# Patient Record
Sex: FEMALE | Race: WHITE | NOT HISPANIC OR LATINO | Employment: OTHER | URBAN - METROPOLITAN AREA
[De-identification: names, ages, dates, MRNs, and addresses within clinical notes are randomized per-mention and may not be internally consistent; named-entity substitution may affect disease eponyms.]

---

## 2017-01-01 ENCOUNTER — OUTSIDE FACILITY SERVICE (OUTPATIENT)
Dept: FAMILY MEDICINE CLINIC | Facility: CLINIC | Age: 82
End: 2017-01-01

## 2017-01-01 ENCOUNTER — HOSPITAL ENCOUNTER (INPATIENT)
Facility: HOSPITAL | Age: 82
LOS: 10 days | Discharge: NURSING, REHAB OR LONG-TERM CARE | End: 2018-01-08
Attending: EMERGENCY MEDICINE | Admitting: INTERNAL MEDICINE

## 2017-01-01 ENCOUNTER — APPOINTMENT (OUTPATIENT)
Dept: GENERAL RADIOLOGY | Facility: HOSPITAL | Age: 82
End: 2017-01-01

## 2017-01-01 DIAGNOSIS — R79.89 ELEVATED LFTS: ICD-10-CM

## 2017-01-01 DIAGNOSIS — E87.6 HYPOKALEMIA: ICD-10-CM

## 2017-01-01 DIAGNOSIS — R77.8 ELEVATED TROPONIN: ICD-10-CM

## 2017-01-01 DIAGNOSIS — I10 HYPERTENSION, UNSPECIFIED TYPE: ICD-10-CM

## 2017-01-01 DIAGNOSIS — N28.9 RENAL INSUFFICIENCY: ICD-10-CM

## 2017-01-01 DIAGNOSIS — J20.9 BRONCHITIS WITH BRONCHOSPASM: Primary | ICD-10-CM

## 2017-01-01 LAB
ALBUMIN SERPL-MCNC: 3.3 G/DL (ref 3.5–5.2)
ALBUMIN/GLOB SERPL: 0.8 G/DL
ALP SERPL-CCNC: 263 U/L (ref 39–117)
ALT SERPL W P-5'-P-CCNC: 47 U/L (ref 1–33)
ANION GAP SERPL CALCULATED.3IONS-SCNC: 12.5 MMOL/L
ANION GAP SERPL CALCULATED.3IONS-SCNC: 12.7 MMOL/L
ANION GAP SERPL CALCULATED.3IONS-SCNC: 15.3 MMOL/L
ANION GAP SERPL CALCULATED.3IONS-SCNC: 16.2 MMOL/L
APTT PPP: 44.7 SECONDS (ref 22.7–35.4)
AST SERPL-CCNC: 39 U/L (ref 1–32)
B PERT DNA SPEC QL NAA+PROBE: NOT DETECTED
BASOPHILS # BLD AUTO: 0.02 10*3/MM3 (ref 0–0.2)
BASOPHILS NFR BLD AUTO: 0.3 % (ref 0–1.5)
BILIRUB SERPL-MCNC: 0.2 MG/DL (ref 0.1–1.2)
BUN BLD-MCNC: 22 MG/DL (ref 8–23)
BUN BLD-MCNC: 23 MG/DL (ref 8–23)
BUN BLD-MCNC: 29 MG/DL (ref 8–23)
BUN BLD-MCNC: 31 MG/DL (ref 8–23)
BUN/CREAT SERPL: 14.6 (ref 7–25)
BUN/CREAT SERPL: 17.8 (ref 7–25)
BUN/CREAT SERPL: 18.8 (ref 7–25)
BUN/CREAT SERPL: 22.1 (ref 7–25)
C PNEUM DNA NPH QL NAA+NON-PROBE: NOT DETECTED
CALCIUM SPEC-SCNC: 8.6 MG/DL (ref 8.2–9.6)
CALCIUM SPEC-SCNC: 8.6 MG/DL (ref 8.2–9.6)
CALCIUM SPEC-SCNC: 8.7 MG/DL (ref 8.2–9.6)
CALCIUM SPEC-SCNC: 9 MG/DL (ref 8.2–9.6)
CHLORIDE SERPL-SCNC: 101 MMOL/L (ref 98–107)
CHLORIDE SERPL-SCNC: 102 MMOL/L (ref 98–107)
CHLORIDE SERPL-SCNC: 106 MMOL/L (ref 98–107)
CHLORIDE SERPL-SCNC: 107 MMOL/L (ref 98–107)
CO2 SERPL-SCNC: 22.3 MMOL/L (ref 22–29)
CO2 SERPL-SCNC: 23.5 MMOL/L (ref 22–29)
CO2 SERPL-SCNC: 23.7 MMOL/L (ref 22–29)
CO2 SERPL-SCNC: 23.8 MMOL/L (ref 22–29)
CREAT BLD-MCNC: 1.29 MG/DL (ref 0.57–1)
CREAT BLD-MCNC: 1.4 MG/DL (ref 0.57–1)
CREAT BLD-MCNC: 1.51 MG/DL (ref 0.57–1)
CREAT BLD-MCNC: 1.54 MG/DL (ref 0.57–1)
D-LACTATE SERPL-SCNC: 1.1 MMOL/L (ref 0.5–2)
DEPRECATED RDW RBC AUTO: 46.2 FL (ref 37–54)
DEPRECATED RDW RBC AUTO: 47.1 FL (ref 37–54)
DEPRECATED RDW RBC AUTO: 48 FL (ref 37–54)
EOSINOPHIL # BLD AUTO: 0.16 10*3/MM3 (ref 0–0.7)
EOSINOPHIL NFR BLD AUTO: 2.7 % (ref 0.3–6.2)
ERYTHROCYTE [DISTWIDTH] IN BLOOD BY AUTOMATED COUNT: 13.5 % (ref 11.7–13)
ERYTHROCYTE [DISTWIDTH] IN BLOOD BY AUTOMATED COUNT: 13.6 % (ref 11.7–13)
ERYTHROCYTE [DISTWIDTH] IN BLOOD BY AUTOMATED COUNT: 13.8 % (ref 11.7–13)
FLUAV AG NPH QL: NEGATIVE
FLUAV H1 2009 PAND RNA NPH QL NAA+PROBE: DETECTED
FLUAV H1 HA GENE NPH QL NAA+PROBE: NOT DETECTED
FLUAV H3 RNA NPH QL NAA+PROBE: NOT DETECTED
FLUAV SUBTYP SPEC NAA+PROBE: NOT DETECTED
FLUBV AG NPH QL IA: NEGATIVE
FLUBV RNA ISLT QL NAA+PROBE: NOT DETECTED
GFR SERPL CREATININE-BSD FRML MDRD: 32 ML/MIN/1.73
GFR SERPL CREATININE-BSD FRML MDRD: 32 ML/MIN/1.73
GFR SERPL CREATININE-BSD FRML MDRD: 35 ML/MIN/1.73
GFR SERPL CREATININE-BSD FRML MDRD: 39 ML/MIN/1.73
GLOBULIN UR ELPH-MCNC: 4.2 GM/DL
GLUCOSE BLD-MCNC: 161 MG/DL (ref 65–99)
GLUCOSE BLD-MCNC: 292 MG/DL (ref 65–99)
GLUCOSE BLD-MCNC: 299 MG/DL (ref 65–99)
GLUCOSE BLD-MCNC: 354 MG/DL (ref 65–99)
GLUCOSE BLDC GLUCOMTR-MCNC: 230 MG/DL (ref 70–130)
GLUCOSE BLDC GLUCOMTR-MCNC: 243 MG/DL (ref 70–130)
GLUCOSE BLDC GLUCOMTR-MCNC: 254 MG/DL (ref 70–130)
GLUCOSE BLDC GLUCOMTR-MCNC: 273 MG/DL (ref 70–130)
GLUCOSE BLDC GLUCOMTR-MCNC: 287 MG/DL (ref 70–130)
GLUCOSE BLDC GLUCOMTR-MCNC: 300 MG/DL (ref 70–130)
GLUCOSE BLDC GLUCOMTR-MCNC: 311 MG/DL (ref 70–130)
GLUCOSE BLDC GLUCOMTR-MCNC: 314 MG/DL (ref 70–130)
GLUCOSE BLDC GLUCOMTR-MCNC: 314 MG/DL (ref 70–130)
GLUCOSE BLDC GLUCOMTR-MCNC: 326 MG/DL (ref 70–130)
GLUCOSE BLDC GLUCOMTR-MCNC: 371 MG/DL (ref 70–130)
GLUCOSE BLDC GLUCOMTR-MCNC: 371 MG/DL (ref 70–130)
GLUCOSE BLDC GLUCOMTR-MCNC: 376 MG/DL (ref 70–130)
HADV DNA SPEC NAA+PROBE: NOT DETECTED
HBA1C MFR BLD: 9 % (ref 4.8–5.6)
HCOV 229E RNA SPEC QL NAA+PROBE: NOT DETECTED
HCOV HKU1 RNA SPEC QL NAA+PROBE: NOT DETECTED
HCOV NL63 RNA SPEC QL NAA+PROBE: NOT DETECTED
HCOV OC43 RNA SPEC QL NAA+PROBE: NOT DETECTED
HCT VFR BLD AUTO: 36.9 % (ref 35.6–45.5)
HCT VFR BLD AUTO: 38 % (ref 35.6–45.5)
HCT VFR BLD AUTO: 43.1 % (ref 35.6–45.5)
HGB BLD-MCNC: 11.2 G/DL (ref 11.9–15.5)
HGB BLD-MCNC: 11.5 G/DL (ref 11.9–15.5)
HGB BLD-MCNC: 13.4 G/DL (ref 11.9–15.5)
HMPV RNA NPH QL NAA+NON-PROBE: NOT DETECTED
HPIV1 RNA SPEC QL NAA+PROBE: NOT DETECTED
HPIV2 RNA SPEC QL NAA+PROBE: NOT DETECTED
HPIV3 RNA NPH QL NAA+PROBE: NOT DETECTED
HPIV4 P GENE NPH QL NAA+PROBE: NOT DETECTED
IMM GRANULOCYTES # BLD: 0 10*3/MM3 (ref 0–0.03)
IMM GRANULOCYTES NFR BLD: 0 % (ref 0–0.5)
INR PPP: 0.98 (ref 0.9–1.1)
LYMPHOCYTES # BLD AUTO: 2.05 10*3/MM3 (ref 0.9–4.8)
LYMPHOCYTES NFR BLD AUTO: 34.9 % (ref 19.6–45.3)
M PNEUMO IGG SER IA-ACNC: NOT DETECTED
MCH RBC QN AUTO: 29 PG (ref 26.9–32)
MCH RBC QN AUTO: 29.1 PG (ref 26.9–32)
MCH RBC QN AUTO: 29.3 PG (ref 26.9–32)
MCHC RBC AUTO-ENTMCNC: 30.3 G/DL (ref 32.4–36.3)
MCHC RBC AUTO-ENTMCNC: 30.4 G/DL (ref 32.4–36.3)
MCHC RBC AUTO-ENTMCNC: 31.1 G/DL (ref 32.4–36.3)
MCV RBC AUTO: 94.3 FL (ref 80.5–98.2)
MCV RBC AUTO: 95.7 FL (ref 80.5–98.2)
MCV RBC AUTO: 95.8 FL (ref 80.5–98.2)
MONOCYTES # BLD AUTO: 0.79 10*3/MM3 (ref 0.2–1.2)
MONOCYTES NFR BLD AUTO: 13.5 % (ref 5–12)
NEUTROPHILS # BLD AUTO: 2.85 10*3/MM3 (ref 1.9–8.1)
NEUTROPHILS NFR BLD AUTO: 48.6 % (ref 42.7–76)
NT-PROBNP SERPL-MCNC: 380.6 PG/ML (ref 0–1800)
PLATELET # BLD AUTO: 170 10*3/MM3 (ref 140–500)
PLATELET # BLD AUTO: 181 10*3/MM3 (ref 140–500)
PLATELET # BLD AUTO: 183 10*3/MM3 (ref 140–500)
PMV BLD AUTO: 11.9 FL (ref 6–12)
PMV BLD AUTO: 12.1 FL (ref 6–12)
PMV BLD AUTO: 12.1 FL (ref 6–12)
POTASSIUM BLD-SCNC: 3.3 MMOL/L (ref 3.5–5.2)
POTASSIUM BLD-SCNC: 3.7 MMOL/L (ref 3.5–5.2)
POTASSIUM BLD-SCNC: 4.2 MMOL/L (ref 3.5–5.2)
POTASSIUM BLD-SCNC: 4.7 MMOL/L (ref 3.5–5.2)
PROT SERPL-MCNC: 7.5 G/DL (ref 6–8.5)
PROTHROMBIN TIME: 12.6 SECONDS (ref 11.7–14.2)
RBC # BLD AUTO: 3.85 10*6/MM3 (ref 3.9–5.2)
RBC # BLD AUTO: 3.97 10*6/MM3 (ref 3.9–5.2)
RBC # BLD AUTO: 4.57 10*6/MM3 (ref 3.9–5.2)
RHINOVIRUS RNA SPEC NAA+PROBE: NOT DETECTED
RSV RNA NPH QL NAA+NON-PROBE: NOT DETECTED
SODIUM BLD-SCNC: 140 MMOL/L (ref 136–145)
SODIUM BLD-SCNC: 142 MMOL/L (ref 136–145)
TROPONIN T SERPL-MCNC: 0.07 NG/ML (ref 0–0.03)
TROPONIN T SERPL-MCNC: 0.1 NG/ML (ref 0–0.03)
VANCOMYCIN SERPL-MCNC: 15.6 MCG/ML (ref 5–40)
VANCOMYCIN SERPL-MCNC: 23.5 MCG/ML (ref 5–40)
WBC NRBC COR # BLD: 5.87 10*3/MM3 (ref 4.5–10.7)
WBC NRBC COR # BLD: 6.59 10*3/MM3 (ref 4.5–10.7)
WBC NRBC COR # BLD: 8.19 10*3/MM3 (ref 4.5–10.7)

## 2017-01-01 PROCEDURE — 25010000002 VANCOMYCIN 10 G RECONSTITUTED SOLUTION: Performed by: INTERNAL MEDICINE

## 2017-01-01 PROCEDURE — 99308 SBSQ NF CARE LOW MDM 20: CPT | Performed by: NURSE PRACTITIONER

## 2017-01-01 PROCEDURE — 63710000001 INSULIN ASPART PER 5 UNITS: Performed by: INTERNAL MEDICINE

## 2017-01-01 PROCEDURE — 36415 COLL VENOUS BLD VENIPUNCTURE: CPT

## 2017-01-01 PROCEDURE — 94640 AIRWAY INHALATION TREATMENT: CPT

## 2017-01-01 PROCEDURE — 94799 UNLISTED PULMONARY SVC/PX: CPT

## 2017-01-01 PROCEDURE — 85730 THROMBOPLASTIN TIME PARTIAL: CPT | Performed by: EMERGENCY MEDICINE

## 2017-01-01 PROCEDURE — 25010000002 ENOXAPARIN PER 10 MG: Performed by: INTERNAL MEDICINE

## 2017-01-01 PROCEDURE — G8980 MOBILITY D/C STATUS: HCPCS

## 2017-01-01 PROCEDURE — 85610 PROTHROMBIN TIME: CPT | Performed by: EMERGENCY MEDICINE

## 2017-01-01 PROCEDURE — 25010000002 METHYLPREDNISOLONE PER 125 MG: Performed by: INTERNAL MEDICINE

## 2017-01-01 PROCEDURE — 85025 COMPLETE CBC W/AUTO DIFF WBC: CPT | Performed by: EMERGENCY MEDICINE

## 2017-01-01 PROCEDURE — 85027 COMPLETE CBC AUTOMATED: CPT | Performed by: INTERNAL MEDICINE

## 2017-01-01 PROCEDURE — 25010000002 METHYLPREDNISOLONE PER 40 MG: Performed by: INTERNAL MEDICINE

## 2017-01-01 PROCEDURE — 82962 GLUCOSE BLOOD TEST: CPT

## 2017-01-01 PROCEDURE — 80202 ASSAY OF VANCOMYCIN: CPT | Performed by: INTERNAL MEDICINE

## 2017-01-01 PROCEDURE — 84484 ASSAY OF TROPONIN QUANT: CPT | Performed by: EMERGENCY MEDICINE

## 2017-01-01 PROCEDURE — 93005 ELECTROCARDIOGRAM TRACING: CPT | Performed by: INTERNAL MEDICINE

## 2017-01-01 PROCEDURE — 25010000002 VANCOMYCIN 10 G RECONSTITUTED SOLUTION: Performed by: EMERGENCY MEDICINE

## 2017-01-01 PROCEDURE — 84484 ASSAY OF TROPONIN QUANT: CPT | Performed by: INTERNAL MEDICINE

## 2017-01-01 PROCEDURE — 93010 ELECTROCARDIOGRAM REPORT: CPT | Performed by: INTERNAL MEDICINE

## 2017-01-01 PROCEDURE — 87581 M.PNEUMON DNA AMP PROBE: CPT | Performed by: INTERNAL MEDICINE

## 2017-01-01 PROCEDURE — G8978 MOBILITY CURRENT STATUS: HCPCS

## 2017-01-01 PROCEDURE — 93005 ELECTROCARDIOGRAM TRACING: CPT | Performed by: EMERGENCY MEDICINE

## 2017-01-01 PROCEDURE — 87486 CHLMYD PNEUM DNA AMP PROBE: CPT | Performed by: INTERNAL MEDICINE

## 2017-01-01 PROCEDURE — 71020 HC CHEST PA AND LATERAL: CPT

## 2017-01-01 PROCEDURE — 87804 INFLUENZA ASSAY W/OPTIC: CPT | Performed by: EMERGENCY MEDICINE

## 2017-01-01 PROCEDURE — 83036 HEMOGLOBIN GLYCOSYLATED A1C: CPT | Performed by: INTERNAL MEDICINE

## 2017-01-01 PROCEDURE — 87081 CULTURE SCREEN ONLY: CPT | Performed by: INTERNAL MEDICINE

## 2017-01-01 PROCEDURE — 99308 SBSQ NF CARE LOW MDM 20: CPT | Performed by: FAMILY MEDICINE

## 2017-01-01 PROCEDURE — 63710000001 INSULIN DETEMER PER 5 UNITS: Performed by: INTERNAL MEDICINE

## 2017-01-01 PROCEDURE — 25010000002 PIPERACILLIN SOD-TAZOBACTAM PER 1 G: Performed by: EMERGENCY MEDICINE

## 2017-01-01 PROCEDURE — 36415 COLL VENOUS BLD VENIPUNCTURE: CPT | Performed by: EMERGENCY MEDICINE

## 2017-01-01 PROCEDURE — 80053 COMPREHEN METABOLIC PANEL: CPT | Performed by: EMERGENCY MEDICINE

## 2017-01-01 PROCEDURE — 87798 DETECT AGENT NOS DNA AMP: CPT | Performed by: INTERNAL MEDICINE

## 2017-01-01 PROCEDURE — 25010000002 PIPERACILLIN SOD-TAZOBACTAM PER 1 G: Performed by: INTERNAL MEDICINE

## 2017-01-01 PROCEDURE — G0439 PPPS, SUBSEQ VISIT: HCPCS | Performed by: NURSE PRACTITIONER

## 2017-01-01 PROCEDURE — 25010000002 FUROSEMIDE PER 20 MG: Performed by: EMERGENCY MEDICINE

## 2017-01-01 PROCEDURE — G8979 MOBILITY GOAL STATUS: HCPCS

## 2017-01-01 PROCEDURE — 99307 SBSQ NF CARE SF MDM 10: CPT | Performed by: FAMILY MEDICINE

## 2017-01-01 PROCEDURE — 97162 PT EVAL MOD COMPLEX 30 MIN: CPT

## 2017-01-01 PROCEDURE — 83880 ASSAY OF NATRIURETIC PEPTIDE: CPT | Performed by: EMERGENCY MEDICINE

## 2017-01-01 PROCEDURE — 80048 BASIC METABOLIC PNL TOTAL CA: CPT | Performed by: INTERNAL MEDICINE

## 2017-01-01 PROCEDURE — 99285 EMERGENCY DEPT VISIT HI MDM: CPT

## 2017-01-01 PROCEDURE — 25010000002 METHYLPREDNISOLONE PER 125 MG: Performed by: EMERGENCY MEDICINE

## 2017-01-01 PROCEDURE — 83605 ASSAY OF LACTIC ACID: CPT | Performed by: EMERGENCY MEDICINE

## 2017-01-01 PROCEDURE — 87633 RESP VIRUS 12-25 TARGETS: CPT | Performed by: INTERNAL MEDICINE

## 2017-01-01 PROCEDURE — 63710000001 PREDNISONE PER 5 MG: Performed by: INTERNAL MEDICINE

## 2017-01-01 RX ORDER — IPRATROPIUM BROMIDE AND ALBUTEROL SULFATE 2.5; .5 MG/3ML; MG/3ML
3 SOLUTION RESPIRATORY (INHALATION) EVERY 4 HOURS PRN
COMMUNITY
End: 2018-01-01 | Stop reason: SDUPTHER

## 2017-01-01 RX ORDER — IPRATROPIUM BROMIDE AND ALBUTEROL SULFATE 2.5; .5 MG/3ML; MG/3ML
3 SOLUTION RESPIRATORY (INHALATION) EVERY 4 HOURS PRN
Status: DISCONTINUED | OUTPATIENT
Start: 2017-01-01 | End: 2018-01-01 | Stop reason: HOSPADM

## 2017-01-01 RX ORDER — POLYETHYLENE GLYCOL 3350 17 G/17G
17 POWDER, FOR SOLUTION ORAL DAILY PRN
Status: DISCONTINUED | OUTPATIENT
Start: 2017-01-01 | End: 2018-01-01 | Stop reason: HOSPADM

## 2017-01-01 RX ORDER — PREDNISONE 20 MG/1
20 TABLET ORAL 2 TIMES DAILY WITH MEALS
Status: DISCONTINUED | OUTPATIENT
Start: 2017-01-01 | End: 2017-01-01

## 2017-01-01 RX ORDER — METHYLPREDNISOLONE SODIUM SUCCINATE 40 MG/ML
40 INJECTION, POWDER, LYOPHILIZED, FOR SOLUTION INTRAMUSCULAR; INTRAVENOUS EVERY 12 HOURS
Status: DISCONTINUED | OUTPATIENT
Start: 2017-01-01 | End: 2017-01-01

## 2017-01-01 RX ORDER — FUROSEMIDE 10 MG/ML
40 INJECTION INTRAMUSCULAR; INTRAVENOUS ONCE
Status: COMPLETED | OUTPATIENT
Start: 2017-01-01 | End: 2017-01-01

## 2017-01-01 RX ORDER — ATORVASTATIN CALCIUM 10 MG/1
10 TABLET, FILM COATED ORAL NIGHTLY
Status: DISCONTINUED | OUTPATIENT
Start: 2017-01-01 | End: 2018-01-01 | Stop reason: HOSPADM

## 2017-01-01 RX ORDER — METOCLOPRAMIDE 5 MG/1
5 TABLET ORAL
Status: DISCONTINUED | OUTPATIENT
Start: 2017-01-01 | End: 2018-01-01 | Stop reason: HOSPADM

## 2017-01-01 RX ORDER — ROPINIROLE 0.25 MG/1
0.25 TABLET, FILM COATED ORAL NIGHTLY
COMMUNITY

## 2017-01-01 RX ORDER — IPRATROPIUM BROMIDE AND ALBUTEROL SULFATE 2.5; .5 MG/3ML; MG/3ML
3 SOLUTION RESPIRATORY (INHALATION)
Status: DISCONTINUED | OUTPATIENT
Start: 2017-01-01 | End: 2018-01-01

## 2017-01-01 RX ORDER — POLYETHYLENE GLYCOL 3350 17 G/17G
17 POWDER, FOR SOLUTION ORAL DAILY PRN
COMMUNITY

## 2017-01-01 RX ORDER — METOPROLOL SUCCINATE 50 MG/1
50 TABLET, EXTENDED RELEASE ORAL DAILY
Status: DISCONTINUED | OUTPATIENT
Start: 2017-01-01 | End: 2018-01-01 | Stop reason: HOSPADM

## 2017-01-01 RX ORDER — AMLODIPINE BESYLATE 5 MG/1
5 TABLET ORAL DAILY
Status: DISCONTINUED | OUTPATIENT
Start: 2017-01-01 | End: 2018-01-01

## 2017-01-01 RX ORDER — ASPIRIN 325 MG
325 TABLET ORAL ONCE
Status: COMPLETED | OUTPATIENT
Start: 2017-01-01 | End: 2017-01-01

## 2017-01-01 RX ORDER — DEXTROSE MONOHYDRATE 25 G/50ML
25 INJECTION, SOLUTION INTRAVENOUS
Status: DISCONTINUED | OUTPATIENT
Start: 2017-01-01 | End: 2018-01-01 | Stop reason: HOSPADM

## 2017-01-01 RX ORDER — PROMETHAZINE HYDROCHLORIDE 12.5 MG/1
12.5 TABLET ORAL EVERY 6 HOURS PRN
Status: DISCONTINUED | OUTPATIENT
Start: 2017-01-01 | End: 2018-01-01 | Stop reason: HOSPADM

## 2017-01-01 RX ORDER — ALBUTEROL SULFATE 2.5 MG/3ML
2.5 SOLUTION RESPIRATORY (INHALATION)
Status: COMPLETED | OUTPATIENT
Start: 2017-01-01 | End: 2017-01-01

## 2017-01-01 RX ORDER — IRON ASPGLY,PS/C/B12/FA/CA/SUC 150-25-1
1 CAPSULE ORAL
Status: DISCONTINUED | OUTPATIENT
Start: 2017-01-01 | End: 2018-01-01 | Stop reason: HOSPADM

## 2017-01-01 RX ORDER — SODIUM CHLORIDE 9 MG/ML
100 INJECTION, SOLUTION INTRAVENOUS CONTINUOUS
Status: DISCONTINUED | OUTPATIENT
Start: 2017-01-01 | End: 2017-01-01

## 2017-01-01 RX ORDER — ISOSORBIDE MONONITRATE 30 MG/1
30 TABLET, EXTENDED RELEASE ORAL DAILY
Status: DISCONTINUED | OUTPATIENT
Start: 2017-01-01 | End: 2018-01-01 | Stop reason: HOSPADM

## 2017-01-01 RX ORDER — METHYLPREDNISOLONE SODIUM SUCCINATE 125 MG/2ML
60 INJECTION, POWDER, LYOPHILIZED, FOR SOLUTION INTRAMUSCULAR; INTRAVENOUS EVERY 8 HOURS
Status: DISCONTINUED | OUTPATIENT
Start: 2017-01-01 | End: 2017-01-01

## 2017-01-01 RX ORDER — NITROGLYCERIN 0.4 MG/1
0.4 TABLET SUBLINGUAL
Status: DISCONTINUED | OUTPATIENT
Start: 2017-01-01 | End: 2018-01-01 | Stop reason: HOSPADM

## 2017-01-01 RX ORDER — LEVOTHYROXINE SODIUM 0.1 MG/1
100 TABLET ORAL EVERY MORNING
Status: DISCONTINUED | OUTPATIENT
Start: 2017-01-01 | End: 2018-01-01 | Stop reason: HOSPADM

## 2017-01-01 RX ORDER — OSELTAMIVIR PHOSPHATE 75 MG/1
75 CAPSULE ORAL EVERY 12 HOURS SCHEDULED
Status: DISCONTINUED | OUTPATIENT
Start: 2017-01-01 | End: 2017-01-01

## 2017-01-01 RX ORDER — ECHINACEA PURPUREA EXTRACT 125 MG
2 TABLET ORAL AS NEEDED
Status: DISCONTINUED | OUTPATIENT
Start: 2017-01-01 | End: 2018-01-01 | Stop reason: HOSPADM

## 2017-01-01 RX ORDER — METOPROLOL SUCCINATE 50 MG/1
50 TABLET, EXTENDED RELEASE ORAL DAILY
COMMUNITY

## 2017-01-01 RX ORDER — PANTOPRAZOLE SODIUM 40 MG/1
40 TABLET, DELAYED RELEASE ORAL EVERY MORNING
Status: DISCONTINUED | OUTPATIENT
Start: 2017-01-01 | End: 2018-01-01 | Stop reason: HOSPADM

## 2017-01-01 RX ORDER — ASPIRIN 81 MG/1
81 TABLET, CHEWABLE ORAL DAILY
Status: DISCONTINUED | OUTPATIENT
Start: 2017-01-01 | End: 2018-01-01 | Stop reason: HOSPADM

## 2017-01-01 RX ORDER — OSELTAMIVIR PHOSPHATE 30 MG/1
30 CAPSULE ORAL
Status: DISCONTINUED | OUTPATIENT
Start: 2017-01-01 | End: 2018-01-01 | Stop reason: DRUGHIGH

## 2017-01-01 RX ORDER — TROLAMINE SALICYLATE 10 G/100G
1 CREAM TOPICAL 2 TIMES DAILY
COMMUNITY
End: 2018-01-01

## 2017-01-01 RX ORDER — METHYLPREDNISOLONE SODIUM SUCCINATE 125 MG/2ML
125 INJECTION, POWDER, LYOPHILIZED, FOR SOLUTION INTRAMUSCULAR; INTRAVENOUS ONCE
Status: COMPLETED | OUTPATIENT
Start: 2017-01-01 | End: 2017-01-01

## 2017-01-01 RX ORDER — ROPINIROLE 0.25 MG/1
0.25 TABLET, FILM COATED ORAL NIGHTLY
Status: DISCONTINUED | OUTPATIENT
Start: 2017-01-01 | End: 2018-01-01 | Stop reason: HOSPADM

## 2017-01-01 RX ORDER — IPRATROPIUM BROMIDE AND ALBUTEROL SULFATE 2.5; .5 MG/3ML; MG/3ML
3 SOLUTION RESPIRATORY (INHALATION) ONCE
Status: COMPLETED | OUTPATIENT
Start: 2017-01-01 | End: 2017-01-01

## 2017-01-01 RX ORDER — HYDROCODONE BITARTRATE AND ACETAMINOPHEN 5; 325 MG/1; MG/1
1 TABLET ORAL EVERY 4 HOURS PRN
Status: DISCONTINUED | OUTPATIENT
Start: 2017-01-01 | End: 2018-01-01 | Stop reason: HOSPADM

## 2017-01-01 RX ORDER — NICOTINE POLACRILEX 4 MG
15 LOZENGE BUCCAL
Status: DISCONTINUED | OUTPATIENT
Start: 2017-01-01 | End: 2018-01-01 | Stop reason: HOSPADM

## 2017-01-01 RX ORDER — CLOPIDOGREL BISULFATE 75 MG/1
75 TABLET ORAL DAILY
Status: DISCONTINUED | OUTPATIENT
Start: 2017-01-01 | End: 2018-01-01 | Stop reason: HOSPADM

## 2017-01-01 RX ORDER — ASPIRIN 300 MG/1
300 SUPPOSITORY RECTAL ONCE
Status: DISCONTINUED | OUTPATIENT
Start: 2017-01-01 | End: 2017-01-01

## 2017-01-01 RX ORDER — POTASSIUM CHLORIDE 750 MG/1
40 CAPSULE, EXTENDED RELEASE ORAL ONCE
Status: COMPLETED | OUTPATIENT
Start: 2017-01-01 | End: 2017-01-01

## 2017-01-01 RX ORDER — ACETAMINOPHEN 325 MG/1
650 TABLET ORAL EVERY 6 HOURS PRN
Status: DISCONTINUED | OUTPATIENT
Start: 2017-01-01 | End: 2018-01-01 | Stop reason: HOSPADM

## 2017-01-01 RX ORDER — PREDNISONE 10 MG/1
10 TABLET ORAL 2 TIMES DAILY WITH MEALS
Status: DISCONTINUED | OUTPATIENT
Start: 2017-01-01 | End: 2018-01-01

## 2017-01-01 RX ADMIN — VANCOMYCIN HYDROCHLORIDE 1250 MG: 10 INJECTION, POWDER, LYOPHILIZED, FOR SOLUTION INTRAVENOUS at 01:32

## 2017-01-01 RX ADMIN — INSULIN ASPART 6 UNITS: 100 INJECTION, SOLUTION INTRAVENOUS; SUBCUTANEOUS at 17:35

## 2017-01-01 RX ADMIN — OSELTAMIVIR PHOSPHATE 75 MG: 75 CAPSULE ORAL at 01:31

## 2017-01-01 RX ADMIN — SODIUM CHLORIDE 100 ML/HR: 9 INJECTION, SOLUTION INTRAVENOUS at 05:24

## 2017-01-01 RX ADMIN — LEVOTHYROXINE SODIUM 100 MCG: 100 TABLET ORAL at 07:37

## 2017-01-01 RX ADMIN — INSULIN ASPART 4 UNITS: 100 INJECTION, SOLUTION INTRAVENOUS; SUBCUTANEOUS at 08:36

## 2017-01-01 RX ADMIN — METOCLOPRAMIDE 5 MG: 5 TABLET ORAL at 17:35

## 2017-01-01 RX ADMIN — CLOPIDOGREL 75 MG: 75 TABLET, FILM COATED ORAL at 08:29

## 2017-01-01 RX ADMIN — TAZOBACTAM SODIUM AND PIPERACILLIN SODIUM 3.38 G: 375; 3 INJECTION, SOLUTION INTRAVENOUS at 06:06

## 2017-01-01 RX ADMIN — OSELTAMIVIR PHOSPHATE 75 MG: 75 CAPSULE ORAL at 09:13

## 2017-01-01 RX ADMIN — LINAGLIPTIN 5 MG: 5 TABLET, FILM COATED ORAL at 09:12

## 2017-01-01 RX ADMIN — INSULIN ASPART 5 UNITS: 100 INJECTION, SOLUTION INTRAVENOUS; SUBCUTANEOUS at 11:42

## 2017-01-01 RX ADMIN — METOCLOPRAMIDE 5 MG: 5 TABLET ORAL at 11:36

## 2017-01-01 RX ADMIN — ATORVASTATIN CALCIUM 10 MG: 10 TABLET, FILM COATED ORAL at 20:31

## 2017-01-01 RX ADMIN — IPRATROPIUM BROMIDE AND ALBUTEROL SULFATE 3 ML: .5; 3 SOLUTION RESPIRATORY (INHALATION) at 23:26

## 2017-01-01 RX ADMIN — INSULIN ASPART 5 UNITS: 100 INJECTION, SOLUTION INTRAVENOUS; SUBCUTANEOUS at 11:35

## 2017-01-01 RX ADMIN — TAZOBACTAM SODIUM AND PIPERACILLIN SODIUM 3.38 G: 375; 3 INJECTION, SOLUTION INTRAVENOUS at 00:35

## 2017-01-01 RX ADMIN — AMLODIPINE BESYLATE 5 MG: 5 TABLET ORAL at 08:29

## 2017-01-01 RX ADMIN — ROPINIROLE 0.25 MG: 0.25 TABLET, FILM COATED ORAL at 20:31

## 2017-01-01 RX ADMIN — ATORVASTATIN CALCIUM 10 MG: 10 TABLET, FILM COATED ORAL at 20:12

## 2017-01-01 RX ADMIN — DOCUSATE SODIUM 250 MG: 50 CAPSULE, LIQUID FILLED ORAL at 08:35

## 2017-01-01 RX ADMIN — DOCUSATE SODIUM 250 MG: 50 CAPSULE, LIQUID FILLED ORAL at 09:13

## 2017-01-01 RX ADMIN — METOCLOPRAMIDE 5 MG: 5 TABLET ORAL at 20:12

## 2017-01-01 RX ADMIN — INSULIN ASPART 3 UNITS: 100 INJECTION, SOLUTION INTRAVENOUS; SUBCUTANEOUS at 20:30

## 2017-01-01 RX ADMIN — METOPROLOL SUCCINATE 50 MG: 50 TABLET, FILM COATED, EXTENDED RELEASE ORAL at 08:29

## 2017-01-01 RX ADMIN — ASPIRIN 81 MG: 81 TABLET, CHEWABLE ORAL at 09:13

## 2017-01-01 RX ADMIN — METOCLOPRAMIDE 5 MG: 5 TABLET ORAL at 07:37

## 2017-01-01 RX ADMIN — ASPIRIN 81 MG: 81 TABLET, CHEWABLE ORAL at 08:29

## 2017-01-01 RX ADMIN — ATORVASTATIN CALCIUM 10 MG: 10 TABLET, FILM COATED ORAL at 22:00

## 2017-01-01 RX ADMIN — IPRATROPIUM BROMIDE AND ALBUTEROL SULFATE 3 ML: .5; 3 SOLUTION RESPIRATORY (INHALATION) at 07:24

## 2017-01-01 RX ADMIN — INSULIN DETEMIR 25 UNITS: 100 INJECTION, SOLUTION SUBCUTANEOUS at 21:18

## 2017-01-01 RX ADMIN — ALBUTEROL SULFATE 2.5 MG: 2.5 SOLUTION RESPIRATORY (INHALATION) at 00:14

## 2017-01-01 RX ADMIN — Medication 1 CAPSULE: at 09:14

## 2017-01-01 RX ADMIN — OSELTAMIVIR PHOSPHATE 30 MG: 30 CAPSULE ORAL at 08:29

## 2017-01-01 RX ADMIN — Medication 1 CAPSULE: at 08:35

## 2017-01-01 RX ADMIN — LEVOTHYROXINE SODIUM 100 MCG: 100 TABLET ORAL at 06:58

## 2017-01-01 RX ADMIN — IPRATROPIUM BROMIDE AND ALBUTEROL SULFATE 3 ML: .5; 3 SOLUTION RESPIRATORY (INHALATION) at 15:17

## 2017-01-01 RX ADMIN — CLOPIDOGREL 75 MG: 75 TABLET, FILM COATED ORAL at 10:18

## 2017-01-01 RX ADMIN — PANTOPRAZOLE SODIUM 40 MG: 40 TABLET, DELAYED RELEASE ORAL at 07:37

## 2017-01-01 RX ADMIN — DOCUSATE SODIUM 250 MG: 50 CAPSULE, LIQUID FILLED ORAL at 10:23

## 2017-01-01 RX ADMIN — INSULIN ASPART 5 UNITS: 100 INJECTION, SOLUTION INTRAVENOUS; SUBCUTANEOUS at 22:01

## 2017-01-01 RX ADMIN — PANTOPRAZOLE SODIUM 40 MG: 40 TABLET, DELAYED RELEASE ORAL at 11:35

## 2017-01-01 RX ADMIN — IPRATROPIUM BROMIDE AND ALBUTEROL SULFATE 3 ML: .5; 3 SOLUTION RESPIRATORY (INHALATION) at 21:18

## 2017-01-01 RX ADMIN — LINAGLIPTIN 5 MG: 5 TABLET, FILM COATED ORAL at 08:29

## 2017-01-01 RX ADMIN — METOCLOPRAMIDE 5 MG: 5 TABLET ORAL at 20:31

## 2017-01-01 RX ADMIN — PREDNISONE 10 MG: 10 TABLET ORAL at 17:27

## 2017-01-01 RX ADMIN — PANTOPRAZOLE SODIUM 40 MG: 40 TABLET, DELAYED RELEASE ORAL at 06:58

## 2017-01-01 RX ADMIN — ENOXAPARIN SODIUM 30 MG: 30 INJECTION SUBCUTANEOUS at 11:35

## 2017-01-01 RX ADMIN — IPRATROPIUM BROMIDE AND ALBUTEROL SULFATE 3 ML: .5; 3 SOLUTION RESPIRATORY (INHALATION) at 16:31

## 2017-01-01 RX ADMIN — METHYLPREDNISOLONE SODIUM SUCCINATE 40 MG: 125 INJECTION, POWDER, FOR SOLUTION INTRAMUSCULAR; INTRAVENOUS at 20:15

## 2017-01-01 RX ADMIN — METOCLOPRAMIDE 5 MG: 5 TABLET ORAL at 22:00

## 2017-01-01 RX ADMIN — IPRATROPIUM BROMIDE AND ALBUTEROL SULFATE 3 ML: .5; 3 SOLUTION RESPIRATORY (INHALATION) at 15:14

## 2017-01-01 RX ADMIN — METOCLOPRAMIDE 5 MG: 5 TABLET ORAL at 17:46

## 2017-01-01 RX ADMIN — INSULIN ASPART 5 UNITS: 100 INJECTION, SOLUTION INTRAVENOUS; SUBCUTANEOUS at 09:13

## 2017-01-01 RX ADMIN — CLOPIDOGREL 75 MG: 75 TABLET, FILM COATED ORAL at 09:13

## 2017-01-01 RX ADMIN — ACETAMINOPHEN 325 MG: 325 TABLET ORAL at 20:47

## 2017-01-01 RX ADMIN — LEVOTHYROXINE SODIUM 100 MCG: 100 TABLET ORAL at 10:19

## 2017-01-01 RX ADMIN — VANCOMYCIN HYDROCHLORIDE 1500 MG: 10 INJECTION, POWDER, LYOPHILIZED, FOR SOLUTION INTRAVENOUS at 03:28

## 2017-01-01 RX ADMIN — INSULIN DETEMIR 15 UNITS: 100 INJECTION, SOLUTION SUBCUTANEOUS at 01:32

## 2017-01-01 RX ADMIN — AMLODIPINE BESYLATE 5 MG: 5 TABLET ORAL at 10:20

## 2017-01-01 RX ADMIN — SODIUM CHLORIDE 100 ML/HR: 9 INJECTION, SOLUTION INTRAVENOUS at 23:47

## 2017-01-01 RX ADMIN — METOPROLOL SUCCINATE 50 MG: 50 TABLET, FILM COATED, EXTENDED RELEASE ORAL at 09:12

## 2017-01-01 RX ADMIN — ENOXAPARIN SODIUM 30 MG: 30 INJECTION SUBCUTANEOUS at 09:13

## 2017-01-01 RX ADMIN — INSULIN ASPART 4 UNITS: 100 INJECTION, SOLUTION INTRAVENOUS; SUBCUTANEOUS at 17:26

## 2017-01-01 RX ADMIN — METHYLPREDNISOLONE SODIUM SUCCINATE 60 MG: 125 INJECTION, POWDER, FOR SOLUTION INTRAMUSCULAR; INTRAVENOUS at 00:34

## 2017-01-01 RX ADMIN — ALBUTEROL SULFATE 2.5 MG: 2.5 SOLUTION RESPIRATORY (INHALATION) at 23:31

## 2017-01-01 RX ADMIN — IPRATROPIUM BROMIDE AND ALBUTEROL SULFATE 3 ML: .5; 3 SOLUTION RESPIRATORY (INHALATION) at 09:07

## 2017-01-01 RX ADMIN — AMLODIPINE BESYLATE 5 MG: 5 TABLET ORAL at 09:13

## 2017-01-01 RX ADMIN — METOCLOPRAMIDE 5 MG: 5 TABLET ORAL at 06:58

## 2017-01-01 RX ADMIN — NITROGLYCERIN 1 INCH: 20 OINTMENT TOPICAL at 00:35

## 2017-01-01 RX ADMIN — Medication 1 CAPSULE: at 11:36

## 2017-01-01 RX ADMIN — INSULIN ASPART 6 UNITS: 100 INJECTION, SOLUTION INTRAVENOUS; SUBCUTANEOUS at 21:17

## 2017-01-01 RX ADMIN — METOCLOPRAMIDE 5 MG: 5 TABLET ORAL at 17:27

## 2017-01-01 RX ADMIN — INSULIN ASPART 6 UNITS: 100 INJECTION, SOLUTION INTRAVENOUS; SUBCUTANEOUS at 12:47

## 2017-01-01 RX ADMIN — IPRATROPIUM BROMIDE AND ALBUTEROL SULFATE 3 ML: .5; 3 SOLUTION RESPIRATORY (INHALATION) at 12:49

## 2017-01-01 RX ADMIN — ENOXAPARIN SODIUM 30 MG: 30 INJECTION SUBCUTANEOUS at 08:29

## 2017-01-01 RX ADMIN — IPRATROPIUM BROMIDE AND ALBUTEROL SULFATE 3 ML: .5; 3 SOLUTION RESPIRATORY (INHALATION) at 22:25

## 2017-01-01 RX ADMIN — METOCLOPRAMIDE 5 MG: 5 TABLET ORAL at 11:42

## 2017-01-01 RX ADMIN — METOCLOPRAMIDE 5 MG: 5 TABLET ORAL at 12:50

## 2017-01-01 RX ADMIN — IPRATROPIUM BROMIDE AND ALBUTEROL SULFATE 3 ML: .5; 3 SOLUTION RESPIRATORY (INHALATION) at 07:05

## 2017-01-01 RX ADMIN — METHYLPREDNISOLONE SODIUM SUCCINATE 40 MG: 125 INJECTION, POWDER, FOR SOLUTION INTRAMUSCULAR; INTRAVENOUS at 08:29

## 2017-01-01 RX ADMIN — INSULIN DETEMIR 25 UNITS: 100 INJECTION, SOLUTION SUBCUTANEOUS at 20:30

## 2017-01-01 RX ADMIN — IPRATROPIUM BROMIDE AND ALBUTEROL SULFATE 3 ML: .5; 3 SOLUTION RESPIRATORY (INHALATION) at 11:49

## 2017-01-01 RX ADMIN — LINAGLIPTIN 5 MG: 5 TABLET, FILM COATED ORAL at 10:19

## 2017-01-01 RX ADMIN — POTASSIUM CHLORIDE 40 MEQ: 750 CAPSULE, EXTENDED RELEASE ORAL at 11:35

## 2017-01-01 RX ADMIN — METHYLPREDNISOLONE SODIUM SUCCINATE 125 MG: 125 INJECTION, POWDER, FOR SOLUTION INTRAMUSCULAR; INTRAVENOUS at 23:47

## 2017-01-01 RX ADMIN — ISOSORBIDE MONONITRATE 30 MG: 30 TABLET ORAL at 10:19

## 2017-01-01 RX ADMIN — ISOSORBIDE MONONITRATE 30 MG: 30 TABLET ORAL at 09:13

## 2017-01-01 RX ADMIN — METHYLPREDNISOLONE SODIUM SUCCINATE 60 MG: 125 INJECTION, POWDER, FOR SOLUTION INTRAMUSCULAR; INTRAVENOUS at 09:14

## 2017-01-01 RX ADMIN — ASPIRIN 81 MG: 81 TABLET, CHEWABLE ORAL at 10:20

## 2017-01-01 RX ADMIN — IPRATROPIUM BROMIDE AND ALBUTEROL SULFATE 3 ML: .5; 3 SOLUTION RESPIRATORY (INHALATION) at 11:38

## 2017-01-01 RX ADMIN — INSULIN ASPART 5 UNITS: 100 INJECTION, SOLUTION INTRAVENOUS; SUBCUTANEOUS at 17:46

## 2017-01-01 RX ADMIN — ROPINIROLE 0.25 MG: 0.25 TABLET, FILM COATED ORAL at 20:12

## 2017-01-01 RX ADMIN — METOPROLOL SUCCINATE 50 MG: 50 TABLET, FILM COATED, EXTENDED RELEASE ORAL at 10:19

## 2017-01-01 RX ADMIN — ISOSORBIDE MONONITRATE 30 MG: 30 TABLET ORAL at 08:29

## 2017-01-01 RX ADMIN — ASPIRIN 325 MG: 325 TABLET ORAL at 00:48

## 2017-01-01 RX ADMIN — IPRATROPIUM BROMIDE AND ALBUTEROL SULFATE 3 ML: .5; 3 SOLUTION RESPIRATORY (INHALATION) at 22:03

## 2017-01-01 RX ADMIN — FUROSEMIDE 40 MG: 10 INJECTION, SOLUTION INTRAMUSCULAR; INTRAVENOUS at 00:40

## 2017-01-01 RX ADMIN — ROPINIROLE 0.25 MG: 0.25 TABLET, FILM COATED ORAL at 22:00

## 2017-01-01 RX ADMIN — TAZOBACTAM SODIUM AND PIPERACILLIN SODIUM 4.5 G: 500; 4 INJECTION, SOLUTION INTRAVENOUS at 02:52

## 2017-01-03 ENCOUNTER — OUTSIDE FACILITY SERVICE (OUTPATIENT)
Dept: FAMILY MEDICINE CLINIC | Facility: CLINIC | Age: 82
End: 2017-01-03

## 2017-01-03 PROCEDURE — 99308 SBSQ NF CARE LOW MDM 20: CPT | Performed by: NURSE PRACTITIONER

## 2017-01-13 ENCOUNTER — APPOINTMENT (OUTPATIENT)
Dept: WOUND CARE | Facility: HOSPITAL | Age: 82
End: 2017-01-13
Attending: SURGERY

## 2017-01-16 ENCOUNTER — APPOINTMENT (OUTPATIENT)
Dept: GENERAL RADIOLOGY | Facility: HOSPITAL | Age: 82
End: 2017-01-16

## 2017-01-16 ENCOUNTER — HOSPITAL ENCOUNTER (INPATIENT)
Facility: HOSPITAL | Age: 82
LOS: 4 days | Discharge: NURSING, REHAB OR LONG-TERM CARE | End: 2017-01-20
Attending: EMERGENCY MEDICINE | Admitting: INTERNAL MEDICINE

## 2017-01-16 ENCOUNTER — APPOINTMENT (OUTPATIENT)
Dept: CT IMAGING | Facility: HOSPITAL | Age: 82
End: 2017-01-16
Attending: INTERNAL MEDICINE

## 2017-01-16 DIAGNOSIS — J18.9 PNEUMONIA OF LEFT LOWER LOBE DUE TO INFECTIOUS ORGANISM: Primary | ICD-10-CM

## 2017-01-16 PROBLEM — K80.50 COMMON BILE DUCT STONE: Status: ACTIVE | Noted: 2017-01-16

## 2017-01-16 PROBLEM — J18.0 BILATERAL BRONCHOPNEUMONIA: Status: ACTIVE | Noted: 2017-01-16

## 2017-01-16 PROBLEM — J98.8: Status: ACTIVE | Noted: 2017-01-16

## 2017-01-16 PROBLEM — B97.19: Status: ACTIVE | Noted: 2017-01-16

## 2017-01-16 LAB
ALBUMIN SERPL-MCNC: 3.6 G/DL (ref 3.5–5.2)
ALBUMIN/GLOB SERPL: 0.8 G/DL
ALP SERPL-CCNC: 125 U/L (ref 39–117)
ALT SERPL W P-5'-P-CCNC: 12 U/L (ref 1–33)
ANION GAP SERPL CALCULATED.3IONS-SCNC: 13.8 MMOL/L
ANION GAP SERPL CALCULATED.3IONS-SCNC: 15.7 MMOL/L
AST SERPL-CCNC: 10 U/L (ref 1–32)
B PERT DNA SPEC QL NAA+PROBE: NOT DETECTED
BASOPHILS # BLD AUTO: 0.03 10*3/MM3 (ref 0–0.2)
BASOPHILS # BLD AUTO: 0.04 10*3/MM3 (ref 0–0.2)
BASOPHILS NFR BLD AUTO: 0.2 % (ref 0–1.5)
BASOPHILS NFR BLD AUTO: 0.2 % (ref 0–1.5)
BILIRUB SERPL-MCNC: 0.3 MG/DL (ref 0.1–1.2)
BUN BLD-MCNC: 14 MG/DL (ref 8–23)
BUN BLD-MCNC: 14 MG/DL (ref 8–23)
BUN/CREAT SERPL: 12.6 (ref 7–25)
BUN/CREAT SERPL: 14 (ref 7–25)
C PNEUM DNA NPH QL NAA+NON-PROBE: NOT DETECTED
CALCIUM SPEC-SCNC: 9 MG/DL (ref 8.6–10.5)
CALCIUM SPEC-SCNC: 9.5 MG/DL (ref 8.6–10.5)
CHLORIDE SERPL-SCNC: 97 MMOL/L (ref 98–107)
CHLORIDE SERPL-SCNC: 99 MMOL/L (ref 98–107)
CO2 SERPL-SCNC: 25.3 MMOL/L (ref 22–29)
CO2 SERPL-SCNC: 26.2 MMOL/L (ref 22–29)
CREAT BLD-MCNC: 1 MG/DL (ref 0.57–1)
CREAT BLD-MCNC: 1.11 MG/DL (ref 0.57–1)
D-LACTATE SERPL-SCNC: 1.1 MMOL/L (ref 0.5–2)
D-LACTATE SERPL-SCNC: 1.5 MMOL/L (ref 0.5–2)
DEPRECATED RDW RBC AUTO: 51.2 FL (ref 37–54)
DEPRECATED RDW RBC AUTO: 51.4 FL (ref 37–54)
EOSINOPHIL # BLD AUTO: 0.11 10*3/MM3 (ref 0–0.7)
EOSINOPHIL # BLD AUTO: 0.15 10*3/MM3 (ref 0–0.7)
EOSINOPHIL NFR BLD AUTO: 0.7 % (ref 0.3–6.2)
EOSINOPHIL NFR BLD AUTO: 0.8 % (ref 0.3–6.2)
ERYTHROCYTE [DISTWIDTH] IN BLOOD BY AUTOMATED COUNT: 15.2 % (ref 11.7–13)
ERYTHROCYTE [DISTWIDTH] IN BLOOD BY AUTOMATED COUNT: 15.2 % (ref 11.7–13)
FLUAV H1 2009 PAND RNA NPH QL NAA+PROBE: NOT DETECTED
FLUAV H1 HA GENE NPH QL NAA+PROBE: NOT DETECTED
FLUAV H3 RNA NPH QL NAA+PROBE: NOT DETECTED
FLUAV SUBTYP SPEC NAA+PROBE: NOT DETECTED
FLUBV RNA ISLT QL NAA+PROBE: NOT DETECTED
GFR SERPL CREATININE-BSD FRML MDRD: 46 ML/MIN/1.73
GFR SERPL CREATININE-BSD FRML MDRD: 52 ML/MIN/1.73
GLOBULIN UR ELPH-MCNC: 4.3 GM/DL
GLUCOSE BLD-MCNC: 171 MG/DL (ref 65–99)
GLUCOSE BLD-MCNC: 241 MG/DL (ref 65–99)
GLUCOSE BLDC GLUCOMTR-MCNC: 206 MG/DL (ref 70–130)
GLUCOSE BLDC GLUCOMTR-MCNC: 219 MG/DL (ref 70–130)
HADV DNA SPEC NAA+PROBE: NOT DETECTED
HCOV 229E RNA SPEC QL NAA+PROBE: NOT DETECTED
HCOV HKU1 RNA SPEC QL NAA+PROBE: NOT DETECTED
HCOV NL63 RNA SPEC QL NAA+PROBE: NOT DETECTED
HCOV OC43 RNA SPEC QL NAA+PROBE: NOT DETECTED
HCT VFR BLD AUTO: 37.1 % (ref 35.6–45.5)
HCT VFR BLD AUTO: 40.9 % (ref 35.6–45.5)
HGB BLD-MCNC: 11.6 G/DL (ref 11.9–15.5)
HGB BLD-MCNC: 12.3 G/DL (ref 11.9–15.5)
HMPV RNA NPH QL NAA+NON-PROBE: NOT DETECTED
HOLD SPECIMEN: NORMAL
HOLD SPECIMEN: NORMAL
HPIV1 RNA SPEC QL NAA+PROBE: NOT DETECTED
HPIV2 RNA SPEC QL NAA+PROBE: NOT DETECTED
HPIV3 RNA NPH QL NAA+PROBE: NOT DETECTED
HPIV4 P GENE NPH QL NAA+PROBE: NOT DETECTED
IMM GRANULOCYTES # BLD: 0.12 10*3/MM3 (ref 0–0.03)
IMM GRANULOCYTES # BLD: 0.13 10*3/MM3 (ref 0–0.03)
IMM GRANULOCYTES NFR BLD: 0.7 % (ref 0–0.5)
IMM GRANULOCYTES NFR BLD: 0.8 % (ref 0–0.5)
LYMPHOCYTES # BLD AUTO: 2.46 10*3/MM3 (ref 0.9–4.8)
LYMPHOCYTES # BLD AUTO: 3 10*3/MM3 (ref 0.9–4.8)
LYMPHOCYTES NFR BLD AUTO: 14.6 % (ref 19.6–45.3)
LYMPHOCYTES NFR BLD AUTO: 16.9 % (ref 19.6–45.3)
M PNEUMO IGG SER IA-ACNC: NOT DETECTED
MCH RBC QN AUTO: 27.6 PG (ref 26.9–32)
MCH RBC QN AUTO: 28.9 PG (ref 26.9–32)
MCHC RBC AUTO-ENTMCNC: 30.1 G/DL (ref 32.4–36.3)
MCHC RBC AUTO-ENTMCNC: 31.3 G/DL (ref 32.4–36.3)
MCV RBC AUTO: 91.7 FL (ref 80.5–98.2)
MCV RBC AUTO: 92.5 FL (ref 80.5–98.2)
MONOCYTES # BLD AUTO: 1.72 10*3/MM3 (ref 0.2–1.2)
MONOCYTES # BLD AUTO: 1.9 10*3/MM3 (ref 0.2–1.2)
MONOCYTES NFR BLD AUTO: 10.2 % (ref 5–12)
MONOCYTES NFR BLD AUTO: 10.7 % (ref 5–12)
NEUTROPHILS # BLD AUTO: 12.34 10*3/MM3 (ref 1.9–8.1)
NEUTROPHILS # BLD AUTO: 12.52 10*3/MM3 (ref 1.9–8.1)
NEUTROPHILS NFR BLD AUTO: 70.7 % (ref 42.7–76)
NEUTROPHILS NFR BLD AUTO: 73.5 % (ref 42.7–76)
NRBC BLD MANUAL-RTO: 0 /100 WBC (ref 0–0)
NT-PROBNP SERPL-MCNC: 953.2 PG/ML (ref 0–1800)
PLATELET # BLD AUTO: 373 10*3/MM3 (ref 140–500)
PLATELET # BLD AUTO: 380 10*3/MM3 (ref 140–500)
PMV BLD AUTO: 10.7 FL (ref 6–12)
PMV BLD AUTO: 10.9 FL (ref 6–12)
POTASSIUM BLD-SCNC: 3.6 MMOL/L (ref 3.5–5.2)
POTASSIUM BLD-SCNC: 3.7 MMOL/L (ref 3.5–5.2)
PROCALCITONIN SERPL-MCNC: 0.1 NG/ML (ref 0.1–0.25)
PROT SERPL-MCNC: 7.9 G/DL (ref 6–8.5)
RBC # BLD AUTO: 4.01 10*6/MM3 (ref 3.9–5.2)
RBC # BLD AUTO: 4.46 10*6/MM3 (ref 3.9–5.2)
RHINOVIRUS RNA SPEC NAA+PROBE: DETECTED
RSV RNA NPH QL NAA+NON-PROBE: NOT DETECTED
SODIUM BLD-SCNC: 138 MMOL/L (ref 136–145)
SODIUM BLD-SCNC: 139 MMOL/L (ref 136–145)
TROPONIN T SERPL-MCNC: <0.01 NG/ML (ref 0–0.03)
WBC NRBC COR # BLD: 16.8 10*3/MM3 (ref 4.5–10.7)
WBC NRBC COR # BLD: 17.72 10*3/MM3 (ref 4.5–10.7)
WHOLE BLOOD HOLD SPECIMEN: NORMAL
WHOLE BLOOD HOLD SPECIMEN: NORMAL

## 2017-01-16 PROCEDURE — 99222 1ST HOSP IP/OBS MODERATE 55: CPT | Performed by: INTERNAL MEDICINE

## 2017-01-16 PROCEDURE — 84145 PROCALCITONIN (PCT): CPT | Performed by: INTERNAL MEDICINE

## 2017-01-16 PROCEDURE — 83880 ASSAY OF NATRIURETIC PEPTIDE: CPT | Performed by: EMERGENCY MEDICINE

## 2017-01-16 PROCEDURE — 82962 GLUCOSE BLOOD TEST: CPT

## 2017-01-16 PROCEDURE — 25010000002 PIPERACILLIN SOD-TAZOBACTAM PER 1 G: Performed by: FAMILY MEDICINE

## 2017-01-16 PROCEDURE — 63710000001 INSULIN DETEMER PER 5 UNITS: Performed by: INTERNAL MEDICINE

## 2017-01-16 PROCEDURE — 74176 CT ABD & PELVIS W/O CONTRAST: CPT

## 2017-01-16 PROCEDURE — 85025 COMPLETE CBC W/AUTO DIFF WBC: CPT | Performed by: INTERNAL MEDICINE

## 2017-01-16 PROCEDURE — 87486 CHLMYD PNEUM DNA AMP PROBE: CPT | Performed by: INTERNAL MEDICINE

## 2017-01-16 PROCEDURE — 83605 ASSAY OF LACTIC ACID: CPT | Performed by: INTERNAL MEDICINE

## 2017-01-16 PROCEDURE — 63710000001 INSULIN ASPART PER 5 UNITS: Performed by: INTERNAL MEDICINE

## 2017-01-16 PROCEDURE — 80053 COMPREHEN METABOLIC PANEL: CPT | Performed by: EMERGENCY MEDICINE

## 2017-01-16 PROCEDURE — 25010000002 PIPERACILLIN SOD-TAZOBACTAM PER 1 G: Performed by: EMERGENCY MEDICINE

## 2017-01-16 PROCEDURE — 93010 ELECTROCARDIOGRAM REPORT: CPT | Performed by: INTERNAL MEDICINE

## 2017-01-16 PROCEDURE — 87633 RESP VIRUS 12-25 TARGETS: CPT | Performed by: INTERNAL MEDICINE

## 2017-01-16 PROCEDURE — 93005 ELECTROCARDIOGRAM TRACING: CPT | Performed by: EMERGENCY MEDICINE

## 2017-01-16 PROCEDURE — 87040 BLOOD CULTURE FOR BACTERIA: CPT | Performed by: EMERGENCY MEDICINE

## 2017-01-16 PROCEDURE — 87581 M.PNEUMON DNA AMP PROBE: CPT | Performed by: INTERNAL MEDICINE

## 2017-01-16 PROCEDURE — 84484 ASSAY OF TROPONIN QUANT: CPT | Performed by: EMERGENCY MEDICINE

## 2017-01-16 PROCEDURE — 71020 HC CHEST PA AND LATERAL: CPT

## 2017-01-16 PROCEDURE — 99223 1ST HOSP IP/OBS HIGH 75: CPT | Performed by: INTERNAL MEDICINE

## 2017-01-16 PROCEDURE — 85025 COMPLETE CBC W/AUTO DIFF WBC: CPT | Performed by: EMERGENCY MEDICINE

## 2017-01-16 PROCEDURE — 87798 DETECT AGENT NOS DNA AMP: CPT | Performed by: INTERNAL MEDICINE

## 2017-01-16 PROCEDURE — 71250 CT THORAX DX C-: CPT

## 2017-01-16 PROCEDURE — 83605 ASSAY OF LACTIC ACID: CPT | Performed by: EMERGENCY MEDICINE

## 2017-01-16 PROCEDURE — 25010000002 ENOXAPARIN PER 10 MG: Performed by: INTERNAL MEDICINE

## 2017-01-16 PROCEDURE — 99285 EMERGENCY DEPT VISIT HI MDM: CPT

## 2017-01-16 RX ORDER — TRAMADOL HYDROCHLORIDE 50 MG/1
50 TABLET ORAL EVERY 6 HOURS PRN
Status: DISCONTINUED | OUTPATIENT
Start: 2017-01-16 | End: 2017-01-16 | Stop reason: SDUPTHER

## 2017-01-16 RX ORDER — AMLODIPINE BESYLATE 5 MG/1
5 TABLET ORAL DAILY
Status: DISCONTINUED | OUTPATIENT
Start: 2017-01-16 | End: 2017-01-21 | Stop reason: HOSPADM

## 2017-01-16 RX ORDER — HYDROCODONE BITARTRATE AND ACETAMINOPHEN 5; 325 MG/1; MG/1
1 TABLET ORAL EVERY 4 HOURS PRN
Status: DISCONTINUED | OUTPATIENT
Start: 2017-01-16 | End: 2017-01-21 | Stop reason: HOSPADM

## 2017-01-16 RX ORDER — DEXTROSE MONOHYDRATE 25 G/50ML
25 INJECTION, SOLUTION INTRAVENOUS
Status: DISCONTINUED | OUTPATIENT
Start: 2017-01-16 | End: 2017-01-21 | Stop reason: HOSPADM

## 2017-01-16 RX ORDER — MORPHINE SULFATE 2 MG/ML
1 INJECTION, SOLUTION INTRAMUSCULAR; INTRAVENOUS EVERY 4 HOURS PRN
Status: DISCONTINUED | OUTPATIENT
Start: 2017-01-16 | End: 2017-01-21 | Stop reason: HOSPADM

## 2017-01-16 RX ORDER — LEVOTHYROXINE SODIUM 0.1 MG/1
100 TABLET ORAL DAILY
Status: DISCONTINUED | OUTPATIENT
Start: 2017-01-16 | End: 2017-01-21 | Stop reason: HOSPADM

## 2017-01-16 RX ORDER — INSULIN GLARGINE 100 [IU]/ML
16 INJECTION, SOLUTION SUBCUTANEOUS DAILY
Status: ON HOLD | COMMUNITY
End: 2017-01-20

## 2017-01-16 RX ORDER — TRAMADOL HYDROCHLORIDE 50 MG/1
50 TABLET ORAL EVERY 6 HOURS PRN
Status: DISCONTINUED | OUTPATIENT
Start: 2017-01-16 | End: 2017-01-21 | Stop reason: HOSPADM

## 2017-01-16 RX ORDER — ATORVASTATIN CALCIUM 10 MG/1
10 TABLET, FILM COATED ORAL NIGHTLY
Status: DISCONTINUED | OUTPATIENT
Start: 2017-01-16 | End: 2017-01-21 | Stop reason: HOSPADM

## 2017-01-16 RX ORDER — NITROGLYCERIN 0.4 MG/1
0.4 TABLET SUBLINGUAL
Status: DISCONTINUED | OUTPATIENT
Start: 2017-01-16 | End: 2017-01-21 | Stop reason: HOSPADM

## 2017-01-16 RX ORDER — SUCRALFATE 1 G/1
1 TABLET ORAL
COMMUNITY
End: 2018-01-01 | Stop reason: HOSPADM

## 2017-01-16 RX ORDER — GLIMEPIRIDE 4 MG/1
4 TABLET ORAL
Status: DISCONTINUED | OUTPATIENT
Start: 2017-01-16 | End: 2017-01-16

## 2017-01-16 RX ORDER — ASPIRIN 81 MG/1
81 TABLET, CHEWABLE ORAL DAILY
Status: DISCONTINUED | OUTPATIENT
Start: 2017-01-16 | End: 2017-01-21 | Stop reason: HOSPADM

## 2017-01-16 RX ORDER — ACETAMINOPHEN 325 MG/1
650 TABLET ORAL EVERY 6 HOURS PRN
Status: DISCONTINUED | OUTPATIENT
Start: 2017-01-16 | End: 2017-01-21 | Stop reason: HOSPADM

## 2017-01-16 RX ORDER — ACETAMINOPHEN 325 MG/1
650 TABLET ORAL EVERY 4 HOURS PRN
Status: DISCONTINUED | OUTPATIENT
Start: 2017-01-16 | End: 2017-01-21 | Stop reason: HOSPADM

## 2017-01-16 RX ORDER — CALCIUM CARBONATE 200(500)MG
1 TABLET,CHEWABLE ORAL DAILY
Status: DISCONTINUED | OUTPATIENT
Start: 2017-01-16 | End: 2017-01-21 | Stop reason: HOSPADM

## 2017-01-16 RX ORDER — CLOPIDOGREL BISULFATE 75 MG/1
75 TABLET ORAL DAILY
Status: DISCONTINUED | OUTPATIENT
Start: 2017-01-16 | End: 2017-01-21 | Stop reason: HOSPADM

## 2017-01-16 RX ORDER — PROMETHAZINE HYDROCHLORIDE 25 MG/1
12.5 TABLET ORAL EVERY 6 HOURS PRN
Status: DISCONTINUED | OUTPATIENT
Start: 2017-01-16 | End: 2017-01-21 | Stop reason: HOSPADM

## 2017-01-16 RX ORDER — SODIUM HYPOCHLORITE 1.25 MG/ML
10 SOLUTION TOPICAL EVERY 12 HOURS
Status: DISCONTINUED | OUTPATIENT
Start: 2017-01-16 | End: 2017-01-16

## 2017-01-16 RX ORDER — SUCRALFATE 1 G/1
1 TABLET ORAL
Status: DISCONTINUED | OUTPATIENT
Start: 2017-01-16 | End: 2017-01-21 | Stop reason: HOSPADM

## 2017-01-16 RX ORDER — IRON ASPGLY,PS/C/B12/FA/CA/SUC 150-25-1
1 CAPSULE ORAL
Status: DISCONTINUED | OUTPATIENT
Start: 2017-01-17 | End: 2017-01-21 | Stop reason: HOSPADM

## 2017-01-16 RX ORDER — ISOSORBIDE MONONITRATE 30 MG/1
30 TABLET, EXTENDED RELEASE ORAL DAILY
Status: DISCONTINUED | OUTPATIENT
Start: 2017-01-16 | End: 2017-01-21 | Stop reason: HOSPADM

## 2017-01-16 RX ORDER — NALOXONE HCL 0.4 MG/ML
0.4 VIAL (ML) INJECTION
Status: DISCONTINUED | OUTPATIENT
Start: 2017-01-16 | End: 2017-01-21 | Stop reason: HOSPADM

## 2017-01-16 RX ORDER — SODIUM CHLORIDE 0.9 % (FLUSH) 0.9 %
10 SYRINGE (ML) INJECTION AS NEEDED
Status: DISCONTINUED | OUTPATIENT
Start: 2017-01-16 | End: 2017-01-21 | Stop reason: HOSPADM

## 2017-01-16 RX ORDER — METOCLOPRAMIDE 10 MG/1
10 TABLET ORAL
Status: DISCONTINUED | OUTPATIENT
Start: 2017-01-16 | End: 2017-01-21 | Stop reason: HOSPADM

## 2017-01-16 RX ORDER — NICOTINE POLACRILEX 4 MG
15 LOZENGE BUCCAL
Status: DISCONTINUED | OUTPATIENT
Start: 2017-01-16 | End: 2017-01-21 | Stop reason: HOSPADM

## 2017-01-16 RX ORDER — DOCUSATE SODIUM 100 MG/1
100 CAPSULE, LIQUID FILLED ORAL 2 TIMES DAILY
Status: DISCONTINUED | OUTPATIENT
Start: 2017-01-16 | End: 2017-01-21 | Stop reason: HOSPADM

## 2017-01-16 RX ORDER — GLIMEPIRIDE 4 MG/1
4 TABLET ORAL 2 TIMES DAILY WITH MEALS
Status: DISCONTINUED | OUTPATIENT
Start: 2017-01-16 | End: 2017-01-21 | Stop reason: HOSPADM

## 2017-01-16 RX ORDER — PANTOPRAZOLE SODIUM 40 MG/1
40 TABLET, DELAYED RELEASE ORAL DAILY
Status: DISCONTINUED | OUTPATIENT
Start: 2017-01-16 | End: 2017-01-21 | Stop reason: HOSPADM

## 2017-01-16 RX ORDER — ALBUTEROL SULFATE 2.5 MG/3ML
2.5 SOLUTION RESPIRATORY (INHALATION) EVERY 4 HOURS PRN
Status: DISCONTINUED | OUTPATIENT
Start: 2017-01-16 | End: 2017-01-21 | Stop reason: HOSPADM

## 2017-01-16 RX ORDER — DEXTROSE, SODIUM CHLORIDE, AND POTASSIUM CHLORIDE 5; .45; .15 G/100ML; G/100ML; G/100ML
75 INJECTION INTRAVENOUS CONTINUOUS
Status: DISCONTINUED | OUTPATIENT
Start: 2017-01-16 | End: 2017-01-21 | Stop reason: HOSPADM

## 2017-01-16 RX ORDER — SODIUM CHLORIDE 0.9 % (FLUSH) 0.9 %
1-10 SYRINGE (ML) INJECTION AS NEEDED
Status: DISCONTINUED | OUTPATIENT
Start: 2017-01-16 | End: 2017-01-21 | Stop reason: HOSPADM

## 2017-01-16 RX ORDER — ATENOLOL 25 MG/1
25 TABLET ORAL DAILY
Status: DISCONTINUED | OUTPATIENT
Start: 2017-01-16 | End: 2017-01-21 | Stop reason: HOSPADM

## 2017-01-16 RX ORDER — CALCIUM CARBONATE 200(500)MG
1 TABLET,CHEWABLE ORAL DAILY
COMMUNITY

## 2017-01-16 RX ADMIN — TRAMADOL HYDROCHLORIDE 50 MG: 50 TABLET, COATED ORAL at 06:28

## 2017-01-16 RX ADMIN — HYDROCODONE BITARTRATE AND ACETAMINOPHEN 1 TABLET: 5; 325 TABLET ORAL at 15:50

## 2017-01-16 RX ADMIN — CLOPIDOGREL 75 MG: 75 TABLET, FILM COATED ORAL at 13:12

## 2017-01-16 RX ADMIN — ASPIRIN 81 MG: 81 TABLET, CHEWABLE ORAL at 13:13

## 2017-01-16 RX ADMIN — INSULIN DETEMIR 16 UNITS: 100 INJECTION, SOLUTION SUBCUTANEOUS at 21:54

## 2017-01-16 RX ADMIN — ISOSORBIDE MONONITRATE 30 MG: 30 TABLET, EXTENDED RELEASE ORAL at 13:13

## 2017-01-16 RX ADMIN — ENOXAPARIN SODIUM 40 MG: 40 INJECTION SUBCUTANEOUS at 10:54

## 2017-01-16 RX ADMIN — ATORVASTATIN CALCIUM 10 MG: 10 TABLET, FILM COATED ORAL at 21:02

## 2017-01-16 RX ADMIN — HYDROCODONE BITARTRATE AND ACETAMINOPHEN 1 TABLET: 5; 325 TABLET ORAL at 11:07

## 2017-01-16 RX ADMIN — AMLODIPINE BESYLATE 5 MG: 5 TABLET ORAL at 13:12

## 2017-01-16 RX ADMIN — HYDROCODONE BITARTRATE AND ACETAMINOPHEN 1 TABLET: 5; 325 TABLET ORAL at 22:37

## 2017-01-16 RX ADMIN — METOCLOPRAMIDE 10 MG: 10 TABLET ORAL at 21:02

## 2017-01-16 RX ADMIN — POTASSIUM CHLORIDE, DEXTROSE MONOHYDRATE AND SODIUM CHLORIDE 75 ML/HR: 150; 5; 450 INJECTION, SOLUTION INTRAVENOUS at 11:08

## 2017-01-16 RX ADMIN — TAZOBACTAM SODIUM AND PIPERACILLIN SODIUM 4.5 G: 500; 4 INJECTION, SOLUTION INTRAVENOUS at 04:22

## 2017-01-16 RX ADMIN — INSULIN ASPART 3 UNITS: 100 INJECTION, SOLUTION INTRAVENOUS; SUBCUTANEOUS at 21:36

## 2017-01-16 RX ADMIN — PANTOPRAZOLE SODIUM 40 MG: 40 TABLET, DELAYED RELEASE ORAL at 10:54

## 2017-01-16 RX ADMIN — GLIMEPIRIDE 4 MG: 4 TABLET ORAL at 21:02

## 2017-01-16 RX ADMIN — DOCUSATE SODIUM 100 MG: 100 CAPSULE, LIQUID FILLED ORAL at 10:54

## 2017-01-16 RX ADMIN — ATENOLOL 25 MG: 25 TABLET ORAL at 13:13

## 2017-01-16 RX ADMIN — SUCRALFATE 1 G: 1 TABLET ORAL at 13:13

## 2017-01-16 RX ADMIN — METOCLOPRAMIDE 10 MG: 10 TABLET ORAL at 18:24

## 2017-01-16 RX ADMIN — SUCRALFATE 1 G: 1 TABLET ORAL at 18:24

## 2017-01-16 RX ADMIN — TAZOBACTAM SODIUM AND PIPERACILLIN SODIUM 3.38 G: 375; 3 INJECTION, SOLUTION INTRAVENOUS at 20:49

## 2017-01-16 RX ADMIN — POTASSIUM CHLORIDE, DEXTROSE MONOHYDRATE AND SODIUM CHLORIDE 75 ML/HR: 150; 5; 450 INJECTION, SOLUTION INTRAVENOUS at 22:38

## 2017-01-16 RX ADMIN — DOCUSATE SODIUM 100 MG: 100 CAPSULE, LIQUID FILLED ORAL at 18:25

## 2017-01-16 RX ADMIN — Medication 1 TABLET: at 13:12

## 2017-01-16 RX ADMIN — GLIMEPIRIDE 4 MG: 4 TABLET ORAL at 13:13

## 2017-01-16 RX ADMIN — LEVOTHYROXINE SODIUM 100 MCG: 100 TABLET ORAL at 13:13

## 2017-01-16 RX ADMIN — SILVER SULFADIAZINE: 10 CREAM TOPICAL at 19:15

## 2017-01-16 RX ADMIN — TAZOBACTAM SODIUM AND PIPERACILLIN SODIUM 3.38 G: 375; 3 INJECTION, SOLUTION INTRAVENOUS at 13:12

## 2017-01-16 RX ADMIN — METOCLOPRAMIDE 10 MG: 10 TABLET ORAL at 13:13

## 2017-01-16 NOTE — PROGRESS NOTES
"Pharmacy Consult - [facility]    Cynthia Arreola has been consulted for pharmacy to dose enoxaparin for DVT prophylaxis per Dr. Canales's request.       Relevant clinical data and objective history reviewed:  89 y.o. female 63\" (160 cm) 165 lb 6.4 oz (75 kg)    Past Medical History   Diagnosis Date   • Anemia    • Coronary artery disease    • Diabetes mellitus    • Disease of thyroid gland    • Dysphagia    • Hypertension    • Ischemic cardiomyopathy    • Malignant neoplasm of colon    • PVD (peripheral vascular disease)    • Renal disorder      has No Known Allergies.    Lab Results   Component Value Date    WBC 17.72 (H) 01/16/2017    HGB 12.3 01/16/2017    HCT 40.9 01/16/2017    MCV 91.7 01/16/2017     01/16/2017     Lab Results   Component Value Date    GLUCOSE 171 (H) 01/16/2017    CALCIUM 9.5 01/16/2017     01/16/2017    K 3.6 01/16/2017    CO2 26.2 01/16/2017    CL 99 01/16/2017    BUN 14 01/16/2017    CREATININE 1.00 01/16/2017    EGFRIFNONA 52 (L) 01/16/2017    BCR 14.0 01/16/2017    ANIONGAP 13.8 01/16/2017       Estimated Creatinine Clearance: 37 mL/min (by C-G formula based on Cr of 1).    Active Inpatient Anticoagulation Orders: Aspirin 81mg daily and clopidogrel 75mg daily    Assessment/Plan  Will start patient on enoxaparin 40mg subQ q24hrs. Pharmacy will continue to follow.     Tod Michelle [facility]      "

## 2017-01-16 NOTE — CONSULTS
ENDOCRINOLOGY CONSULTATION    CONSULTING PHYSICIAN: Sukumar Richard MD    REFERRING PHYSICIAN: Dayron Canales MD    REASON FOR CONSULTATION: Treatment of diabetes mellitus.    HISTORY OF PRESENT ILLNESS: Seen last 12/27/2016 for diabetes control. She came to the hospital on 01/16/2017 because of acute onset of nausea, vomiting, and shortness of breath which woke her up from sleep.     The patient has known diabetes mellitus since 2008. She was recently started on insulin in 2016 at 16 units once a day when discharged from the hospital in 12/2016. In 12/2016, was 7.89%. She has renal insufficiency with a serum creatinine of 1.31. Her last eye examination was in 2016. She denies any blurry vision. She denies any numbness, tingling, or burning in her hands or feet.    She has a chronic wound on the right heel and the dorsum of the right foot. Ultrasound done in 12/2016 showed bilateral aortoiliac disease and bilateral moderate digital ischemia.    She has hyperlipidemia and has been on Simvastatin 20 mg once a day. Note that the patient has been taking amlodipine 5 mg once a day for hypertension.     The patient has hypothyroidism and has been on levothyroxine 100 mcg per day. She denies any previous history of goiter. She denies any history of head or neck radiation therapy. TSH done in 12/2016 was normal at 0.915.     PAST MEDICAL HISTORY:   1. She had previous colon cancer with resection and has a left lower quadrant colostomy. The surgery was done about 25 years ago.   2. She has history of ischemic cardiomyopathy.   3. She has iron deficiency anemia.  4. No previous history of myocardial infarction or stroke.    DIAGNOSTIC DATA: CT scan of the abdomen done during this admission showed a 1 cm stone in the common bile duct and possible intracalyceal stones in the left kidney.    ALLERGIES: No known drug allergies.    SOCIAL HISTORY: The patient is . She has 1 son. She lives in a nursing home. She denies any  alcohol, tobacco, or drug abuse.    FAMILY HISTORY: Her daughter has diabetes mellitus, hypertension, and hyperlipidemia.     REVIEW OF SYSTEMS: She denies any significant weight change. She is no longer nauseated or vomiting. She denies any melena, hematochezia, or hematuria. She denies any dysuria. She denies any cough or sputum production. She denies any chest pain or palpitations. She denies vaginal discharge. She denies any pain in her foot. She denies increased heartburn. She denies any loss of consciousness.     PHYSICAL EXAMINATION:   VITAL SIGNS: Blood pressure is 177/61, respiratory rate 18, heart rate 64, temperature 98.6° Fahrenheit.  GENERAL: The patient is awake, alert, oriented. Not dyspneic, not tachypneic, not orthopneic. Not in acute distress.  HEENT: Pink conjunctivae. Sclerae are anicteric. There is no xanthelasma. Fully extraocular muscle movement. No facial asymmetry. Speech is fluent. Tongue is midline.   NECK: No carotid bruits. Neck veins are not visible at 45°. The thyroid is not enlarged.  LUNGS: Equal chest expansion. No rales, no wheezes.  HEART: Regular heart rate and rhythm. No gallop.  ABDOMEN: Soft, nontender. There is a colostomy bag in the left lower quadrant with brown formed stool.  EXTREMITIES: Warm. No cyanosis, no clubbing. There is a right heel ulcer with black eschar measuring about 2.5 cm. There is a shallow ulcer on the dorsum of the right foot with about a 2.5 cm shallow ulcer.   NEUROLOGICAL: Light touch sensation is grossly intact. Motor is grade 5/5. There are no xanthomas.     IMPRESSION:   1. Type 2 diabetes mellitus, poorly controlled.  2. Hypothyroidism, adequately replaced.  3. Hyperlipidemia.  4. Hypertension.   5. Diabetic foot ulcers.  6. Peripheral artery disease.  7. Chronic renal insufficiency.    RECOMMENDATIONS: Restart glimepiride 4 mg b.i.d. Continue Levemir 16 units every evening. Will switch the patient from Zocor to Lipitor 10 mg once a day which does  not interact with amlodipine and does not require dose adjustment with renal insufficiency. Continue levothyroxine 100 mcg per day. I will defer decision on blood pressure control to Dr. Canales. Will start on iron supplements. Check iron levels in the morning.    Thank you for the referral. I will follow the patient with you during her hospital stay.

## 2017-01-16 NOTE — PROGRESS NOTES
Continued Stay Note  [facility]     Patient Name: Cynthia Arreola  MRN: [id number]  Today's Date: 1/16/2017    Admit Date: 1/16/2017          Discharge Plan       01/16/17 1157    Case Management/Social Work Plan    Additional Comments IMM 1/16. Spoke with patient at bedside.  I introduced myself and explained CCP role.  Verified face sheet and confirmed that pt obtains his medications from facility where she resides..  Pt lives at [facility] and has no bed hold, in a Medicaid bed.  Spoke to Laura to verify bed status.  Pt can return.  She uses a wheel chair to move around the facility.    CCP will follow.              Discharge Codes     None            Ana Copeland RN

## 2017-01-16 NOTE — IP AVS SNAPSHOT
AFTER VISIT SUMMARY             Cynthia Arreola           About your hospitalization     You were admitted on:  January 16, 2017 You last received care in the:  [address]       Procedures & Surgeries         Medications    If you or your caregiver advised us that you are currently taking a medication and that medication is marked below as “Resume”, this simply indicates that we have reviewed those medications to make sure our new therapy recommendations do not interfere.  If you have concerns about medications other than those new ones which we are prescribing today, please consult the physician who prescribed them (or your primary physician).  Our review of your home medications is not meant to indicate that we are directing their use.             Your Medications      START taking these medications     atorvastatin 10 MG tablet   Take 1 tablet by mouth Every Night.   Last time this was given:  1/20/2017  8:33 PM   Commonly known as:  LIPITOR           dextrose 40 % gel   Take 15 g by mouth Every 15 (Fifteen) Minutes As Needed for low blood sugar.   Commonly known as:  GLUTOSE           Ferrex 150 forte plus  MG capsule capsule   Take 1 capsule by mouth Daily With Breakfast.   Last time this was given:  1/20/2017 10:19 AM           HYDROcodone-acetaminophen 5-325 MG per tablet   Take 1 tablet by mouth Every 4 (Four) Hours As Needed for moderate pain (4-6) or severe pain (7-10) for up to 6 days.   Last time this was given:  1/20/2017  8:33 PM   Commonly known as:  NORCO           linagliptin 5 MG tablet tablet   Take 1 tablet by mouth Daily.   Last time this was given:  1/20/2017 10:17 AM   Commonly known as:  TRADJENTA           nitroglycerin 0.4 MG SL tablet   Place 1 tablet under the tongue Every 5 (Five) Minutes As Needed for chest pain (Hold systolic BP < 100 mm/Hg.). Max 3 doses / 15 min   Commonly known as:  NITROSTAT             CHANGE how you take these medications     glimepiride 4 MG tablet   Take 1 tablet by mouth 2 (Two) Times a Day With Meals.   Last time this was given:  1/20/2017 10:18 AM   Commonly known as:  AMARYL   What changed:  You were already taking a medication with the same name, and this prescription was added. Make sure you understand how and when to take each.           glimepiride 4 MG tablet   Take 4 mg by mouth Every Morning Before Breakfast.   Last time this was given:  1/20/2017 10:18 AM   Commonly known as:  AMARYL   What changed:  Another medication with the same name was added. Make sure you understand how and when to take each.           insulin glargine 100 UNIT/ML injection   Inject 14 Units under the skin Daily.   Commonly known as:  LANTUS   What changed:  how much to take             CONTINUE taking these medications     acetaminophen 325 MG tablet   Take 650 mg by mouth Every 6 (Six) Hours As Needed for mild pain (1-3).   Commonly known as:  TYLENOL           albuterol (2.5 MG/3ML) 0.083% nebulizer solution   Take 2.5 mg by nebulization Every 4 (Four) Hours As Needed for wheezing.   Last time this was given:  1/20/2017  2:18 AM   Commonly known as:  PROVENTIL           amLODIPine 5 MG tablet   Take 5 mg by mouth Daily.   Last time this was given:  1/20/2017 10:18 AM   Commonly known as:  NORVASC           aspirin 81 MG chewable tablet   Chew 81 mg Daily.   Last time this was given:  1/20/2017 10:17 AM           atenolol 25 MG tablet   Take 25 mg by mouth Daily.   Last time this was given:  1/20/2017 10:18 AM   Commonly known as:  TENORMIN           calcium carbonate 500 MG chewable tablet   Chew 1 tablet Daily.   Last time this was given:  1/20/2017 10:18 AM   Commonly known as:  TUMS           clopidogrel 75 MG tablet   Take 75 mg by mouth Daily.   Last time this was given:  1/20/2017 10:18 AM   Commonly known as:  PLAVIX            MG capsule   Take 100 mg by mouth 2 (Two) Times a Day.   Last time this was given:  1/20/2017  8:35 PM            glucagon (human recombinant) 1 MG injection   Inject 1 mg under the skin 1 (One) Time As Needed (hypoglycemia) for up to 1 dose.   Commonly known as:  GLUCAGEN DIAGNOSTIC           insulin aspart 100 UNIT/ML injection   Inject 0-7 Units under the skin 4 (Four) Times a Day Before Meals & at Bedtime.   Last time this was given:  1/20/2017  1:14 PM   Commonly known as:  novoLOG           isosorbide mononitrate 30 MG 24 hr tablet   Take 30 mg by mouth Daily.   Last time this was given:  1/20/2017 10:17 AM   Commonly known as:  IMDUR           levothyroxine 100 MCG tablet   Take 100 mcg by mouth Daily.   Last time this was given:  1/20/2017 10:17 AM   Commonly known as:  SYNTHROID, LEVOTHROID           metoclopramide 10 MG tablet   Take 10 mg by mouth 4 (Four) Times a Day Before Meals & at Bedtime.   Last time this was given:  1/20/2017  8:33 PM   Commonly known as:  REGLAN           pantoprazole 40 MG EC tablet   Take 40 mg by mouth Daily.   Last time this was given:  1/20/2017  8:46 AM   Commonly known as:  PROTONIX           promethazine 12.5 MG tablet   Take 1 tablet by mouth Every 6 (Six) Hours As Needed for nausea or vomiting.   Commonly known as:  PHENERGAN           silver sulfadiazine 1 % cream   Apply  topically Every 12 (Twelve) Hours.   Last time this was given:  1/20/2017 10:28 AM   Commonly known as:  SILVADENE, SSD           sucralfate 1 G tablet   Take 1 g by mouth 3 (Three) Times a Day With Meals.   Last time this was given:  1/20/2017  8:35 PM   Commonly known as:  CARAFATE           traMADol 50 MG tablet   Take 1 tablet by mouth Every 6 (Six) Hours As Needed for moderate pain (4-6).   Last time this was given:  1/16/2017  6:28 AM   Commonly known as:  ULTRAM             STOP taking these medications     ARGLAES powder           insulin detemir 100 UNIT/ML injection   Commonly known as:  LEVEMIR           simvastatin 20 MG tablet   Commonly known as:  ZOCOR           sodium hypochlorite 0.125 %  solution topical solution 0.125%   Commonly known as:  DAKIN'S 1/4 STRENGTH                Where to Get Your Medications      You can get these medications from any pharmacy     Bring a paper prescription for each of these medications     HYDROcodone-acetaminophen 5-325 MG per tablet    traMADol 50 MG tablet         Information about where to get these medications is not yet available     ! Ask your nurse or doctor about these medications     atorvastatin 10 MG tablet    dextrose 40 % gel     MG capsule    Ferrex 150 forte plus  MG capsule capsule    glimepiride 4 MG tablet    insulin aspart 100 UNIT/ML injection    insulin glargine 100 UNIT/ML injection    linagliptin 5 MG tablet tablet    nitroglycerin 0.4 MG SL tablet                  Your Medications      Your Medication List           Morning Noon Evening Bedtime As Needed    acetaminophen 325 MG tablet   Take 650 mg by mouth Every 6 (Six) Hours As Needed for mild pain (1-3).   Commonly known as:  TYLENOL                                albuterol (2.5 MG/3ML) 0.083% nebulizer solution   Take 2.5 mg by nebulization Every 4 (Four) Hours As Needed for wheezing.   Commonly known as:  PROVENTIL                                amLODIPine 5 MG tablet   Take 5 mg by mouth Daily.   Commonly known as:  NORVASC                                aspirin 81 MG chewable tablet   Chew 81 mg Daily.                                atenolol 25 MG tablet   Take 25 mg by mouth Daily.   Commonly known as:  TENORMIN                                atorvastatin 10 MG tablet   Take 1 tablet by mouth Every Night.   Commonly known as:  LIPITOR                                calcium carbonate 500 MG chewable tablet   Chew 1 tablet Daily.   Commonly known as:  TUMS                                clopidogrel 75 MG tablet   Take 75 mg by mouth Daily.   Commonly known as:  PLAVIX                                dextrose 40 % gel   Take 15 g by mouth Every 15 (Fifteen) Minutes As Needed  for low blood sugar.   Commonly known as:  GLUTOSE                                 MG capsule   Take 100 mg by mouth 2 (Two) Times a Day.                                Ferrex 150 forte plus  MG capsule capsule   Take 1 capsule by mouth Daily With Breakfast.                                * glimepiride 4 MG tablet   Take 1 tablet by mouth 2 (Two) Times a Day With Meals.   Commonly known as:  AMARYL                                * glimepiride 4 MG tablet   Take 4 mg by mouth Every Morning Before Breakfast.   Commonly known as:  AMARYL                                glucagon (human recombinant) 1 MG injection   Inject 1 mg under the skin 1 (One) Time As Needed (hypoglycemia) for up to 1 dose.   Commonly known as:  GLUCAGEN DIAGNOSTIC                                HYDROcodone-acetaminophen 5-325 MG per tablet   Take 1 tablet by mouth Every 4 (Four) Hours As Needed for moderate pain (4-6) or severe pain (7-10) for up to 6 days.   Commonly known as:  NORCO                                insulin aspart 100 UNIT/ML injection   Inject 0-7 Units under the skin 4 (Four) Times a Day Before Meals & at Bedtime.   Commonly known as:  novoLOG                                insulin glargine 100 UNIT/ML injection   Inject 14 Units under the skin Daily.   Commonly known as:  LANTUS                                isosorbide mononitrate 30 MG 24 hr tablet   Take 30 mg by mouth Daily.   Commonly known as:  IMDUR                                levothyroxine 100 MCG tablet   Take 100 mcg by mouth Daily.   Commonly known as:  SYNTHROID, LEVOTHROID                                linagliptin 5 MG tablet tablet   Take 1 tablet by mouth Daily.   Commonly known as:  TRADJENTA                                metoclopramide 10 MG tablet   Take 10 mg by mouth 4 (Four) Times a Day Before Meals & at Bedtime.   Commonly known as:  REGLAN                                nitroglycerin 0.4 MG SL tablet   Place 1 tablet under the tongue  Every 5 (Five) Minutes As Needed for chest pain (Hold systolic BP < 100 mm/Hg.). Max 3 doses / 15 min   Commonly known as:  NITROSTAT                                pantoprazole 40 MG EC tablet   Take 40 mg by mouth Daily.   Commonly known as:  PROTONIX                                promethazine 12.5 MG tablet   Take 1 tablet by mouth Every 6 (Six) Hours As Needed for nausea or vomiting.   Commonly known as:  PHENERGAN                                silver sulfadiazine 1 % cream   Apply  topically Every 12 (Twelve) Hours.   Commonly known as:  SILVADENE, SSD         Clean right foot wounds with normal saline, then apply silvadene cream and cover with 4x4/wrap kerlex bid                       sucralfate 1 G tablet   Take 1 g by mouth 3 (Three) Times a Day With Meals.   Commonly known as:  CARAFATE                                traMADol 50 MG tablet   Take 1 tablet by mouth Every 6 (Six) Hours As Needed for moderate pain (4-6).   Commonly known as:  ULTRAM                                * Notice:  This list has 2 medication(s) that are the same as other medications prescribed for you. Read the directions carefully, and ask your doctor or other care provider to review them with you.             Instructions for After Discharge         Follow-ups for After Discharge        Follow-up Information     Follow up with [facility] .    Specialty:  Skilled Nursing Facility    Contact information:    [address] [facility] [address] [phone]        Follow up with Dayron Canales MD .    Specialty:  Internal Medicine    Contact information:    [address] [address] [phone]        Referrals and Follow-ups to Schedule     Follow-Up    As directed    1.  With [facility] or with wound team at facility (if available)  2.  With Dr. Yuliana EASTMAN in his office for ERCP to treat RUQ stone and Colonoscopy to eval GI bleeding             Scheduled Appointments      Mar 15, 2017  1:00 PM EDT   New Patient with Amandeep Cooper Jr., MD   [facility] (--)    [address] [facility]  [facility] [address] [phone]           Bring all previous medical records and films, along with current medications and insurance information.              MyChart Signup     Our records indicate that you have declined [facility] signup. If you would like to sign up for [employer], please email Lyon [email or web address] or call [phone] to obtain an activation code.         Summary of Your Hospitalization        Reason for Hospitalization     Your primary diagnosis was:  Bilateral Bronchopneumonia    Your diagnoses also included:  Healthcare-Associated Pneumonia, Diabetes With Complication, Chronic Kidney Disease, Stage Ii (Mild), Ischemic Cardiomyopathy, Peripheral Arterial Disease, Mrsa Cellulitis Of Right Foot, High Cholesterol Or Triglycerides, Cardiovascular Disease, Anemia Of Chronic Disease, High Blood Pressure, Immobility Syndrome, Acquired Underactive Thyroid, Pressure Sore On Sacrum, Sss (Sick Sinus Syndrome), Status Post Placement Of Cardiac Pacemaker, Inflammation Of The Esophagus, History Of Small Bowel Obstruction, Recurrent Uti, Respiratory Infection Due To Enterovirus 68, Ischemic Ulcer Of Right Heel With Fat Layer Exposed, Common Bile Duct Stone, Acute Bronchitis Due To Rhinovirus      Care Providers     Provider Service Role Specialty    Dayron Canales MD Internal Medicine Attending Provider Internal Medicine    Dayron Canales MD Internal Medicine Consulting Physician  Internal Medicine    Kennedy CASTAÑEDA MD -- Consulting Physician  Endocrinology    Jasper Bridges MD -- Consulting Physician  Gastroenterology      Your Allergies  Date Reviewed: 1/17/2017    No active allergies      Pending Labs     Order Current Status    Blood Culture Preliminary result      Patient Belongings Returned     Document  Return of Belongings Flowsheet     Were the patient bedside belongings sent home?   Yes   Belongings Retrieved from Security & Sent Home   N/A    Belongings Sent to Safe   --   Medications Retrieved from Pharmacy & Sent Home   N/A               SYMPTOMS OF A STROKE    Call 911 or have someone take you to the Emergency Department if you have any of the following:    · Sudden numbness or weakness of your face, arm or leg especially on one side of the body  · Sudden confusion, diffiiculty speaking or trouble understanding   · Changes in your vision or loss of sight in one eye  · Sudden severe headache with no known cause  · sudden dizziness, trouble walking, loss of balance or coordination    It is important to seek emergency care right away if you have further stroke symptoms. If you get emergency help quickly, the powerful clot-dissolving medicines can reduce the disabilities caused by a stroke.     For more information:    American Stroke Association  7-065-3-STROKE  www.strokeassociation.org           IF YOU SMOKE OR USE TOBACCO PLEASE READ THE FOLLOWING:    Why is smoking bad for me?  Smoking increases the risk of heart disease, lung disease, vascular disease, stroke, and cancer.     If you smoke, STOP!    If you would like more information on quitting smoking, please visit the [employer] website: [email or web address]   This link will provide additional resources including the QUIT line and the Beat the Pack support groups.     For more information:    American Cancer Society  (460) 209-3780    American Heart Association  1-694.510.5216               YOU ARE THE MOST IMPORTANT FACTOR IN YOUR RECOVERY.     Follow all instructions carefully.     I have reviewed my discharge instructions with my nurse, including the following information, if applicable:     Information about my illness and diagnosis   Follow up appointments (including lab draws)   Wound Care   Equipment  Needs   Medications (new and continuing) along with side effects   Preventative information such as vaccines and smoking cessations   Diet   Pain   I know when to contact my Doctor's office or seek emergency care      I want my nurse to describe the side effects of my medications: YES NO   If the answer is no, I understand the side effects of my medications: YES NO   My nurse described the side effects of my medications in a way that I could understand: YES NO   I have taken my personal belongings and my own medications with me at discharge: YES NO            I have received this information and my questions have been answered. I have discussed any concerns I see with this plan with the nurse or physician. I understand these instructions.    Signature of Patient or Responsible Person: _____________________________________    Date: _________________  Time: __________________    Signature of Healthcare Provider: _______________________________________  Date: _________________  Time: __________________

## 2017-01-16 NOTE — NURSING NOTE
CWOCN consult- patient has an unstageable pressure injury on right heel and another ulceration on the right dorsal foot which has moist yellow slough and light redness around periwound. There is no odor to either wound. The right heel has loosening eschar around the edges. Last admission, Vascular team saw her and started silvadene- recommend to continue their plan.   Will send a colostomy kit to the floor.

## 2017-01-16 NOTE — PROGRESS NOTES
"Pharmacy Consult - Zosyn Dosing     Cynthia Arreola has a consult for pharmacy to dose Zosyn for PNA.  Pharmacy dosing Zosyn per Dr. Martinez's request.       Relevant clinical data and objective history reviewed:  89 y.o. female 63\" (160 cm) 165 lb 6.4 oz (75 kg)    Past Medical History   Diagnosis Date   • Anemia    • Coronary artery disease    • Diabetes mellitus    • Disease of thyroid gland    • Dysphagia    • Hypertension    • Ischemic cardiomyopathy    • Malignant neoplasm of colon    • PVD (peripheral vascular disease)    • Renal disorder      CREATININE   Date Value Ref Range Status   01/16/2017 1.00 0.57 - 1.00 mg/dL Final   12/29/2016 1.16 (H) 0.57 - 1.00 mg/dL Final   12/28/2016 1.45 (H) 0.57 - 1.00 mg/dL Final     BUN   Date Value Ref Range Status   01/16/2017 14 8 - 23 mg/dL Final     Estimated Creatinine Clearance: 37 mL/min (by C-G formula based on Cr of 1).    Lab Results   Component Value Date    WBC 17.72 (H) 01/16/2017     Temp Readings from Last 3 Encounters:   01/16/17 97.8 °F (36.6 °C)   12/29/16 97.6 °F (36.4 °C) (Oral)          Assessment/Plan  Will start Zosyn 3.75 g IV q8h.. Pharmacy will continue to follow daily while on Zosyn and adjust as needed.     Joy Boykin, [facility]    "

## 2017-01-16 NOTE — PLAN OF CARE
Problem: Patient Care Overview (Adult)  Goal: Plan of Care Review  Outcome: Ongoing (interventions implemented as appropriate)    01/16/17 0858   Coping/Psychosocial Response Interventions   Plan Of Care Reviewed With patient   Patient Care Overview   Progress no change       Goal: Adult Individualization and Mutuality  Outcome: Ongoing (interventions implemented as appropriate)  Goal: Discharge Needs Assessment  Outcome: Ongoing (interventions implemented as appropriate)

## 2017-01-16 NOTE — CONSULTS
Referring Provider: Param Martinez MD  Reason for Consultation: SOB    Subjective   History of present illness:    This is a very nice 89-year-old female who was admitted on 01/16/2017 with dyspnea.  Infectious disease service was asked to provide evaluation and opinion regarding possible pneumonia.    Per review of the past medical record, it looks like she was previously admitted from 09/27/2016 through 10/12/2016 to [facility] with increasing weakness, as well as a pressure sore on sacral area.  During that admission, she was also treated for urinary tract infection, pretibial cellulitis with antibiotics and with wound care of the ulcers and cellulitis.  Her hospitalization was complicated by small bowel obstruction, but she was ultimately and will be discharged to a nursing facility.  She was readmitted to [facility] on 12/27/2016 with acute hypoxic respiratory failure.  She had a RVP that was positive for rhinovirus at that time and was diagnosed with viral bronchitis.  She was treated supportively and was discharged.      Per the patient, she was in her usual state of health until 01/15/2017 when she awoke with the acute onset of severe dyspnea.  She had had normal day, but then developed dyspnea.  This lasted about 3 hours and so she came to the [facility] emergency department.  She was started on antibiotics and admitted.  She has felt quite a bit better since that time.  Feels like her breathing is nearly back to baseline.  She denies any associated fevers or chills or night sweats.  She denies chest pain or productive cough.  She has been around several people who have been ill with respiratory illnesses at her hospital.    PAST MEDICAL HISTORY:  1.  Hypertension.  2.  Diabetes mellitus type 2.  3.  Sick sinus syndrome.  4.  Restless leg syndrome.  5.  Colorectal cancer.  6.  Chronic kidney disease.  7.  Ischemic cardiomyopathy.  8.  Sacral ulcer.  9.   Hypothyroidism.  10.  Anemia.  11.  Cholecystectomy.  12.  Colon resection with colostomy placement.  13.  Pacemaker placement.  14.  Peripheral vascular disease.  15.  Bilateral calcaneal ulcers.    FAMILY HISTORY:  No family history of infectious disease.    SOCIAL HISTORY:  She lives in a nursing facility in [place], [place].  No alcohol, tobacco or illicit drug use.      Review of Systems  Pertinent items are noted in HPI, all other systems reviewed and negative and very mild abdominal pain    Objective     Physical Exam:   Vital Signs   Temp:  [97.8 °F (36.6 °C)-98.6 °F (37 °C)] 98.6 °F (37 °C)  Heart Rate:  [] 60  Resp:  [18-26] 18  BP: (150-198)/() 177/61    GENERAL: Awake and alert, in no acute distress.   HEENT: Oropharynx is clear. Hearing is grossly normal.   EYES: PERRL. No conjunctival injection. No lid lag.   LYMPHATICS: No lymphadenopathy of the neck or axillary regions.   HEART: Regular rate and rhythm. No peripheral edema.   LUNGS: Clear to auscultation anteriorly with some wheezing; normal respiratory effort.   GI: Soft, not really ttp in RUQ, nondistended. No appreciable organomegaly.   SKIN: Warm and dry without cutaneous eruptions   PSYCHIATRIC: Appropriate mood, affect, insight, and judgment.     Results Review:   I reviewed the patient's new clinical results.  WBC 16.80 (p74, L 15, M 10, E1)  H/H 11.6/37      CMP nl but   Cr 1.11    Estimated Creatinine Clearance: 33.3 mL/min (by C-G formula based on Cr of 1.11).      Microbiology:  RVP=rhinovirus    Radiology:  CT chest, personally reviewed, mild bonchopneumonia  CT a/p: 1.2 cm CBD with 1 cm stone in CBD    Assessment/Plan   1. Sepsis  2. Rhinovirus  3. PNA  4. Choledocholithiasis    Suspect WBC up from stone in CBD.  Will cont on zosyn.  Probably still residual rhinovirus from last month rather than new infection to explain lung findings.  Repeat LFTs and PCT in AM.    Thank you for this consult.  We will continue  to follow along and tailor antibiotics as the patient's clinical course evolves.    Rico Ramirez MD  01/16/17  4:23 PM

## 2017-01-17 LAB
ALBUMIN SERPL-MCNC: 2.8 G/DL (ref 3.5–5.2)
ALP SERPL-CCNC: 115 U/L (ref 39–117)
ALT SERPL W P-5'-P-CCNC: 11 U/L (ref 1–33)
ANION GAP SERPL CALCULATED.3IONS-SCNC: 14.7 MMOL/L
AST SERPL-CCNC: 12 U/L (ref 1–32)
BASOPHILS # BLD AUTO: 0.06 10*3/MM3 (ref 0–0.2)
BASOPHILS NFR BLD AUTO: 0.4 % (ref 0–1.5)
BILIRUB CONJ SERPL-MCNC: 0.3 MG/DL (ref 0–0.3)
BILIRUB INDIRECT SERPL-MCNC: 0.1 MG/DL
BILIRUB SERPL-MCNC: 0.4 MG/DL (ref 0.1–1.2)
BUN BLD-MCNC: 13 MG/DL (ref 8–23)
BUN/CREAT SERPL: 13.8 (ref 7–25)
CALCIUM SPEC-SCNC: 9.1 MG/DL (ref 8.6–10.5)
CHLORIDE SERPL-SCNC: 99 MMOL/L (ref 98–107)
CO2 SERPL-SCNC: 25.3 MMOL/L (ref 22–29)
CREAT BLD-MCNC: 0.94 MG/DL (ref 0.57–1)
DEPRECATED RDW RBC AUTO: 51.6 FL (ref 37–54)
EOSINOPHIL # BLD AUTO: 0.15 10*3/MM3 (ref 0–0.7)
EOSINOPHIL NFR BLD AUTO: 1.1 % (ref 0.3–6.2)
ERYTHROCYTE [DISTWIDTH] IN BLOOD BY AUTOMATED COUNT: 15.2 % (ref 11.7–13)
GASTROCULT GAST QL: POSITIVE
GFR SERPL CREATININE-BSD FRML MDRD: 56 ML/MIN/1.73
GLUCOSE BLD-MCNC: 85 MG/DL (ref 65–99)
GLUCOSE BLDC GLUCOMTR-MCNC: 196 MG/DL (ref 70–130)
GLUCOSE BLDC GLUCOMTR-MCNC: 209 MG/DL (ref 70–130)
GLUCOSE BLDC GLUCOMTR-MCNC: 246 MG/DL (ref 70–130)
HCT VFR BLD AUTO: 36.9 % (ref 35.6–45.5)
HGB BLD-MCNC: 11.2 G/DL (ref 11.9–15.5)
IMM GRANULOCYTES # BLD: 0.06 10*3/MM3 (ref 0–0.03)
IMM GRANULOCYTES NFR BLD: 0.4 % (ref 0–0.5)
IRON 24H UR-MRATE: 16 MCG/DL (ref 37–145)
IRON SATN MFR SERPL: 8 % (ref 20–50)
LYMPHOCYTES # BLD AUTO: 1.76 10*3/MM3 (ref 0.9–4.8)
LYMPHOCYTES NFR BLD AUTO: 13 % (ref 19.6–45.3)
MCH RBC QN AUTO: 28 PG (ref 26.9–32)
MCHC RBC AUTO-ENTMCNC: 30.4 G/DL (ref 32.4–36.3)
MCV RBC AUTO: 92.3 FL (ref 80.5–98.2)
MONOCYTES # BLD AUTO: 1.73 10*3/MM3 (ref 0.2–1.2)
MONOCYTES NFR BLD AUTO: 12.8 % (ref 5–12)
NEUTROPHILS # BLD AUTO: 9.76 10*3/MM3 (ref 1.9–8.1)
NEUTROPHILS NFR BLD AUTO: 72.3 % (ref 42.7–76)
NRBC BLD MANUAL-RTO: 0 /100 WBC (ref 0–0)
PLATELET # BLD AUTO: 326 10*3/MM3 (ref 140–500)
PMV BLD AUTO: 11 FL (ref 6–12)
POTASSIUM BLD-SCNC: 3.4 MMOL/L (ref 3.5–5.2)
PROCALCITONIN SERPL-MCNC: 0.11 NG/ML (ref 0.1–0.25)
PROT SERPL-MCNC: 7.1 G/DL (ref 6–8.5)
RBC # BLD AUTO: 4 10*6/MM3 (ref 3.9–5.2)
SODIUM BLD-SCNC: 139 MMOL/L (ref 136–145)
TIBC SERPL-MCNC: 191 MCG/DL (ref 298–536)
TRANSFERRIN SERPL-MCNC: 128 MG/DL (ref 200–360)
WBC NRBC COR # BLD: 13.52 10*3/MM3 (ref 4.5–10.7)

## 2017-01-17 PROCEDURE — 25010000002 PIPERACILLIN SOD-TAZOBACTAM PER 1 G: Performed by: FAMILY MEDICINE

## 2017-01-17 PROCEDURE — 82270 OCCULT BLOOD FECES: CPT | Performed by: INTERNAL MEDICINE

## 2017-01-17 PROCEDURE — 82962 GLUCOSE BLOOD TEST: CPT

## 2017-01-17 PROCEDURE — 85025 COMPLETE CBC W/AUTO DIFF WBC: CPT | Performed by: INTERNAL MEDICINE

## 2017-01-17 PROCEDURE — 80048 BASIC METABOLIC PNL TOTAL CA: CPT | Performed by: INTERNAL MEDICINE

## 2017-01-17 PROCEDURE — 84466 ASSAY OF TRANSFERRIN: CPT | Performed by: INTERNAL MEDICINE

## 2017-01-17 PROCEDURE — 80076 HEPATIC FUNCTION PANEL: CPT | Performed by: INTERNAL MEDICINE

## 2017-01-17 PROCEDURE — 99232 SBSQ HOSP IP/OBS MODERATE 35: CPT | Performed by: INTERNAL MEDICINE

## 2017-01-17 PROCEDURE — 84145 PROCALCITONIN (PCT): CPT | Performed by: INTERNAL MEDICINE

## 2017-01-17 PROCEDURE — 63710000001 INSULIN DETEMER PER 5 UNITS: Performed by: INTERNAL MEDICINE

## 2017-01-17 PROCEDURE — 99221 1ST HOSP IP/OBS SF/LOW 40: CPT | Performed by: SURGERY

## 2017-01-17 PROCEDURE — 83540 ASSAY OF IRON: CPT | Performed by: INTERNAL MEDICINE

## 2017-01-17 PROCEDURE — 63710000001 INSULIN ASPART PER 5 UNITS: Performed by: INTERNAL MEDICINE

## 2017-01-17 PROCEDURE — 99222 1ST HOSP IP/OBS MODERATE 55: CPT | Performed by: INTERNAL MEDICINE

## 2017-01-17 PROCEDURE — 25010000002 ENOXAPARIN PER 10 MG: Performed by: INTERNAL MEDICINE

## 2017-01-17 RX ADMIN — GLIMEPIRIDE 4 MG: 4 TABLET ORAL at 18:08

## 2017-01-17 RX ADMIN — INSULIN ASPART 3 UNITS: 100 INJECTION, SOLUTION INTRAVENOUS; SUBCUTANEOUS at 12:54

## 2017-01-17 RX ADMIN — HYDROCODONE BITARTRATE AND ACETAMINOPHEN 1 TABLET: 5; 325 TABLET ORAL at 22:04

## 2017-01-17 RX ADMIN — INSULIN DETEMIR 16 UNITS: 100 INJECTION, SOLUTION SUBCUTANEOUS at 21:28

## 2017-01-17 RX ADMIN — DOCUSATE SODIUM 100 MG: 100 CAPSULE, LIQUID FILLED ORAL at 18:08

## 2017-01-17 RX ADMIN — ATORVASTATIN CALCIUM 10 MG: 10 TABLET, FILM COATED ORAL at 21:26

## 2017-01-17 RX ADMIN — SUCRALFATE 1 G: 1 TABLET ORAL at 10:21

## 2017-01-17 RX ADMIN — METOCLOPRAMIDE 10 MG: 10 TABLET ORAL at 21:26

## 2017-01-17 RX ADMIN — INSULIN ASPART 3 UNITS: 100 INJECTION, SOLUTION INTRAVENOUS; SUBCUTANEOUS at 18:08

## 2017-01-17 RX ADMIN — POTASSIUM CHLORIDE, DEXTROSE MONOHYDRATE AND SODIUM CHLORIDE 75 ML/HR: 150; 5; 450 INJECTION, SOLUTION INTRAVENOUS at 22:32

## 2017-01-17 RX ADMIN — DOCUSATE SODIUM 100 MG: 100 CAPSULE, LIQUID FILLED ORAL at 10:24

## 2017-01-17 RX ADMIN — TAZOBACTAM SODIUM AND PIPERACILLIN SODIUM 3.38 G: 375; 3 INJECTION, SOLUTION INTRAVENOUS at 04:36

## 2017-01-17 RX ADMIN — GLIMEPIRIDE 4 MG: 4 TABLET ORAL at 10:21

## 2017-01-17 RX ADMIN — METOCLOPRAMIDE 10 MG: 10 TABLET ORAL at 10:20

## 2017-01-17 RX ADMIN — ASPIRIN 81 MG: 81 TABLET, CHEWABLE ORAL at 10:21

## 2017-01-17 RX ADMIN — AMLODIPINE BESYLATE 5 MG: 5 TABLET ORAL at 10:21

## 2017-01-17 RX ADMIN — HYDROCODONE BITARTRATE AND ACETAMINOPHEN 1 TABLET: 5; 325 TABLET ORAL at 10:22

## 2017-01-17 RX ADMIN — LEVOTHYROXINE SODIUM 100 MCG: 100 TABLET ORAL at 10:21

## 2017-01-17 RX ADMIN — TAZOBACTAM SODIUM AND PIPERACILLIN SODIUM 3.38 G: 375; 3 INJECTION, SOLUTION INTRAVENOUS at 20:30

## 2017-01-17 RX ADMIN — TAZOBACTAM SODIUM AND PIPERACILLIN SODIUM 3.38 G: 375; 3 INJECTION, SOLUTION INTRAVENOUS at 12:48

## 2017-01-17 RX ADMIN — SILVER SULFADIAZINE: 10 CREAM TOPICAL at 20:35

## 2017-01-17 RX ADMIN — ATENOLOL 25 MG: 25 TABLET ORAL at 10:21

## 2017-01-17 RX ADMIN — INSULIN ASPART 2 UNITS: 100 INJECTION, SOLUTION INTRAVENOUS; SUBCUTANEOUS at 21:25

## 2017-01-17 RX ADMIN — ISOSORBIDE MONONITRATE 30 MG: 30 TABLET, EXTENDED RELEASE ORAL at 10:21

## 2017-01-17 RX ADMIN — SILVER SULFADIAZINE: 10 CREAM TOPICAL at 10:24

## 2017-01-17 RX ADMIN — ENOXAPARIN SODIUM 40 MG: 40 INJECTION SUBCUTANEOUS at 10:23

## 2017-01-17 RX ADMIN — HYDROCODONE BITARTRATE AND ACETAMINOPHEN 1 TABLET: 5; 325 TABLET ORAL at 18:08

## 2017-01-17 RX ADMIN — METOCLOPRAMIDE 10 MG: 10 TABLET ORAL at 18:08

## 2017-01-17 RX ADMIN — Medication 1 TABLET: at 10:20

## 2017-01-17 RX ADMIN — PANTOPRAZOLE SODIUM 40 MG: 40 TABLET, DELAYED RELEASE ORAL at 10:22

## 2017-01-17 RX ADMIN — Medication 1 CAPSULE: at 10:24

## 2017-01-17 RX ADMIN — CLOPIDOGREL 75 MG: 75 TABLET, FILM COATED ORAL at 10:21

## 2017-01-17 RX ADMIN — POTASSIUM CHLORIDE, DEXTROSE MONOHYDRATE AND SODIUM CHLORIDE 75 ML/HR: 150; 5; 450 INJECTION, SOLUTION INTRAVENOUS at 10:25

## 2017-01-17 RX ADMIN — SUCRALFATE 1 G: 1 TABLET ORAL at 18:08

## 2017-01-17 NOTE — PROGRESS NOTES
"  ENDOCRINE    Subjective    AND PLAN  Cynthia Arreola is a 89 y.o. female.     No shortness of breath.  No nausea or vomiting.  Fasting glucose 85.  Random glucose 219.      Hemoglobin 11.2.  Iron low at 1 16.  Patient has iron deficiency anemia.  Iron supplement has been started.      Patient has been switch from Zocor to Lipitor 10 mg once a day to reduce potential interaction with amlodipine.  No myalgia.      Patient has slow healing wound on right foot and right heel.  She has bilateral aortoiliac disease.      Objective   Visit Vitals   • /63   • Pulse 60   • Temp 97.9 °F (36.6 °C) (Oral)   • Resp 14   • Ht 63\" (160 cm)   • Wt 165 lb 6.4 oz (75 kg)   • SpO2 96%   • Breastfeeding No   • BMI 29.3 kg/m2     Physical Exam    Awake, alert, not dyspneic, not in acute distress.  No rales or wheezes.  Regular heart rate and rhythm.  No gallop  Neck veins are not visible a 45°.  No carotid bruits  Abdomen soft, nontender.  No hepatojugular reflux.  No calf tenderness.  No cyanosis.    Lab Results (last 24 hours)     Procedure Component Value Units Date/Time    Lactic Acid, Plasma [[id number]]  (Normal) Collected:  01/16/17 0930    Specimen:  Blood Updated:  01/16/17 0948     Lactate 1.5 mmol/L     CBC & Differential [[id number]] Collected:  01/16/17 0930    Specimen:  Blood Updated:  01/16/17 0949    Narrative:       The following orders were created for panel order CBC & Differential.  Procedure                               Abnormality         Status                     ---------                               -----------         ------                     CBC Auto Differential[[id number]]         Abnormal            Final result                 Please view results for these tests on the individual orders.    CBC Auto Differential [[id number]]  (Abnormal) Collected:  01/16/17 0930    Specimen:  Blood Updated:  01/16/17 0949     WBC 16.80 (H) 10*3/mm3      RBC 4.01 10*6/mm3      Hemoglobin 11.6 (L) g/dL      Hematocrit 37.1 " "%      MCV 92.5 fL      MCH 28.9 pg      MCHC 31.3 (L) g/dL      RDW 15.2 (H) %      RDW-SD 51.4 fl      MPV 10.7 fL      Platelets 373 10*3/mm3      Neutrophil % 73.5 %      Lymphocyte % 14.6 (L) %      Monocyte % 10.2 %      Eosinophil % 0.7 %      Basophil % 0.2 %      Immature Grans % 0.8 (H) %      Neutrophils, Absolute 12.34 (H) 10*3/mm3      Lymphocytes, Absolute 2.46 10*3/mm3      Monocytes, Absolute 1.72 (H) 10*3/mm3      Eosinophils, Absolute 0.11 10*3/mm3      Basophils, Absolute 0.04 10*3/mm3      Immature Grans, Absolute 0.13 (H) 10*3/mm3     Basic Metabolic Panel [[id number]]  (Abnormal) Collected:  01/16/17 0930    Specimen:  Blood Updated:  01/16/17 1003     Glucose 241 (H) mg/dL      BUN 14 mg/dL      Creatinine 1.11 (H) mg/dL      Sodium 138 mmol/L      Potassium 3.7 mmol/L      Chloride 97 (L) mmol/L      CO2 25.3 mmol/L      Calcium 9.0 mg/dL      eGFR Non African Amer 46 (L) mL/min/1.73      BUN/Creatinine Ratio 12.6      Anion Gap 15.7 mmol/L     Narrative:       The MDRD GFR formula is only valid for adults with stable renal function between ages 18 and 70.    Procalcitonin [[id number]]  (Normal) Collected:  01/16/17 0930    Specimen:  Blood Updated:  01/16/17 1012     Procalcitonin 0.10 ng/mL     Narrative:       As a Marker for Sepsis (Non-Neonates):   1. <0.5 ng/mL represents a low risk of severe sepsis and/or septic shock.  1. >2 ng/mL represents a high risk of severe sepsis and/or septic shock.    As a Marker for Lower Respiratory Tract Infections that require antibiotic therapy:  PCT on Admission     Antibiotic Therapy             6-12 Hrs later  > 0.5                Strongly Recommended            >0.25 - <0.5         Recommended  0.1 - 0.25           Discouraged                   Remeasure/reassess PCT  <0.1                 Strongly Discouraged          Remeasure/reassess PCT      As 28 day mortality risk marker: \"Change in Procalcitonin Result\" (> 80 % or <=80 %) if Day 0 (or Day 1) and " Day 4 values are available. Refer to [email or web address]-pct-[email or web address]/   Change in PCT <=80 %   A decrease of PCT levels below or equal to 80 % defines a positive change in PCT test result representing a higher risk for 28-day all-cause mortality of patients diagnosed with severe sepsis or septic shock.  Change in PCT > 80 %   A decrease of PCT levels of more than 80 % defines a negative change in PCT result representing a lower risk for 28-day all-cause mortality of patients diagnosed with severe sepsis or septic shock.                Respiratory Panel, PCR [[id number]]  (Abnormal) Collected:  01/16/17 1025    Specimen:  Swab from Nasopharynx Updated:  01/16/17 1356     ADENOVIRUS, PCR Not Detected      Coronavirus 229E Not Detected      Coronavirus HKU1 Not Detected      Coronavirus NL63 Not Detected      Coronavirus OC43 Not Detected      Human Metapneumovirus Not Detected      Human Rhinovirus/Enterovirus Detected (A)      Influenza B PCR Not Detected      Parainfluenza Virus 1 Not Detected      Parainfluenza Virus 2 Not Detected      Parainfluenza Virus 3 Not Detected      Parainfluenza Virus 4 Not Detected      Bordetella pertussis pcr Not Detected      Influenza 2009 H1N1 by PCR Not Detected      Chlamydophila pneumoniae PCR Not Detected      Mycoplasma pneumo by PCR Not Detected      Influenza A PCR Not Detected      Influenza A H3 Not Detected      Influenza A H1 Not Detected      RSV, PCR Not Detected     POC Glucose Fingerstick [[id number]]  (Abnormal) Collected:  01/16/17 1636    Specimen:  Blood Updated:  01/16/17 1637     Glucose 206 (H) mg/dL     Narrative:       Meter: [id number] : [id number] Hayde Tabares    POC Glucose Fingerstick [[id number]]  (Abnormal) Collected:  01/16/17 2056    Specimen:  Blood Updated:  01/16/17 2058     Glucose 219 (H) mg/dL     Narrative:       Meter: [id number] : [id number] Felton Samano    CBC & Differential [[id number]] Collected:  01/17/17 0623     Specimen:  Blood Updated:  01/17/17 0717    Narrative:       The following orders were created for panel order CBC & Differential.  Procedure                               Abnormality         Status                     ---------                               -----------         ------                     CBC Auto Differential[[id number]]         Abnormal            Final result                 Please view results for these tests on the individual orders.    CBC Auto Differential [[id number]]  (Abnormal) Collected:  01/17/17 0623    Specimen:  Blood Updated:  01/17/17 0717     WBC 13.52 (H) 10*3/mm3      RBC 4.00 10*6/mm3      Hemoglobin 11.2 (L) g/dL      Hematocrit 36.9 %      MCV 92.3 fL      MCH 28.0 pg      MCHC 30.4 (L) g/dL      RDW 15.2 (H) %      RDW-SD 51.6 fl      MPV 11.0 fL      Platelets 326 10*3/mm3      Neutrophil % 72.3 %      Lymphocyte % 13.0 (L) %      Monocyte % 12.8 (H) %      Eosinophil % 1.1 %      Basophil % 0.4 %      Immature Grans % 0.4 %      Neutrophils, Absolute 9.76 (H) 10*3/mm3      Lymphocytes, Absolute 1.76 10*3/mm3      Monocytes, Absolute 1.73 (H) 10*3/mm3      Eosinophils, Absolute 0.15 10*3/mm3      Basophils, Absolute 0.06 10*3/mm3      Immature Grans, Absolute 0.06 (H) 10*3/mm3      nRBC 0.0 /100 WBC     Procalcitonin [[id number]]  (Normal) Collected:  01/17/17 0623    Specimen:  Blood Updated:  01/17/17 0724     Procalcitonin 0.11 ng/mL     Narrative:       As a Marker for Sepsis (Non-Neonates):   1. <0.5 ng/mL represents a low risk of severe sepsis and/or septic shock.  1. >2 ng/mL represents a high risk of severe sepsis and/or septic shock.    As a Marker for Lower Respiratory Tract Infections that require antibiotic therapy:  PCT on Admission     Antibiotic Therapy             6-12 Hrs later  > 0.5                Strongly Recommended            >0.25 - <0.5         Recommended  0.1 - 0.25           Discouraged                   Remeasure/reassess PCT  <0.1                  "Strongly Discouraged          Remeasure/reassess PCT      As 28 day mortality risk marker: \"Change in Procalcitonin Result\" (> 80 % or <=80 %) if Day 0 (or Day 1) and Day 4 values are available. Refer to [email or web address]-pct-[email or web address]/   Change in PCT <=80 %   A decrease of PCT levels below or equal to 80 % defines a positive change in PCT test result representing a higher risk for 28-day all-cause mortality of patients diagnosed with severe sepsis or septic shock.  Change in PCT > 80 %   A decrease of PCT levels of more than 80 % defines a negative change in PCT result representing a lower risk for 28-day all-cause mortality of patients diagnosed with severe sepsis or septic shock.                Hepatic Function Panel [[id number]]  (Abnormal) Collected:  01/17/17 0623    Specimen:  Blood Updated:  01/17/17 0739     Total Protein 7.1 g/dL      Albumin 2.80 (L) g/dL      ALT (SGPT) 11 U/L      AST (SGOT) 12 U/L      Alkaline Phosphatase 115 U/L      Total Bilirubin 0.4 mg/dL      Bilirubin, Direct 0.3 mg/dL      Bilirubin, Indirect 0.1 mg/dL     Iron Profile [[id number]]  (Abnormal) Collected:  01/17/17 0623    Specimen:  Blood Updated:  01/17/17 0739     Iron 16 (L) mcg/dL      Iron Saturation 8 (L) %      Transferrin 128 (L) mg/dL      TIBC 191 mcg/dL     Basic Metabolic Panel [[id number]]  (Abnormal) Collected:  01/17/17 0623    Specimen:  Blood Updated:  01/17/17 0742     Glucose 85 mg/dL      BUN 13 mg/dL      Creatinine 0.94 mg/dL      Sodium 139 mmol/L      Potassium 3.4 (L) mmol/L      Chloride 99 mmol/L      CO2 25.3 mmol/L      Calcium 9.1 mg/dL      eGFR Non African Amer 56 (L) mL/min/1.73      BUN/Creatinine Ratio 13.8      Anion Gap 14.7 mmol/L     Narrative:       The MDRD GFR formula is only valid for adults with stable renal function between ages 18 and 70.    Blood Culture [[id number]]  (Normal) Collected:  01/16/17 0426    Specimen:  Blood from Arm, Left Updated:  01/17/17 0801     Blood Culture No " growth at 24 hours             Principal Problem:    Bilateral bronchopneumonia  Active Problems:    Healthcare-associated viral pneumonia    Diabetic foot ulcer associated with type 2 diabetes mellitus    Chronic kidney disease, stage II (mild)    H/O small bowel obstruction    Anemia of chronic disease    Recurrent UTI    Ischemic cardiomyopathy    Peripheral arterial disease    Hypertension, essential    Immobility syndrome    Acquired hypothyroidism    MRSA cellulitis of right foot    Sacral decubitus ulcer    SSS (sick sinus syndrome)    S/P placement of cardiac pacemaker    Gastroesophageal reflux disease with esophagitis    Hyperlipidemia    Ischemic ulcer of right heel with fat layer exposed    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    Respiratory infection due to enterovirus/ Human Rhinovirus    Common bile duct stone    Plan-  Continue Levemir 16 units every evening and glimepiride 4 mg twice a day.    Continue NovoLog as needed.    Will watch on present regimen for now.  Check stool for occult blood.    Will defer further evaluation to Dr. Canales.    Check lipid profile in 4-6 weeks.  Continue local wound care and follow-up at wound care center.

## 2017-01-17 NOTE — H&P
"                       [facility]  FAMILY AND INTERNAL MEDICINE  NEYDA PINTO, and JACE      INTERNAL MEDICINE DAILY PROGRESS NOTE  Dayron Canales M.D.  2017            Patient Identification:  Name: Cynthia Arreola  Age: 89 y.o.  Sex: female  :  1927  MRN: [id number]         Primary Care Physician: Dayron Canales MD  LENGTH OF STAY 0 DAYS    Consults     Date and Time Order Name Status Description    2017 1453 Inpatient Consult to General Surgery      2017 0917 Inpatient Consult to Endocrinology Completed     2017 0917 Inpatient Consult to Infectious Diseases Completed     2017 0339 Family Medicine Consult Completed     2016 1424 Inpatient Consult to Vascular Surgery Completed     2016 142 Inpatient Consult to Infectious Diseases Completed     2016 1424 Inpatient Consult to Endocrinology Completed     2016 142 Inpatient Consult to Cardiology Completed     2016 0821 Family Medicine Consult Completed           Chief Complaint:  Shortness of air and chest pain      Subjective     HPI: Patient is a 89 y.o.female who presented with shortness of breath and chest pain that woke patient from sleep. Event occurred at [facility] where she has been a patient for several weeks.  Patient demanded transfer out to ER for further work up and evaluation. She felt symptoms were similar to those she had with \"her pneumonia\" between 16 and 16 here at [facility].  She also complains of nausea and vomiting with certain foods. Patient has multiple pressure ulcers that predate her transfer to [place].  These were addressed during her last stay here at [facility] and actually seem to be improving slightly.    Patient was evaluated in the ED by Dr. Ramon Yao.  He reported SOB x 3 hours duration at the time of presentation. ROS was positive for SOB, chest pain, nausea and vomiting.  Vitals showed  and /120.  She was " afebrile. PE showed decreased breath sounds, rhonchi bilaterally, no pedal edema. Significant labs included:  CBC with WBC 17.72, glucose 171, hemoglobin 12.3, respiratory viral panel positive for Human Rhinovirus/Enterovirus.  Chest xray showed a possible LLL infiltrate.  EKG was unremarkable with HR 67.  Patient was started on Zosyn in the ER and admitted by Dr. Martinez to me.    1/16/17.  I saw the patient for the first time during this hospital stay in her bed on [address].  Patient was resting comfortably in bed and in no respiratory distress.  Patient admits to be somewhat unhappy at her current nursing home.  Wounds on legs are reported as better by patient.  She currently is without nausea, vomiting or other significant issues. Patient to be seen today by Endocrine, ID, and Wound Care.        Review of Systems:    A comprehensive 14 point review of systems was negative except for:  Constitution:  positive for fatigue and malaise  Respiratory: positive for  pleuritic pain and shortness of air  Cardiovascular: positive for  lower extremity edema  Integument: positive for  rash and ulcer  Musculoskeletal: positive for  back pain and muscle weakness  Neurological: positive for  dizziness and weakness    Past Medical History   Diagnosis Date   • Anemia    • Coronary artery disease    • Diabetes mellitus    • Disease of thyroid gland    • Dysphagia    • Hypertension    • Ischemic cardiomyopathy    • Malignant neoplasm of colon    • PVD (peripheral vascular disease)    • Renal disorder      Past Surgical History   Procedure Laterality Date   • Colon resection with colostomy       25 years ago   • Cholecystectomy     • Colon surgery       No Known Allergies  Family History   Problem Relation Age of Onset   • Diabetes Daughter    • Hyperlipidemia Daughter    • Hypertension Daughter    • Cancer Son      Social History     Social History   • Marital status:      Spouse name: N/A   • Number of children: N/A   •  Years of education: N/A     Social History Main Topics   • Smoking status: Former Smoker     Packs/day: 0.50     Types: Cigarettes     Start date: 6/16/1941     Quit date: 5/16/2007   • Smokeless tobacco: Never Used   • Alcohol use No   • Drug use: No   • Sexual activity: Defer     Other Topics Concern   • None     Social History Narrative   • None       PMH, FH, SH and ROS completed with Admission History and Physical and updated in EPIC system.  This data is unchanged at this time.       Objective     Scheduled Meds:  amLODIPine 5 mg Oral Daily   aspirin 81 mg Oral Daily   atenolol 25 mg Oral Daily   atorvastatin 10 mg Oral Nightly   calcium carbonate 1 tablet Oral Daily   clopidogrel 75 mg Oral Daily   docusate sodium 100 mg Oral BID   enoxaparin 40 mg Subcutaneous Q24H   [START ON 1/17/2017] Ferrex 150 forte plus 1 capsule Oral Daily With Breakfast   glimepiride 4 mg Oral BID With Meals   insulin aspart 0-7 Units Subcutaneous 4x Daily AC & at Bedtime   insulin detemir 16 Units Subcutaneous Nightly   isosorbide mononitrate 30 mg Oral Daily   levothyroxine 100 mcg Oral Daily   metoclopramide 10 mg Oral 4x Daily AC & at Bedtime   pantoprazole 40 mg Oral Daily   piperacillin-tazobactam 3.375 g Intravenous Q8H   silver sulfadiazine  Topical Q12H   sucralfate 1 g Oral TID With Meals     Continuous Infusions:  dextrose 5 % and sodium chloride 0.45 % with KCl 20 mEq/L 75 mL/hr Last Rate: 75 mL/hr (01/16/17 2238)   Pharmacy to Dose enoxaparin (LOVENOX)     Pharmacy to Dose Zosyn         Vital signs in last 24 hours:  Temp:  [97.8 °F (36.6 °C)-98.6 °F (37 °C)] 98 °F (36.7 °C)  Heart Rate:  [] 61  Resp:  [14-26] 14  BP: (150-198)/() 166/76    Intake/Output:    Intake/Output Summary (Last 24 hours) at 01/16/17 2351  Last data filed at 01/16/17 2238   Gross per 24 hour   Intake   2030 ml   Output      0 ml   Net   2030 ml       Exam:  Visit Vitals   • /76   • Pulse 61   • Temp 98 °F (36.7 °C) (Oral)   •  "Resp 14   • Ht 63\" (160 cm)   • Wt 165 lb 6.4 oz (75 kg)   • SpO2 94%   • Breastfeeding No   • BMI 29.3 kg/m2                  Constitutional:    Alert, cooperative, no distress, AAOx3, resting comfortably, lying in bed                          Head:    Normocephalic, without obvious abnormality, atraumatic                           Eyes:    PERRLA, conjunctiva/corneas clear, no icterus, no conjunctival                                         pallor, EOM's intact, both eyes          ENT and Mouth:   Lips, tongue, gums normal; oral mucosa pink and moist                           Neck:   Supple, symmetrical, trachea midline, no JVD                 Respiratory:   Clear to auscultation bilaterally, respirations unlabored           Cardiovascular:    Regular rate and rhythm, S1 and S2 normal, no murmur,       no  Rub or gallop.  Pulses normal.            Gastrointestinal:   BS present x 4 Soft, non-tender, bowel sounds active, no                                                  masses,  no hepatosplenomegaly                              :    No hernia.  Normal exam for sex.                Musculoskeletal:   Extremities normal, atraumatic, no cyanosis or edema.  No                                               arthropathy.  No deformity.  Gait normal                            Skin:  Skin is warm and dry,  no rashes, swelling or palpable lesions                  Neurologic:   CNII-XII intact, motor strength grossly intact, sensation                                                      grossly intact to light touch, no focal reflexl deficits noted                   Psychiatric:   Alert, oriented x 3, no delusions, psychosis,depression,anxiety       Heme/Lymph/Imun:   No bruises, petechiae.  Lymph nodes normal in size/configuration       Data Review:  Lab Results   Component Value Date    CALCIUM 9.0 01/16/2017     Results from last 7 days  Lab Units 01/16/17  0930 01/16/17  0248   AST (SGOT) U/L  --  10   SODIUM mmol/L " 138 139   POTASSIUM mmol/L 3.7 3.6   CHLORIDE mmol/L 97* 99   TOTAL CO2 mmol/L 25.3 26.2   BUN mg/dL 14 14   CREATININE mg/dL 1.11* 1.00   GLUCOSE mg/dL 241* 171*   CALCIUM mg/dL 9.0 9.5   WBC 10*3/mm3 16.80* 17.72*   HEMOGLOBIN g/dL 11.6* 12.3   PLATELETS 10*3/mm3 373 380   ALT (SGPT) U/L  --  12     Lab Results   Component Value Date    TROPONINT <0.010 01/16/2017     Estimated Creatinine Clearance: 33.3 mL/min (by C-G formula based on Cr of 1.11).  WEIGHTS:     Wt Readings from Last 1 Encounters:   01/16/17 0602 165 lb 6.4 oz (75 kg)   01/16/17 0222 170 lb (77.1 kg)       PROBLEM LIST    Principal Problem:    Bilateral bronchopneumonia  Active Problems:    Healthcare-associated viral pneumonia    Diabetic foot ulcer associated with type 2 diabetes mellitus    Chronic kidney disease, stage II (mild)    Ischemic cardiomyopathy    Peripheral arterial disease    Hyperlipidemia    Ischemic ulcer of right heel with fat layer exposed    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    Respiratory infection due to enterovirus/ Human Rhinovirus    Common bile duct stone    Anemia of chronic disease    Hypertension, essential    Immobility syndrome    Acquired hypothyroidism    MRSA cellulitis of right foot    Sacral decubitus ulcer    SSS (sick sinus syndrome)    S/P placement of cardiac pacemaker    Gastroesophageal reflux disease with esophagitis    H/O small bowel obstruction    Recurrent UTI      ATTENDING PHYSICIAN ASESSMENT AND PLAN:    1.  Healthcare-associated Bilateral broncho-pneumonia with respiratory distress on admission.Patient admitted on IV Zosyn.  ID consulted and agrees with choice  Will continue same antibiotic and monitor.  2.  Choledocholithiasis with large stone in bile duct.  May be cause for increased WBC.  Surgery consulted for their input and evaluation.  patient does indeed complain of pain in this area.  3.  Pressure ulcers of decubitus, right foot and right heal with poorly  controlled type 2 DM  Wound care evaluating.  Silvadene continued as topical treatment of choice.  Outpatient follow up in wound care clinic is planned.  4.  Uncontrolled Diabetes Type 2.  Endocrine evaluating and adjusting insulin  We will continue to following blood sugars regularly  5. Hyperlipidemia. Now being controlled with Lipitor as opposed to Zocor. Endocrinology feels this is a good move to protect against potential interaction between Zocor and amlodipine. Levels on lipid profile looked good.  5. Severe peripheral vascular occlusive arterial disease especially prominent in aorta iliac and femoral areas. Revascularization is one possibility. Vascular seems a little reluctant however at this point to engage in this very aggressive mode of treatment. Continued careful outpatient follow-up in wound care clinic seems most appropriate with a follow-up in the vascular clinic if revascularization is felt to be necessary at some point in the future.  6. History of MRSA infection in cellulitis of right foot. No clear-cut evidence at this point of a bacterial in this area. We will continue aggressive topical treatment of the wounds and efforts to heal the wounds. Wound care has evaluated and adjusted the patient's wedding care plans.  7. Ischemic heel ulcer. This also can be treated outpatient level with regular debridement. Basilar has rheumatic recommended treatment of this with painting using Betadine as done in the past to continue with the drying process.  8. Ischemic cardiomyopathy. Workup so far unremarkable. Echocardiogram showing ejection fraction greater than 60%. Stress test also completed and was unremarkable. Pacemaker check has been done and showed no malfunction of the pacemaker. Cardiac workup therefore so far inconclusive.  9. Hypertension. Medications have been regulated and blood pressure seems under much better control at this point clonidine has been added on a when necessary basis and Norvasc  on a regular prescribed basis. We will continue to follow this on an outpatient basis.  10. Recurrent urinary tract infections. There was no evidence of a urinary tract infection in this patient at this time in the hospital. All antibiotics have been discontinued at this point.  11. Anemia of chronic disease. Iron therapy recommended no need for transfusion at this point. Patient does need continued follow-up with CBCs on an outpatient basis.        Dayron Canales MD  1/16/2017  9:30AM

## 2017-01-17 NOTE — NURSING NOTE
Ostomy kit taken to room; current pouch on ok and staff getting pt up in chair with lift at present time.  Low air loss mattress ordered.  Assessed right foot wounds per staff RN;s request.  Right heel with loose black eschar; no s/s of infection; right dorsal foot wound pink with small area of yellow slough; no s/s of infection.  Recommend continuing silvadene and drsgs changed as ordered

## 2017-01-17 NOTE — CONSULTS
CC: nausea and vomiting      Patient is a 89 y.o. female referred by Dayron Canales MD for evaluation of nausea and vomiting  Patient was brought to the emergency room on 1/16/2017 with complaints of nausea, vomiting and shortness of breath.  Patient reports she had experienced this during her December admission for pneumonia.patient denies jaundice, radha colored stools or dark urine.  Patient reports that she has had her gallbladder out. Patient has a colostomy for colon cancer over 25 years ago and denies any melena or bright red blood in the output.  Patient reports she has a decrease in appetite secondary to the nausea and vomiting.  But does not think that food caused it.  Patient does not know what makes it better or worse.  This event occurred suddenly in the middle of the night.    Past Medical History   Diagnosis Date   • Anemia    • Coronary artery disease    • Diabetes mellitus    • Disease of thyroid gland    • Dysphagia    • Hypertension    • Ischemic cardiomyopathy    • Malignant neoplasm of colon    • PVD (peripheral vascular disease)    • Renal disorder      Past Surgical History   Procedure Laterality Date   • Colon resection with colostomy       25 years ago   • Cholecystectomy     • Colon surgery       Family History   Problem Relation Age of Onset   • Diabetes Daughter    • Hyperlipidemia Daughter    • Hypertension Daughter    • Cancer Son      Social History   Substance Use Topics   • Smoking status: Former Smoker     Packs/day: 0.50     Types: Cigarettes     Start date: 6/16/1941     Quit date: 5/16/2007   • Smokeless tobacco: Never Used   • Alcohol use No         Current Facility-Administered Medications:   •  acetaminophen (TYLENOL) tablet 650 mg, 650 mg, Oral, Q6H PRN, Dayron Canales MD  •  acetaminophen (TYLENOL) tablet 650 mg, 650 mg, Oral, Q4H PRN, Dayron Canales MD  •  albuterol (PROVENTIL) nebulizer solution 2.5 mg, 2.5 mg, Nebulization, Q4H PRN, Dayron Canales MD  •   amLODIPine (NORVASC) tablet 5 mg, 5 mg, Oral, Daily, Dayron Canales MD, 5 mg at 01/17/17 1021  •  aspirin chewable tablet 81 mg, 81 mg, Oral, Daily, Dayron Canales MD, 81 mg at 01/17/17 1021  •  atenolol (TENORMIN) tablet 25 mg, 25 mg, Oral, Daily, Dayron Canales MD, 25 mg at 01/17/17 1021  •  atorvastatin (LIPITOR) tablet 10 mg, 10 mg, Oral, Nightly, Sukumar Richard MD, 10 mg at 01/16/17 2102  •  calcium carbonate (TUMS) chewable tablet 500 mg (200 mg elemental), 1 tablet, Oral, Daily, Dayron Canales MD, 1 tablet at 01/17/17 1020  •  clopidogrel (PLAVIX) tablet 75 mg, 75 mg, Oral, Daily, Dayron Canales MD, 75 mg at 01/17/17 1021  •  dextrose (D50W) solution 25 g, 25 g, Intravenous, Q15 Min PRN, Sukumar Richard MD  •  dextrose (GLUTOSE) oral gel 15 g, 15 g, Oral, Q15 Min PRN, Sukumar Richard MD  •  dextrose 5 % and sodium chloride 0.45 % with KCl 20 mEq/L infusion, 75 mL/hr, Intravenous, Continuous, Dayron Canales MD, Last Rate: 75 mL/hr at 01/17/17 1025, 75 mL/hr at 01/17/17 1025  •  docusate sodium (COLACE) capsule 100 mg, 100 mg, Oral, BID, Dayron Canales MD, 100 mg at 01/17/17 1024  •  enoxaparin (LOVENOX) syringe 40 mg, 40 mg, Subcutaneous, Q24H, Dayron Canales MD, 40 mg at 01/17/17 1023  •  Ferrex 150 forte plus capsule 1 capsule, 1 capsule, Oral, Daily With Breakfast, Sukumar Richard MD, 1 capsule at 01/17/17 1024  •  glimepiride (AMARYL) tablet 4 mg, 4 mg, Oral, BID With Meals, Sukumar Richard MD, 4 mg at 01/17/17 1021  •  glucagon (human recombinant) (GLUCAGEN DIAGNOSTIC) injection 1 mg, 1 mg, Subcutaneous, Once PRN, Dayron Canales MD  •  glucagon (human recombinant) (GLUCAGEN DIAGNOSTIC) injection 1 mg, 1 mg, Subcutaneous, Q15 Min PRN, Sukumar Richard MD  •  HYDROcodone-acetaminophen (NORCO) 5-325 MG per tablet 1 tablet, 1 tablet, Oral, Q4H PRN, Dayron Canales MD, 1 tablet at 01/17/17 1022  •  insulin aspart (novoLOG) injection 0-7 Units, 0-7 Units, Subcutaneous, 4x Daily AC  & at Bedtime, Sukumar Richard MD, 3 Units at 01/17/17 1254  •  insulin detemir (LEVEMIR) injection 16 Units, 16 Units, Subcutaneous, Nightly, Dayron Canales MD, 16 Units at 01/16/17 2154  •  isosorbide mononitrate (IMDUR) 24 hr tablet 30 mg, 30 mg, Oral, Daily, Dayron Canales MD, 30 mg at 01/17/17 1021  •  levothyroxine (SYNTHROID, LEVOTHROID) tablet 100 mcg, 100 mcg, Oral, Daily, Dayron Canales MD, 100 mcg at 01/17/17 1021  •  metoclopramide (REGLAN) tablet 10 mg, 10 mg, Oral, 4x Daily AC & at Bedtime, Dayron Canales MD, 10 mg at 01/17/17 1020  •  morphine injection 1 mg, 1 mg, Intravenous, Q4H PRN **AND** naloxone (NARCAN) injection 0.4 mg, 0.4 mg, Intravenous, Q5 Min PRN, Dayron Canales MD  •  nitroglycerin (NITROSTAT) SL tablet 0.4 mg, 0.4 mg, Sublingual, Q5 Min PRN, Dayron Canales MD  •  pantoprazole (PROTONIX) EC tablet 40 mg, 40 mg, Oral, Daily, Dayron Canales MD, 40 mg at 01/17/17 1022  •  Pharmacy to Dose enoxaparin (LOVENOX), , Does not apply, Continuous PRN, Dayron Canales MD  •  Pharmacy to Dose Zosyn, , Does not apply, Continuous PRN, Param Martinez MD  •  piperacillin-tazobactam (ZOSYN) 3.375 g in dextrose 50 mL IVPB (premix), 3.375 g, Intravenous, Q8H, Param Martinez MD, Last Rate: 12.5 mL/hr at 01/17/17 1248, 3.375 g at 01/17/17 1248  •  promethazine (PHENERGAN) tablet 12.5 mg, 12.5 mg, Oral, Q6H PRN, Dayron Canales MD  •  silver sulfadiazine (SILVADENE, SSD) 1 % cream, , Topical, Q12H, Dayron Canales MD  •  sodium chloride 0.9 % flush 1-10 mL, 1-10 mL, Intravenous, PRN, Dayron Canales MD  •  sodium chloride 0.9 % flush 10 mL, 10 mL, Intravenous, PRN, Ramon Yao MD  •  sucralfate (CARAFATE) tablet 1 g, 1 g, Oral, TID With Meals, Dayron Canales MD, 1 g at 01/17/17 1021  •  traMADol (ULTRAM) tablet 50 mg, 50 mg, Oral, Q6H PRN, Dayron Canales MD, 50 mg at 01/16/17 1054    Review of Systems   General: Patient reports fair health  Eyes: No eye problems  Ears, nose,  mouth and throat: No rhinitis, no hearing problems, no chronic cough  Cardiovascular/heart: Denies palpitations, syncope or chest pain  Respiratory/lung: positive shortness of breath, no hemoptysis  Genital/urinary: No frequency, hematuria or dysuria  Hematological/lymphatic: positive anemia   Musculoskeletal: muscle pain, back pain, muscle weakness  Skin: pressure sores  Neurological: No seizures or other neurological problems  Psychiatric: None  Endocrine: diabetic, hypothyroidism  Gastro-intestinal: see history of present illness      Vitals:    01/1934 01/17/17 0025 01/17/17 0827 01/17/17 1300   BP: 166/76 156/63 164/73 151/56   BP Location:   Left arm Left arm   Patient Position:   Lying Lying   Pulse: 61 60 66 63   Resp: 14 14 18 18   Temp: 98 °F (36.7 °C) 97.9 °F (36.6 °C) 98.8 °F (37.1 °C) 98.6 °F (37 °C)   TempSrc: Oral Oral Oral Oral   SpO2: 94% 96%  98%   Weight:       Height:           Physical Exam  General: Alert and oriented x3 in no acute distress  HEENT: Normal cephalic, atraumatic, PERRLA, EOMI, sclera anicteric, moist mucous membranes, neck is supple, + JVD, no carotid bruits, no thyromegaly no adenopathy  Chest: Decreased breath sounds on the left base  CVA: RRR, normal S1-S2, no murmurs, no gallops or rubs  Abdomen: Positive BS, soft, slightly distended, nontender, no rebound, no guarding, no hernias, no organomegaly and no masses, functioning LLQ colostomt  Extremities:  no clubbing, no cyanosis, positivie edema  Neurovascular: Grossly intact    Patient does not use tobacco products currently and I have counseled the patient not to start using tobacco products in the future.    Ct Scan reviewed and revealed a CBD stone that was nonobstructive  Labs and x-rays all have been reviewed     Assessment:  ?? Symptomatic CBD stone with N & V    Plan:  This is a pleasant 89-year-old female patient who has had nausea and vomiting with decrease in appetite.  Patient was incidentally found to have a  common bile duct stone on the CAT scan that was nonobstructing and a normal liver function test.  However, with the nausea and vomiting this could be related to the common bile duct stone and may want to consider an ERCP with a sphincterotomy. The patient has had a previous cholecystectomy several years ago. I will place a consult for Dr. Bridges.    Lilian Gibbs MD  General, Minimally Invasive and Endoscopic Surgery  [facility]    [address], [address]  [place], [place], [address]  P: [phone]  F: [phone]    Cc:  Dayron Canales MD

## 2017-01-17 NOTE — PLAN OF CARE
Problem: Patient Care Overview (Adult)  Goal: Plan of Care Review  Outcome: Ongoing (interventions implemented as appropriate)    01/17/17 0634   Coping/Psychosocial Response Interventions   Plan Of Care Reviewed With patient   Patient Care Overview   Progress no change   Outcome Evaluation   Outcome Summary/Follow up Plan VSS. O2 SAT STABLE ON 2L/M N/C. PAIN PILL FOR NECK PAIN. SAFETY MAINTAINED.         Problem: Pneumonia (Adult)  Goal: Signs and Symptoms of Listed Potential Problems Will be Absent or Manageable (Pneumonia)  Outcome: Ongoing (interventions implemented as appropriate)    Problem: Fall Risk (Adult)  Goal: Identify Related Risk Factors and Signs and Symptoms  Outcome: Outcome(s) achieved Date Met:  01/17/17  Goal: Absence of Falls  Outcome: Ongoing (interventions implemented as appropriate)

## 2017-01-17 NOTE — PROGRESS NOTES
Continued Stay Note  [facility]     Patient Name: Cynthia Arreola  MRN: [id number]  Today's Date: 1/17/2017    Admit Date: 1/16/2017          Discharge Plan       01/17/17 1506    Case Management/Social Work Plan    Plan [facility] or [facility]    Additional Comments Spoke with Dr. giles and pt son and pt son wants pt moved to another nursing facility.  [facility]              Discharge Codes     None            Ana Copeland RN

## 2017-01-17 NOTE — PLAN OF CARE
Problem: Patient Care Overview (Adult)  Goal: Plan of Care Review  Outcome: Ongoing (interventions implemented as appropriate)    01/16/17 2054   Coping/Psychosocial Response Interventions   Plan Of Care Reviewed With patient   Patient Care Overview   Progress no change   Outcome Evaluation   Outcome Summary/Follow up Plan Continue antibiotics. Dressing changed as ordered. BP increased monitor BP. Maintain isolation precautions for Droplet and Contact.        Goal: Adult Individualization and Mutuality  Outcome: Ongoing (interventions implemented as appropriate)  Goal: Discharge Needs Assessment  Outcome: Ongoing (interventions implemented as appropriate)    Problem: Pneumonia (Adult)  Goal: Signs and Symptoms of Listed Potential Problems Will be Absent or Manageable (Pneumonia)  Outcome: Ongoing (interventions implemented as appropriate)

## 2017-01-17 NOTE — PROGRESS NOTES
INFECTIOUS DISEASES PROGRESS NOTE    CC: f/u dyspnea    S:   Feels good  No f/c/ns, abdominal pain, sob    O:  Physical Exam:  Temp:  [97.9 °F (36.6 °C)-98.8 °F (37.1 °C)] 98.8 °F (37.1 °C)  Heart Rate:  [60-66] 66  Resp:  [14-18] 18  BP: (156-177)/(61-76) 164/73  Physical Exam   Constitutional: She appears well-developed. No distress.   Cardiovascular: Regular rhythm.    Pulmonary/Chest: Effort normal and breath sounds normal.   Abdominal: Soft. She exhibits distension. There is no tenderness.   Neurological: She is alert.   Skin: Skin is warm.   Psychiatric: She has a normal mood and affect. Her behavior is normal.        Diagnostics:     WBC 13.5 (P72,  L 13, M 13)  H/H11.2/37    Cr 0.94  ALT 11  AST 12  TB 0.4    PCT 0.11    BCx ngtd    Estimated Creatinine Clearance: 39.3 mL/min (by C-G formula based on Cr of 0.94).    Assessment/Plan   1. Sepsis  2. Rhinovirus  3. PNA  4. Choledocholithiasis    LFTs nl, AF, so I don't think she has cholangitis or any biliary infection.  I'll keep her on zosyn pending surgical eval for her CBD stone but if nothing need to be done then I think we can discharge her off antibiotics.  Has viral pna.    Rico Ramirez MD  9:03 AM  01/17/17

## 2017-01-18 LAB
ANION GAP SERPL CALCULATED.3IONS-SCNC: 16.3 MMOL/L
BASOPHILS # BLD AUTO: 0.03 10*3/MM3 (ref 0–0.2)
BASOPHILS NFR BLD AUTO: 0.2 % (ref 0–1.5)
BUN BLD-MCNC: 12 MG/DL (ref 8–23)
BUN/CREAT SERPL: 11.2 (ref 7–25)
CALCIUM SPEC-SCNC: 9.1 MG/DL (ref 8.6–10.5)
CHLORIDE SERPL-SCNC: 98 MMOL/L (ref 98–107)
CO2 SERPL-SCNC: 23.7 MMOL/L (ref 22–29)
CREAT BLD-MCNC: 1.07 MG/DL (ref 0.57–1)
DEPRECATED RDW RBC AUTO: 49.8 FL (ref 37–54)
EOSINOPHIL # BLD AUTO: 0.05 10*3/MM3 (ref 0–0.7)
EOSINOPHIL NFR BLD AUTO: 0.4 % (ref 0.3–6.2)
ERYTHROCYTE [DISTWIDTH] IN BLOOD BY AUTOMATED COUNT: 14.9 % (ref 11.7–13)
GFR SERPL CREATININE-BSD FRML MDRD: 48 ML/MIN/1.73
GLUCOSE BLD-MCNC: 54 MG/DL (ref 65–99)
GLUCOSE BLDC GLUCOMTR-MCNC: 199 MG/DL (ref 70–130)
GLUCOSE BLDC GLUCOMTR-MCNC: 203 MG/DL (ref 70–130)
GLUCOSE BLDC GLUCOMTR-MCNC: 281 MG/DL (ref 70–130)
GLUCOSE BLDC GLUCOMTR-MCNC: 75 MG/DL (ref 70–130)
HCT VFR BLD AUTO: 36.6 % (ref 35.6–45.5)
HGB BLD-MCNC: 11.2 G/DL (ref 11.9–15.5)
IMM GRANULOCYTES # BLD: 0.08 10*3/MM3 (ref 0–0.03)
IMM GRANULOCYTES NFR BLD: 0.6 % (ref 0–0.5)
LYMPHOCYTES # BLD AUTO: 1.99 10*3/MM3 (ref 0.9–4.8)
LYMPHOCYTES NFR BLD AUTO: 15.3 % (ref 19.6–45.3)
MCH RBC QN AUTO: 27.8 PG (ref 26.9–32)
MCHC RBC AUTO-ENTMCNC: 30.6 G/DL (ref 32.4–36.3)
MCV RBC AUTO: 90.8 FL (ref 80.5–98.2)
MONOCYTES # BLD AUTO: 1.55 10*3/MM3 (ref 0.2–1.2)
MONOCYTES NFR BLD AUTO: 11.9 % (ref 5–12)
NEUTROPHILS # BLD AUTO: 9.32 10*3/MM3 (ref 1.9–8.1)
NEUTROPHILS NFR BLD AUTO: 71.6 % (ref 42.7–76)
NRBC BLD MANUAL-RTO: 0 /100 WBC (ref 0–0)
PLATELET # BLD AUTO: 266 10*3/MM3 (ref 140–500)
PMV BLD AUTO: 11 FL (ref 6–12)
POTASSIUM BLD-SCNC: 3.9 MMOL/L (ref 3.5–5.2)
RBC # BLD AUTO: 4.03 10*6/MM3 (ref 3.9–5.2)
SODIUM BLD-SCNC: 138 MMOL/L (ref 136–145)
WBC NRBC COR # BLD: 13.02 10*3/MM3 (ref 4.5–10.7)

## 2017-01-18 PROCEDURE — 25010000002 ENOXAPARIN PER 10 MG: Performed by: INTERNAL MEDICINE

## 2017-01-18 PROCEDURE — 63710000001 INSULIN ASPART PER 5 UNITS: Performed by: INTERNAL MEDICINE

## 2017-01-18 PROCEDURE — 80048 BASIC METABOLIC PNL TOTAL CA: CPT | Performed by: INTERNAL MEDICINE

## 2017-01-18 PROCEDURE — 25010000002 PIPERACILLIN SOD-TAZOBACTAM PER 1 G: Performed by: FAMILY MEDICINE

## 2017-01-18 PROCEDURE — 85025 COMPLETE CBC W/AUTO DIFF WBC: CPT | Performed by: INTERNAL MEDICINE

## 2017-01-18 PROCEDURE — 99232 SBSQ HOSP IP/OBS MODERATE 35: CPT | Performed by: INTERNAL MEDICINE

## 2017-01-18 PROCEDURE — 99231 SBSQ HOSP IP/OBS SF/LOW 25: CPT | Performed by: SURGERY

## 2017-01-18 PROCEDURE — 82962 GLUCOSE BLOOD TEST: CPT

## 2017-01-18 RX ADMIN — DOCUSATE SODIUM 100 MG: 100 CAPSULE, LIQUID FILLED ORAL at 09:46

## 2017-01-18 RX ADMIN — ENOXAPARIN SODIUM 40 MG: 40 INJECTION SUBCUTANEOUS at 09:44

## 2017-01-18 RX ADMIN — GLIMEPIRIDE 4 MG: 4 TABLET ORAL at 09:45

## 2017-01-18 RX ADMIN — SUCRALFATE 1 G: 1 TABLET ORAL at 11:45

## 2017-01-18 RX ADMIN — HYDROCODONE BITARTRATE AND ACETAMINOPHEN 1 TABLET: 5; 325 TABLET ORAL at 15:14

## 2017-01-18 RX ADMIN — SUCRALFATE 1 G: 1 TABLET ORAL at 09:45

## 2017-01-18 RX ADMIN — SILVER SULFADIAZINE 1 APPLICATION: 10 CREAM TOPICAL at 23:00

## 2017-01-18 RX ADMIN — METOCLOPRAMIDE 10 MG: 10 TABLET ORAL at 17:41

## 2017-01-18 RX ADMIN — ISOSORBIDE MONONITRATE 30 MG: 30 TABLET, EXTENDED RELEASE ORAL at 09:44

## 2017-01-18 RX ADMIN — POTASSIUM CHLORIDE, DEXTROSE MONOHYDRATE AND SODIUM CHLORIDE 75 ML/HR: 150; 5; 450 INJECTION, SOLUTION INTRAVENOUS at 15:14

## 2017-01-18 RX ADMIN — GLIMEPIRIDE 4 MG: 4 TABLET ORAL at 17:41

## 2017-01-18 RX ADMIN — METOCLOPRAMIDE 10 MG: 10 TABLET ORAL at 11:45

## 2017-01-18 RX ADMIN — HYDROCODONE BITARTRATE AND ACETAMINOPHEN 1 TABLET: 5; 325 TABLET ORAL at 22:58

## 2017-01-18 RX ADMIN — HYDROCODONE BITARTRATE AND ACETAMINOPHEN 1 TABLET: 5; 325 TABLET ORAL at 09:46

## 2017-01-18 RX ADMIN — Medication 1 TABLET: at 09:44

## 2017-01-18 RX ADMIN — METOCLOPRAMIDE 10 MG: 10 TABLET ORAL at 09:46

## 2017-01-18 RX ADMIN — SILVER SULFADIAZINE: 10 CREAM TOPICAL at 09:44

## 2017-01-18 RX ADMIN — CLOPIDOGREL 75 MG: 75 TABLET, FILM COATED ORAL at 09:44

## 2017-01-18 RX ADMIN — ATORVASTATIN CALCIUM 10 MG: 10 TABLET, FILM COATED ORAL at 22:57

## 2017-01-18 RX ADMIN — SUCRALFATE 1 G: 1 TABLET ORAL at 17:41

## 2017-01-18 RX ADMIN — TAZOBACTAM SODIUM AND PIPERACILLIN SODIUM 3.38 G: 375; 3 INJECTION, SOLUTION INTRAVENOUS at 04:33

## 2017-01-18 RX ADMIN — INSULIN ASPART 3 UNITS: 100 INJECTION, SOLUTION INTRAVENOUS; SUBCUTANEOUS at 22:55

## 2017-01-18 RX ADMIN — INSULIN ASPART 4 UNITS: 100 INJECTION, SOLUTION INTRAVENOUS; SUBCUTANEOUS at 17:40

## 2017-01-18 RX ADMIN — ASPIRIN 81 MG: 81 TABLET, CHEWABLE ORAL at 09:45

## 2017-01-18 RX ADMIN — LEVOTHYROXINE SODIUM 100 MCG: 100 TABLET ORAL at 09:44

## 2017-01-18 RX ADMIN — METOCLOPRAMIDE 10 MG: 10 TABLET ORAL at 22:58

## 2017-01-18 RX ADMIN — DOCUSATE SODIUM 100 MG: 100 CAPSULE, LIQUID FILLED ORAL at 17:41

## 2017-01-18 RX ADMIN — AMLODIPINE BESYLATE 5 MG: 5 TABLET ORAL at 09:45

## 2017-01-18 RX ADMIN — Medication 1 CAPSULE: at 09:47

## 2017-01-18 RX ADMIN — PANTOPRAZOLE SODIUM 40 MG: 40 TABLET, DELAYED RELEASE ORAL at 09:47

## 2017-01-18 RX ADMIN — INSULIN ASPART 2 UNITS: 100 INJECTION, SOLUTION INTRAVENOUS; SUBCUTANEOUS at 11:44

## 2017-01-18 RX ADMIN — ATENOLOL 25 MG: 25 TABLET ORAL at 09:44

## 2017-01-18 NOTE — PROGRESS NOTES
Daily Progress Note    Chief Complaint:   Chief Complaint   Patient presents with   • Shortness of Breath     sent from [facility], rn concerned may be anxiety related    Nausea and vomiting      LOS: 2 days     Subjective   No N&V today tolerated diet    Objective     Vital signs in last 24 hours:  Temp:  [98 °F (36.7 °C)-99.4 °F (37.4 °C)] 98 °F (36.7 °C)  Heart Rate:  [60-67] 65  Resp:  [18] 18  BP: (152-179)/(50-65) 178/65    Results from last 7 days  Lab Units 01/18/17  0438 01/17/17  0623   SODIUM mmol/L 138 139   POTASSIUM mmol/L 3.9 3.4*   CHLORIDE mmol/L 98 99   TOTAL CO2 mmol/L 23.7 25.3   BUN mg/dL 12 13   CREATININE mg/dL 1.07* 0.94   CALCIUM mg/dL 9.1 9.1   BILIRUBIN mg/dL  --  0.4   ALK PHOS U/L  --  115   ALT (SGPT) U/L  --  11   AST (SGOT) U/L  --  12   GLUCOSE mg/dL 54* 85         Results from last 7 days  Lab Units 01/18/17  0615   WBC 10*3/mm3 13.02*   HEMOGLOBIN g/dL 11.2*   HEMATOCRIT % 36.6   PLATELETS 10*3/mm3 266         Intake/Output last 3 shifts:  I/O last 3 completed shifts:  In: 3480 [P.O.:780; I.V.:2450; IV Piggyback:250]  Out: -     Intake/Output this shift:  I/O this shift:  In: 600 [P.O.:600]  Out: -     Physical Exam:  Chest: decreased bilateral breath sounds  CV:irreg  Abd: Positive BS, soft, ND, NT, no rebound, no guarding, func colostomy    Assessment/Plan   CBD stone current okay agree with Dr. Bridges and discussed this with the pt.    Lilian Gibbs MD  General, Minimally Invasive and Endoscopic Surgery  [facility]    [address], [address]  [place], [place], [address]  P: [phone]  F: [phone]    Cc:  Dayron Canales MD

## 2017-01-18 NOTE — PLAN OF CARE
Problem: Patient Care Overview (Adult)  Goal: Plan of Care Review  Outcome: Ongoing (interventions implemented as appropriate)    01/17/17 2016   Coping/Psychosocial Response Interventions   Plan Of Care Reviewed With patient   Patient Care Overview   Progress improving   Outcome Evaluation   Outcome Summary/Follow up Plan Used lift to get patient in chair today. Stayed in chair til shift change. Low Air Loss Mattress placed on bed. Lortab for back pain given PRN. Monitor Lung sounds and wounds on right foot.        Goal: Adult Individualization and Mutuality  Outcome: Ongoing (interventions implemented as appropriate)  Goal: Discharge Needs Assessment  Outcome: Ongoing (interventions implemented as appropriate)    Problem: Pneumonia (Adult)  Goal: Signs and Symptoms of Listed Potential Problems Will be Absent or Manageable (Pneumonia)  Outcome: Ongoing (interventions implemented as appropriate)    Problem: Fall Risk (Adult)  Goal: Absence of Falls  Outcome: Ongoing (interventions implemented as appropriate)

## 2017-01-18 NOTE — PLAN OF CARE
Problem: Patient Care Overview (Adult)  Goal: Plan of Care Review  Outcome: Ongoing (interventions implemented as appropriate)    01/18/17 0707   Patient Care Overview   Progress no change   Outcome Evaluation   Outcome Summary/Follow up Plan LOW GRADE TEMP OVERNIGHT. O2 SAT STABLE ON 2L/M N/C. SAFETY MAINTAINED.         Problem: Pneumonia (Adult)  Goal: Signs and Symptoms of Listed Potential Problems Will be Absent or Manageable (Pneumonia)  Outcome: Ongoing (interventions implemented as appropriate)    Problem: Fall Risk (Adult)  Goal: Absence of Falls  Outcome: Ongoing (interventions implemented as appropriate)

## 2017-01-18 NOTE — PROGRESS NOTES
"  ENDOCRINE    Subjective   AND PLAN  Cynthia Arreola is a 89 y.o. female.     Stool occult blood positive.  No nausea, vomiting or abdominal pain.  Patient has iron deficiency anemia of unknown cause.  Will defer further evaluation to Yuliana Burnham and Sai.    Appetite good.  Fasting glucose 75.  Random glucose .      Tolerating switch from Zocor to Lipitor.      Denies claudication pain.  Right foot ulcers about the same.  Wound care nurse notes noted    Objective   Visit Vitals   • /63 (BP Location: Left arm, Patient Position: Lying)   • Pulse 65   • Temp 98.9 °F (37.2 °C) (Oral)   • Resp 18   • Ht 63\" (160 cm)   • Wt 161 lb 11.2 oz (73.3 kg)   • SpO2 97%   • Breastfeeding No   • BMI 28.64 kg/m2     Physical Exam    Awake, alert, not dyspneic, not in distress.  Pink conjunctiva.  Anicteric sclerae.  No rales or wheezes.    Regular heart rate and rhythm.  No gallop.  Abdomen soft, nontender.  No hepatojugular reflux.  Brown stools on colostomy bag.  Right heel ulcer with black eschar without surrounding erythema or tenderness.  Ulcer on dorsum of the right foot without erythema or tenderness.  No cyanosis.      Lab Results (last 24 hours)     Procedure Component Value Units Date/Time    POC Glucose Fingerstick [[id number]]  (Abnormal) Collected:  01/17/17 1245    Specimen:  Blood Updated:  01/17/17 1247     Glucose 246 (H) mg/dL     Narrative:       Meter: [id number] : [id number] Hayde Tabares    POC Glucose Fingerstick [[id number]]  (Abnormal) Collected:  01/17/17 1641    Specimen:  Blood Updated:  01/17/17 1656     Glucose 209 (H) mg/dL     Narrative:       Meter: [id number] : [id number] Hayde Tabares    Occult Blood X 1, Stool [[id number]]  (Abnormal) Collected:  01/17/17 1853    Specimen:  Stool Updated:  01/17/17 1950     Fecal Occult Blood Positive (A)     POC Glucose Fingerstick [[id number]]  (Abnormal) Collected:  01/17/17 2039    Specimen:  Blood Updated:  01/17/17 " 2042     Glucose 196 (H) mg/dL     Narrative:       Meter: [id number] : [id number] Javed Bateman    Basic Metabolic Panel [[id number]]  (Abnormal) Collected:  01/18/17 0438    Specimen:  Blood Updated:  01/18/17 0527     Glucose 54 (L) mg/dL      BUN 12 mg/dL      Creatinine 1.07 (H) mg/dL      Sodium 138 mmol/L      Potassium 3.9 mmol/L      Chloride 98 mmol/L      CO2 23.7 mmol/L      Calcium 9.1 mg/dL      eGFR Non African Amer 48 (L) mL/min/1.73      BUN/Creatinine Ratio 11.2      Anion Gap 16.3 mmol/L     Narrative:       The MDRD GFR formula is only valid for adults with stable renal function between ages 18 and 70.    POC Glucose Fingerstick [[id number]]  (Normal) Collected:  01/18/17 0628    Specimen:  Blood Updated:  01/18/17 0628     Glucose 75 mg/dL     Narrative:       Meter: [id number] : [id number] Javed Bateman    CBC Auto Differential [[id number]]  (Abnormal) Collected:  01/18/17 0615    Specimen:  Blood Updated:  01/18/17 0732     WBC 13.02 (H) 10*3/mm3      RBC 4.03 10*6/mm3      Hemoglobin 11.2 (L) g/dL      Hematocrit 36.6 %      MCV 90.8 fL      MCH 27.8 pg      MCHC 30.6 (L) g/dL      RDW 14.9 (H) %      RDW-SD 49.8 fl      MPV 11.0 fL      Platelets 266 10*3/mm3      Neutrophil % 71.6 %      Lymphocyte % 15.3 (L) %      Monocyte % 11.9 %      Eosinophil % 0.4 %      Basophil % 0.2 %      Immature Grans % 0.6 (H) %      Neutrophils, Absolute 9.32 (H) 10*3/mm3      Lymphocytes, Absolute 1.99 10*3/mm3      Monocytes, Absolute 1.55 (H) 10*3/mm3      Eosinophils, Absolute 0.05 10*3/mm3      Basophils, Absolute 0.03 10*3/mm3      Immature Grans, Absolute 0.08 (H) 10*3/mm3      nRBC 0.0 /100 WBC     CBC & Differential [[id number]] Collected:  01/18/17 0438    Specimen:  Blood Updated:  01/18/17 0733    Narrative:       The following orders were created for panel order CBC & Differential.  Procedure                               Abnormality         Status                     ---------                                -----------         ------                     Scan Slide[[id number]]                                                                   CBC Auto Differential[[id number]]         Abnormal            Final result                 Please view results for these tests on the individual orders.    Blood Culture [[id number]]  (Normal) Collected:  01/16/17 0426    Specimen:  Blood from Arm, Left Updated:  01/18/17 0801     Blood Culture No growth at 2 days             Principal Problem:    Bilateral bronchopneumonia  Active Problems:    Healthcare-associated viral pneumonia    Diabetic foot ulcer associated with type 2 diabetes mellitus    Chronic kidney disease, stage II (mild)    H/O small bowel obstruction    Anemia of chronic disease    Recurrent UTI    Ischemic cardiomyopathy    Peripheral arterial disease    Hypertension, essential    Immobility syndrome    Acquired hypothyroidism    MRSA cellulitis of right foot    Sacral decubitus ulcer    SSS (sick sinus syndrome)    S/P placement of cardiac pacemaker    Gastroesophageal reflux disease with esophagitis    Hyperlipidemia    Ischemic ulcer of right heel with fat layer exposed    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    Respiratory infection due to enterovirus/ Human Rhinovirus    Common bile duct stone    Plan-  GI and surgical consult noted.    Continue iron supplements.    Will defer further evaluation to Yuliana Burnham and Sai.  Will decrease Levemir to 14 units every evening.    Continue glimepiride 4 mg twice a day and NovoLog as needed.  Recheck lipid profile in 4-6 weeks.

## 2017-01-18 NOTE — PROGRESS NOTES
INFECTIOUS DISEASES PROGRESS NOTE    CC: f/u sepsis    S:   Still with intermittent abdominal pain and TTP  Breathing back to baseline  No f/c/ns    O:  Physical Exam:  Temp:  [98.6 °F (37 °C)-99.4 °F (37.4 °C)] 98.9 °F (37.2 °C)  Heart Rate:  [60-67] 65  Resp:  [18] 18  BP: (151-167)/(50-63) 167/63  Physical Exam   Constitutional: She appears well-developed. No distress.   Abdominal: Soft. She exhibits no distension. There is tenderness in the right upper quadrant and epigastric area. There is guarding. There is no rebound.   Neurological: She is alert.   Skin: Skin is warm and dry.   Psychiatric: She has a normal mood and affect. Her behavior is normal.        Diagnostics:     WBC 13.0 (P72, L 15, M 12)  H/H 11/37    Cr 1.1    bcx ngtd      Assessment/Plan   1. Sepsis  2. Rhinovirus PNA  3. Choledocholithiasis    ERCP when over viral PNA per Dr. Bridges.  Let's see how she does off zosyn today.        Rico Ramirez MD  9:15 AM  01/18/17

## 2017-01-18 NOTE — CONSULTS
[facility]  Initial Inpatient Consult Note    Referring Provider: Dr. Lilian Gibbs    Reason for Consultation: Bile duct stone    Subjective     History of present illness:  Pt is an 89 YO f with h/o CAD, DM, hypopthyroid, HTN, PVD presenting with weakness and shortness of breath.  Recently admitted for acute pneumonia found with pulmonary infiltrates.  On questioning pt reported intermittent ruq pain with nausea and vomiting with certain foods.  Pt noted this with greasy foods in particular.  LFT's have been normal but CT shows a mid CBD stone with no ductal dilation.  Pt now with no GI complaints referred for further evaluation.  Pt on O2 nasal canula though reports breathing improved.    Past Medical History:  Past Medical History   Diagnosis Date   • Anemia    • Coronary artery disease    • Diabetes mellitus    • Disease of thyroid gland    • Dysphagia    • Hypertension    • Ischemic cardiomyopathy    • Malignant neoplasm of colon    • PVD (peripheral vascular disease)    • Renal disorder        Past Surgical History:  Past Surgical History   Procedure Laterality Date   • Colon resection with colostomy       25 years ago   • Cholecystectomy     • Colon surgery          Social History:   Social History   Substance Use Topics   • Smoking status: Former Smoker     Packs/day: 0.50     Types: Cigarettes     Start date: 6/16/1941     Quit date: 5/16/2007   • Smokeless tobacco: Never Used   • Alcohol use No        Family History:  Family History   Problem Relation Age of Onset   • Diabetes Daughter    • Hyperlipidemia Daughter    • Hypertension Daughter    • Cancer Son        Home Meds:  Prescriptions Prior to Admission   Medication Sig Dispense Refill Last Dose   • acetaminophen (TYLENOL) 325 MG tablet Take 650 mg by mouth Every 6 (Six) Hours As Needed for mild pain (1-3).   Unknown at Unknown time   • albuterol (PROVENTIL) (2.5 MG/3ML) 0.083% nebulizer solution Take 2.5 mg by nebulization  Every 4 (Four) Hours As Needed for wheezing.   Unknown at Unknown time   • amLODIPine (NORVASC) 5 MG tablet Take 5 mg by mouth Daily.   Unknown at Unknown time   • aspirin 81 MG chewable tablet Chew 81 mg Daily.   Unknown at Unknown time   • atenolol (TENORMIN) 25 MG tablet Take 25 mg by mouth Daily.   Unknown at Unknown time   • calcium carbonate (TUMS) 500 MG chewable tablet Chew 1 tablet Daily.   Unknown at Unknown time   • clopidogrel (PLAVIX) 75 MG tablet Take 75 mg by mouth Daily.   Unknown at Unknown time   • docusate sodium 100 MG capsule Take 100 mg by mouth 2 (Two) Times a Day.  0 Unknown at Unknown time   • glimepiride (AMARYL) 4 MG tablet Take 4 mg by mouth Every Morning Before Breakfast.   Unknown at Unknown time   • glucagon, human recombinant, (GLUCAGEN DIAGNOSTIC) 1 MG injection Inject 1 mg under the skin 1 (One) Time As Needed (hypoglycemia) for up to 1 dose.   Unknown at Unknown time   • insulin aspart (novoLOG) 100 UNIT/ML injection Inject 0-7 Units under the skin 4 (Four) Times a Day Before Meals & at Bedtime.  12 Unknown at Unknown time   • insulin detemir (LEVEMIR) 100 UNIT/ML injection Inject 16 Units under the skin Every Night.      • insulin glargine (LANTUS) 100 UNIT/ML injection Inject 16 Units under the skin Daily.   Unknown at Unknown time   • isosorbide mononitrate (IMDUR) 30 MG 24 hr tablet Take 30 mg by mouth Daily.   Unknown at Unknown time   • levothyroxine (SYNTHROID, LEVOTHROID) 100 MCG tablet Take 100 mcg by mouth Daily.   Unknown at Unknown time   • metoclopramide (REGLAN) 10 MG tablet Take 10 mg by mouth 4 (Four) Times a Day Before Meals & at Bedtime.   Unknown at Unknown time   • pantoprazole (PROTONIX) 40 MG EC tablet Take 40 mg by mouth Daily.   Unknown at Unknown time   • promethazine (PHENERGAN) 12.5 MG tablet Take 1 tablet by mouth Every 6 (Six) Hours As Needed for nausea or vomiting.  0    • silver sulfadiazine (SILVADENE, SSD) 1 % cream Apply  topically Every 12  (Twelve) Hours.  0 Unknown at Unknown time   • simvastatin (ZOCOR) 20 MG tablet Take 20 mg by mouth Every Night.   Unknown at Unknown time   • sodium hypochlorite , (DAKIN'S 1/4 STRENGTH) 0.125 % solution topical solution 0.125% Apply 10 mL topically Every 12 (Twelve) Hours.  0 Unknown at Unknown time   • sucralfate (CARAFATE) 1 G tablet Take 1 g by mouth 3 (Three) Times a Day With Meals.   Unknown at Unknown time   • traMADol (ULTRAM) 50 MG tablet Take 1 tablet by mouth Every 6 (Six) Hours As Needed for moderate pain (4-6). 120 tablet 0 Unknown at Unknown time   • Wound Dressings (ARGLAES) powder Apply  topically.   Unknown at Unknown time       Current Meds:     amLODIPine 5 mg Oral Daily   aspirin 81 mg Oral Daily   atenolol 25 mg Oral Daily   atorvastatin 10 mg Oral Nightly   calcium carbonate 1 tablet Oral Daily   clopidogrel 75 mg Oral Daily   docusate sodium 100 mg Oral BID   enoxaparin 40 mg Subcutaneous Q24H   Ferrex 150 forte plus 1 capsule Oral Daily With Breakfast   glimepiride 4 mg Oral BID With Meals   insulin aspart 0-7 Units Subcutaneous 4x Daily AC & at Bedtime   insulin detemir 16 Units Subcutaneous Nightly   isosorbide mononitrate 30 mg Oral Daily   levothyroxine 100 mcg Oral Daily   metoclopramide 10 mg Oral 4x Daily AC & at Bedtime   pantoprazole 40 mg Oral Daily   piperacillin-tazobactam 3.375 g Intravenous Q8H   silver sulfadiazine  Topical Q12H   sucralfate 1 g Oral TID With Meals       Allergies:  No Known Allergies    Review of Systems  Pertinent items are noted in HPI     Objective     Vital Signs  Temp:  [97.9 °F (36.6 °C)-98.8 °F (37.1 °C)] 98.7 °F (37.1 °C)  Heart Rate:  [60-67] 67  Resp:  [14-18] 18  BP: (151-164)/(50-73) 152/50    Physical Exam:  General Appearance:    Alert, cooperative, in no acute distress   Head:    Normocephalic, without obvious abnormality, atraumatic   Eyes:            Lids and lashes normal, conjunctivae and sclerae normal, no   icterus   Throat:   No oral  lesions, no thrush, oral mucosa moist   Neck:   No adenopathy, supple, trachea midline, no thyromegaly, no   carotid bruit, no JVD   Lungs:     Clear to auscultation,respirations regular, even and                   unlabored    Heart:    Regular rhythm and normal rate, normal S1 and S2, no            murmur, no gallop, no rub, no click   Chest Wall:    No abnormalities observed   Abdomen:     Normal bowel sounds, no masses, no organomegaly, soft        non-tender, non-distended, no guarding, no rebound                 tenderness   Rectal:     Deferred   Extremities:   no edema, no cyanosis, no redness   Skin:   No bleeding, bruising or rash   Lymph nodes:   No palpable adenopathy   Psychiatric:  Judgement and insight: normal   Orientation to person place and time: normal   Mood and affect: normal     Results Review:   I reviewed the patient's new clinical results.  I reviewed the patient's new imaging results and agree with the interpretation.      Results from last 7 days  Lab Units 01/17/17 0623 01/16/17  0930 01/16/17  0248   WBC 10*3/mm3 13.52* 16.80* 17.72*   HEMOGLOBIN g/dL 11.2* 11.6* 12.3   HEMATOCRIT % 36.9 37.1 40.9   PLATELETS 10*3/mm3 326 373 380         Results from last 7 days  Lab Units 01/17/17 0623 01/16/17  0930 01/16/17  0248   SODIUM mmol/L 139 138 139   POTASSIUM mmol/L 3.4* 3.7 3.6   CHLORIDE mmol/L 99 97* 99   TOTAL CO2 mmol/L 25.3 25.3 26.2   BUN mg/dL 13 14 14   CREATININE mg/dL 0.94 1.11* 1.00   CALCIUM mg/dL 9.1 9.0 9.5   BILIRUBIN mg/dL 0.4  --  0.3   ALK PHOS U/L 115  --  125*   ALT (SGPT) U/L 11  --  12   AST (SGOT) U/L 12  --  10   GLUCOSE mg/dL 85 241* 171*             No results found for: LIPASE    Radiology:  Imaging Results (last 72 hours)     Procedure Component Value Units Date/Time    XR Chest 2 View [[id number]] Collected:  01/16/17 0623     Updated:  01/16/17 0623    Narrative:       2-VIEW CHEST     HISTORY: Shortness of breath. Cough.     FINDINGS: There is mild  cardiomegaly with a pacemaker in place and this  is unchanged from 12/27/2016. There is mild vascular congestion that is  slightly more prominent on today's examination and consistent with  borderline congestive heart failure. There is no focal infiltrate.       CT Chest Without Contrast [[id number]] Collected:  01/16/17 1628     Updated:  01/16/17 1711    Narrative:       CT CHEST, ABDOMEN, AND PELVIS WITHOUT CONTRAST     HISTORY: Shortness of breath, congestion, right upper quadrant pain and  nausea.     TECHNIQUE: Spiral axial CT imaging of the chest, abdomen, and pelvis was  performed at 3 mm intervals throughout. No intravenous contrast was  administered for this imaging.     COMPARISON: A chest radiograph of the same day is reviewed. There is no  previous CT imaging.     FINDINGS:  CHEST: Heart and great vessels demonstrate an unremarkable noncontrasted  appearance. There is advanced calcified coronary disease. A left-sided  cardiac pacer is in place with lead tips terminating in the right atrium  and ventricle. Mediastinal and hilar structures are unremarkable. The  esophagus is normal in caliber and course. I suspect there is a small  hiatal hernia. No axillary or supraclavicular adenopathy is present. The  chest wall is unremarkable. Degenerative changes are noted at the spine.  Lung windows demonstrate small calcified granuloma. No suspicious  pulmonary nodule or mass is present. Some peribronchial thickening and  peribronchiolar inflammatory changes are present at both inferior lower  lobe, particularly extending into the medial aspect of the right  posterior sulcus. No further infiltrate is present. There is no  pneumothorax.     ABDOMEN/PELVIS: Liver is unremarkable. Gallbladder is surgically absent.  There is some minimal prominence of the central hepatic ducts. The  common bile duct measures up to 1.2 cm in diameter. There appears to be  a partially calcified 1 cm stone within the common bile duct at  the  level of the head of the pancreas. The common bile duct distal to this  level is completely decompressed. There is no dilatation of the  pancreatic duct or secondary signs for pancreatitis.     Spleen is normal in size and configuration. The adrenal glands and  kidneys are largely unremarkable. Bilateral renal cortical cysts are  noted. There is vascular calcification at both kidneys and probably  small intracalyceal stones along the posterior aspect mid to lower pole  left kidney.     Apparently, there has been distal colonic and rectal resection. A  diverting colostomy is present at the left lower quadrant. The remaining  colon otherwise is unremarkable. A normal appendix is visualized. Small  bowel is normal in caliber and course. Stomach appears grossly  unremarkable. Uterus remains in situ and is atrophic. No adnexal masses  are identified. No lymphadenopathy is present. There is no free air or  free fluid. Degenerative changes are present at the hips and spine.       Impression:       1. CT chest demonstrates peribronchial thickening and peribronchiolar  inflammatory changes along the inferior medial aspects of both lower  lobes, particularly in the right posterior sulcus. Findings are  consistent with mild bronchopneumonia.  2. There has been prior cholecystectomy. There is some mild prominence  of the central intrahepatic bile ducts as well as proximal common bile  duct measuring up to 1.2 cm. There is a partially calcified 1 cm stone  within the common bile left just above the level of the head of the  pancreas.  3. No further acute process is identified at the abdomen or pelvis.  There appears to have been a previous APR with diverting left lower  quadrant colostomy present.     NOTE: These findings were called to the nurse caring for the patient on  the floor at the time of the dictation.     This report was finalized on 1/16/2017 5:08 PM by Dr. Armand Kendall MD.       CT Abdomen Pelvis Without  Contrast [[id number]] Collected:  01/16/17 1628     Updated:  01/16/17 1711    Narrative:       CT CHEST, ABDOMEN, AND PELVIS WITHOUT CONTRAST     HISTORY: Shortness of breath, congestion, right upper quadrant pain and  nausea.     TECHNIQUE: Spiral axial CT imaging of the chest, abdomen, and pelvis was  performed at 3 mm intervals throughout. No intravenous contrast was  administered for this imaging.     COMPARISON: A chest radiograph of the same day is reviewed. There is no  previous CT imaging.     FINDINGS:  CHEST: Heart and great vessels demonstrate an unremarkable noncontrasted  appearance. There is advanced calcified coronary disease. A left-sided  cardiac pacer is in place with lead tips terminating in the right atrium  and ventricle. Mediastinal and hilar structures are unremarkable. The  esophagus is normal in caliber and course. I suspect there is a small  hiatal hernia. No axillary or supraclavicular adenopathy is present. The  chest wall is unremarkable. Degenerative changes are noted at the spine.  Lung windows demonstrate small calcified granuloma. No suspicious  pulmonary nodule or mass is present. Some peribronchial thickening and  peribronchiolar inflammatory changes are present at both inferior lower  lobe, particularly extending into the medial aspect of the right  posterior sulcus. No further infiltrate is present. There is no  pneumothorax.     ABDOMEN/PELVIS: Liver is unremarkable. Gallbladder is surgically absent.  There is some minimal prominence of the central hepatic ducts. The  common bile duct measures up to 1.2 cm in diameter. There appears to be  a partially calcified 1 cm stone within the common bile duct at the  level of the head of the pancreas. The common bile duct distal to this  level is completely decompressed. There is no dilatation of the  pancreatic duct or secondary signs for pancreatitis.     Spleen is normal in size and configuration. The adrenal glands and  kidneys are  largely unremarkable. Bilateral renal cortical cysts are  noted. There is vascular calcification at both kidneys and probably  small intracalyceal stones along the posterior aspect mid to lower pole  left kidney.     Apparently, there has been distal colonic and rectal resection. A  diverting colostomy is present at the left lower quadrant. The remaining  colon otherwise is unremarkable. A normal appendix is visualized. Small  bowel is normal in caliber and course. Stomach appears grossly  unremarkable. Uterus remains in situ and is atrophic. No adnexal masses  are identified. No lymphadenopathy is present. There is no free air or  free fluid. Degenerative changes are present at the hips and spine.       Impression:       1. CT chest demonstrates peribronchial thickening and peribronchiolar  inflammatory changes along the inferior medial aspects of both lower  lobes, particularly in the right posterior sulcus. Findings are  consistent with mild bronchopneumonia.  2. There has been prior cholecystectomy. There is some mild prominence  of the central intrahepatic bile ducts as well as proximal common bile  duct measuring up to 1.2 cm. There is a partially calcified 1 cm stone  within the common bile left just above the level of the head of the  pancreas.  3. No further acute process is identified at the abdomen or pelvis.  There appears to have been a previous APR with diverting left lower  quadrant colostomy present.     NOTE: These findings were called to the nurse caring for the patient on  the floor at the time of the dictation.     This report was finalized on 1/16/2017 5:08 PM by Dr. Armand Kendall MD.             Assessment/Plan     Principal Problem:    Bilateral bronchopneumonia  Active Problems:    Healthcare-associated viral pneumonia    Diabetic foot ulcer associated with type 2 diabetes mellitus    Chronic kidney disease, stage II (mild)    H/O small bowel obstruction    Anemia of chronic disease     Recurrent UTI    Ischemic cardiomyopathy    Peripheral arterial disease    Hypertension, essential    Immobility syndrome    Acquired hypothyroidism    MRSA cellulitis of right foot    Sacral decubitus ulcer    SSS (sick sinus syndrome)    S/P placement of cardiac pacemaker    Gastroesophageal reflux disease with esophagitis    Hyperlipidemia    Ischemic ulcer of right heel with fat layer exposed    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    Respiratory infection due to enterovirus/ Human Rhinovirus    Common bile duct stone      Pt with intermittent diet related symptoms from CBD stone.  In setting of normal LFT's and bile ducts, elevated WBC not from cholangitis, likely from bronchpneumonia identified on CT chest.  Discussed need to remove CBD stone but not until pulmonary status is optimized as increased risk with MAC anesthesia in that setting.  Keep low fat diet and pt to consider ERCP when pulmnary status improved.    I discussed the patients findings and my recommendations with patient    Jasper Bridges MD  [facility]  01/17/17

## 2017-01-18 NOTE — PROGRESS NOTES
Continued Stay Note  [facility]     Patient Name: Cynthia Arreola  MRN: [id number]  Today's Date: 1/18/2017    Admit Date: 1/16/2017          Discharge Plan       01/18/17 1124    Case Management/Social Work Plan    Additional Comments Spoke to Dr Canales  Referral to [facility].  Spoke to Velma Kaur and she states she can accept pt based on bed availability.  She states all beds are private.  CCP will follow up tomorrow on bed status at [facility].                Discharge Codes     None            Ana Copeland RN

## 2017-01-18 NOTE — PROGRESS NOTES
"                       [facility]  FAMILY AND INTERNAL MEDICINE  NEYDA PINTO, and JACE      INTERNAL MEDICINE DAILY PROGRESS NOTE  Dayron Canales M.D.  17            Patient Identification:  Name: Cynthia Arreola  Age: 89 y.o.  Sex: female  :  1927  MRN: [id number]         Primary Care Physician: Dayron Canales MD  LENGTH OF STAY 1 DAYS    Consults     Date and Time Order Name Status Description    2017 0822 Inpatient Consult to Gastroenterology Completed     2017 1453 Inpatient Consult to General Surgery      2017 0917 Inpatient Consult to Endocrinology Completed     2017 0917 Inpatient Consult to Infectious Diseases Completed     2017 0339 Family Medicine Consult Completed     2016 1424 Inpatient Consult to Vascular Surgery Completed     2016 1424 Inpatient Consult to Infectious Diseases Completed     2016 1424 Inpatient Consult to Endocrinology Completed     2016 1424 Inpatient Consult to Cardiology Completed     2016 0821 Family Medicine Consult Completed                Chief Complaint: Shortness of air and chest pain        Subjective        HPI: Patient is a 89 y.o.female who presented with shortness of breath and chest pain that woke patient from sleep. Event occurred at [facility] where she has been a patient for several weeks. Patient demanded transfer out to ER for further work up and evaluation. She felt symptoms were similar to those she had with \"her pneumonia\" between 16 and 16 here at [facility]. She also complains of nausea and vomiting with certain foods. Patient has multiple pressure ulcers that predate her transfer to [place]. These were addressed during her last stay here at [facility] and actually seem to be improving slightly.     Patient was evaluated in the ED by Dr. Ramon Yao. He reported SOB x 3 hours duration at the time of presentation. ROS was positive for SOB, chest " pain, nausea and vomiting. Vitals showed  and /120. She was afebrile. PE showed decreased breath sounds, rhonchi bilaterally, no pedal edema. Significant labs included: CBC with WBC 17.72, glucose 171, hemoglobin 12.3, respiratory viral panel positive for Human Rhinovirus/Enterovirus. Chest xray showed a possible LLL infiltrate. EKG was unremarkable with HR 67. Patient was started on Zosyn in the ER and admitted by Dr. Martinez to me.     1/16/17. I saw the patient for the first time during this hospital stay in her bed on [address]. Patient was resting comfortably in bed and in no respiratory distress. Patient admits to be somewhat unhappy at her current nursing home. Wounds on legs are reported as better by patient. She currently is without nausea, vomiting or other significant issues. Patient to be seen today by Endocrine, ID, and Wound Care.    1/17/17 Patient resting comfortably in bed with son at here side.  She is eating about 50% of her lunch picking at it bite by bite.  I had a lengthy discussion with son about nursing home options and I then discussed his concerns with CCP who will help him look into options. Endocrine continues to optimize diabetic control.  Blood sugars looking better on controlled hospital diet.Surgery and GI have seen patient.  She needs ERCP when better to remove bile duct stone and needs colonoscopy to evaluate anemia.  These will be scheduled as outpatient.  Patient will have antibiotics Discontinued at time of discharge.  Anticipate DC in 1-2 days    Review of Systems:    A comprehensive 14 point review of systems was negative    Past Medical History   Diagnosis Date   • Anemia    • Coronary artery disease    • Diabetes mellitus    • Disease of thyroid gland    • Dysphagia    • Hypertension    • Ischemic cardiomyopathy    • Malignant neoplasm of colon    • PVD (peripheral vascular disease)    • Renal disorder      Past Surgical History   Procedure Laterality Date   •  Colon resection with colostomy       25 years ago   • Cholecystectomy     • Colon surgery       No Known Allergies  Family History   Problem Relation Age of Onset   • Diabetes Daughter    • Hyperlipidemia Daughter    • Hypertension Daughter    • Cancer Son      Social History     Social History   • Marital status:      Spouse name: N/A   • Number of children: N/A   • Years of education: N/A     Social History Main Topics   • Smoking status: Former Smoker     Packs/day: 0.50     Types: Cigarettes     Start date: 6/16/1941     Quit date: 5/16/2007   • Smokeless tobacco: Never Used   • Alcohol use No   • Drug use: No   • Sexual activity: Defer     Other Topics Concern   • None     Social History Narrative       PMH, FH, SH and ROS completed with Admission History and Physical and updated in EPIC system.  This data is unchanged at this time.       Objective     Scheduled Meds:  amLODIPine 5 mg Oral Daily   aspirin 81 mg Oral Daily   atenolol 25 mg Oral Daily   atorvastatin 10 mg Oral Nightly   calcium carbonate 1 tablet Oral Daily   clopidogrel 75 mg Oral Daily   docusate sodium 100 mg Oral BID   enoxaparin 40 mg Subcutaneous Q24H   Ferrex 150 forte plus 1 capsule Oral Daily With Breakfast   glimepiride 4 mg Oral BID With Meals   insulin aspart 0-7 Units Subcutaneous 4x Daily AC & at Bedtime   insulin detemir 16 Units Subcutaneous Nightly   isosorbide mononitrate 30 mg Oral Daily   levothyroxine 100 mcg Oral Daily   metoclopramide 10 mg Oral 4x Daily AC & at Bedtime   pantoprazole 40 mg Oral Daily   piperacillin-tazobactam 3.375 g Intravenous Q8H   silver sulfadiazine  Topical Q12H   sucralfate 1 g Oral TID With Meals     Continuous Infusions:  dextrose 5 % and sodium chloride 0.45 % with KCl 20 mEq/L 75 mL/hr Last Rate: 75 mL/hr (01/17/17 2232)   Pharmacy to Dose enoxaparin (LOVENOX)     Pharmacy to Dose Zosyn         Vital signs in last 24 hours:  Temp:  [98.6 °F (37 °C)-99.4 °F (37.4 °C)] 99.4 °F (37.4  "°C)  Heart Rate:  [60-67] 60  Resp:  [18] 18  BP: (151-167)/(50-73) 167/63    Intake/Output:    Intake/Output Summary (Last 24 hours) at 01/18/17 0238  Last data filed at 01/17/17 2232   Gross per 24 hour   Intake   2430 ml   Output      0 ml   Net   2430 ml       Exam:  Visit Vitals   • /63 (BP Location: Left arm, Patient Position: Lying)   • Pulse 60   • Temp 99.4 °F (37.4 °C) (Oral)   • Resp 18   • Ht 63\" (160 cm)   • Wt 165 lb 6.4 oz (75 kg)   • SpO2 97%   • Breastfeeding No   • BMI 29.3 kg/m2                  Constitutional:    Alert, cooperative, no distress, AAOx3, resting comfortably                          Head:    Normocephalic, without obvious abnormality, atraumatic                           Eyes:    PERRLA, conjunctiva/corneas clear, no icterus, no conjunctival                                         pallor, EOM's intact, both eyes          ENT and Mouth:   Lips, tongue, gums normal; oral mucosa pink and moist                           Neck:   Supple, symmetrical, trachea midline, no JVD                 Respiratory:   Clear to auscultation bilaterally, respirations unlabored           Cardiovascular:    Regular rate and rhythm, S1 and S2 normal, no murmur,       no  Rub or gallop.  Pulses normal.            Gastrointestinal:   BS present x 4 Soft, non-tender, bowel sounds active, no                                                  masses,  no hepatosplenomegaly                              :    No hernia.  Normal exam for sex.                Musculoskeletal:   Extremities normal, atraumatic, no cyanosis or edema.  No                                               arthropathy.  No deformity.  Gait normal                            Skin:  Skin is warm and dry,  no rashes, swelling or palpable lesions                  Neurologic:   CNII-XII intact, motor strength grossly intact, sensation                                                      grossly intact to light touch, no focal reflexl deficits " noted                   Psychiatric:   Alert, oriented x 3, no delusions, psychosis,depression,anxiety       Heme/Lymph/Imun:   No bruises, petechiae.  Lymph nodes normal in size/configuration       Data Review:  Lab Results   Component Value Date    CALCIUM 9.1 01/17/2017     Results from last 7 days  Lab Units 01/17/17  0623 01/16/17  0930 01/16/17  0248   AST (SGOT) U/L 12  --  10   SODIUM mmol/L 139 138 139   POTASSIUM mmol/L 3.4* 3.7 3.6   CHLORIDE mmol/L 99 97* 99   TOTAL CO2 mmol/L 25.3 25.3 26.2   BUN mg/dL 13 14 14   CREATININE mg/dL 0.94 1.11* 1.00   GLUCOSE mg/dL 85 241* 171*   CALCIUM mg/dL 9.1 9.0 9.5   WBC 10*3/mm3 13.52* 16.80* 17.72*   HEMOGLOBIN g/dL 11.2* 11.6* 12.3   PLATELETS 10*3/mm3 326 373 380   ALT (SGPT) U/L 11  --  12     Lab Results   Component Value Date    TROPONINT <0.010 01/16/2017     Estimated Creatinine Clearance: 39.3 mL/min (by C-G formula based on Cr of 0.94).  WEIGHTS:     Wt Readings from Last 1 Encounters:   01/16/17 0602 165 lb 6.4 oz (75 kg)   01/16/17 0222 170 lb (77.1 kg)         Assessment:  Principal Problem:    Bilateral bronchopneumonia  Active Problems:    Healthcare-associated viral pneumonia    Diabetic foot ulcer associated with type 2 diabetes mellitus    Chronic kidney disease, stage II (mild)    Ischemic cardiomyopathy    Peripheral arterial disease    Hyperlipidemia    Ischemic ulcer of right heel with fat layer exposed    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    Respiratory infection due to enterovirus/ Human Rhinovirus    Common bile duct stone    Anemia of chronic disease    Hypertension, essential    Immobility syndrome    Acquired hypothyroidism    MRSA cellulitis of right foot    Sacral decubitus ulcer    SSS (sick sinus syndrome)    S/P placement of cardiac pacemaker    Gastroesophageal reflux disease with esophagitis    H/O small bowel obstruction    Recurrent UTI    ATTENDING PHYSICIAN ASESSMENT AND PLAN:     1.  Healthcare-associated Bilateral broncho-pneumonia with respiratory distress on admission now felt to be viral in etiology.Patient admitted on IV Zosyn. ID consulted and agrees with choice Planning to Dc antibiotic at time od hospital DC and continue aggressive pulmonary toilet to clear the  2. Choledocholithiasis with large stone in bile duct. May be cause for increased WBC. Surgery and GI have evaluated.  Will need outpatient ERCP and Colonoscopy when pneumonia has resolved in 2-3 weeks  3. Pressure ulcers of decubitus, right foot and right heal with poorly controlled type 2 DM  Wound care evaluating. Silvadene continued as topical treatment of choice. Outpatient follow up in wound care clinic is planned.Pressure reducing mattress added  4. Uncontrolled Diabetes Type 2. Endocrine evaluating and adjusting insulin We will continue to following blood sugars regularly.  Levwels much improved  5. Hyperlipidemia. Now being controlled with Lipitor as opposed to Zocor. Endocrinology feels this is a good move to protect against potential interaction between Zocor and amlodipine. Levels on lipid profile looked good.  5. Severe peripheral vascular occlusive arterial disease especially prominent in aorta iliac and femoral areas. Revascularization is one possibility. Vascular seems a little reluctant however at this point to engage in this very aggressive mode of treatment. Continued careful outpatient follow-up in wound care clinic seems most appropriate with a follow-up in the vascular clinic if revascularization is felt to be necessary at some point in the future.  6. History of MRSA infection in cellulitis of right foot. No clear-cut evidence at this point of a bacterial in this area. We will continue aggressive topical treatment of the wounds and efforts to heal the wounds. Wound care has evaluated and adjusted the patient's wedding care plans.  7. Ischemic heel ulcer. This also can be treated outpatient level with regular  debridement. Vascular has recommended treatment of this with painting using Betadine as done in the past to continue with the drying process.  8. Ischemic cardiomyopathy. Workup so far unremarkable. Echocardiogram showing ejection fraction greater than 60%. Stress test also completed and was unremarkable. Pacemaker check has been done and showed no malfunction of the pacemaker. Cardiac workup therefore so far inconclusive.  9. Hypertension. Medications have been regulated and blood pressure seems under much better control at this point clonidine has been added on a when necessary basis and Norvasc on a regular prescribed basis. We will continue to follow this on an outpatient basis.  10. Recurrent urinary tract infections. There was no evidence of a urinary tract infection in this patient at this time in the hospital. All antibiotics have been discontinued at this point.  11. Anemia of chronic disease. Iron therapy recommended no need for transfusion at this point. Patient does need continued follow-up with CBCs on an outpatient basis.      2      Plan for disposition:Where: SNF and When:  1-2 days when blood sugar stabilizesdays      Dayron Canales MD  1/17/17  10:30 AM

## 2017-01-19 LAB
ANION GAP SERPL CALCULATED.3IONS-SCNC: 13.2 MMOL/L
BASOPHILS # BLD AUTO: 0.03 10*3/MM3 (ref 0–0.2)
BASOPHILS NFR BLD AUTO: 0.3 % (ref 0–1.5)
BUN BLD-MCNC: 10 MG/DL (ref 8–23)
BUN/CREAT SERPL: 10.3 (ref 7–25)
CALCIUM SPEC-SCNC: 8.9 MG/DL (ref 8.6–10.5)
CHLORIDE SERPL-SCNC: 101 MMOL/L (ref 98–107)
CO2 SERPL-SCNC: 23.8 MMOL/L (ref 22–29)
CREAT BLD-MCNC: 0.97 MG/DL (ref 0.57–1)
DEPRECATED RDW RBC AUTO: 52.3 FL (ref 37–54)
EOSINOPHIL # BLD AUTO: 0.15 10*3/MM3 (ref 0–0.7)
EOSINOPHIL NFR BLD AUTO: 1.3 % (ref 0.3–6.2)
ERYTHROCYTE [DISTWIDTH] IN BLOOD BY AUTOMATED COUNT: 15.2 % (ref 11.7–13)
GFR SERPL CREATININE-BSD FRML MDRD: 54 ML/MIN/1.73
GLUCOSE BLD-MCNC: 109 MG/DL (ref 65–99)
GLUCOSE BLDC GLUCOMTR-MCNC: 100 MG/DL (ref 70–130)
GLUCOSE BLDC GLUCOMTR-MCNC: 109 MG/DL (ref 70–130)
GLUCOSE BLDC GLUCOMTR-MCNC: 187 MG/DL (ref 70–130)
GLUCOSE BLDC GLUCOMTR-MCNC: 250 MG/DL (ref 70–130)
GLUCOSE BLDC GLUCOMTR-MCNC: 99 MG/DL (ref 70–130)
HCT VFR BLD AUTO: 33.7 % (ref 35.6–45.5)
HGB BLD-MCNC: 9.9 G/DL (ref 11.9–15.5)
IMM GRANULOCYTES # BLD: 0.09 10*3/MM3 (ref 0–0.03)
IMM GRANULOCYTES NFR BLD: 0.8 % (ref 0–0.5)
LYMPHOCYTES # BLD AUTO: 1.85 10*3/MM3 (ref 0.9–4.8)
LYMPHOCYTES NFR BLD AUTO: 16.1 % (ref 19.6–45.3)
MCH RBC QN AUTO: 27.5 PG (ref 26.9–32)
MCHC RBC AUTO-ENTMCNC: 29.4 G/DL (ref 32.4–36.3)
MCV RBC AUTO: 93.6 FL (ref 80.5–98.2)
MONOCYTES # BLD AUTO: 1.21 10*3/MM3 (ref 0.2–1.2)
MONOCYTES NFR BLD AUTO: 10.6 % (ref 5–12)
NEUTROPHILS # BLD AUTO: 8.13 10*3/MM3 (ref 1.9–8.1)
NEUTROPHILS NFR BLD AUTO: 70.9 % (ref 42.7–76)
NRBC BLD MANUAL-RTO: 0 /100 WBC (ref 0–0)
PLATELET # BLD AUTO: 312 10*3/MM3 (ref 140–500)
PMV BLD AUTO: 11 FL (ref 6–12)
POTASSIUM BLD-SCNC: 3.6 MMOL/L (ref 3.5–5.2)
RBC # BLD AUTO: 3.6 10*6/MM3 (ref 3.9–5.2)
SODIUM BLD-SCNC: 138 MMOL/L (ref 136–145)
WBC NRBC COR # BLD: 11.46 10*3/MM3 (ref 4.5–10.7)

## 2017-01-19 PROCEDURE — 85025 COMPLETE CBC W/AUTO DIFF WBC: CPT | Performed by: INTERNAL MEDICINE

## 2017-01-19 PROCEDURE — 99232 SBSQ HOSP IP/OBS MODERATE 35: CPT | Performed by: INTERNAL MEDICINE

## 2017-01-19 PROCEDURE — 25010000002 ENOXAPARIN PER 10 MG: Performed by: INTERNAL MEDICINE

## 2017-01-19 PROCEDURE — 82962 GLUCOSE BLOOD TEST: CPT

## 2017-01-19 PROCEDURE — 63710000001 INSULIN ASPART PER 5 UNITS: Performed by: INTERNAL MEDICINE

## 2017-01-19 PROCEDURE — 80048 BASIC METABOLIC PNL TOTAL CA: CPT | Performed by: INTERNAL MEDICINE

## 2017-01-19 RX ADMIN — ATENOLOL 25 MG: 25 TABLET ORAL at 09:28

## 2017-01-19 RX ADMIN — PANTOPRAZOLE SODIUM 40 MG: 40 TABLET, DELAYED RELEASE ORAL at 09:27

## 2017-01-19 RX ADMIN — INSULIN ASPART 2 UNITS: 100 INJECTION, SOLUTION INTRAVENOUS; SUBCUTANEOUS at 16:59

## 2017-01-19 RX ADMIN — POTASSIUM CHLORIDE, DEXTROSE MONOHYDRATE AND SODIUM CHLORIDE 75 ML/HR: 150; 5; 450 INJECTION, SOLUTION INTRAVENOUS at 05:20

## 2017-01-19 RX ADMIN — LINAGLIPTIN 5 MG: 5 TABLET, FILM COATED ORAL at 09:28

## 2017-01-19 RX ADMIN — HYDROCODONE BITARTRATE AND ACETAMINOPHEN 1 TABLET: 5; 325 TABLET ORAL at 09:27

## 2017-01-19 RX ADMIN — METOCLOPRAMIDE 10 MG: 10 TABLET ORAL at 11:31

## 2017-01-19 RX ADMIN — ISOSORBIDE MONONITRATE 30 MG: 30 TABLET, EXTENDED RELEASE ORAL at 09:28

## 2017-01-19 RX ADMIN — METOCLOPRAMIDE 10 MG: 10 TABLET ORAL at 07:35

## 2017-01-19 RX ADMIN — Medication 1 TABLET: at 09:27

## 2017-01-19 RX ADMIN — GLIMEPIRIDE 4 MG: 4 TABLET ORAL at 17:00

## 2017-01-19 RX ADMIN — HYDROCODONE BITARTRATE AND ACETAMINOPHEN 1 TABLET: 5; 325 TABLET ORAL at 03:59

## 2017-01-19 RX ADMIN — CLOPIDOGREL 75 MG: 75 TABLET, FILM COATED ORAL at 09:28

## 2017-01-19 RX ADMIN — SUCRALFATE 1 G: 1 TABLET ORAL at 17:00

## 2017-01-19 RX ADMIN — GLIMEPIRIDE 4 MG: 4 TABLET ORAL at 07:35

## 2017-01-19 RX ADMIN — SUCRALFATE 1 G: 1 TABLET ORAL at 11:31

## 2017-01-19 RX ADMIN — METOCLOPRAMIDE 10 MG: 10 TABLET ORAL at 17:00

## 2017-01-19 RX ADMIN — HYDROCODONE BITARTRATE AND ACETAMINOPHEN 1 TABLET: 5; 325 TABLET ORAL at 16:49

## 2017-01-19 RX ADMIN — METOCLOPRAMIDE 10 MG: 10 TABLET ORAL at 23:13

## 2017-01-19 RX ADMIN — DOCUSATE SODIUM 100 MG: 100 CAPSULE, LIQUID FILLED ORAL at 09:27

## 2017-01-19 RX ADMIN — SILVER SULFADIAZINE 1 APPLICATION: 10 CREAM TOPICAL at 23:13

## 2017-01-19 RX ADMIN — Medication 1 CAPSULE: at 07:35

## 2017-01-19 RX ADMIN — AMLODIPINE BESYLATE 5 MG: 5 TABLET ORAL at 09:27

## 2017-01-19 RX ADMIN — POTASSIUM CHLORIDE, DEXTROSE MONOHYDRATE AND SODIUM CHLORIDE 75 ML/HR: 150; 5; 450 INJECTION, SOLUTION INTRAVENOUS at 21:58

## 2017-01-19 RX ADMIN — ATORVASTATIN CALCIUM 10 MG: 10 TABLET, FILM COATED ORAL at 23:13

## 2017-01-19 RX ADMIN — SUCRALFATE 1 G: 1 TABLET ORAL at 07:35

## 2017-01-19 RX ADMIN — SILVER SULFADIAZINE: 10 CREAM TOPICAL at 09:28

## 2017-01-19 RX ADMIN — INSULIN ASPART 4 UNITS: 100 INJECTION, SOLUTION INTRAVENOUS; SUBCUTANEOUS at 11:31

## 2017-01-19 RX ADMIN — HYDROCODONE BITARTRATE AND ACETAMINOPHEN 1 TABLET: 5; 325 TABLET ORAL at 21:58

## 2017-01-19 RX ADMIN — ASPIRIN 81 MG: 81 TABLET, CHEWABLE ORAL at 09:27

## 2017-01-19 RX ADMIN — DOCUSATE SODIUM 100 MG: 100 CAPSULE, LIQUID FILLED ORAL at 17:00

## 2017-01-19 RX ADMIN — ENOXAPARIN SODIUM 40 MG: 40 INJECTION SUBCUTANEOUS at 09:28

## 2017-01-19 RX ADMIN — LEVOTHYROXINE SODIUM 100 MCG: 100 TABLET ORAL at 09:28

## 2017-01-19 NOTE — PLAN OF CARE
Problem: Patient Care Overview (Adult)  Goal: Plan of Care Review  Outcome: Ongoing (interventions implemented as appropriate)    01/18/17 2020   Coping/Psychosocial Response Interventions   Plan Of Care Reviewed With patient   Patient Care Overview   Progress no change   Outcome Evaluation   Outcome Summary/Follow up Plan Pt complained of hip and abd pain. Medicated. Atrial paced. Pink buttocks, turned frequently. Continue to monitor.        Goal: Discharge Needs Assessment  Outcome: Ongoing (interventions implemented as appropriate)    Problem: Pneumonia (Adult)  Goal: Signs and Symptoms of Listed Potential Problems Will be Absent or Manageable (Pneumonia)  Outcome: Ongoing (interventions implemented as appropriate)    Problem: Fall Risk (Adult)  Goal: Absence of Falls  Outcome: Ongoing (interventions implemented as appropriate)

## 2017-01-19 NOTE — PROGRESS NOTES
"  ENDOCRINE    Subjective   AND PLAN  Cynthia Arreola is a 89 y.o. female.     Mild right upper quadrant pain.  No nausea or vomiting.  Afebrile off antibiotics.  Fasting glucose 99.  Random glucose 199-281.  No hypoglycemia.      Objective   Visit Vitals   • /58 (BP Location: Right arm, Patient Position: Lying)   • Pulse 60   • Temp 98.6 °F (37 °C) (Oral)   • Resp 18   • Ht 63\" (160 cm)   • Wt 161 lb 11.2 oz (73.3 kg)   • SpO2 97%   • Breastfeeding No   • BMI 28.64 kg/m2     Physical Exam    Awake, alert, not dyspneic, not in distress.  No rales or wheezes.  Regular heart rate and rhythm.  No gallop  Abdomen soft, nontender  No cyanosis.  No clubbing    Lab Results (last 24 hours)     Procedure Component Value Units Date/Time    Blood Culture [[id number]]  (Normal) Collected:  01/16/17 0426    Specimen:  Blood from Arm, Left Updated:  01/18/17 0801     Blood Culture No growth at 2 days     POC Glucose Fingerstick [[id number]]  (Abnormal) Collected:  01/18/17 1114    Specimen:  Blood Updated:  01/18/17 1116     Glucose 199 (H) mg/dL     Narrative:       Meter: [id number] : [id number] [employer]    POC Glucose Fingerstick [[id number]]  (Abnormal) Collected:  01/18/17 1627    Specimen:  Blood Updated:  01/18/17 1628     Glucose 281 (H) mg/dL     Narrative:       Meter: [id number] : [id number] [employer] Jammin [employer]av[employer]    POC Glucose Fingerstick [[id number]]  (Abnormal) Collected:  01/18/17 2201    Specimen:  Blood Updated:  01/18/17 2202     Glucose 203 (H) mg/dL     Narrative:       Meter: [id number] : [id number] Javed Bateman    Basic Metabolic Panel [[id number]] Collected:  01/19/17 0606    Specimen:  Blood Updated:  01/19/17 0633    POC Glucose Fingerstick [[id number]]  (Normal) Collected:  01/19/17 0716    Specimen:  Blood Updated:  01/19/17 0718     Glucose 99 mg/dL     Narrative:       Meter: [id number] : [id number] Javed Bateman    CBC & Differential [[id number]] Collected:  01/19/17 0606    Specimen:  Blood " Updated:  01/19/17 0726    Narrative:       The following orders were created for panel order CBC & Differential.  Procedure                               Abnormality         Status                     ---------                               -----------         ------                     CBC Auto Differential[[id number]]         Abnormal            Final result                 Please view results for these tests on the individual orders.    CBC Auto Differential [[id number]]  (Abnormal) Collected:  01/19/17 0606    Specimen:  Blood Updated:  01/19/17 0726     WBC 11.46 (H) 10*3/mm3      RBC 3.60 (L) 10*6/mm3      Hemoglobin 9.9 (L) g/dL      Hematocrit 33.7 (L) %      MCV 93.6 fL      MCH 27.5 pg      MCHC 29.4 (L) g/dL      RDW 15.2 (H) %      RDW-SD 52.3 fl      MPV 11.0 fL      Platelets 312 10*3/mm3      Neutrophil % 70.9 %      Lymphocyte % 16.1 (L) %      Monocyte % 10.6 %      Eosinophil % 1.3 %      Basophil % 0.3 %      Immature Grans % 0.8 (H) %      Neutrophils, Absolute 8.13 (H) 10*3/mm3      Lymphocytes, Absolute 1.85 10*3/mm3      Monocytes, Absolute 1.21 (H) 10*3/mm3      Eosinophils, Absolute 0.15 10*3/mm3      Basophils, Absolute 0.03 10*3/mm3      Immature Grans, Absolute 0.09 (H) 10*3/mm3      nRBC 0.0 /100 WBC             Principal Problem:    Bilateral bronchopneumonia  Active Problems:    Healthcare-associated viral pneumonia    Diabetic foot ulcer associated with type 2 diabetes mellitus    Chronic kidney disease, stage II (mild)    H/O small bowel obstruction    Anemia of chronic disease    Recurrent UTI    Ischemic cardiomyopathy    Peripheral arterial disease    Hypertension, essential    Immobility syndrome    Acquired hypothyroidism    MRSA cellulitis of right foot    Sacral decubitus ulcer    SSS (sick sinus syndrome)    S/P placement of cardiac pacemaker    Gastroesophageal reflux disease with esophagitis    Hyperlipidemia    Ischemic ulcer of right heel with fat layer exposed    Peripheral  vascular occlusive disease aorta iliac and femoraL    Respiratory infection due to enterovirus/ Human Rhinovirus    Common bile duct stone    Plan-  Will add Tradjenta 5 mg once a day.    Continue Levemir 14 units every evening and glimepiride 4 mg twice a day.

## 2017-01-19 NOTE — PROGRESS NOTES
"                       [facility]  FAMILY AND INTERNAL MEDICINE  NEYDA PINTO, and JACE      INTERNAL MEDICINE DAILY PROGRESS NOTE  Dayron Canales M.D.  17            Patient Identification:  Name: Cynthia Arreola  Age: 89 y.o.  Sex: female  :  1927  MRN: [id number]         Primary Care Physician: Dayron Canales MD  LENGTH OF STAY 2 DAYS    Consults     Date and Time Order Name Status Description    2017 0822 Inpatient Consult to Gastroenterology Completed     2017 1453 Inpatient Consult to General Surgery      2017 0917 Inpatient Consult to Endocrinology Completed     2017 0917 Inpatient Consult to Infectious Diseases Completed     2017 0339 Family Medicine Consult Completed     2016 1424 Inpatient Consult to Vascular Surgery Completed     2016 1424 Inpatient Consult to Infectious Diseases Completed     2016 1424 Inpatient Consult to Endocrinology Completed     2016 1424 Inpatient Consult to Cardiology Completed     2016 0821 Family Medicine Consult Completed                Chief Complaint: Shortness of air and chest pain        Subjective        HPI: Patient is a 89 y.o.female who presented with shortness of breath and chest pain that woke patient from sleep. Event occurred at [facility] where she has been a patient for several weeks. Patient demanded transfer out to ER for further work up and evaluation. She felt symptoms were similar to those she had with \"her pneumonia\" between 16 and 16 here at [facility]. She also complains of nausea and vomiting with certain foods. Patient has multiple pressure ulcers that predate her transfer to [place]. These were addressed during her last stay here at [facility] and actually seem to be improving slightly.     Patient was evaluated in the ED by Dr. Ramon Yao. He reported SOB x 3 hours duration at the time of presentation. ROS was positive for SOB, chest " pain, nausea and vomiting. Vitals showed  and /120. She was afebrile. PE showed decreased breath sounds, rhonchi bilaterally, no pedal edema. Significant labs included: CBC with WBC 17.72, glucose 171, hemoglobin 12.3, respiratory viral panel positive for Human Rhinovirus/Enterovirus. Chest xray showed a possible LLL infiltrate. EKG was unremarkable with HR 67. Patient was started on Zosyn in the ER and admitted by Dr. Martinez to me.     1/16/17. I saw the patient for the first time during this hospital stay in her bed on [address]. Patient was resting comfortably in bed and in no respiratory distress. Patient admits to be somewhat unhappy at her current nursing home. Wounds on legs are reported as better by patient. She currently is without nausea, vomiting or other significant issues. Patient to be seen today by Endocrine, ID, and Wound Care.    1/17/17 Patient resting comfortably in bed with son at here side.  She is eating about 50% of her lunch picking at it bite by bite.  I had a lengthy discussion with son about nursing home options and I then discussed his concerns with CCP who will help him look into options. Endocrine continues to optimize diabetic control.  Blood sugars looking better on controlled hospital diet.Surgery and GI have seen patient.  She needs ERCP when better to remove bile duct stone and needs colonoscopy to evaluate anemia.  These will be scheduled as outpatient.  Patient will have antibiotics Discontinued at time of discharge.  Anticipate DC in 1-2 days    Review of Systems:    A comprehensive 14 point review of systems was negative    Past Medical History   Diagnosis Date   • Anemia    • Coronary artery disease    • Diabetes mellitus    • Disease of thyroid gland    • Dysphagia    • Hypertension    • Ischemic cardiomyopathy    • Malignant neoplasm of colon    • PVD (peripheral vascular disease)    • Renal disorder      Past Surgical History   Procedure Laterality Date   •  Colon resection with colostomy       25 years ago   • Cholecystectomy     • Colon surgery       No Known Allergies    Family History   Problem Relation Age of Onset   • Diabetes Daughter    • Hyperlipidemia Daughter    • Hypertension Daughter    • Cancer Son        Social History     Social History   • Marital status:      Spouse name: N/A   • Number of children: N/A   • Years of education: N/A     Social History Main Topics   • Smoking status: Former Smoker     Packs/day: 0.50     Types: Cigarettes     Start date: 6/16/1941     Quit date: 5/16/2007   • Smokeless tobacco: Never Used   • Alcohol use No   • Drug use: No   • Sexual activity: Defer     Other Topics Concern   • None     Social History Narrative       PMH, FH, SH and ROS completed with Admission History and Physical and updated in EPIC system.  This data is unchanged at this time.       Objective     Scheduled Meds:    amLODIPine 5 mg Oral Daily   aspirin 81 mg Oral Daily   atenolol 25 mg Oral Daily   atorvastatin 10 mg Oral Nightly   calcium carbonate 1 tablet Oral Daily   clopidogrel 75 mg Oral Daily   docusate sodium 100 mg Oral BID   enoxaparin 40 mg Subcutaneous Q24H   Ferrex 150 forte plus 1 capsule Oral Daily With Breakfast   glimepiride 4 mg Oral BID With Meals   insulin aspart 0-7 Units Subcutaneous 4x Daily AC & at Bedtime   insulin detemir 14 Units Subcutaneous Nightly   isosorbide mononitrate 30 mg Oral Daily   levothyroxine 100 mcg Oral Daily   metoclopramide 10 mg Oral 4x Daily AC & at Bedtime   pantoprazole 40 mg Oral Daily   silver sulfadiazine  Topical Q12H   sucralfate 1 g Oral TID With Meals     Continuous Infusions:    dextrose 5 % and sodium chloride 0.45 % with KCl 20 mEq/L 75 mL/hr Last Rate: 75 mL/hr (01/18/17 1514)   Pharmacy to Dose enoxaparin (LOVENOX)     Pharmacy to Dose Zosyn         Vital signs in last 24 hours:  Temp:  [98 °F (36.7 °C)-98.9 °F (37.2 °C)] 98.6 °F (37 °C)  Heart Rate:  [60-65] 60  Resp:  [16-18]  "18  BP: (175-184)/(52-65) 175/58    Intake/Output:    Intake/Output Summary (Last 24 hours) at 01/19/17 0121  Last data filed at 01/18/17 1514   Gross per 24 hour   Intake   1650 ml   Output      0 ml   Net   1650 ml       Exam:  Visit Vitals   • /58 (BP Location: Right arm, Patient Position: Lying)   • Pulse 60   • Temp 98.6 °F (37 °C) (Oral)   • Resp 18   • Ht 63\" (160 cm)   • Wt 161 lb 11.2 oz (73.3 kg)   • SpO2 97%   • Breastfeeding No   • BMI 28.64 kg/m2                  Constitutional:    Alert, cooperative, no distress, AAOx3, resting comfortably                          Head:    Normocephalic, without obvious abnormality, atraumatic                           Eyes:    PERRLA, conjunctiva/corneas clear, no icterus, no conjunctival                                         pallor, EOM's intact, both eyes          ENT and Mouth:   Lips, tongue, gums normal; oral mucosa pink and moist                           Neck:   Supple, symmetrical, trachea midline, no JVD                 Respiratory:   Clear to auscultation bilaterally, respirations unlabored           Cardiovascular:    Regular rate and rhythm, S1 and S2 normal, no murmur,       no  Rub or gallop.  Pulses normal.            Gastrointestinal:   BS present x 4 Soft, non-tender, bowel sounds active, no                                                  masses,  no hepatosplenomegaly                              :    No hernia.  Normal exam for sex.                Musculoskeletal:   Extremities normal, atraumatic, no cyanosis or edema.  No                                               arthropathy.  No deformity.  Gait normal                            Skin:  Skin is warm and dry,  no rashes, swelling or palpable lesions                  Neurologic:   CNII-XII intact, motor strength grossly intact, sensation                                                      grossly intact to light touch, no focal reflexl deficits noted                   Psychiatric:   " Alert, oriented x 3, no delusions, psychosis,depression,anxiety       Heme/Lymph/Imun:   No bruises, petechiae.  Lymph nodes normal in size/configuration       Data Review:  Lab Results   Component Value Date    CALCIUM 9.1 01/18/2017       Results from last 7 days  Lab Units 01/18/17  0615 01/18/17  0438 01/17/17  0623 01/16/17  0930 01/16/17  0248   AST (SGOT) U/L  --   --  12  --  10   SODIUM mmol/L  --  138 139 138 139   POTASSIUM mmol/L  --  3.9 3.4* 3.7 3.6   CHLORIDE mmol/L  --  98 99 97* 99   TOTAL CO2 mmol/L  --  23.7 25.3 25.3 26.2   BUN mg/dL  --  12 13 14 14   CREATININE mg/dL  --  1.07* 0.94 1.11* 1.00   GLUCOSE mg/dL  --  54* 85 241* 171*   CALCIUM mg/dL  --  9.1 9.1 9.0 9.5   WBC 10*3/mm3 13.02*  --  13.52* 16.80* 17.72*   HEMOGLOBIN g/dL 11.2*  --  11.2* 11.6* 12.3   PLATELETS 10*3/mm3 266  --  326 373 380   ALT (SGPT) U/L  --   --  11  --  12     Lab Results   Component Value Date    TROPONINT <0.010 01/16/2017     Estimated Creatinine Clearance: 34.2 mL/min (by C-G formula based on Cr of 1.07).  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 Healthcare-associated Bilateral broncho-pneumonia with respiratory distress on admission now felt to be viral in etiology.Patient admitted on IV Zosyn. ID consulted and agrees with choice Planning to Dc antibiotic at time od hospital DC and continue aggressive pulmonary toilet to clear the secretions.  Zosyn Dc's on 1/18/17.  2. Choledocholithiasis with large stone in bile duct. May be cause for increased WBC. Surgery and GI have evaluated.  Will need outpatient ERCP and Colonoscopy when pneumonia has resolved in 2-3 weeks  3. Pressure ulcers of decubitus, right foot and right heal with poorly controlled type 2 DM  Wound care evaluating. Silvadene continued as topical treatment of choice. Outpatient follow up in wound care clinic is planned.Pressure reducing mattress added  4. Uncontrolled Diabetes Type 2. Endocrine evaluating and adjusting insulin We will continue to following blood sugars regularly.  Levwels much improved.  Now stabilizing.  5. Hyperlipidemia. Now being controlled with Lipitor as opposed to Zocor. Endocrinology feels this is a good move to protect against potential interaction between Zocor and amlodipine. Levels on lipid profile looked good.  5. Severe peripheral vascular occlusive arterial disease especially prominent in aorta iliac and femoral areas. Revascularization is one possibility. Vascular seems a little reluctant however at this point to engage in this very aggressive mode of treatment. Continued careful outpatient follow-up in wound care clinic seems most appropriate with a follow-up in the vascular clinic if revascularization is felt to be necessary at some point in the future.  6. History of MRSA infection in cellulitis of right foot. No clear-cut evidence at this point of a bacterial in this area. We will continue aggressive topical  treatment of the wounds and efforts to heal the wounds. Wound care has evaluated and adjusted the patient's wedding care plans.  7. Ischemic heel ulcer. This also can be treated outpatient level with regular debridement. Vascular has recommended treatment of this with painting using Betadine as done in the past to continue with the drying process.  8. Ischemic cardiomyopathy. Workup so far unremarkable. Echocardiogram showing ejection fraction greater than 60%. Stress test also completed and was unremarkable. Pacemaker check has been done and showed no malfunction of the pacemaker. Cardiac workup therefore so far inconclusive.  9. Hypertension. Medications have been regulated and blood pressure seems under much better control at this point clonidine has been added on a when necessary basis and Norvasc on a regular prescribed basis. We will continue to follow this on an outpatient basis.  10. Recurrent urinary tract infections. There was no evidence of a urinary tract infection in this patient at this time in the hospital. All antibiotics have been discontinued at this point.  11. Anemia of chronic disease. Iron therapy recommended no need for transfusion at this point. Patient does need continued follow-up with CBCs on an outpatient basis.            Plan for disposition:Where: SNF and When:  1-2 days when blood sugar stabilizesdays      Dayron Canales MD  1/18/17  10:30 AM

## 2017-01-19 NOTE — PROGRESS NOTES
"INFECTIOUS DISEASES PROGRESS NOTE    CC: f/u sepsis    S:   Mild intermittent abd pain when she eats food that \"does not agree\" with her  No f/c/ns  Breathing at bseline    O:  Physical Exam:  Temp:  [98 °F (36.7 °C)-98.7 °F (37.1 °C)] 98.6 °F (37 °C)  Heart Rate:  [60-65] 60  Resp:  [16-18] 18  BP: (175-184)/(52-65) 175/58  Physical Exam   Constitutional: She appears well-developed. No distress.   Cardiovascular: Normal rate and regular rhythm.    Abdominal: Soft. She exhibits no distension. There is tenderness (RUQ, minimal). There is no rebound and no guarding.   Neurological: She is alert.   Skin: Skin is dry.        Diagnostics:    WBC 11.5 (p71, L 16, M 11)  H/H 9.9/34    Bcx ngtd      Assessment/Plan   1. Sepsis, better  2. Rhinovirus PNA, better  3. Choledocholithiasis, no s/s of cholangitis.  ERCP when stable    Stable off abx, WBC is decreasing.  I d/w her s/s of cholangitis and when to seek medical attn.  OK to d/c from ID perspective.  I will not plan to see daily but please call with questions.    Rico Ramirez MD  7:55 AM  01/19/17         "

## 2017-01-19 NOTE — PROGRESS NOTES
"                       [facility]  FAMILY AND INTERNAL MEDICINE  NEYDA PINTO, and JACE      INTERNAL MEDICINE DAILY PROGRESS NOTE  Dayron Canales M.D.  17            Patient Identification:  Name: Cynthia Arreola  Age: 89 y.o.  Sex: female  :  1927  MRN: [id number]         Primary Care Physician: Dayron Canales MD  LENGTH OF STAY 3 DAYS    Consults     Date and Time Order Name Status Description    2017 0822 Inpatient Consult to Gastroenterology Completed     2017 1453 Inpatient Consult to General Surgery      2017 0917 Inpatient Consult to Endocrinology Completed     2017 0917 Inpatient Consult to Infectious Diseases Completed     2017 0339 Family Medicine Consult Completed     2016 1424 Inpatient Consult to Vascular Surgery Completed     2016 1424 Inpatient Consult to Infectious Diseases Completed     2016 1424 Inpatient Consult to Endocrinology Completed     2016 1424 Inpatient Consult to Cardiology Completed     2016 0821 Family Medicine Consult Completed                Chief Complaint: Shortness of air and chest pain        Subjective        HPI: Patient is a 89 y.o.female who presented with shortness of breath and chest pain that woke patient from sleep. Event occurred at [facility] where she has been a patient for several weeks. Patient demanded transfer out to ER for further work up and evaluation. She felt symptoms were similar to those she had with \"her pneumonia\" between 16 and 16 here at [facility]. She also complains of nausea and vomiting with certain foods. Patient has multiple pressure ulcers that predate her transfer to [place]. These were addressed during her last stay here at [facility] and actually seem to be improving slightly.     Patient was evaluated in the ED by Dr. Ramon Yao. He reported SOB x 3 hours duration at the time of presentation. ROS was positive for SOB, chest " pain, nausea and vomiting. Vitals showed  and /120. She was afebrile. PE showed decreased breath sounds, rhonchi bilaterally, no pedal edema. Significant labs included: CBC with WBC 17.72, glucose 171, hemoglobin 12.3, respiratory viral panel positive for Human Rhinovirus/Enterovirus. Chest xray showed a possible LLL infiltrate. EKG was unremarkable with HR 67. Patient was started on Zosyn in the ER and admitted by Dr. Martinez to me.     1/16/17. I saw the patient for the first time during this hospital stay in her bed on [address]. Patient was resting comfortably in bed and in no respiratory distress. Patient admits to be somewhat unhappy at her current nursing home. Wounds on legs are reported as better by patient. She currently is without nausea, vomiting or other significant issues. Patient to be seen today by Endocrine, ID, and Wound Care.    1/17/17 Patient resting comfortably in bed with son at here side.  She is eating about 50% of her lunch picking at it bite by bite.  I had a lengthy discussion with son about nursing home options and I then discussed his concerns with CCP who will help him look into options. Endocrine continues to optimize diabetic control.  Blood sugars looking better on controlled hospital diet.Surgery and GI have seen patient.  She needs ERCP when better to remove bile duct stone and needs colonoscopy to evaluate anemia.  These will be scheduled as outpatient.  Patient will have antibiotics Discontinued at time of discharge.  Anticipate DC in 1-2 days    1/18/17.  Patient resting quietly in bed at the time of my visit.  I attempted to wake her but she was sleeping soundly.  Discussed the case with the patient's nurse.  Still working on blood sugar control with endocrinology.  Other specialists seem to have finished their workup.  Discussed the case with CCP.  Working on nursing home placement issues with the son.  Son would like to switch to a new nursing home that can manage  her wounds more effectively.    1/1917.  Patient more alert today during my hospital rounds.  She did eat a good breakfast and feels very good this morning.  Blood sugars have been under much improved control.  I did discuss the case with the patient's nurse and with CCP.  Continuing to work with son on nursing home placement.  [facility] in the [facility] system is [place] does have a bed available which would meet the patient's medical needs.  Son is planning to take a look at this today and make a decision on what that she can be discharged to tomorrow morning.  Hopefully blood sugars will remain under good control.  We are asking endocrinology to make final comments on outpatient treatment of her blood sugars.  We are planning a follow-up with in 2-3 weeks with Dr. Bridges to consider ERCP for treatment of the chronic biliary dilatation due to stone.    Review of Systems:    A comprehensive 14 point review of systems was negative    Past Medical History   Diagnosis Date   • Anemia    • Coronary artery disease    • Diabetes mellitus    • Disease of thyroid gland    • Dysphagia    • Hypertension    • Ischemic cardiomyopathy    • Malignant neoplasm of colon    • PVD (peripheral vascular disease)    • Renal disorder      Past Surgical History   Procedure Laterality Date   • Colon resection with colostomy       25 years ago   • Cholecystectomy     • Colon surgery       No Known Allergiesob  Family History   Problem Relation Age of Onset   • Diabetes Daughter    • Hyperlipidemia Daughter    • Hypertension Daughter    • Cancer Son    jeff Relation Age of Onset   • Diabetes Daughter    • Hyperlipidemia Daughter    • Hypertension Daughter    • Cancer Son         Social History   • Marital status:      Spouse name: N/A   • Number of children: N/A   • Years of education: N/A     Social History Main Topics   • Smoking status: Former Smoker     Packs/day: 0.50     Types: Cigarettes     Start date: 6/16/1941      "Quit date: 5/16/2007   • Smokeless tobacco: Never Used   • Alcohol use No   • Drug use: No   • Sexual activity: Defer     Other Topics Concern   • None     Social History Narrative       PMH, FH, SH and ROS completed with Admission History and Physical and updated in EPIC system.  This data is unchanged at this time.       Objective     Scheduled Meds:    amLODIPine 5 mg Oral Daily   aspirin 81 mg Oral Daily   atenolol 25 mg Oral Daily   atorvastatin 10 mg Oral Nightly   calcium carbonate 1 tablet Oral Daily   clopidogrel 75 mg Oral Daily   docusate sodium 100 mg Oral BID   enoxaparin 40 mg Subcutaneous Q24H   Ferrex 150 forte plus 1 capsule Oral Daily With Breakfast   glimepiride 4 mg Oral BID With Meals   insulin aspart 0-7 Units Subcutaneous 4x Daily AC & at Bedtime   insulin detemir 14 Units Subcutaneous Nightly   isosorbide mononitrate 30 mg Oral Daily   levothyroxine 100 mcg Oral Daily   linagliptin 5 mg Oral Daily   metoclopramide 10 mg Oral 4x Daily AC & at Bedtime   pantoprazole 40 mg Oral Daily   silver sulfadiazine  Topical Q12H   sucralfate 1 g Oral TID With Meals     Continuous Infusions:    dextrose 5 % and sodium chloride 0.45 % with KCl 20 mEq/L 75 mL/hr Last Rate: 75 mL/hr (01/19/17 0520)   Pharmacy to Dose enoxaparin (LOVENOX)         Vital signs in last 24 hours:  Temp:  [97.5 °F (36.4 °C)-98.7 °F (37.1 °C)] 97.5 °F (36.4 °C)  Heart Rate:  [60-66] 65  Resp:  [16-20] 20  BP: (175-196)/(58-72) 196/72    Intake/Output:    Intake/Output Summary (Last 24 hours) at 01/19/17 1104  Last data filed at 01/19/17 0520   Gross per 24 hour   Intake   2984 ml   Output      0 ml   Net   2984 ml       Exam:  Visit Vitals   • BP (!) 196/72   • Pulse 65   • Temp 97.5 °F (36.4 °C) (Oral)   • Resp 20   • Ht 63\" (160 cm)   • Wt 161 lb 11.2 oz (73.3 kg)   • SpO2 97%   • Breastfeeding No   • BMI 28.64 kg/m2                  Constitutional:    Alert, cooperative, no distress, AAOx3, resting comfortably                     "      Head:    Normocephalic, without obvious abnormality, atraumatic                           Eyes:    PERRLA, conjunctiva/corneas clear, no icterus, no conjunctival                                         pallor, EOM's intact, both eyes          ENT and Mouth:   Lips, tongue, gums normal; oral mucosa pink and moist                           Neck:   Supple, symmetrical, trachea midline, no JVD                 Respiratory:   Clear to auscultation bilaterally, respirations unlabored           Cardiovascular:    Regular rate and rhythm, S1 and S2 normal, no murmur,       no  Rub or gallop.  Pulses normal.            Gastrointestinal:   BS present x 4 Soft, non-tender, bowel sounds active, no                                                  masses,  no hepatosplenomegaly                              :    No hernia.  Normal exam for sex.                Musculoskeletal:   Extremities normal, atraumatic, no cyanosis or edema.  No                                               arthropathy.  No deformity.  Gait normal                            Skin:  Skin is warm and dry,  no rashes, swelling or palpable lesions                  Neurologic:   CNII-XII intact, motor strength grossly intact, sensation                                                      grossly intact to light touch, no focal reflexl deficits noted                   Psychiatric:   Alert, oriented x 3, no delusions, psychosis,depression,anxiety       Heme/Lymph/Imun:   No bruises, petechiae.  Lymph nodes normal in size/configuration       Data Review:  Lab Results   Component Value Date    CALCIUM 8.9 01/19/2017       Results from last 7 days  Lab Units 01/19/17  0606 01/18/17  0615 01/18/17  0438 01/17/17  0623  01/16/17  0248   AST (SGOT) U/L  --   --   --  12  --  10   SODIUM mmol/L 138  --  138 139  < > 139   POTASSIUM mmol/L 3.6  --  3.9 3.4*  < > 3.6   CHLORIDE mmol/L 101  --  98 99  < > 99   TOTAL CO2 mmol/L 23.8  --  23.7 25.3  < > 26.2   BUN mg/dL  10  --  12 13  < > 14   CREATININE mg/dL 0.97  --  1.07* 0.94  < > 1.00   GLUCOSE mg/dL 109*  --  54* 85  < > 171*   CALCIUM mg/dL 8.9  --  9.1 9.1  < > 9.5   WBC 10*3/mm3 11.46* 13.02*  --  13.52*  < > 17.72*   HEMOGLOBIN g/dL 9.9* 11.2*  --  11.2*  < > 12.3   PLATELETS 10*3/mm3 312 266  --  326  < > 380   ALT (SGPT) U/L  --   --   --  11  --  12   < > = values in this interval not displayed.  Lab Results   Component Value Date    TROPONINT <0.010 01/16/2017     Estimated Creatinine Clearance: 37.7 mL/min (by C-G formula based on Cr of 0.97).  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 Healthcare-associated Bilateral broncho-pneumonia with respiratory distress on admission now felt to be viral in etiology.Patient admitted on IV Zosyn. ID consulted and agrees with choice Planning to Dc antibiotic at time od hospital DC and continue aggressive pulmonary toilet to clear the secretions.  Zosyn Dc's on 1/18/17.  Seems  to be doing well with little to no suctioning.  Antibiotics have now been discontinued.  2. Choledocholithiasis with large stone in bile duct. May be cause for increased WBC. Surgery and GI have evaluated.  Will need outpatient ERCP and Colonoscopy when pneumonia has resolved in 2-3 weeks area and still has minimal right upper quadrant tenderness will continue to follow and treat as necessary.  3. Pressure ulcers of decubitus, right foot and right heal with poorly controlled type 2 DM  Wound care evaluating. Silvadene continued as topical treatment of choice. Outpatient follow up in wound care clinic is planned.Pressure reducing mattress added.  Care issues ongoing.  Patient may benefit from being at skilled nursing unit which does have wound care physician.  4. Uncontrolled Diabetes Type 2. Endocrine evaluating and adjusting insulin We will continue to following blood sugars regularly.  Levwels much improved.  Now stabilizing.  Working  on final plan for control of diabetes.  5. Hyperlipidemia. Now being controlled with Lipitor as opposed to Zocor. Endocrinology feels this is a good move to protect against potential interaction between Zocor and amlodipine. Levels on lipid profile looked good.  5. Severe peripheral vascular occlusive arterial disease especially prominent in aorta iliac and femoral areas. Revascularization is one possibility. Vascular seems a little reluctant however at this point to engage in this very aggressive mode of treatment. Continued careful outpatient follow-up in wound care clinic seems most appropriate with a follow-up in the vascular clinic if revascularization is felt to be necessary at some point in the future.  6. History of MRSA infection in cellulitis of right foot. No clear-cut evidence at this point of a bacterial in this area. We will continue aggressive topical treatment of the wounds and efforts to heal the wounds. Wound care has evaluated and adjusted the patient's wedding  care plans.  7. Ischemic heel ulcer. This also can be treated outpatient level with regular debridement. Vascular has recommended treatment of this with painting using Betadine as done in the past to continue with the drying process.  8. Ischemic cardiomyopathy. Workup so far unremarkable. Echocardiogram showing ejection fraction greater than 60%. Stress test also completed and was unremarkable. Pacemaker check has been done and showed no malfunction of the pacemaker. Cardiac workup therefore so far inconclusive.  9. Hypertension. Medications have been regulated and blood pressure seems under much better control at this point clonidine has been added on a when necessary basis and Norvasc on a regular prescribed basis. We will continue to follow this on an outpatient basis.  10. Recurrent urinary tract infections. There was no evidence of a urinary tract infection in this patient at this time in the hospital. All antibiotics have been discontinued at this point.  11. Anemia of chronic disease. Iron therapy recommended no need for transfusion at this point. Patient does need continued follow-up with CBCs on an outpatient basis.            Plan for disposition:Where: SNF and When:  1-2 days when blood sugar stabilizes.       Dayron Canales MD  1/19/17  11:04 AM

## 2017-01-19 NOTE — PLAN OF CARE
Problem: Patient Care Overview (Adult)  Goal: Plan of Care Review  Outcome: Ongoing (interventions implemented as appropriate)    01/19/17 0602   Coping/Psychosocial Response Interventions   Plan Of Care Reviewed With patient   Patient Care Overview   Progress improving   Outcome Evaluation   Outcome Summary/Follow up Plan CONTINUE TO MONITOR VS, LABS, PAIN, TURN Q 2, SKIN, DRESSING CHANGES BID&PRN         Problem: Pneumonia (Adult)  Goal: Signs and Symptoms of Listed Potential Problems Will be Absent or Manageable (Pneumonia)  Outcome: Ongoing (interventions implemented as appropriate)    01/19/17 0602   Pneumonia   Problems Assessed (Pneumonia) all   Problems Present (Pneumonia) respiratory compromise;progression of infection         Problem: Fall Risk (Adult)  Goal: Absence of Falls  Outcome: Ongoing (interventions implemented as appropriate)    01/19/17 0602   Fall Risk (Adult)   Absence of Falls making progress toward outcome

## 2017-01-19 NOTE — PROGRESS NOTES
Continued Stay Note  [facility]     Patient Name: Cynthia Arreola  MRN: [id number]  Today's Date: 1/19/2017    Admit Date: 1/16/2017          Discharge Plan       01/19/17 1453    Case Management/Social Work Plan    Plan [facility]    Patient/Family In Agreement With Plan yes    Additional Comments Several conversations with Evert over course of day.  Pt is accepted at [facility] per Leonila key from [facility].  Pt aware.  No other needs.  Laura notified that pt will not return to [place].              Discharge Codes     None            Ana Copeland RN

## 2017-01-20 VITALS
BODY MASS INDEX: 28.24 KG/M2 | RESPIRATION RATE: 22 BRPM | HEART RATE: 76 BPM | OXYGEN SATURATION: 95 % | TEMPERATURE: 99.6 F | WEIGHT: 159.4 LBS | HEIGHT: 63 IN | DIASTOLIC BLOOD PRESSURE: 58 MMHG | SYSTOLIC BLOOD PRESSURE: 169 MMHG

## 2017-01-20 LAB
ANION GAP SERPL CALCULATED.3IONS-SCNC: 11.2 MMOL/L
BASOPHILS # BLD AUTO: 0.03 10*3/MM3 (ref 0–0.2)
BASOPHILS NFR BLD AUTO: 0.3 % (ref 0–1.5)
BUN BLD-MCNC: 10 MG/DL (ref 8–23)
BUN/CREAT SERPL: 11.2 (ref 7–25)
CALCIUM SPEC-SCNC: 9.1 MG/DL (ref 8.6–10.5)
CHLORIDE SERPL-SCNC: 97 MMOL/L (ref 98–107)
CO2 SERPL-SCNC: 24.8 MMOL/L (ref 22–29)
CREAT BLD-MCNC: 0.89 MG/DL (ref 0.57–1)
DEPRECATED RDW RBC AUTO: 51 FL (ref 37–54)
EOSINOPHIL # BLD AUTO: 0.18 10*3/MM3 (ref 0–0.7)
EOSINOPHIL NFR BLD AUTO: 1.5 % (ref 0.3–6.2)
ERYTHROCYTE [DISTWIDTH] IN BLOOD BY AUTOMATED COUNT: 15.1 % (ref 11.7–13)
GFR SERPL CREATININE-BSD FRML MDRD: 60 ML/MIN/1.73
GLUCOSE BLD-MCNC: 238 MG/DL (ref 65–99)
GLUCOSE BLDC GLUCOMTR-MCNC: 159 MG/DL (ref 70–130)
GLUCOSE BLDC GLUCOMTR-MCNC: 166 MG/DL (ref 70–130)
GLUCOSE BLDC GLUCOMTR-MCNC: 176 MG/DL (ref 70–130)
GLUCOSE BLDC GLUCOMTR-MCNC: 199 MG/DL (ref 70–130)
GLUCOSE BLDC GLUCOMTR-MCNC: 233 MG/DL (ref 70–130)
HCT VFR BLD AUTO: 33.4 % (ref 35.6–45.5)
HGB BLD-MCNC: 10.2 G/DL (ref 11.9–15.5)
IMM GRANULOCYTES # BLD: 0.1 10*3/MM3 (ref 0–0.03)
IMM GRANULOCYTES NFR BLD: 0.8 % (ref 0–0.5)
LYMPHOCYTES # BLD AUTO: 1.6 10*3/MM3 (ref 0.9–4.8)
LYMPHOCYTES NFR BLD AUTO: 13.5 % (ref 19.6–45.3)
MCH RBC QN AUTO: 28 PG (ref 26.9–32)
MCHC RBC AUTO-ENTMCNC: 30.5 G/DL (ref 32.4–36.3)
MCV RBC AUTO: 91.8 FL (ref 80.5–98.2)
MONOCYTES # BLD AUTO: 1.02 10*3/MM3 (ref 0.2–1.2)
MONOCYTES NFR BLD AUTO: 8.6 % (ref 5–12)
NEUTROPHILS # BLD AUTO: 8.9 10*3/MM3 (ref 1.9–8.1)
NEUTROPHILS NFR BLD AUTO: 75.3 % (ref 42.7–76)
NRBC BLD MANUAL-RTO: 0 /100 WBC (ref 0–0)
PLATELET # BLD AUTO: 313 10*3/MM3 (ref 140–500)
PMV BLD AUTO: 11 FL (ref 6–12)
POTASSIUM BLD-SCNC: 3.9 MMOL/L (ref 3.5–5.2)
RBC # BLD AUTO: 3.64 10*6/MM3 (ref 3.9–5.2)
SODIUM BLD-SCNC: 133 MMOL/L (ref 136–145)
WBC NRBC COR # BLD: 11.83 10*3/MM3 (ref 4.5–10.7)

## 2017-01-20 PROCEDURE — 82962 GLUCOSE BLOOD TEST: CPT

## 2017-01-20 PROCEDURE — 99232 SBSQ HOSP IP/OBS MODERATE 35: CPT | Performed by: INTERNAL MEDICINE

## 2017-01-20 PROCEDURE — 85025 COMPLETE CBC W/AUTO DIFF WBC: CPT | Performed by: INTERNAL MEDICINE

## 2017-01-20 PROCEDURE — 94640 AIRWAY INHALATION TREATMENT: CPT

## 2017-01-20 PROCEDURE — 80048 BASIC METABOLIC PNL TOTAL CA: CPT | Performed by: INTERNAL MEDICINE

## 2017-01-20 PROCEDURE — 94799 UNLISTED PULMONARY SVC/PX: CPT

## 2017-01-20 PROCEDURE — 99239 HOSP IP/OBS DSCHRG MGMT >30: CPT | Performed by: INTERNAL MEDICINE

## 2017-01-20 PROCEDURE — 63710000001 INSULIN ASPART PER 5 UNITS: Performed by: INTERNAL MEDICINE

## 2017-01-20 PROCEDURE — 25010000002 ENOXAPARIN PER 10 MG: Performed by: INTERNAL MEDICINE

## 2017-01-20 RX ORDER — PSEUDOEPHEDRINE HCL 30 MG
100 TABLET ORAL 2 TIMES DAILY
Refills: 0
Start: 2017-01-20 | End: 2018-01-01 | Stop reason: DRUGHIGH

## 2017-01-20 RX ORDER — HYDROCODONE BITARTRATE AND ACETAMINOPHEN 5; 325 MG/1; MG/1
1 TABLET ORAL EVERY 4 HOURS PRN
Qty: 60 TABLET | Refills: 0 | Status: SHIPPED | OUTPATIENT
Start: 2017-01-20 | End: 2017-01-26

## 2017-01-20 RX ORDER — ATORVASTATIN CALCIUM 10 MG/1
10 TABLET, FILM COATED ORAL NIGHTLY
Start: 2017-01-20

## 2017-01-20 RX ORDER — NITROGLYCERIN 0.4 MG/1
0.4 TABLET SUBLINGUAL
Refills: 12
Start: 2017-01-20

## 2017-01-20 RX ORDER — IRON ASPGLY,PS/C/B12/FA/CA/SUC 150-25-1
1 CAPSULE ORAL
Qty: 30 EACH
Start: 2017-01-20

## 2017-01-20 RX ORDER — GLIMEPIRIDE 4 MG/1
4 TABLET ORAL 2 TIMES DAILY WITH MEALS
Start: 2017-01-20 | End: 2018-01-01 | Stop reason: HOSPADM

## 2017-01-20 RX ORDER — NICOTINE POLACRILEX 4 MG
15 LOZENGE BUCCAL
Qty: 37.5 EACH | Refills: 0
Start: 2017-01-20 | End: 2018-01-01 | Stop reason: HOSPADM

## 2017-01-20 RX ORDER — INSULIN GLARGINE 100 [IU]/ML
14 INJECTION, SOLUTION SUBCUTANEOUS DAILY
Refills: 12
Start: 2017-01-20

## 2017-01-20 RX ORDER — TRAMADOL HYDROCHLORIDE 50 MG/1
50 TABLET ORAL EVERY 6 HOURS PRN
Qty: 60 TABLET | Refills: 0 | Status: SHIPPED | OUTPATIENT
Start: 2017-01-20 | End: 2018-01-01 | Stop reason: HOSPADM

## 2017-01-20 RX ADMIN — LEVOTHYROXINE SODIUM 100 MCG: 100 TABLET ORAL at 10:17

## 2017-01-20 RX ADMIN — HYDROCODONE BITARTRATE AND ACETAMINOPHEN 1 TABLET: 5; 325 TABLET ORAL at 02:09

## 2017-01-20 RX ADMIN — DOCUSATE SODIUM 100 MG: 100 CAPSULE, LIQUID FILLED ORAL at 08:46

## 2017-01-20 RX ADMIN — HYDROCODONE BITARTRATE AND ACETAMINOPHEN 1 TABLET: 5; 325 TABLET ORAL at 08:46

## 2017-01-20 RX ADMIN — DOCUSATE SODIUM 100 MG: 100 CAPSULE, LIQUID FILLED ORAL at 20:35

## 2017-01-20 RX ADMIN — ISOSORBIDE MONONITRATE 30 MG: 30 TABLET, EXTENDED RELEASE ORAL at 10:17

## 2017-01-20 RX ADMIN — ATORVASTATIN CALCIUM 10 MG: 10 TABLET, FILM COATED ORAL at 20:33

## 2017-01-20 RX ADMIN — METOCLOPRAMIDE 10 MG: 10 TABLET ORAL at 10:17

## 2017-01-20 RX ADMIN — ASPIRIN 81 MG: 81 TABLET, CHEWABLE ORAL at 10:17

## 2017-01-20 RX ADMIN — INSULIN ASPART 3 UNITS: 100 INJECTION, SOLUTION INTRAVENOUS; SUBCUTANEOUS at 10:52

## 2017-01-20 RX ADMIN — LINAGLIPTIN 5 MG: 5 TABLET, FILM COATED ORAL at 10:17

## 2017-01-20 RX ADMIN — Medication 1 CAPSULE: at 10:19

## 2017-01-20 RX ADMIN — SUCRALFATE 1 G: 1 TABLET ORAL at 20:35

## 2017-01-20 RX ADMIN — CLOPIDOGREL 75 MG: 75 TABLET, FILM COATED ORAL at 10:18

## 2017-01-20 RX ADMIN — PANTOPRAZOLE SODIUM 40 MG: 40 TABLET, DELAYED RELEASE ORAL at 08:46

## 2017-01-20 RX ADMIN — AMLODIPINE BESYLATE 5 MG: 5 TABLET ORAL at 10:18

## 2017-01-20 RX ADMIN — SUCRALFATE 1 G: 1 TABLET ORAL at 13:15

## 2017-01-20 RX ADMIN — ENOXAPARIN SODIUM 40 MG: 40 INJECTION SUBCUTANEOUS at 10:20

## 2017-01-20 RX ADMIN — SUCRALFATE 1 G: 1 TABLET ORAL at 10:17

## 2017-01-20 RX ADMIN — INSULIN ASPART 2 UNITS: 100 INJECTION, SOLUTION INTRAVENOUS; SUBCUTANEOUS at 13:14

## 2017-01-20 RX ADMIN — ATENOLOL 25 MG: 25 TABLET ORAL at 10:18

## 2017-01-20 RX ADMIN — POTASSIUM CHLORIDE, DEXTROSE MONOHYDRATE AND SODIUM CHLORIDE 75 ML/HR: 150; 5; 450 INJECTION, SOLUTION INTRAVENOUS at 10:20

## 2017-01-20 RX ADMIN — GLIMEPIRIDE 4 MG: 4 TABLET ORAL at 10:18

## 2017-01-20 RX ADMIN — ALBUTEROL SULFATE 2.5 MG: 2.5 SOLUTION RESPIRATORY (INHALATION) at 02:18

## 2017-01-20 RX ADMIN — HYDROCODONE BITARTRATE AND ACETAMINOPHEN 1 TABLET: 5; 325 TABLET ORAL at 20:33

## 2017-01-20 RX ADMIN — METOCLOPRAMIDE 10 MG: 10 TABLET ORAL at 13:15

## 2017-01-20 RX ADMIN — SILVER SULFADIAZINE: 10 CREAM TOPICAL at 10:28

## 2017-01-20 RX ADMIN — METOCLOPRAMIDE 10 MG: 10 TABLET ORAL at 20:33

## 2017-01-20 RX ADMIN — Medication 1 TABLET: at 10:18

## 2017-01-20 NOTE — PROGRESS NOTES
Continued Stay Note  [facility]     Patient Name: Cynthia Arreola  MRN: [id number]  Today's Date: 1/20/2017    Admit Date: 1/16/2017          Discharge Plan       01/20/17 1027    Case Management/Social Work Plan    Plan [facility]    Additional Comments Information sent to [facility] for DC transport to [facility].  Pt aware of pending DC and placement at [facility]  No other needs noted.              Discharge Codes     None            Ana Copeland RN

## 2017-01-20 NOTE — PROGRESS NOTES
"  ENDOCRINE    Subjective   AND PLANS  Cynthia Arreola is a 89 y.o. female.     No nausea or vomiting.  Has intermittent right upper quadrant pain.  No diarrhea.    Fasting glucose 238.  Random glucose 100-250.      Patient has iron deficiency anemia with heme positive stools.      Tolerating switch from Zocor to Lipitor 10 mg once a day to prevent potential interaction of Zocor with amlodipine.      Afebrile off systemic antibiotics.  Right foot and right heel wounds healing slowly.  No claudication pain.      Objective   Visit Vitals   • BP (!) 193/66 (BP Location: Right arm, Patient Position: Lying)   • Pulse 62   • Temp 98 °F (36.7 °C) (Oral)   • Resp 18   • Ht 63\" (160 cm)   • Wt 159 lb 6.4 oz (72.3 kg)  Comment: on bed   • SpO2 95%   • Breastfeeding No   • BMI 28.24 kg/m2     Physical Exam    Awake, alert, not orthopneic not in acute distress.  No rales or wheezes.  Regular heart rate and rhythm.  No gallop.  Neck veins are not visible.  Thyroid is not enlarged.  Abdomen soft, nontender.  No cyanosis or clubbing.      Lab Results (last 24 hours)     Procedure Component Value Units Date/Time    POC Glucose Fingerstick [[id number]]  (Abnormal) Collected:  01/19/17 1106    Specimen:  Blood Updated:  01/19/17 1116     Glucose 250 (H) mg/dL     Narrative:       Meter: [id number] : [id number] WilkersonElasticDot    POC Glucose Fingerstick [[id number]]  (Abnormal) Collected:  01/19/17 1608    Specimen:  Blood Updated:  01/19/17 1619     Glucose 187 (H) mg/dL     Narrative:       Meter: [id number] : [id number] Wilkerson Osiris M    POC Glucose Fingerstick [[id number]]  (Normal) Collected:  01/19/17 2105    Specimen:  Blood Updated:  01/19/17 2106     Glucose 109 mg/dL     Narrative:       Meter: [id number] : [id number] Everett Larry    POC Glucose Fingerstick [[id number]]  (Normal) Collected:  01/19/17 2311    Specimen:  Blood Updated:  01/19/17 2312     Glucose 100 mg/dL     Narrative:       Meter: [id number] : [id number] " Everett Larry    POC Glucose Fingerstick [[id number]]  (Abnormal) Collected:  01/20/17 0147    Specimen:  Blood Updated:  01/20/17 0148     Glucose 199 (H) mg/dL     Narrative:       Meter: [id number] : [id number] Everett Larry    POC Glucose Fingerstick [[id number]]  (Abnormal) Collected:  01/20/17 0612    Specimen:  Blood Updated:  01/20/17 0613     Glucose 233 (H) mg/dL     Narrative:       Meter: [id number] : [id number] Everett Larry    CBC & Differential [[id number]] Collected:  01/20/17 0531    Specimen:  Blood Updated:  01/20/17 0636    Narrative:       The following orders were created for panel order CBC & Differential.  Procedure                               Abnormality         Status                     ---------                               -----------         ------                     CBC Auto Differential[[id number]]         Abnormal            Final result                 Please view results for these tests on the individual orders.    CBC Auto Differential [[id number]]  (Abnormal) Collected:  01/20/17 0531    Specimen:  Blood Updated:  01/20/17 0636     WBC 11.83 (H) 10*3/mm3      RBC 3.64 (L) 10*6/mm3      Hemoglobin 10.2 (L) g/dL      Hematocrit 33.4 (L) %      MCV 91.8 fL      MCH 28.0 pg      MCHC 30.5 (L) g/dL      RDW 15.1 (H) %      RDW-SD 51.0 fl      MPV 11.0 fL      Platelets 313 10*3/mm3      Neutrophil % 75.3 %      Lymphocyte % 13.5 (L) %      Monocyte % 8.6 %      Eosinophil % 1.5 %      Basophil % 0.3 %      Immature Grans % 0.8 (H) %      Neutrophils, Absolute 8.90 (H) 10*3/mm3      Lymphocytes, Absolute 1.60 10*3/mm3      Monocytes, Absolute 1.02 10*3/mm3      Eosinophils, Absolute 0.18 10*3/mm3      Basophils, Absolute 0.03 10*3/mm3      Immature Grans, Absolute 0.10 (H) 10*3/mm3      nRBC 0.0 /100 WBC     Basic Metabolic Panel [[id number]]  (Abnormal) Collected:  01/20/17 0531    Specimen:  Blood Updated:  01/20/17 0637     Glucose 238 (H) mg/dL      BUN 10 mg/dL      Creatinine  0.89 mg/dL      Sodium 133 (L) mmol/L      Potassium 3.9 mmol/L      Chloride 97 (L) mmol/L      CO2 24.8 mmol/L      Calcium 9.1 mg/dL      eGFR Non African Amer 60 (L) mL/min/1.73      BUN/Creatinine Ratio 11.2      Anion Gap 11.2 mmol/L     Narrative:       The MDRD GFR formula is only valid for adults with stable renal function between ages 18 and 70.    Blood Culture [[id number]]  (Normal) Collected:  01/16/17 0426    Specimen:  Blood from Arm, Left Updated:  01/20/17 0801     Blood Culture No growth at 4 days             Principal Problem:    Bilateral bronchopneumonia  Active Problems:    Healthcare-associated viral pneumonia    Diabetic foot ulcer associated with type 2 diabetes mellitus    Chronic kidney disease, stage II (mild)    H/O small bowel obstruction    Anemia of chronic disease    Recurrent UTI    Ischemic cardiomyopathy    Peripheral arterial disease    Hypertension, essential    Immobility syndrome    Acquired hypothyroidism    MRSA cellulitis of right foot    Sacral decubitus ulcer    SSS (sick sinus syndrome)    S/P placement of cardiac pacemaker    Gastroesophageal reflux disease with esophagitis    Hyperlipidemia    Ischemic ulcer of right heel with fat layer exposed    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    Acute bronchitis due to Rhinovirus (enterovirus)    Respiratory infection due to enterovirus/ Human Rhinovirus    Common bile duct stone      Plan-  GI plans noted.  Continue Levemir 40 units every evening, glimepiride 4 mg twice a day and Tradjenta 5 mg per day.    Continue NovoLog as needed.  Continue iron supplement.    Further evaluation deferred to Dr. Canales and surgeon.  Recheck lipid profile in 4-6 weeks.  Continue local wound care and follow-up at wound care center.

## 2017-01-20 NOTE — PROGRESS NOTES
[facility]    Physicians Statement of Medical Necessity for Ambulance Transportation    It is medically necessary for:    Patient Name: Cynthia Arreola    Insurance Information:  [id number] [employer]    To be transported by ambulance:    From (if nursing facility, specify level of care: skilled, retirement, etc): [facility]    To (specify level of care if nursing facility): [facility]    Date of Service: 1/20/2017      For dialysis patients state date dialysis began:     Diagnosis: Bilateral bronchopneumonia  Active Problems:  Healthcare-associated viral pneumonia  Diabetic foot ulcer associated with type 2 diabetes mellitus  Chronic kidney disease, stage II (mild)  Ischemic cardiomyopathy  Peripheral arterial disease  Hyperlipidemia  Ischemic ulcer of right heel with fat layer exposed  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  Respiratory infection due to enterovirus/ Human Rhinovirus  Common bile duct stone  Anemia of chronic disease  Hypertension, essential  Immobility syndrome  Acquired hypothyroidism  MRSA cellulitis of right foot  Sacral decubitus ulcer  SSS (sick sinus syndrome)  S/P placement of cardiac pacemaker  Gastroesophageal reflux disease with esophagitis  H/O small bowel obstruction  Recurrent UTI     ATTENDING PHYSICIAN ASESSMENT AND PLAN:    Past Medical/Surgical History:  Past Medical History   Diagnosis Date   • Anemia    • Coronary artery disease    • Diabetes mellitus    • Disease of thyroid gland    • Dysphagia    • Hypertension    • Ischemic cardiomyopathy    • Malignant neoplasm of colon    • PVD (peripheral vascular disease)    • Renal disorder       Past Surgical History   Procedure Laterality Date   • Colon resection with colostomy       25 years ago   • Cholecystectomy     • Colon surgery          Current Objective Medical Evidence(including physical exam finding to support reason for limitations):    Immobilization syndrome    Other:  falls risk    Physician Signature:           (RN,NP,PA,CAN, Discharge Planner) Date/Time: 1/20/2017 10:02 AM       Printed Name:    __Ana Copeland RN CCP  ________________________________    [facility]   Phone: [phone] Phone: [phone] Phone: [phone]   Fax: [phone] Fax: [phone] Fax: [phone]

## 2017-01-20 NOTE — DISCHARGE SUMMARY
[facility] DC SUMMARY  [facility]    Date of Discharge:  1/20/2017    Discharge Diagnosis:          Bilateral Viral bronchopneumonia  Healthcare associated pneumonia  Diabetic foot ulcers ×2 with black eschar of heel  Chronic kidney disease stage II  Ischemic cardiomyopathy  Peripheral arterial disease  Hyperlipidemia,  Ischemic ulcer right heel with fat layer exposed  Peripheral vascular occlusive disease of the aorta iliac and femoral arteries  Respiratory infection due to enterovirus/human rhinovirus  Common bile duct stone needing ERCP  Anemia of chronic disease with heme positive stools needing colonoscopy  Hypertension essential  Immobility syndrome up with lift  Acquired hypothyroidism  History of MRSA cellulitis of right foot  Sacral decubitus ulcer  Sick sinus syndrome status post placement of cardiac pacemaker  Gastroesophageal reflux disease with esophagitis  History of small bowel obstructive pattern in past  Recurrent upper respiratory tract infections due to rhinovirus  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    Problem List:  Principal Problem:    Bilateral viral bronchopneumonia  Active Problems:    Healthcare-associated viral pneumonia    Diabetic foot ulcer associated with type 2 diabetes mellitus    Chronic kidney disease, stage II (mild)    Ischemic cardiomyopathy    Peripheral arterial disease    Hyperlipidemia    Ischemic ulcer of right heel with fat layer exposed    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    Respiratory infection due to enterovirus/ Human Rhinovirus    Common bile duct stone    Anemia of chronic disease    Hypertension, essential    Immobility syndrome    Acquired hypothyroidism    MRSA cellulitis of right foot    Sacral decubitus ulcer    SSS (sick sinus syndrome)    S/P placement of cardiac pacemaker    Gastroesophageal reflux disease with esophagitis    H/O small bowel obstruction     "Recurrent UTI    Acute bronchitis due to Rhinovirus (enterovirus)      Procedures Performed       Procedures  EKG with nonspecific changes  Pro-calcitonin level 0.11 on admission  Viral respiratory panel positive for human rhinovirus/enterovirus  Chest x-ray with mild cardiomegaly and no infiltrates  CT of chest consistent with bronchopneumonia bilateral lower lobes  CT of abdomen showing right upper quadrant calcified biliary stone  WBC ranging from 17.7 at peak to 11.83 at discharge  Hemoglobin ranging from 12.3 at peak to 10.2 at discharge with minimum level of 9.9    Treatment Team at Hospital  Treatment Team:   Attending Provider: Dayron Canales MD  Consulting Physician: Dayron Canales MD  Consulting Physician: Kennedy CASTAÑEDA MD  Consulting Physician: Jasper Bridges MD  Admitting Provider: Param Martinez MD   Insulting physician: Infectious disease :PHILLIP Ramirez MD      Presenting Problem/History of Present Illness  Chief Complaint   Patient presents with   • Shortness of Breath     sent from [facility], rn concerned may be anxiety related              Chief Complaint: Shortness of air and chest pain          Subjective          HPI: Patient is a 89 y.o.female who presented with shortness of breath and chest pain that woke patient from sleep. Event occurred at [facility] where she has been a patient for several weeks. Patient demanded transfer out to ER for further work up and evaluation. She felt symptoms were similar to those she had with \"her pneumonia\" between 12/27/16 and 12/29/16 here at [facility] [place]. She also complains of nausea and vomiting with certain foods. Patient has multiple pressure ulcers that predate her transfer to [place]. These were addressed during her last stay here at [facility] [place] and actually seem to be improving slightly.      Patient was evaluated in the ED by Dr. Ramon Yao. He reported SOB x 3 hours duration at the time of presentation. ROS was positive for " SOB, chest pain, nausea and vomiting. Vitals showed  and /120. She was afebrile. PE showed decreased breath sounds, rhonchi bilaterally, no pedal edema. Significant labs included: CBC with WBC 17.72, glucose 171, hemoglobin 12.3, respiratory viral panel positive for Human Rhinovirus/Enterovirus. Chest xray showed a possible LLL infiltrate. EKG was unremarkable with HR 67. Patient was started on Zosyn in the ER and admitted by Dr. Martinez to me.      1/16/17. I saw the patient for the first time during this hospital stay in her bed on [address]. Patient was resting comfortably in bed and in no respiratory distress. Patient admits to be somewhat unhappy at her current nursing home. Wounds on legs are reported as better by patient. She currently is without nausea, vomiting or other significant issues. Patient to be seen today by Endocrine, ID, and Wound Care.     1/17/17 Patient resting comfortably in bed with son at here side. She is eating about 50% of her lunch picking at it bite by bite. I had a lengthy discussion with son about nursing home options and I then discussed his concerns with CCP who will help him look into options. Endocrine continues to optimize diabetic control. Blood sugars looking better on controlled hospital diet.Surgery and GI have seen patient. She needs ERCP when better to remove bile duct stone and needs colonoscopy to evaluate anemia. These will be scheduled as outpatient. Patient will have antibiotics Discontinued at time of discharge. Anticipate DC in 1-2 days     1/18/17. Patient resting quietly in bed at the time of my visit. I attempted to wake her but she was sleeping soundly. Discussed the case with the patient's nurse. Still working on blood sugar control with endocrinology. Other specialists seem to have finished their workup. Discussed the case with CCP. Working on nursing home placement issues with the son. Son would like to switch to a new nursing home that can manage  her wounds more effectively.     1/19/17. Patient more alert today during my hospital rounds. She did eat a good breakfast and feels very good this morning. Blood sugars have been under much improved control. I did discuss the case with the patient's nurse and with CCP. Continuing to work with son on nursing home placement. [facility] system is [facility] does have a bed available which would meet the patient's medical needs. Son is planning to take a look at this today and make a decision on what that she can be discharged to tomorrow morning. Hopefully blood sugars will remain under good control. We are asking endocrinology to make final comments on outpatient treatment of her blood sugars. We are planning a follow-up with in 2-3 weeks with Dr. Bridges to consider ERCP for treatment of the chronic biliary dilatation due to stone.    1/20/17.  Patient is now doing much better.  Blood sugars are finally come under better control with help from endocrine.  She still has rather unstable dietary patterns that cause her blood sugars to fluctuate wildly at times and will need to continue being on a sliding scale insulin to control this.  In addition she is on Lantus 14 units daily at bedtime, Glimipermide 4 mg twice a day and Trajenta 5 mg daily.  Patient does have 2 major GI issues that will need follow-up by Dr. Bridges on an outpatient basis.  She does have a right upper quadrant calcified stone in the hepatic/biliary ducts which will require an ERCP and she has persistent bleeding from the rectum with heme positive stools causing anemia which will necessitate a colonoscopy.  Patient is to be scheduled in 2 weeks for reevaluation by Dr. Bridges once she recovers from her pneumonia.  Son tells me today he is not sure if mother will be compliant with this plan but I have encouraged him to at least meet with Dr. Bridges and discuss the issues.  Patient is recovering from her bronchopneumonia which is due  to a viral etiology with human rhinovirus.  Antibiotics have been discontinued.  Patient does have pain medicine needs and Yonathan has been reviewed.  Given Norco for severe pain and tramadol for less severe pain.  Patient is stable at this point.  I have discussed the case with her son.  She will be discharged to [facility] nursing unit later this evening.      Vital Signs  Temp:  [97.7 °F (36.5 °C)-98.8 °F (37.1 °C)] 98 °F (36.7 °C)  Heart Rate:  [62-84] 62  Resp:  [16-20] 18  BP: (157-193)/(63-74) 173/74    Pertinent Test Results:      Results from last 7 days  Lab Units 01/20/17  0531 01/19/17  0606 01/18/17  0438   SODIUM mmol/L 133* 138 138   POTASSIUM mmol/L 3.9 3.6 3.9   CHLORIDE mmol/L 97* 101 98   TOTAL CO2 mmol/L 24.8 23.8 23.7   BUN mg/dL 10 10 12   CREATININE mg/dL 0.89 0.97 1.07*   GLUCOSE mg/dL 238* 109* 54*   CALCIUM mg/dL 9.1 8.9 9.1         Results from last 7 days  Lab Units 01/20/17  0531 01/19/17  0606 01/18/17  0615   WBC 10*3/mm3 11.83* 11.46* 13.02*   HEMOGLOBIN g/dL 10.2* 9.9* 11.2*   HEMATOCRIT % 33.4* 33.7* 36.6   PLATELETS 10*3/mm3 313 312 266       No results found for: LIPASE        Need to check labs again on Monday each week ×3 weeks to include CBC and CMP.        Condition on Discharge:  Stable    Discharge Disposition  Rehab Facility or Unit (DC - External)  [facility]    Transport Plan  Ambulance which is medically necessary by Medicare Standards    Hospital Treatments discontinued at time of Discharge  IV, Telemetry, Graves Catheter, Deep Lines and PICC LInes    Discharge Medications   Cynthia Arreola   Home Medication Instructions MEGAN:[id number]    Printed on:01/20/17 1422   Medication Information                      acetaminophen (TYLENOL) 325 MG tablet  Take 650 mg by mouth Every 6 (Six) Hours As Needed for mild pain (1-3).             albuterol (PROVENTIL) (2.5 MG/3ML) 0.083% nebulizer solution  Take 2.5 mg by nebulization Every 4 (Four) Hours As Needed for wheezing.              amLODIPine (NORVASC) 5 MG tablet  Take 5 mg by mouth Daily.             aspirin 81 MG chewable tablet  Chew 81 mg Daily.             atenolol (TENORMIN) 25 MG tablet  Take 25 mg by mouth Daily.             atorvastatin (LIPITOR) 10 MG tablet  Take 1 tablet by mouth Every Night.             calcium carbonate (TUMS) 500 MG chewable tablet  Chew 1 tablet Daily.             clopidogrel (PLAVIX) 75 MG tablet  Take 75 mg by mouth Daily.             dextrose (GLUTOSE) 40 % gel  Take 15 g by mouth Every 15 (Fifteen) Minutes As Needed for low blood sugar.             Docusate Sodium (DSS) 100 MG capsule  Take 100 mg by mouth 2 (Two) Times a Day.             Fe-Succ Ac-C-Thre Ac-B12-FA (FERREX 150 FORTE PLUS)  MG capsule capsule  Take 1 capsule by mouth Daily With Breakfast.             glimepiride (AMARYL) 4 MG tablet  Take 4 mg by mouth Every Morning Before Breakfast.             glimepiride (AMARYL) 4 MG tablet  Take 1 tablet by mouth 2 (Two) Times a Day With Meals.             glucagon, human recombinant, (GLUCAGEN DIAGNOSTIC) 1 MG injection  Inject 1 mg under the skin 1 (One) Time As Needed (hypoglycemia) for up to 1 dose.             HYDROcodone-acetaminophen (NORCO) 5-325 MG per tablet  Take 1 tablet by mouth Every 4 (Four) Hours As Needed for moderate pain (4-6) or severe pain (7-10) for up to 6 days.             insulin aspart (novoLOG) 100 UNIT/ML injection  Inject 0-7 Units under the skin 4 (Four) Times a Day Before Meals & at Bedtime.             insulin glargine (LANTUS) 100 UNIT/ML injection  Inject 14 Units under the skin Daily.             isosorbide mononitrate (IMDUR) 30 MG 24 hr tablet  Take 30 mg by mouth Daily.             levothyroxine (SYNTHROID, LEVOTHROID) 100 MCG tablet  Take 100 mcg by mouth Daily.             linagliptin (TRADJENTA) 5 MG tablet tablet  Take 1 tablet by mouth Daily.             metoclopramide (REGLAN) 10 MG tablet  Take 10 mg by mouth 4 (Four) Times a Day Before Meals &  at Bedtime.             nitroglycerin (NITROSTAT) 0.4 MG SL tablet  Place 1 tablet under the tongue Every 5 (Five) Minutes As Needed for chest pain (Hold systolic BP < 100 mm/Hg.). Max 3 doses / 15 min             pantoprazole (PROTONIX) 40 MG EC tablet  Take 40 mg by mouth Daily.             promethazine (PHENERGAN) 12.5 MG tablet  Take 1 tablet by mouth Every 6 (Six) Hours As Needed for nausea or vomiting.             silver sulfadiazine (SILVADENE, SSD) 1 % cream  Apply  topically Every 12 (Twelve) Hours.             sucralfate (CARAFATE) 1 G tablet  Take 1 g by mouth 3 (Three) Times a Day With Meals.             traMADol (ULTRAM) 50 MG tablet  Take 1 tablet by mouth Every 6 (Six) Hours As Needed for moderate pain (4-6).                 Home Medication List  Prior to Admission medications    Medication Sig Start Date End Date Taking? Authorizing Provider   acetaminophen (TYLENOL) 325 MG tablet Take 650 mg by mouth Every 6 (Six) Hours As Needed for mild pain (1-3).    Historical Provider, MD   albuterol (PROVENTIL) (2.5 MG/3ML) 0.083% nebulizer solution Take 2.5 mg by nebulization Every 4 (Four) Hours As Needed for wheezing.    Historical Provider, MD   amLODIPine (NORVASC) 5 MG tablet Take 5 mg by mouth Daily.    Historical Provider, MD   aspirin 81 MG chewable tablet Chew 81 mg Daily.    Historical Provider, MD   atenolol (TENORMIN) 25 MG tablet Take 25 mg by mouth Daily.    Historical Provider, MD   atorvastatin (LIPITOR) 10 MG tablet Take 1 tablet by mouth Every Night. 1/20/17   Dayron Canales MD   calcium carbonate (TUMS) 500 MG chewable tablet Chew 1 tablet Daily.    Historical Provider, MD   clopidogrel (PLAVIX) 75 MG tablet Take 75 mg by mouth Daily.    Historical Provider, MD   dextrose (GLUTOSE) 40 % gel Take 15 g by mouth Every 15 (Fifteen) Minutes As Needed for low blood sugar. 1/20/17   Dayron Canales MD   Docusate Sodium (DSS) 100 MG capsule Take 100 mg by mouth 2 (Two) Times a Day. 1/20/17    Dayron Canales MD   docusate sodium 100 MG capsule Take 100 mg by mouth 2 (Two) Times a Day. 12/29/16   Dayron Canales MD   Fe-Succ Ac-C-Thre Ac-B12-FA (FERREX 150 FORTE PLUS)  MG capsule capsule Take 1 capsule by mouth Daily With Breakfast. 1/20/17   Dayron Canales MD   glimepiride (AMARYL) 4 MG tablet Take 4 mg by mouth Every Morning Before Breakfast.    Historical Provider, MD   glimepiride (AMARYL) 4 MG tablet Take 1 tablet by mouth 2 (Two) Times a Day With Meals. 1/20/17   Dayron Canales MD   glucagon, human recombinant, (GLUCAGEN DIAGNOSTIC) 1 MG injection Inject 1 mg under the skin 1 (One) Time As Needed (hypoglycemia) for up to 1 dose. 12/29/16   Dayron Canales MD   HYDROcodone-acetaminophen (NORCO) 5-325 MG per tablet Take 1 tablet by mouth Every 4 (Four) Hours As Needed for moderate pain (4-6) or severe pain (7-10) for up to 6 days. 1/20/17 1/26/17  Dayron Canales MD   insulin aspart (novoLOG) 100 UNIT/ML injection Inject 0-7 Units under the skin 4 (Four) Times a Day Before Meals & at Bedtime. 12/29/16   Dayron Canales MD   insulin aspart (novoLOG) 100 UNIT/ML injection Inject 0-7 Units under the skin 4 (Four) Times a Day Before Meals & at Bedtime. 1/20/17   Dayron Canales MD   insulin detemir (LEVEMIR) 100 UNIT/ML injection Inject 16 Units under the skin Every Night. 12/29/16   Dayron Canales MD   insulin glargine (LANTUS) 100 UNIT/ML injection Inject 14 Units under the skin Daily. 1/20/17   Dayron Canales MD   isosorbide mononitrate (IMDUR) 30 MG 24 hr tablet Take 30 mg by mouth Daily.    Historical Provider, MD   levothyroxine (SYNTHROID, LEVOTHROID) 100 MCG tablet Take 100 mcg by mouth Daily.    Historical Provider, MD   linagliptin (TRADJENTA) 5 MG tablet tablet Take 1 tablet by mouth Daily. 1/20/17   Dayron Canales MD   metoclopramide (REGLAN) 10 MG tablet Take 10 mg by mouth 4 (Four) Times a Day Before Meals & at Bedtime.    Historical Provider, MD   nitroglycerin  (NITROSTAT) 0.4 MG SL tablet Place 1 tablet under the tongue Every 5 (Five) Minutes As Needed for chest pain (Hold systolic BP < 100 mm/Hg.). Max 3 doses / 15 min 1/20/17   Dayron Canales MD   pantoprazole (PROTONIX) 40 MG EC tablet Take 40 mg by mouth Daily.    Historical Provider, MD   promethazine (PHENERGAN) 12.5 MG tablet Take 1 tablet by mouth Every 6 (Six) Hours As Needed for nausea or vomiting. 12/29/16   Dayron Canales MD   silver sulfadiazine (SILVADENE, SSD) 1 % cream Apply  topically Every 12 (Twelve) Hours. 12/29/16   Dayron Canales MD   simvastatin (ZOCOR) 20 MG tablet Take 20 mg by mouth Every Night.    Historical Provider, MD   sodium hypochlorite , (DAKIN'S 1/4 STRENGTH) 0.125 % solution topical solution 0.125% Apply 10 mL topically Every 12 (Twelve) Hours. 12/29/16   Dayron Canales MD   sucralfate (CARAFATE) 1 G tablet Take 1 g by mouth 3 (Three) Times a Day With Meals.    Historical Provider, MD   traMADol (ULTRAM) 50 MG tablet Take 1 tablet by mouth Every 6 (Six) Hours As Needed for moderate pain (4-6). 1/20/17   Dayron Canales MD   Wound Dressings (ARGLAES) powder Apply  topically.    Historical Provider, MD   insulin glargine (LANTUS) 100 UNIT/ML injection Inject 16 Units under the skin Daily.  1/20/17  Historical Provider, MD   traMADol (ULTRAM) 50 MG tablet Take 1 tablet by mouth Every 6 (Six) Hours As Needed for moderate pain (4-6). 12/29/16 1/20/17  Dayron Canales MD       Discharge Diet   Diet Orders (active)     Start     Ordered    01/16/17 0619  Diet Regular; Consistent Carbohydrate  Diet Effective Now      01/16/17 0620        Son does request review of diet by dietitian and discussion with him.  Patient is very particular with her food preferences.  It is important however that her carbohydrates are limited.      Insulin dosing    Correction - Low Dose.  Less than 40 units/day total insulin dose or lean, elderly, renal patients    Blood glucose 150-199 mg/dL - 2  units  Blood glucose 200-249 mg/dL - 3 units  Blood glucose 250-299 mg/dL - 4 units  Blood glucose 300-349 mg/dL - 5 units  Blood glucose 350-400 mg/dL - 6 units  Blood glucose greater than 400 mg/dL - 7 units and call provider    Activity at Discharge up in chair or wheelchair with lift.      Follow-up Appointments  Future Appointments  Date Time Provider Department Center   3/15/2017 1:00 PM Amandeep Cooper Jr., MD [facility] None     Referrals and Follow-ups to Schedule     Follow-Up    As directed    1.  With [facility] or with wound team at facility (if available)  2.  With Dr. Yuliana EASTMAN in his office for ERCP to treat RUQ stone and Colonoscopy to eval GI bleeding               Wound care    1.  The 2 ulcers on the foot are cleansed with saline.  Silvadene is applied.  Areas are then wrapped with loose gauze.  Foot is elevated as much as possible.  2.  Minimal changes in the coccyx area treated by keeping pressure off the wound and rolling the patient every 2 hours.    Therapy Orders  Physical therapy, Occupational Therapy, and Speech Therapy to evaluate and treat.  Nutrition/Dieticien to follow weights and determine further nutritional needs.    Test Results Pending at Discharge   Order Current Status    Blood Culture Preliminary result          Dayron Canales MD    1/20/2017      Time: Discharge 60 min time spent speaking with son in family conference, comprehensive review of chart results, preparation of discharge summary, preparation of discharge orders on Epic, writing of narcotic prescriptions, review of cast per.

## 2017-01-20 NOTE — PLAN OF CARE
Problem: Patient Care Overview (Adult)  Goal: Plan of Care Review    01/20/17 0647   Coping/Psychosocial Response Interventions   Plan Of Care Reviewed With patient   Patient Care Overview   Progress no change   Outcome Evaluation   Outcome Summary/Follow up Plan CONTINUE TO MONITOR VS, LABS, PAIN, TURNQ2; DRESSING CHANGES PRN BID         Problem: Pneumonia (Adult)  Goal: Signs and Symptoms of Listed Potential Problems Will be Absent or Manageable (Pneumonia)  Outcome: Ongoing (interventions implemented as appropriate)    01/20/17 0647   Pneumonia   Problems Assessed (Pneumonia) all   Problems Present (Pneumonia) respiratory compromise;progression of infection         Problem: Fall Risk (Adult)  Goal: Absence of Falls  Outcome: Ongoing (interventions implemented as appropriate)    01/20/17 0647   Fall Risk (Adult)   Absence of Falls making progress toward outcome         Problem: Pressure Ulcer (Adult)  Goal: Signs and Symptoms of Listed Potential Problems Will be Absent or Manageable (Pressure Ulcer)  Outcome: Ongoing (interventions implemented as appropriate)    01/20/17 0647   Pressure Ulcer   Problems Assessed (Pressure Ulcer) all   Problems Present (Pressure Ulcer) pain;wound complications

## 2017-01-20 NOTE — PLAN OF CARE
Problem: Patient Care Overview (Adult)  Goal: Plan of Care Review  Outcome: Ongoing (interventions implemented as appropriate)    01/19/17 7574   Coping/Psychosocial Response Interventions   Plan Of Care Reviewed With patient   Patient Care Overview   Progress improving   Outcome Evaluation   Outcome Summary/Follow up Plan Pt c/o pain in left hip and legs. Changed dressing on foot. Turned frequently. Continue to monitoe.        Goal: Discharge Needs Assessment  Outcome: Ongoing (interventions implemented as appropriate)    Problem: Pneumonia (Adult)  Goal: Signs and Symptoms of Listed Potential Problems Will be Absent or Manageable (Pneumonia)  Outcome: Ongoing (interventions implemented as appropriate)    Problem: Fall Risk (Adult)  Goal: Absence of Falls  Outcome: Ongoing (interventions implemented as appropriate)    Problem: Pressure Ulcer (Adult)  Goal: Signs and Symptoms of Listed Potential Problems Will be Absent or Manageable (Pressure Ulcer)  Outcome: Ongoing (interventions implemented as appropriate)

## 2017-01-21 LAB — BACTERIA SPEC AEROBE CULT: NORMAL

## 2017-01-23 ENCOUNTER — OUTSIDE FACILITY SERVICE (OUTPATIENT)
Dept: FAMILY MEDICINE CLINIC | Facility: CLINIC | Age: 82
End: 2017-01-23

## 2017-01-23 PROCEDURE — 99309 SBSQ NF CARE MODERATE MDM 30: CPT | Performed by: NURSE PRACTITIONER

## 2017-01-26 ENCOUNTER — OUTSIDE FACILITY SERVICE (OUTPATIENT)
Dept: FAMILY MEDICINE CLINIC | Facility: CLINIC | Age: 82
End: 2017-01-26

## 2017-01-26 PROCEDURE — 99304 1ST NF CARE SF/LOW MDM 25: CPT | Performed by: FAMILY MEDICINE

## 2017-01-31 RX ORDER — HYDROCODONE BITARTRATE AND ACETAMINOPHEN 5; 325 MG/1; MG/1
1 TABLET ORAL EVERY 4 HOURS PRN
Qty: 180 TABLET | Refills: 0 | Status: ON HOLD | OUTPATIENT
Start: 2017-01-31 | End: 2018-01-01

## 2017-02-23 ENCOUNTER — OUTSIDE FACILITY SERVICE (OUTPATIENT)
Dept: FAMILY MEDICINE CLINIC | Facility: CLINIC | Age: 82
End: 2017-02-23

## 2017-02-23 PROCEDURE — 99308 SBSQ NF CARE LOW MDM 20: CPT | Performed by: NURSE PRACTITIONER

## 2017-12-29 PROBLEM — R79.89 ELEVATED LFTS: Status: ACTIVE | Noted: 2017-01-01

## 2017-12-29 PROBLEM — E11.9 DM (DIABETES MELLITUS) (HCC): Status: ACTIVE | Noted: 2017-01-01

## 2017-12-29 PROBLEM — N17.9 AKI (ACUTE KIDNEY INJURY) (HCC): Status: ACTIVE | Noted: 2017-01-01

## 2017-12-29 PROBLEM — J20.9 BRONCHITIS WITH BRONCHOSPASM: Status: ACTIVE | Noted: 2017-01-01

## 2017-12-29 PROBLEM — E87.6 HYPOKALEMIA: Status: ACTIVE | Noted: 2017-01-01

## 2017-12-30 PROBLEM — J44.1 COPD EXACERBATION (HCC): Status: ACTIVE | Noted: 2017-01-01

## 2017-12-30 PROBLEM — J10.1 H1N1 INFLUENZA: Status: ACTIVE | Noted: 2017-01-01

## 2018-01-01 ENCOUNTER — APPOINTMENT (OUTPATIENT)
Dept: CARDIOLOGY | Facility: HOSPITAL | Age: 83
End: 2018-01-01
Attending: INTERNAL MEDICINE

## 2018-01-01 ENCOUNTER — EPISODE CHANGES (OUTPATIENT)
Dept: CASE MANAGEMENT | Facility: OTHER | Age: 83
End: 2018-01-01

## 2018-01-01 ENCOUNTER — ANESTHESIA EVENT (OUTPATIENT)
Dept: GASTROENTEROLOGY | Facility: HOSPITAL | Age: 83
End: 2018-01-01

## 2018-01-01 ENCOUNTER — PATIENT OUTREACH (OUTPATIENT)
Dept: CASE MANAGEMENT | Facility: OTHER | Age: 83
End: 2018-01-01

## 2018-01-01 ENCOUNTER — OUTSIDE FACILITY SERVICE (OUTPATIENT)
Dept: INTERNAL MEDICINE | Facility: CLINIC | Age: 83
End: 2018-01-01

## 2018-01-01 ENCOUNTER — APPOINTMENT (OUTPATIENT)
Dept: GENERAL RADIOLOGY | Facility: HOSPITAL | Age: 83
End: 2018-01-01

## 2018-01-01 ENCOUNTER — HOSPITAL ENCOUNTER (INPATIENT)
Facility: HOSPITAL | Age: 83
LOS: 8 days | End: 2018-02-28
Attending: EMERGENCY MEDICINE | Admitting: INTERNAL MEDICINE

## 2018-01-01 ENCOUNTER — HOSPITAL ENCOUNTER (INPATIENT)
Facility: HOSPITAL | Age: 83
LOS: 1 days | End: 2018-03-01
Attending: INTERNAL MEDICINE | Admitting: INTERNAL MEDICINE

## 2018-01-01 ENCOUNTER — APPOINTMENT (OUTPATIENT)
Dept: CT IMAGING | Facility: HOSPITAL | Age: 83
End: 2018-01-01

## 2018-01-01 ENCOUNTER — HOSPITAL ENCOUNTER (INPATIENT)
Facility: HOSPITAL | Age: 83
LOS: 14 days | Discharge: NURSING, REHAB OR LONG-TERM CARE | End: 2018-02-09
Attending: EMERGENCY MEDICINE | Admitting: INTERNAL MEDICINE

## 2018-01-01 ENCOUNTER — APPOINTMENT (OUTPATIENT)
Dept: GENERAL RADIOLOGY | Facility: HOSPITAL | Age: 83
End: 2018-01-01
Attending: INTERNAL MEDICINE

## 2018-01-01 ENCOUNTER — ANESTHESIA (OUTPATIENT)
Dept: GASTROENTEROLOGY | Facility: HOSPITAL | Age: 83
End: 2018-01-01

## 2018-01-01 ENCOUNTER — APPOINTMENT (OUTPATIENT)
Dept: ULTRASOUND IMAGING | Facility: HOSPITAL | Age: 83
End: 2018-01-01

## 2018-01-01 VITALS
DIASTOLIC BLOOD PRESSURE: 81 MMHG | WEIGHT: 172.3 LBS | TEMPERATURE: 98.3 F | HEART RATE: 63 BPM | SYSTOLIC BLOOD PRESSURE: 148 MMHG | OXYGEN SATURATION: 95 % | RESPIRATION RATE: 18 BRPM | HEIGHT: 63 IN | BODY MASS INDEX: 30.53 KG/M2

## 2018-01-01 VITALS
HEART RATE: 101 BPM | DIASTOLIC BLOOD PRESSURE: 54 MMHG | RESPIRATION RATE: 20 BRPM | SYSTOLIC BLOOD PRESSURE: 101 MMHG | BODY MASS INDEX: 29.5 KG/M2 | OXYGEN SATURATION: 94 % | HEIGHT: 63 IN | TEMPERATURE: 99.7 F | WEIGHT: 166.5 LBS

## 2018-01-01 VITALS — DIASTOLIC BLOOD PRESSURE: 48 MMHG | OXYGEN SATURATION: 90 % | TEMPERATURE: 100.1 F | SYSTOLIC BLOOD PRESSURE: 82 MMHG

## 2018-01-01 VITALS
BODY MASS INDEX: 29.92 KG/M2 | RESPIRATION RATE: 20 BRPM | TEMPERATURE: 98.3 F | HEIGHT: 63 IN | DIASTOLIC BLOOD PRESSURE: 55 MMHG | HEART RATE: 65 BPM | SYSTOLIC BLOOD PRESSURE: 153 MMHG | OXYGEN SATURATION: 91 % | WEIGHT: 168.87 LBS

## 2018-01-01 DIAGNOSIS — J18.9 HAP (HOSPITAL-ACQUIRED PNEUMONIA): Primary | ICD-10-CM

## 2018-01-01 DIAGNOSIS — R13.11 ORAL PHASE DYSPHAGIA: ICD-10-CM

## 2018-01-01 DIAGNOSIS — D64.9 ANEMIA, UNSPECIFIED TYPE: ICD-10-CM

## 2018-01-01 DIAGNOSIS — K83.1 OBSTRUCTIVE JAUNDICE: Primary | ICD-10-CM

## 2018-01-01 DIAGNOSIS — D63.8 ANEMIA OF CHRONIC DISEASE: ICD-10-CM

## 2018-01-01 DIAGNOSIS — Z74.09 IMPAIRED FUNCTIONAL MOBILITY, BALANCE, AND ENDURANCE: ICD-10-CM

## 2018-01-01 DIAGNOSIS — I50.9 ACUTE ON CHRONIC CONGESTIVE HEART FAILURE, UNSPECIFIED CONGESTIVE HEART FAILURE TYPE: ICD-10-CM
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
## 2018-01-01 DIAGNOSIS — K80.50 COMMON BILE DUCT STONE: ICD-10-CM

## 2018-01-01 DIAGNOSIS — N17.9 AKI (ACUTE KIDNEY INJURY) (HCC): ICD-10-CM

## 2018-01-01 DIAGNOSIS — R74.8 ELEVATED ALKALINE PHOSPHATASE LEVEL: ICD-10-CM

## 2018-01-01 LAB
ABO + RH BLD: NORMAL
ABO + RH BLD: NORMAL
ABO GROUP BLD: NORMAL
ALBUMIN SERPL-MCNC: 2.7 G/DL (ref 3.5–5.2)
ALBUMIN SERPL-MCNC: 2.7 G/DL (ref 3.5–5.2)
ALBUMIN SERPL-MCNC: 2.9 G/DL (ref 3.5–5.2)
ALBUMIN SERPL-MCNC: 2.9 G/DL (ref 3.5–5.2)
ALBUMIN SERPL-MCNC: 3.1 G/DL (ref 3.5–5.2)
ALBUMIN SERPL-MCNC: 3.1 G/DL (ref 3.5–5.2)
ALBUMIN SERPL-MCNC: 3.2 G/DL (ref 3.5–5.2)
ALBUMIN SERPL-MCNC: 3.2 G/DL (ref 3.5–5.2)
ALBUMIN/GLOB SERPL: 0.6 G/DL
ALBUMIN/GLOB SERPL: 0.7 G/DL
ALBUMIN/GLOB SERPL: 0.7 G/DL
ALBUMIN/GLOB SERPL: 0.8 G/DL
ALBUMIN/GLOB SERPL: 0.9 G/DL
ALP SERPL-CCNC: 1020 U/L (ref 39–117)
ALP SERPL-CCNC: 1432 U/L (ref 39–117)
ALP SERPL-CCNC: 146 U/L (ref 39–117)
ALP SERPL-CCNC: 1512 U/L (ref 39–117)
ALP SERPL-CCNC: 1605 U/L (ref 39–117)
ALP SERPL-CCNC: 496 U/L (ref 39–117)
ALP SERPL-CCNC: 846 U/L (ref 39–117)
ALP SERPL-CCNC: 952 U/L (ref 39–117)
ALT SERPL W P-5'-P-CCNC: 105 U/L (ref 1–33)
ALT SERPL W P-5'-P-CCNC: 141 U/L (ref 1–33)
ALT SERPL W P-5'-P-CCNC: 232 U/L (ref 1–33)
ALT SERPL W P-5'-P-CCNC: 25 U/L (ref 1–33)
ALT SERPL W P-5'-P-CCNC: 313 U/L (ref 1–33)
ALT SERPL W P-5'-P-CCNC: 372 U/L (ref 1–33)
ALT SERPL W P-5'-P-CCNC: 63 U/L (ref 1–33)
ALT SERPL W P-5'-P-CCNC: 79 U/L (ref 1–33)
ANION GAP SERPL CALCULATED.3IONS-SCNC: 10.4 MMOL/L
ANION GAP SERPL CALCULATED.3IONS-SCNC: 10.4 MMOL/L
ANION GAP SERPL CALCULATED.3IONS-SCNC: 10.7 MMOL/L
ANION GAP SERPL CALCULATED.3IONS-SCNC: 10.7 MMOL/L
ANION GAP SERPL CALCULATED.3IONS-SCNC: 10.9 MMOL/L
ANION GAP SERPL CALCULATED.3IONS-SCNC: 11.4 MMOL/L
ANION GAP SERPL CALCULATED.3IONS-SCNC: 11.7 MMOL/L
ANION GAP SERPL CALCULATED.3IONS-SCNC: 11.9 MMOL/L
ANION GAP SERPL CALCULATED.3IONS-SCNC: 12.2 MMOL/L
ANION GAP SERPL CALCULATED.3IONS-SCNC: 12.2 MMOL/L
ANION GAP SERPL CALCULATED.3IONS-SCNC: 12.4 MMOL/L
ANION GAP SERPL CALCULATED.3IONS-SCNC: 12.4 MMOL/L
ANION GAP SERPL CALCULATED.3IONS-SCNC: 12.5 MMOL/L
ANION GAP SERPL CALCULATED.3IONS-SCNC: 12.6 MMOL/L
ANION GAP SERPL CALCULATED.3IONS-SCNC: 12.6 MMOL/L
ANION GAP SERPL CALCULATED.3IONS-SCNC: 13 MMOL/L
ANION GAP SERPL CALCULATED.3IONS-SCNC: 13.4 MMOL/L
ANION GAP SERPL CALCULATED.3IONS-SCNC: 13.5 MMOL/L
ANION GAP SERPL CALCULATED.3IONS-SCNC: 13.7 MMOL/L
ANION GAP SERPL CALCULATED.3IONS-SCNC: 14.4 MMOL/L
ANION GAP SERPL CALCULATED.3IONS-SCNC: 14.5 MMOL/L
ANION GAP SERPL CALCULATED.3IONS-SCNC: 14.8 MMOL/L
ANION GAP SERPL CALCULATED.3IONS-SCNC: 15.2 MMOL/L
ANION GAP SERPL CALCULATED.3IONS-SCNC: 15.5 MMOL/L
ANION GAP SERPL CALCULATED.3IONS-SCNC: 15.5 MMOL/L
ANION GAP SERPL CALCULATED.3IONS-SCNC: 15.6 MMOL/L
ANION GAP SERPL CALCULATED.3IONS-SCNC: 15.8 MMOL/L
ANION GAP SERPL CALCULATED.3IONS-SCNC: 16.1 MMOL/L
ANION GAP SERPL CALCULATED.3IONS-SCNC: 25.9 MMOL/L
ANION GAP SERPL CALCULATED.3IONS-SCNC: 26.2 MMOL/L
ANION GAP SERPL CALCULATED.3IONS-SCNC: 29.1 MMOL/L
ANISOCYTOSIS BLD QL: ABNORMAL
ANISOCYTOSIS BLD QL: ABNORMAL
AORTIC DIMENSIONLESS INDEX: 0.6 (DI)
APTT PPP: 26.9 SECONDS (ref 22.7–35.4)
ARTERIAL PATENCY WRIST A: POSITIVE
AST SERPL-CCNC: 15 U/L (ref 1–32)
AST SERPL-CCNC: 172 U/L (ref 1–32)
AST SERPL-CCNC: 18 U/L (ref 1–32)
AST SERPL-CCNC: 24 U/L (ref 1–32)
AST SERPL-CCNC: 28 U/L (ref 1–32)
AST SERPL-CCNC: 366 U/L (ref 1–32)
AST SERPL-CCNC: 477 U/L (ref 1–32)
AST SERPL-CCNC: 56 U/L (ref 1–32)
ATMOSPHERIC PRESS: 767.7 MMHG
BACTERIA SPEC AEROBE CULT: NO GROWTH
BACTERIA SPEC AEROBE CULT: NORMAL
BACTERIA SPEC AEROBE CULT: NORMAL
BACTERIA UR QL AUTO: ABNORMAL /HPF
BASE EXCESS BLDA CALC-SCNC: 1.9 MMOL/L (ref 0–2)
BASOPHILS # BLD AUTO: 0.01 10*3/MM3 (ref 0–0.2)
BASOPHILS # BLD AUTO: 0.02 10*3/MM3 (ref 0–0.2)
BASOPHILS # BLD AUTO: 0.02 10*3/MM3 (ref 0–0.2)
BASOPHILS # BLD AUTO: 0.03 10*3/MM3 (ref 0–0.2)
BASOPHILS # BLD AUTO: 0.04 10*3/MM3 (ref 0–0.2)
BASOPHILS # BLD AUTO: 0.05 10*3/MM3 (ref 0–0.2)
BASOPHILS # BLD AUTO: 0.06 10*3/MM3 (ref 0–0.2)
BASOPHILS # BLD AUTO: 0.08 10*3/MM3 (ref 0–0.2)
BASOPHILS NFR BLD AUTO: 0.1 % (ref 0–1.5)
BASOPHILS NFR BLD AUTO: 0.1 % (ref 0–1.5)
BASOPHILS NFR BLD AUTO: 0.2 % (ref 0–1.5)
BASOPHILS NFR BLD AUTO: 0.2 % (ref 0–1.5)
BASOPHILS NFR BLD AUTO: 0.3 % (ref 0–1.5)
BASOPHILS NFR BLD AUTO: 0.3 % (ref 0–1.5)
BASOPHILS NFR BLD AUTO: 0.4 % (ref 0–1.5)
BASOPHILS NFR BLD AUTO: 0.5 % (ref 0–1.5)
BASOPHILS NFR BLD AUTO: 0.6 % (ref 0–1.5)
BDY SITE: ABNORMAL
BH BB BLOOD EXPIRATION DATE: NORMAL
BH BB BLOOD EXPIRATION DATE: NORMAL
BH BB BLOOD TYPE BARCODE: 5100
BH BB BLOOD TYPE BARCODE: 5100
BH BB DISPENSE STATUS: NORMAL
BH BB DISPENSE STATUS: NORMAL
BH BB PRODUCT CODE: NORMAL
BH BB PRODUCT CODE: NORMAL
BH BB UNIT NUMBER: NORMAL
BH BB UNIT NUMBER: NORMAL
BH CV ECHO MEAS - ACS: 1.3 CM
BH CV ECHO MEAS - AO MAX PG: 19 MMHG
BH CV ECHO MEAS - AO MEAN PG (FULL): 7 MMHG
BH CV ECHO MEAS - AO MEAN PG: 10 MMHG
BH CV ECHO MEAS - AO ROOT AREA (BSA CORRECTED): 1.5
BH CV ECHO MEAS - AO ROOT AREA: 5.7 CM^2
BH CV ECHO MEAS - AO ROOT DIAM: 2.7 CM
BH CV ECHO MEAS - AO V2 MAX: 215 CM/SEC
BH CV ECHO MEAS - AO V2 MEAN: 150 CM/SEC
BH CV ECHO MEAS - AO V2 VTI: 48.6 CM
BH CV ECHO MEAS - AVA(I,A): 1.4 CM^2
BH CV ECHO MEAS - AVA(I,D): 1.4 CM^2
BH CV ECHO MEAS - BSA(HAYCOCK): 1.9 M^2
BH CV ECHO MEAS - BSA: 1.8 M^2
BH CV ECHO MEAS - BZI_BMI: 30.5 KILOGRAMS/M^2
BH CV ECHO MEAS - BZI_METRIC_HEIGHT: 160 CM
BH CV ECHO MEAS - BZI_METRIC_WEIGHT: 78 KG
BH CV ECHO MEAS - CONTRAST EF (2CH): 56.8 ML/M^2
BH CV ECHO MEAS - CONTRAST EF 4CH: 55.3 ML/M^2
BH CV ECHO MEAS - EDV(CUBED): 74.1 ML
BH CV ECHO MEAS - EDV(MOD-SP2): 81 ML
BH CV ECHO MEAS - EDV(MOD-SP4): 85 ML
BH CV ECHO MEAS - EDV(TEICH): 78.6 ML
BH CV ECHO MEAS - EF(CUBED): 59.8 %
BH CV ECHO MEAS - EF(MOD-SP2): 56.8 %
BH CV ECHO MEAS - EF(MOD-SP4): 55.3 %
BH CV ECHO MEAS - EF(TEICH): 51.7 %
BH CV ECHO MEAS - ESV(CUBED): 29.8 ML
BH CV ECHO MEAS - ESV(MOD-SP2): 35 ML
BH CV ECHO MEAS - ESV(MOD-SP4): 38 ML
BH CV ECHO MEAS - ESV(TEICH): 37.9 ML
BH CV ECHO MEAS - FS: 26.2 %
BH CV ECHO MEAS - IVS/LVPW: 0.91
BH CV ECHO MEAS - IVSD: 1 CM
BH CV ECHO MEAS - LAT PEAK E' VEL: 8 CM/SEC
BH CV ECHO MEAS - LV DIASTOLIC VOL/BSA (35-75): 46.9 ML/M^2
BH CV ECHO MEAS - LV MASS(C)D: 147 GRAMS
BH CV ECHO MEAS - LV MASS(C)DI: 81 GRAMS/M^2
BH CV ECHO MEAS - LV MEAN PG: 3 MMHG
BH CV ECHO MEAS - LV SYSTOLIC VOL/BSA (12-30): 21 ML/M^2
BH CV ECHO MEAS - LV V1 MAX: 114 CM/SEC
BH CV ECHO MEAS - LV V1 MEAN: 83.5 CM/SEC
BH CV ECHO MEAS - LV V1 VTI: 30.5 CM
BH CV ECHO MEAS - LVIDD: 4.2 CM
BH CV ECHO MEAS - LVIDS: 3.1 CM
BH CV ECHO MEAS - LVLD AP2: 7.3 CM
BH CV ECHO MEAS - LVLD AP4: 7.8 CM
BH CV ECHO MEAS - LVLS AP2: 6.5 CM
BH CV ECHO MEAS - LVLS AP4: 6.9 CM
BH CV ECHO MEAS - LVOT AREA (M): 2.3 CM^2
BH CV ECHO MEAS - LVOT AREA: 2.3 CM^2
BH CV ECHO MEAS - LVOT DIAM: 1.7 CM
BH CV ECHO MEAS - LVPWD: 1.1 CM
BH CV ECHO MEAS - MED PEAK E' VEL: 7 CM/SEC
BH CV ECHO MEAS - MR MAX PG: 96.4 MMHG
BH CV ECHO MEAS - MR MAX VEL: 491 CM/SEC
BH CV ECHO MEAS - MV A DUR: 0.18 SEC
BH CV ECHO MEAS - MV A MAX VEL: 118 CM/SEC
BH CV ECHO MEAS - MV DEC SLOPE: 276 CM/SEC^2
BH CV ECHO MEAS - MV DEC TIME: 0.28 SEC
BH CV ECHO MEAS - MV E MAX VEL: 82.1 CM/SEC
BH CV ECHO MEAS - MV E/A: 0.7
BH CV ECHO MEAS - MV MAX PG: 5 MMHG
BH CV ECHO MEAS - MV MEAN PG: 2 MMHG
BH CV ECHO MEAS - MV P1/2T MAX VEL: 92.1 CM/SEC
BH CV ECHO MEAS - MV P1/2T: 97.7 MSEC
BH CV ECHO MEAS - MV V2 MEAN: 67.4 CM/SEC
BH CV ECHO MEAS - MV V2 VTI: 33.7 CM
BH CV ECHO MEAS - MVA P1/2T LCG: 2.4 CM^2
BH CV ECHO MEAS - MVA(P1/2T): 2.3 CM^2
BH CV ECHO MEAS - MVA(VTI): 2.1 CM^2
BH CV ECHO MEAS - PA ACC SLOPE: 972 CM/SEC^2
BH CV ECHO MEAS - PA ACC TIME: 0.12 SEC
BH CV ECHO MEAS - PA MAX PG (FULL): 1.4 MMHG
BH CV ECHO MEAS - PA MAX PG: 5.2 MMHG
BH CV ECHO MEAS - PA PR(ACCEL): 26.8 MMHG
BH CV ECHO MEAS - PA V2 MAX: 114 CM/SEC
BH CV ECHO MEAS - PULM A REVS DUR: 0.16 SEC
BH CV ECHO MEAS - PULM A REVS VEL: 30.1 CM/SEC
BH CV ECHO MEAS - PULM DIAS VEL: 53.8 CM/SEC
BH CV ECHO MEAS - PULM S/D: 1.5
BH CV ECHO MEAS - PULM SYS VEL: 82.8 CM/SEC
BH CV ECHO MEAS - PVA(V,A): 3.8 CM^2
BH CV ECHO MEAS - PVA(V,D): 3.8 CM^2
BH CV ECHO MEAS - QP/QS: 1.6
BH CV ECHO MEAS - RAP SYSTOLE: 3 MMHG
BH CV ECHO MEAS - RV MAX PG: 3.8 MMHG
BH CV ECHO MEAS - RV MEAN PG: 2 MMHG
BH CV ECHO MEAS - RV V1 MAX: 97.8 CM/SEC
BH CV ECHO MEAS - RV V1 MEAN: 72.8 CM/SEC
BH CV ECHO MEAS - RV V1 VTI: 25.6 CM
BH CV ECHO MEAS - RVOT AREA: 4.4 CM^2
BH CV ECHO MEAS - RVOT DIAM: 2.4 CM
BH CV ECHO MEAS - RVSP: 10.8 MMHG
BH CV ECHO MEAS - SI(AO): 153.4 ML/M^2
BH CV ECHO MEAS - SI(CUBED): 24.4 ML/M^2
BH CV ECHO MEAS - SI(LVOT): 38.2 ML/M^2
BH CV ECHO MEAS - SI(MOD-SP2): 25.4 ML/M^2
BH CV ECHO MEAS - SI(MOD-SP4): 25.9 ML/M^2
BH CV ECHO MEAS - SI(TEICH): 22.4 ML/M^2
BH CV ECHO MEAS - SV(AO): 278.3 ML
BH CV ECHO MEAS - SV(CUBED): 44.3 ML
BH CV ECHO MEAS - SV(LVOT): 69.2 ML
BH CV ECHO MEAS - SV(MOD-SP2): 46 ML
BH CV ECHO MEAS - SV(MOD-SP4): 47 ML
BH CV ECHO MEAS - SV(RVOT): 113.1 ML
BH CV ECHO MEAS - SV(TEICH): 40.7 ML
BH CV ECHO MEAS - TAPSE (>1.6): 1.6 CM2
BH CV ECHO MEAS - TR MAX VEL: 140 CM/SEC
BH CV VAS BP RIGHT ARM: NORMAL MMHG
BH CV XLRA - RV BASE: 2.8 CM
BH CV XLRA - TDI S': 12 CM/SEC
BILIRUB CONJ SERPL-MCNC: 3.7 MG/DL (ref 0–0.3)
BILIRUB INDIRECT SERPL-MCNC: 0.6 MG/DL
BILIRUB SERPL-MCNC: 0.2 MG/DL (ref 0.1–1.2)
BILIRUB SERPL-MCNC: 0.8 MG/DL (ref 0.1–1.2)
BILIRUB SERPL-MCNC: 0.9 MG/DL (ref 0.1–1.2)
BILIRUB SERPL-MCNC: 1.1 MG/DL (ref 0.1–1.2)
BILIRUB SERPL-MCNC: 1.2 MG/DL (ref 0.1–1.2)
BILIRUB SERPL-MCNC: 2 MG/DL (ref 0.1–1.2)
BILIRUB SERPL-MCNC: 3.3 MG/DL (ref 0.1–1.2)
BILIRUB SERPL-MCNC: 4.3 MG/DL (ref 0.1–1.2)
BILIRUB UR QL STRIP: NEGATIVE
BLD GP AB SCN SERPL QL: NEGATIVE
BUN BLD-MCNC: 15 MG/DL (ref 8–23)
BUN BLD-MCNC: 16 MG/DL (ref 8–23)
BUN BLD-MCNC: 18 MG/DL (ref 8–23)
BUN BLD-MCNC: 20 MG/DL (ref 8–23)
BUN BLD-MCNC: 20 MG/DL (ref 8–23)
BUN BLD-MCNC: 21 MG/DL (ref 8–23)
BUN BLD-MCNC: 21 MG/DL (ref 8–23)
BUN BLD-MCNC: 22 MG/DL (ref 8–23)
BUN BLD-MCNC: 24 MG/DL (ref 8–23)
BUN BLD-MCNC: 25 MG/DL (ref 8–23)
BUN BLD-MCNC: 27 MG/DL (ref 8–23)
BUN BLD-MCNC: 28 MG/DL (ref 8–23)
BUN BLD-MCNC: 30 MG/DL (ref 8–23)
BUN BLD-MCNC: 33 MG/DL (ref 8–23)
BUN BLD-MCNC: 34 MG/DL (ref 8–23)
BUN BLD-MCNC: 36 MG/DL (ref 8–23)
BUN BLD-MCNC: 38 MG/DL (ref 8–23)
BUN BLD-MCNC: 39 MG/DL (ref 8–23)
BUN BLD-MCNC: 39 MG/DL (ref 8–23)
BUN BLD-MCNC: 40 MG/DL (ref 8–23)
BUN BLD-MCNC: 42 MG/DL (ref 8–23)
BUN BLD-MCNC: 48 MG/DL (ref 8–23)
BUN BLD-MCNC: 55 MG/DL (ref 8–23)
BUN BLD-MCNC: 56 MG/DL (ref 8–23)
BUN BLD-MCNC: 60 MG/DL (ref 8–23)
BUN BLD-MCNC: 61 MG/DL (ref 8–23)
BUN BLD-MCNC: 62 MG/DL (ref 8–23)
BUN BLD-MCNC: 74 MG/DL (ref 8–23)
BUN/CREAT SERPL: 11.6 (ref 7–25)
BUN/CREAT SERPL: 11.8 (ref 7–25)
BUN/CREAT SERPL: 11.9 (ref 7–25)
BUN/CREAT SERPL: 12 (ref 7–25)
BUN/CREAT SERPL: 12.9 (ref 7–25)
BUN/CREAT SERPL: 13.2 (ref 7–25)
BUN/CREAT SERPL: 13.8 (ref 7–25)
BUN/CREAT SERPL: 14.9 (ref 7–25)
BUN/CREAT SERPL: 15 (ref 7–25)
BUN/CREAT SERPL: 15.3 (ref 7–25)
BUN/CREAT SERPL: 15.3 (ref 7–25)
BUN/CREAT SERPL: 15.7 (ref 7–25)
BUN/CREAT SERPL: 16.8 (ref 7–25)
BUN/CREAT SERPL: 17.1 (ref 7–25)
BUN/CREAT SERPL: 18.1 (ref 7–25)
BUN/CREAT SERPL: 19.2 (ref 7–25)
BUN/CREAT SERPL: 19.8 (ref 7–25)
BUN/CREAT SERPL: 23.9 (ref 7–25)
BUN/CREAT SERPL: 25 (ref 7–25)
BUN/CREAT SERPL: 25.2 (ref 7–25)
BUN/CREAT SERPL: 26 (ref 7–25)
BUN/CREAT SERPL: 28.4 (ref 7–25)
BUN/CREAT SERPL: 28.8 (ref 7–25)
BUN/CREAT SERPL: 31.1 (ref 7–25)
BUN/CREAT SERPL: 35.6 (ref 7–25)
BUN/CREAT SERPL: 38.6 (ref 7–25)
BUN/CREAT SERPL: 45.8 (ref 7–25)
BUN/CREAT SERPL: 49.3 (ref 7–25)
BUN/CREAT SERPL: 8.5 (ref 7–25)
BUN/CREAT SERPL: 9 (ref 7–25)
BUN/CREAT SERPL: 9.6 (ref 7–25)
C DIFF TOX GENS STL QL NAA+PROBE: NEGATIVE
CALCIUM SPEC-SCNC: 8.3 MG/DL (ref 8.2–9.6)
CALCIUM SPEC-SCNC: 8.3 MG/DL (ref 8.2–9.6)
CALCIUM SPEC-SCNC: 8.4 MG/DL (ref 8.2–9.6)
CALCIUM SPEC-SCNC: 8.5 MG/DL (ref 8.2–9.6)
CALCIUM SPEC-SCNC: 8.5 MG/DL (ref 8.2–9.6)
CALCIUM SPEC-SCNC: 8.6 MG/DL (ref 8.2–9.6)
CALCIUM SPEC-SCNC: 8.7 MG/DL (ref 8.2–9.6)
CALCIUM SPEC-SCNC: 8.8 MG/DL (ref 8.2–9.6)
CALCIUM SPEC-SCNC: 8.8 MG/DL (ref 8.2–9.6)
CALCIUM SPEC-SCNC: 8.9 MG/DL (ref 8.2–9.6)
CALCIUM SPEC-SCNC: 9 MG/DL (ref 8.2–9.6)
CALCIUM SPEC-SCNC: 9.1 MG/DL (ref 8.2–9.6)
CALCIUM SPEC-SCNC: 9.2 MG/DL (ref 8.2–9.6)
CALCIUM SPEC-SCNC: 9.2 MG/DL (ref 8.2–9.6)
CALCIUM SPEC-SCNC: 9.3 MG/DL (ref 8.2–9.6)
CALCIUM SPEC-SCNC: 9.5 MG/DL (ref 8.2–9.6)
CALCIUM SPEC-SCNC: 9.7 MG/DL (ref 8.2–9.6)
CHLORIDE SERPL-SCNC: 100 MMOL/L (ref 98–107)
CHLORIDE SERPL-SCNC: 101 MMOL/L (ref 98–107)
CHLORIDE SERPL-SCNC: 103 MMOL/L (ref 98–107)
CHLORIDE SERPL-SCNC: 105 MMOL/L (ref 98–107)
CHLORIDE SERPL-SCNC: 105 MMOL/L (ref 98–107)
CHLORIDE SERPL-SCNC: 107 MMOL/L (ref 98–107)
CHLORIDE SERPL-SCNC: 107 MMOL/L (ref 98–107)
CHLORIDE SERPL-SCNC: 94 MMOL/L (ref 98–107)
CHLORIDE SERPL-SCNC: 94 MMOL/L (ref 98–107)
CHLORIDE SERPL-SCNC: 95 MMOL/L (ref 98–107)
CHLORIDE SERPL-SCNC: 96 MMOL/L (ref 98–107)
CHLORIDE SERPL-SCNC: 97 MMOL/L (ref 98–107)
CHLORIDE SERPL-SCNC: 98 MMOL/L (ref 98–107)
CHLORIDE SERPL-SCNC: 98 MMOL/L (ref 98–107)
CHLORIDE SERPL-SCNC: 99 MMOL/L (ref 98–107)
CHLORIDE SERPL-SCNC: 99 MMOL/L (ref 98–107)
CLARITY UR: ABNORMAL
CLARITY UR: ABNORMAL
CLARITY UR: CLEAR
CLARITY UR: CLEAR
CO2 SERPL-SCNC: 10.9 MMOL/L (ref 22–29)
CO2 SERPL-SCNC: 17.4 MMOL/L (ref 22–29)
CO2 SERPL-SCNC: 21.6 MMOL/L (ref 22–29)
CO2 SERPL-SCNC: 23.6 MMOL/L (ref 22–29)
CO2 SERPL-SCNC: 23.6 MMOL/L (ref 22–29)
CO2 SERPL-SCNC: 23.8 MMOL/L (ref 22–29)
CO2 SERPL-SCNC: 24.2 MMOL/L (ref 22–29)
CO2 SERPL-SCNC: 24.5 MMOL/L (ref 22–29)
CO2 SERPL-SCNC: 24.5 MMOL/L (ref 22–29)
CO2 SERPL-SCNC: 24.6 MMOL/L (ref 22–29)
CO2 SERPL-SCNC: 25.8 MMOL/L (ref 22–29)
CO2 SERPL-SCNC: 25.9 MMOL/L (ref 22–29)
CO2 SERPL-SCNC: 26.1 MMOL/L (ref 22–29)
CO2 SERPL-SCNC: 26.3 MMOL/L (ref 22–29)
CO2 SERPL-SCNC: 26.5 MMOL/L (ref 22–29)
CO2 SERPL-SCNC: 26.6 MMOL/L (ref 22–29)
CO2 SERPL-SCNC: 27.2 MMOL/L (ref 22–29)
CO2 SERPL-SCNC: 27.3 MMOL/L (ref 22–29)
CO2 SERPL-SCNC: 27.4 MMOL/L (ref 22–29)
CO2 SERPL-SCNC: 27.6 MMOL/L (ref 22–29)
CO2 SERPL-SCNC: 28.3 MMOL/L (ref 22–29)
CO2 SERPL-SCNC: 28.4 MMOL/L (ref 22–29)
CO2 SERPL-SCNC: 30 MMOL/L (ref 22–29)
CO2 SERPL-SCNC: 30.1 MMOL/L (ref 22–29)
CO2 SERPL-SCNC: 30.3 MMOL/L (ref 22–29)
CO2 SERPL-SCNC: 30.8 MMOL/L (ref 22–29)
CO2 SERPL-SCNC: 31.6 MMOL/L (ref 22–29)
CO2 SERPL-SCNC: 5.1 MMOL/L (ref 22–29)
CO2 SERPL-SCNC: 7.8 MMOL/L (ref 22–29)
COLOR UR: ABNORMAL
COLOR UR: ABNORMAL
COLOR UR: YELLOW
COLOR UR: YELLOW
CREAT BLD-MCNC: 1.11 MG/DL (ref 0.57–1)
CREAT BLD-MCNC: 1.15 MG/DL (ref 0.57–1)
CREAT BLD-MCNC: 1.16 MG/DL (ref 0.57–1)
CREAT BLD-MCNC: 1.17 MG/DL (ref 0.57–1)
CREAT BLD-MCNC: 1.18 MG/DL (ref 0.57–1)
CREAT BLD-MCNC: 1.21 MG/DL (ref 0.57–1)
CREAT BLD-MCNC: 1.29 MG/DL (ref 0.57–1)
CREAT BLD-MCNC: 1.31 MG/DL (ref 0.57–1)
CREAT BLD-MCNC: 1.33 MG/DL (ref 0.57–1)
CREAT BLD-MCNC: 1.34 MG/DL (ref 0.57–1)
CREAT BLD-MCNC: 1.35 MG/DL (ref 0.57–1)
CREAT BLD-MCNC: 1.35 MG/DL (ref 0.57–1)
CREAT BLD-MCNC: 1.4 MG/DL (ref 0.57–1)
CREAT BLD-MCNC: 1.4 MG/DL (ref 0.57–1)
CREAT BLD-MCNC: 1.43 MG/DL (ref 0.57–1)
CREAT BLD-MCNC: 1.45 MG/DL (ref 0.57–1)
CREAT BLD-MCNC: 1.45 MG/DL (ref 0.57–1)
CREAT BLD-MCNC: 1.5 MG/DL (ref 0.57–1)
CREAT BLD-MCNC: 1.5 MG/DL (ref 0.57–1)
CREAT BLD-MCNC: 1.52 MG/DL (ref 0.57–1)
CREAT BLD-MCNC: 1.52 MG/DL (ref 0.57–1)
CREAT BLD-MCNC: 1.57 MG/DL (ref 0.57–1)
CREAT BLD-MCNC: 1.6 MG/DL (ref 0.57–1)
CREAT BLD-MCNC: 1.61 MG/DL (ref 0.57–1)
CREAT BLD-MCNC: 1.63 MG/DL (ref 0.57–1)
CREAT BLD-MCNC: 1.72 MG/DL (ref 0.57–1)
CREAT BLD-MCNC: 1.75 MG/DL (ref 0.57–1)
CREAT BLD-MCNC: 3.27 MG/DL (ref 0.57–1)
CREAT BLD-MCNC: 5.71 MG/DL (ref 0.57–1)
CREAT BLD-MCNC: 6.91 MG/DL (ref 0.57–1)
CREAT BLD-MCNC: 7.14 MG/DL (ref 0.57–1)
CREAT UR-MCNC: 186.2 MG/DL
D-LACTATE SERPL-SCNC: 1.8 MMOL/L (ref 0.5–2)
DEPRECATED RDW RBC AUTO: 46.9 FL (ref 37–54)
DEPRECATED RDW RBC AUTO: 47.1 FL (ref 37–54)
DEPRECATED RDW RBC AUTO: 47.1 FL (ref 37–54)
DEPRECATED RDW RBC AUTO: 47.2 FL (ref 37–54)
DEPRECATED RDW RBC AUTO: 47.3 FL (ref 37–54)
DEPRECATED RDW RBC AUTO: 47.3 FL (ref 37–54)
DEPRECATED RDW RBC AUTO: 47.8 FL (ref 37–54)
DEPRECATED RDW RBC AUTO: 48.4 FL (ref 37–54)
DEPRECATED RDW RBC AUTO: 48.9 FL (ref 37–54)
DEPRECATED RDW RBC AUTO: 49 FL (ref 37–54)
DEPRECATED RDW RBC AUTO: 50.2 FL (ref 37–54)
DEPRECATED RDW RBC AUTO: 51.1 FL (ref 37–54)
DEPRECATED RDW RBC AUTO: 51.3 FL (ref 37–54)
DEPRECATED RDW RBC AUTO: 51.5 FL (ref 37–54)
DEPRECATED RDW RBC AUTO: 51.7 FL (ref 37–54)
DEPRECATED RDW RBC AUTO: 53 FL (ref 37–54)
DEPRECATED RDW RBC AUTO: 53.7 FL (ref 37–54)
DEPRECATED RDW RBC AUTO: 54.3 FL (ref 37–54)
DEPRECATED RDW RBC AUTO: 54.7 FL (ref 37–54)
DEPRECATED RDW RBC AUTO: 55.2 FL (ref 37–54)
DEPRECATED RDW RBC AUTO: 55.2 FL (ref 37–54)
DEPRECATED RDW RBC AUTO: 55.5 FL (ref 37–54)
DEPRECATED RDW RBC AUTO: 57.1 FL (ref 37–54)
DEPRECATED RDW RBC AUTO: 57.4 FL (ref 37–54)
DEPRECATED RDW RBC AUTO: 59 FL (ref 37–54)
E/E' RATIO: 12
EOSINOPHIL # BLD AUTO: 0 10*3/MM3 (ref 0–0.7)
EOSINOPHIL # BLD AUTO: 0.02 10*3/MM3 (ref 0–0.7)
EOSINOPHIL # BLD AUTO: 0.09 10*3/MM3 (ref 0–0.7)
EOSINOPHIL # BLD AUTO: 0.1 10*3/MM3 (ref 0–0.7)
EOSINOPHIL # BLD AUTO: 0.1 10*3/MM3 (ref 0–0.7)
EOSINOPHIL # BLD AUTO: 0.11 10*3/MM3 (ref 0–0.7)
EOSINOPHIL # BLD AUTO: 0.12 10*3/MM3 (ref 0–0.7)
EOSINOPHIL # BLD AUTO: 0.13 10*3/MM3 (ref 0–0.7)
EOSINOPHIL # BLD AUTO: 0.13 10*3/MM3 (ref 0–0.7)
EOSINOPHIL # BLD AUTO: 0.23 10*3/MM3 (ref 0–0.7)
EOSINOPHIL # BLD AUTO: 0.26 10*3/MM3 (ref 0–0.7)
EOSINOPHIL # BLD MANUAL: 0.09 10*3/MM3 (ref 0–0.7)
EOSINOPHIL # BLD MANUAL: 0.18 10*3/MM3 (ref 0–0.7)
EOSINOPHIL # BLD MANUAL: 0.22 10*3/MM3 (ref 0–0.7)
EOSINOPHIL # BLD MANUAL: 0.34 10*3/MM3 (ref 0–0.7)
EOSINOPHIL # BLD MANUAL: 0.42 10*3/MM3 (ref 0–0.7)
EOSINOPHIL NFR BLD AUTO: 0 % (ref 0.3–6.2)
EOSINOPHIL NFR BLD AUTO: 0.2 % (ref 0.3–6.2)
EOSINOPHIL NFR BLD AUTO: 0.6 % (ref 0.3–6.2)
EOSINOPHIL NFR BLD AUTO: 0.7 % (ref 0.3–6.2)
EOSINOPHIL NFR BLD AUTO: 0.9 % (ref 0.3–6.2)
EOSINOPHIL NFR BLD AUTO: 1.2 % (ref 0.3–6.2)
EOSINOPHIL NFR BLD AUTO: 1.2 % (ref 0.3–6.2)
EOSINOPHIL NFR BLD AUTO: 1.6 % (ref 0.3–6.2)
EOSINOPHIL NFR BLD AUTO: 1.6 % (ref 0.3–6.2)
EOSINOPHIL NFR BLD AUTO: 1.8 % (ref 0.3–6.2)
EOSINOPHIL NFR BLD AUTO: 2.6 % (ref 0.3–6.2)
EOSINOPHIL NFR BLD MANUAL: 1 % (ref 0.3–6.2)
EOSINOPHIL NFR BLD MANUAL: 2 % (ref 0.3–6.2)
EOSINOPHIL NFR BLD MANUAL: 2 % (ref 0.3–6.2)
EOSINOPHIL NFR BLD MANUAL: 3 % (ref 0.3–6.2)
EOSINOPHIL NFR BLD MANUAL: 4 % (ref 0.3–6.2)
ERYTHROCYTE [DISTWIDTH] IN BLOOD BY AUTOMATED COUNT: 13.6 % (ref 11.7–13)
ERYTHROCYTE [DISTWIDTH] IN BLOOD BY AUTOMATED COUNT: 13.7 % (ref 11.7–13)
ERYTHROCYTE [DISTWIDTH] IN BLOOD BY AUTOMATED COUNT: 13.9 % (ref 11.7–13)
ERYTHROCYTE [DISTWIDTH] IN BLOOD BY AUTOMATED COUNT: 14 % (ref 11.7–13)
ERYTHROCYTE [DISTWIDTH] IN BLOOD BY AUTOMATED COUNT: 14.6 % (ref 11.7–13)
ERYTHROCYTE [DISTWIDTH] IN BLOOD BY AUTOMATED COUNT: 14.6 % (ref 11.7–13)
ERYTHROCYTE [DISTWIDTH] IN BLOOD BY AUTOMATED COUNT: 14.8 % (ref 11.7–13)
ERYTHROCYTE [DISTWIDTH] IN BLOOD BY AUTOMATED COUNT: 14.9 % (ref 11.7–13)
ERYTHROCYTE [DISTWIDTH] IN BLOOD BY AUTOMATED COUNT: 14.9 % (ref 11.7–13)
ERYTHROCYTE [DISTWIDTH] IN BLOOD BY AUTOMATED COUNT: 15 % (ref 11.7–13)
ERYTHROCYTE [DISTWIDTH] IN BLOOD BY AUTOMATED COUNT: 15 % (ref 11.7–13)
ERYTHROCYTE [DISTWIDTH] IN BLOOD BY AUTOMATED COUNT: 15.1 % (ref 11.7–13)
ERYTHROCYTE [DISTWIDTH] IN BLOOD BY AUTOMATED COUNT: 15.1 % (ref 11.7–13)
ERYTHROCYTE [DISTWIDTH] IN BLOOD BY AUTOMATED COUNT: 15.6 % (ref 11.7–13)
ERYTHROCYTE [DISTWIDTH] IN BLOOD BY AUTOMATED COUNT: 15.8 % (ref 11.7–13)
ERYTHROCYTE [DISTWIDTH] IN BLOOD BY AUTOMATED COUNT: 15.9 % (ref 11.7–13)
ERYTHROCYTE [DISTWIDTH] IN BLOOD BY AUTOMATED COUNT: 16.1 % (ref 11.7–13)
ERYTHROCYTE [DISTWIDTH] IN BLOOD BY AUTOMATED COUNT: 16.3 % (ref 11.7–13)
ERYTHROCYTE [DISTWIDTH] IN BLOOD BY AUTOMATED COUNT: 16.4 % (ref 11.7–13)
ERYTHROCYTE [DISTWIDTH] IN BLOOD BY AUTOMATED COUNT: 16.7 % (ref 11.7–13)
ERYTHROCYTE [DISTWIDTH] IN BLOOD BY AUTOMATED COUNT: 17.5 % (ref 11.7–13)
ERYTHROCYTE [DISTWIDTH] IN BLOOD BY AUTOMATED COUNT: 17.7 % (ref 11.7–13)
GAS FLOW AIRWAY: 4 LPM
GFR SERPL CREATININE-BSD FRML MDRD: 13 ML/MIN/1.73
GFR SERPL CREATININE-BSD FRML MDRD: 27 ML/MIN/1.73
GFR SERPL CREATININE-BSD FRML MDRD: 28 ML/MIN/1.73
GFR SERPL CREATININE-BSD FRML MDRD: 30 ML/MIN/1.73
GFR SERPL CREATININE-BSD FRML MDRD: 31 ML/MIN/1.73
GFR SERPL CREATININE-BSD FRML MDRD: 32 ML/MIN/1.73
GFR SERPL CREATININE-BSD FRML MDRD: 32 ML/MIN/1.73
GFR SERPL CREATININE-BSD FRML MDRD: 33 ML/MIN/1.73
GFR SERPL CREATININE-BSD FRML MDRD: 33 ML/MIN/1.73
GFR SERPL CREATININE-BSD FRML MDRD: 34 ML/MIN/1.73
GFR SERPL CREATININE-BSD FRML MDRD: 35 ML/MIN/1.73
GFR SERPL CREATININE-BSD FRML MDRD: 35 ML/MIN/1.73
GFR SERPL CREATININE-BSD FRML MDRD: 37 ML/MIN/1.73
GFR SERPL CREATININE-BSD FRML MDRD: 38 ML/MIN/1.73
GFR SERPL CREATININE-BSD FRML MDRD: 39 ML/MIN/1.73
GFR SERPL CREATININE-BSD FRML MDRD: 42 ML/MIN/1.73
GFR SERPL CREATININE-BSD FRML MDRD: 43 ML/MIN/1.73
GFR SERPL CREATININE-BSD FRML MDRD: 43 ML/MIN/1.73
GFR SERPL CREATININE-BSD FRML MDRD: 44 ML/MIN/1.73
GFR SERPL CREATININE-BSD FRML MDRD: 44 ML/MIN/1.73
GFR SERPL CREATININE-BSD FRML MDRD: 46 ML/MIN/1.73
GFR SERPL CREATININE-BSD FRML MDRD: 5 ML/MIN/1.73
GFR SERPL CREATININE-BSD FRML MDRD: 6 ML/MIN/1.73
GFR SERPL CREATININE-BSD FRML MDRD: 7 ML/MIN/1.73
GLOBULIN UR ELPH-MCNC: 3.4 GM/DL
GLOBULIN UR ELPH-MCNC: 4 GM/DL
GLOBULIN UR ELPH-MCNC: 4.1 GM/DL
GLOBULIN UR ELPH-MCNC: 4.1 GM/DL
GLOBULIN UR ELPH-MCNC: 4.4 GM/DL
GLOBULIN UR ELPH-MCNC: 5.1 GM/DL
GLOBULIN UR ELPH-MCNC: 5.2 GM/DL
GLUCOSE BLD-MCNC: 104 MG/DL (ref 65–99)
GLUCOSE BLD-MCNC: 110 MG/DL (ref 65–99)
GLUCOSE BLD-MCNC: 111 MG/DL (ref 65–99)
GLUCOSE BLD-MCNC: 131 MG/DL (ref 65–99)
GLUCOSE BLD-MCNC: 132 MG/DL (ref 65–99)
GLUCOSE BLD-MCNC: 133 MG/DL (ref 65–99)
GLUCOSE BLD-MCNC: 137 MG/DL (ref 65–99)
GLUCOSE BLD-MCNC: 139 MG/DL (ref 65–99)
GLUCOSE BLD-MCNC: 146 MG/DL (ref 65–99)
GLUCOSE BLD-MCNC: 151 MG/DL (ref 65–99)
GLUCOSE BLD-MCNC: 154 MG/DL (ref 65–99)
GLUCOSE BLD-MCNC: 155 MG/DL (ref 65–99)
GLUCOSE BLD-MCNC: 161 MG/DL (ref 65–99)
GLUCOSE BLD-MCNC: 180 MG/DL (ref 65–99)
GLUCOSE BLD-MCNC: 180 MG/DL (ref 65–99)
GLUCOSE BLD-MCNC: 188 MG/DL (ref 65–99)
GLUCOSE BLD-MCNC: 189 MG/DL (ref 65–99)
GLUCOSE BLD-MCNC: 197 MG/DL (ref 65–99)
GLUCOSE BLD-MCNC: 207 MG/DL (ref 65–99)
GLUCOSE BLD-MCNC: 224 MG/DL (ref 65–99)
GLUCOSE BLD-MCNC: 233 MG/DL (ref 65–99)
GLUCOSE BLD-MCNC: 244 MG/DL (ref 65–99)
GLUCOSE BLD-MCNC: 264 MG/DL (ref 65–99)
GLUCOSE BLD-MCNC: 266 MG/DL (ref 65–99)
GLUCOSE BLD-MCNC: 267 MG/DL (ref 65–99)
GLUCOSE BLD-MCNC: 277 MG/DL (ref 65–99)
GLUCOSE BLD-MCNC: 289 MG/DL (ref 65–99)
GLUCOSE BLD-MCNC: 322 MG/DL (ref 65–99)
GLUCOSE BLD-MCNC: 52 MG/DL (ref 65–99)
GLUCOSE BLD-MCNC: 81 MG/DL (ref 65–99)
GLUCOSE BLD-MCNC: 89 MG/DL (ref 65–99)
GLUCOSE BLDC GLUCOMTR-MCNC: 105 MG/DL (ref 70–130)
GLUCOSE BLDC GLUCOMTR-MCNC: 106 MG/DL (ref 70–130)
GLUCOSE BLDC GLUCOMTR-MCNC: 108 MG/DL (ref 70–130)
GLUCOSE BLDC GLUCOMTR-MCNC: 113 MG/DL (ref 70–130)
GLUCOSE BLDC GLUCOMTR-MCNC: 114 MG/DL (ref 70–130)
GLUCOSE BLDC GLUCOMTR-MCNC: 116 MG/DL (ref 70–130)
GLUCOSE BLDC GLUCOMTR-MCNC: 116 MG/DL (ref 70–130)
GLUCOSE BLDC GLUCOMTR-MCNC: 120 MG/DL (ref 70–130)
GLUCOSE BLDC GLUCOMTR-MCNC: 121 MG/DL (ref 70–130)
GLUCOSE BLDC GLUCOMTR-MCNC: 122 MG/DL (ref 70–130)
GLUCOSE BLDC GLUCOMTR-MCNC: 130 MG/DL (ref 70–130)
GLUCOSE BLDC GLUCOMTR-MCNC: 134 MG/DL (ref 70–130)
GLUCOSE BLDC GLUCOMTR-MCNC: 140 MG/DL (ref 70–130)
GLUCOSE BLDC GLUCOMTR-MCNC: 140 MG/DL (ref 70–130)
GLUCOSE BLDC GLUCOMTR-MCNC: 144 MG/DL (ref 70–130)
GLUCOSE BLDC GLUCOMTR-MCNC: 144 MG/DL (ref 70–130)
GLUCOSE BLDC GLUCOMTR-MCNC: 145 MG/DL (ref 70–130)
GLUCOSE BLDC GLUCOMTR-MCNC: 149 MG/DL (ref 70–130)
GLUCOSE BLDC GLUCOMTR-MCNC: 151 MG/DL (ref 70–130)
GLUCOSE BLDC GLUCOMTR-MCNC: 151 MG/DL (ref 70–130)
GLUCOSE BLDC GLUCOMTR-MCNC: 152 MG/DL (ref 70–130)
GLUCOSE BLDC GLUCOMTR-MCNC: 155 MG/DL (ref 70–130)
GLUCOSE BLDC GLUCOMTR-MCNC: 157 MG/DL (ref 70–130)
GLUCOSE BLDC GLUCOMTR-MCNC: 164 MG/DL (ref 70–130)
GLUCOSE BLDC GLUCOMTR-MCNC: 164 MG/DL (ref 70–130)
GLUCOSE BLDC GLUCOMTR-MCNC: 166 MG/DL (ref 70–130)
GLUCOSE BLDC GLUCOMTR-MCNC: 167 MG/DL (ref 70–130)
GLUCOSE BLDC GLUCOMTR-MCNC: 168 MG/DL (ref 70–130)
GLUCOSE BLDC GLUCOMTR-MCNC: 170 MG/DL (ref 70–130)
GLUCOSE BLDC GLUCOMTR-MCNC: 171 MG/DL (ref 70–130)
GLUCOSE BLDC GLUCOMTR-MCNC: 173 MG/DL (ref 70–130)
GLUCOSE BLDC GLUCOMTR-MCNC: 173 MG/DL (ref 70–130)
GLUCOSE BLDC GLUCOMTR-MCNC: 174 MG/DL (ref 70–130)
GLUCOSE BLDC GLUCOMTR-MCNC: 175 MG/DL (ref 70–130)
GLUCOSE BLDC GLUCOMTR-MCNC: 175 MG/DL (ref 70–130)
GLUCOSE BLDC GLUCOMTR-MCNC: 176 MG/DL (ref 70–130)
GLUCOSE BLDC GLUCOMTR-MCNC: 178 MG/DL (ref 70–130)
GLUCOSE BLDC GLUCOMTR-MCNC: 179 MG/DL (ref 70–130)
GLUCOSE BLDC GLUCOMTR-MCNC: 181 MG/DL (ref 70–130)
GLUCOSE BLDC GLUCOMTR-MCNC: 183 MG/DL (ref 70–130)
GLUCOSE BLDC GLUCOMTR-MCNC: 183 MG/DL (ref 70–130)
GLUCOSE BLDC GLUCOMTR-MCNC: 184 MG/DL (ref 70–130)
GLUCOSE BLDC GLUCOMTR-MCNC: 185 MG/DL (ref 70–130)
GLUCOSE BLDC GLUCOMTR-MCNC: 188 MG/DL (ref 70–130)
GLUCOSE BLDC GLUCOMTR-MCNC: 188 MG/DL (ref 70–130)
GLUCOSE BLDC GLUCOMTR-MCNC: 192 MG/DL (ref 70–130)
GLUCOSE BLDC GLUCOMTR-MCNC: 195 MG/DL (ref 70–130)
GLUCOSE BLDC GLUCOMTR-MCNC: 196 MG/DL (ref 70–130)
GLUCOSE BLDC GLUCOMTR-MCNC: 198 MG/DL (ref 70–130)
GLUCOSE BLDC GLUCOMTR-MCNC: 200 MG/DL (ref 70–130)
GLUCOSE BLDC GLUCOMTR-MCNC: 200 MG/DL (ref 70–130)
GLUCOSE BLDC GLUCOMTR-MCNC: 203 MG/DL (ref 70–130)
GLUCOSE BLDC GLUCOMTR-MCNC: 205 MG/DL (ref 70–130)
GLUCOSE BLDC GLUCOMTR-MCNC: 206 MG/DL (ref 70–130)
GLUCOSE BLDC GLUCOMTR-MCNC: 208 MG/DL (ref 70–130)
GLUCOSE BLDC GLUCOMTR-MCNC: 209 MG/DL (ref 70–130)
GLUCOSE BLDC GLUCOMTR-MCNC: 211 MG/DL (ref 70–130)
GLUCOSE BLDC GLUCOMTR-MCNC: 216 MG/DL (ref 70–130)
GLUCOSE BLDC GLUCOMTR-MCNC: 216 MG/DL (ref 70–130)
GLUCOSE BLDC GLUCOMTR-MCNC: 221 MG/DL (ref 70–130)
GLUCOSE BLDC GLUCOMTR-MCNC: 225 MG/DL (ref 70–130)
GLUCOSE BLDC GLUCOMTR-MCNC: 228 MG/DL (ref 70–130)
GLUCOSE BLDC GLUCOMTR-MCNC: 230 MG/DL (ref 70–130)
GLUCOSE BLDC GLUCOMTR-MCNC: 238 MG/DL (ref 70–130)
GLUCOSE BLDC GLUCOMTR-MCNC: 238 MG/DL (ref 70–130)
GLUCOSE BLDC GLUCOMTR-MCNC: 242 MG/DL (ref 70–130)
GLUCOSE BLDC GLUCOMTR-MCNC: 246 MG/DL (ref 70–130)
GLUCOSE BLDC GLUCOMTR-MCNC: 247 MG/DL (ref 70–130)
GLUCOSE BLDC GLUCOMTR-MCNC: 253 MG/DL (ref 70–130)
GLUCOSE BLDC GLUCOMTR-MCNC: 264 MG/DL (ref 70–130)
GLUCOSE BLDC GLUCOMTR-MCNC: 269 MG/DL (ref 70–130)
GLUCOSE BLDC GLUCOMTR-MCNC: 273 MG/DL (ref 70–130)
GLUCOSE BLDC GLUCOMTR-MCNC: 273 MG/DL (ref 70–130)
GLUCOSE BLDC GLUCOMTR-MCNC: 275 MG/DL (ref 70–130)
GLUCOSE BLDC GLUCOMTR-MCNC: 279 MG/DL (ref 70–130)
GLUCOSE BLDC GLUCOMTR-MCNC: 281 MG/DL (ref 70–130)
GLUCOSE BLDC GLUCOMTR-MCNC: 284 MG/DL (ref 70–130)
GLUCOSE BLDC GLUCOMTR-MCNC: 285 MG/DL (ref 70–130)
GLUCOSE BLDC GLUCOMTR-MCNC: 286 MG/DL (ref 70–130)
GLUCOSE BLDC GLUCOMTR-MCNC: 287 MG/DL (ref 70–130)
GLUCOSE BLDC GLUCOMTR-MCNC: 288 MG/DL (ref 70–130)
GLUCOSE BLDC GLUCOMTR-MCNC: 288 MG/DL (ref 70–130)
GLUCOSE BLDC GLUCOMTR-MCNC: 293 MG/DL (ref 70–130)
GLUCOSE BLDC GLUCOMTR-MCNC: 297 MG/DL (ref 70–130)
GLUCOSE BLDC GLUCOMTR-MCNC: 299 MG/DL (ref 70–130)
GLUCOSE BLDC GLUCOMTR-MCNC: 309 MG/DL (ref 70–130)
GLUCOSE BLDC GLUCOMTR-MCNC: 310 MG/DL (ref 70–130)
GLUCOSE BLDC GLUCOMTR-MCNC: 320 MG/DL (ref 70–130)
GLUCOSE BLDC GLUCOMTR-MCNC: 321 MG/DL (ref 70–130)
GLUCOSE BLDC GLUCOMTR-MCNC: 325 MG/DL (ref 70–130)
GLUCOSE BLDC GLUCOMTR-MCNC: 326 MG/DL (ref 70–130)
GLUCOSE BLDC GLUCOMTR-MCNC: 326 MG/DL (ref 70–130)
GLUCOSE BLDC GLUCOMTR-MCNC: 334 MG/DL (ref 70–130)
GLUCOSE BLDC GLUCOMTR-MCNC: 342 MG/DL (ref 70–130)
GLUCOSE BLDC GLUCOMTR-MCNC: 363 MG/DL (ref 70–130)
GLUCOSE BLDC GLUCOMTR-MCNC: 365 MG/DL (ref 70–130)
GLUCOSE BLDC GLUCOMTR-MCNC: 368 MG/DL (ref 70–130)
GLUCOSE BLDC GLUCOMTR-MCNC: 400 MG/DL (ref 70–130)
GLUCOSE BLDC GLUCOMTR-MCNC: 410 MG/DL (ref 70–130)
GLUCOSE BLDC GLUCOMTR-MCNC: 453 MG/DL (ref 70–130)
GLUCOSE BLDC GLUCOMTR-MCNC: 462 MG/DL (ref 70–130)
GLUCOSE BLDC GLUCOMTR-MCNC: 504 MG/DL (ref 70–130)
GLUCOSE BLDC GLUCOMTR-MCNC: 517 MG/DL (ref 70–130)
GLUCOSE BLDC GLUCOMTR-MCNC: 52 MG/DL (ref 70–130)
GLUCOSE BLDC GLUCOMTR-MCNC: 52 MG/DL (ref 70–130)
GLUCOSE BLDC GLUCOMTR-MCNC: 556 MG/DL (ref 70–130)
GLUCOSE BLDC GLUCOMTR-MCNC: 62 MG/DL (ref 70–130)
GLUCOSE BLDC GLUCOMTR-MCNC: 78 MG/DL (ref 70–130)
GLUCOSE BLDC GLUCOMTR-MCNC: 90 MG/DL (ref 70–130)
GLUCOSE BLDC GLUCOMTR-MCNC: 91 MG/DL (ref 70–130)
GLUCOSE BLDC GLUCOMTR-MCNC: 97 MG/DL (ref 70–130)
GLUCOSE BLDC GLUCOMTR-MCNC: 97 MG/DL (ref 70–130)
GLUCOSE UR STRIP-MCNC: ABNORMAL MG/DL
GLUCOSE UR STRIP-MCNC: ABNORMAL MG/DL
GLUCOSE UR STRIP-MCNC: NEGATIVE MG/DL
GLUCOSE UR STRIP-MCNC: NEGATIVE MG/DL
HAV IGM SERPL QL IA: NORMAL
HBV CORE IGM SERPL QL IA: NORMAL
HBV SURFACE AG SERPL QL IA: NORMAL
HCO3 BLDA-SCNC: 28.3 MMOL/L (ref 22–28)
HCT VFR BLD AUTO: 21 % (ref 35.6–45.5)
HCT VFR BLD AUTO: 24.7 % (ref 35.6–45.5)
HCT VFR BLD AUTO: 24.9 % (ref 35.6–45.5)
HCT VFR BLD AUTO: 25.2 % (ref 35.6–45.5)
HCT VFR BLD AUTO: 25.3 % (ref 35.6–45.5)
HCT VFR BLD AUTO: 25.5 % (ref 35.6–45.5)
HCT VFR BLD AUTO: 26 % (ref 35.6–45.5)
HCT VFR BLD AUTO: 26.2 % (ref 35.6–45.5)
HCT VFR BLD AUTO: 26.4 % (ref 35.6–45.5)
HCT VFR BLD AUTO: 26.5 % (ref 35.6–45.5)
HCT VFR BLD AUTO: 26.6 % (ref 35.6–45.5)
HCT VFR BLD AUTO: 26.8 % (ref 35.6–45.5)
HCT VFR BLD AUTO: 26.8 % (ref 35.6–45.5)
HCT VFR BLD AUTO: 27.3 % (ref 35.6–45.5)
HCT VFR BLD AUTO: 27.4 % (ref 35.6–45.5)
HCT VFR BLD AUTO: 27.7 % (ref 35.6–45.5)
HCT VFR BLD AUTO: 28.2 % (ref 35.6–45.5)
HCT VFR BLD AUTO: 29.1 % (ref 35.6–45.5)
HCT VFR BLD AUTO: 29.3 % (ref 35.6–45.5)
HCT VFR BLD AUTO: 32 % (ref 35.6–45.5)
HCT VFR BLD AUTO: 32.9 % (ref 35.6–45.5)
HCT VFR BLD AUTO: 32.9 % (ref 35.6–45.5)
HCT VFR BLD AUTO: 33.2 % (ref 35.6–45.5)
HCT VFR BLD AUTO: 33.6 % (ref 35.6–45.5)
HCT VFR BLD AUTO: 33.7 % (ref 35.6–45.5)
HCT VFR BLD AUTO: 34 % (ref 35.6–45.5)
HCT VFR BLD AUTO: 34 % (ref 35.6–45.5)
HCT VFR BLD AUTO: 34.1 % (ref 35.6–45.5)
HCT VFR BLD AUTO: 35.6 % (ref 35.6–45.5)
HCT VFR BLD AUTO: 36.5 % (ref 35.6–45.5)
HCT VFR BLD AUTO: 37.7 % (ref 35.6–45.5)
HCT VFR BLD AUTO: 38 % (ref 35.6–45.5)
HCT VFR BLD AUTO: 38.2 % (ref 35.6–45.5)
HCT VFR BLD AUTO: 38.8 % (ref 35.6–45.5)
HCT VFR BLD AUTO: 40.3 % (ref 35.6–45.5)
HCT VFR BLD AUTO: 41 % (ref 35.6–45.5)
HCT VFR BLD AUTO: 41.1 % (ref 35.6–45.5)
HCT VFR BLD AUTO: 42.2 % (ref 35.6–45.5)
HCT VFR BLD AUTO: 43.6 % (ref 35.6–45.5)
HCV AB SER DONR QL: NORMAL
HEMOCCULT STL QL: POSITIVE
HGB BLD-MCNC: 10 G/DL (ref 11.9–15.5)
HGB BLD-MCNC: 10.1 G/DL (ref 11.9–15.5)
HGB BLD-MCNC: 10.2 G/DL (ref 11.9–15.5)
HGB BLD-MCNC: 10.3 G/DL (ref 11.9–15.5)
HGB BLD-MCNC: 10.4 G/DL (ref 11.9–15.5)
HGB BLD-MCNC: 10.8 G/DL (ref 11.9–15.5)
HGB BLD-MCNC: 10.9 G/DL (ref 11.9–15.5)
HGB BLD-MCNC: 11.6 G/DL (ref 11.9–15.5)
HGB BLD-MCNC: 11.7 G/DL (ref 11.9–15.5)
HGB BLD-MCNC: 11.7 G/DL (ref 11.9–15.5)
HGB BLD-MCNC: 11.9 G/DL (ref 11.9–15.5)
HGB BLD-MCNC: 12.4 G/DL (ref 11.9–15.5)
HGB BLD-MCNC: 12.5 G/DL (ref 11.9–15.5)
HGB BLD-MCNC: 12.5 G/DL (ref 11.9–15.5)
HGB BLD-MCNC: 12.8 G/DL (ref 11.9–15.5)
HGB BLD-MCNC: 13.2 G/DL (ref 11.9–15.5)
HGB BLD-MCNC: 6.2 G/DL (ref 11.9–15.5)
HGB BLD-MCNC: 7.8 G/DL (ref 11.9–15.5)
HGB BLD-MCNC: 7.9 G/DL (ref 11.9–15.5)
HGB BLD-MCNC: 8 G/DL (ref 11.9–15.5)
HGB BLD-MCNC: 8.1 G/DL (ref 11.9–15.5)
HGB BLD-MCNC: 8.1 G/DL (ref 11.9–15.5)
HGB BLD-MCNC: 8.2 G/DL (ref 11.9–15.5)
HGB BLD-MCNC: 8.2 G/DL (ref 11.9–15.5)
HGB BLD-MCNC: 8.3 G/DL (ref 11.9–15.5)
HGB BLD-MCNC: 8.4 G/DL (ref 11.9–15.5)
HGB BLD-MCNC: 8.7 G/DL (ref 11.9–15.5)
HGB BLD-MCNC: 8.7 G/DL (ref 11.9–15.5)
HGB BLD-MCNC: 8.8 G/DL (ref 11.9–15.5)
HGB BLD-MCNC: 9.9 G/DL (ref 11.9–15.5)
HGB BLD-MCNC: 9.9 G/DL (ref 11.9–15.5)
HGB UR QL STRIP.AUTO: ABNORMAL
HGB UR QL STRIP.AUTO: NEGATIVE
HOLD SPECIMEN: NORMAL
HYALINE CASTS UR QL AUTO: ABNORMAL /LPF
IMM GRANULOCYTES # BLD: 0.03 10*3/MM3 (ref 0–0.03)
IMM GRANULOCYTES # BLD: 0.07 10*3/MM3 (ref 0–0.03)
IMM GRANULOCYTES # BLD: 0.08 10*3/MM3 (ref 0–0.03)
IMM GRANULOCYTES # BLD: 0.16 10*3/MM3 (ref 0–0.03)
IMM GRANULOCYTES # BLD: 0.16 10*3/MM3 (ref 0–0.03)
IMM GRANULOCYTES # BLD: 0.17 10*3/MM3 (ref 0–0.03)
IMM GRANULOCYTES # BLD: 0.2 10*3/MM3 (ref 0–0.03)
IMM GRANULOCYTES # BLD: 0.31 10*3/MM3 (ref 0–0.03)
IMM GRANULOCYTES # BLD: 0.33 10*3/MM3 (ref 0–0.03)
IMM GRANULOCYTES # BLD: 0.33 10*3/MM3 (ref 0–0.03)
IMM GRANULOCYTES # BLD: 0.35 10*3/MM3 (ref 0–0.03)
IMM GRANULOCYTES NFR BLD: 0.3 % (ref 0–0.5)
IMM GRANULOCYTES NFR BLD: 0.7 % (ref 0–0.5)
IMM GRANULOCYTES NFR BLD: 0.9 % (ref 0–0.5)
IMM GRANULOCYTES NFR BLD: 0.9 % (ref 0–0.5)
IMM GRANULOCYTES NFR BLD: 1.2 % (ref 0–0.5)
IMM GRANULOCYTES NFR BLD: 1.7 % (ref 0–0.5)
IMM GRANULOCYTES NFR BLD: 2.5 % (ref 0–0.5)
IMM GRANULOCYTES NFR BLD: 2.5 % (ref 0–0.5)
IMM GRANULOCYTES NFR BLD: 3.4 % (ref 0–0.5)
IMM GRANULOCYTES NFR BLD: 3.8 % (ref 0–0.5)
IMM GRANULOCYTES NFR BLD: 4.4 % (ref 0–0.5)
INR PPP: 1.1 (ref 0.9–1.1)
KETONES UR QL STRIP: ABNORMAL
KETONES UR QL STRIP: NEGATIVE
LEFT ATRIUM VOLUME INDEX: 22 ML/M2
LEUKOCYTE ESTERASE UR QL STRIP.AUTO: ABNORMAL
LEUKOCYTE ESTERASE UR QL STRIP.AUTO: ABNORMAL
LEUKOCYTE ESTERASE UR QL STRIP.AUTO: NEGATIVE
LEUKOCYTE ESTERASE UR QL STRIP.AUTO: NEGATIVE
LIPASE SERPL-CCNC: 44 U/L (ref 13–60)
LIPASE SERPL-CCNC: 47 U/L (ref 13–60)
LV EF 2D ECHO EST: 55 %
LYMPHOCYTES # BLD AUTO: 0.87 10*3/MM3 (ref 0.9–4.8)
LYMPHOCYTES # BLD AUTO: 1.37 10*3/MM3 (ref 0.9–4.8)
LYMPHOCYTES # BLD AUTO: 1.39 10*3/MM3 (ref 0.9–4.8)
LYMPHOCYTES # BLD AUTO: 1.45 10*3/MM3 (ref 0.9–4.8)
LYMPHOCYTES # BLD AUTO: 1.65 10*3/MM3 (ref 0.9–4.8)
LYMPHOCYTES # BLD AUTO: 2.12 10*3/MM3 (ref 0.9–4.8)
LYMPHOCYTES # BLD AUTO: 2.16 10*3/MM3 (ref 0.9–4.8)
LYMPHOCYTES # BLD AUTO: 2.4 10*3/MM3 (ref 0.9–4.8)
LYMPHOCYTES # BLD AUTO: 2.51 10*3/MM3 (ref 0.9–4.8)
LYMPHOCYTES # BLD AUTO: 2.65 10*3/MM3 (ref 0.9–4.8)
LYMPHOCYTES # BLD AUTO: 2.78 10*3/MM3 (ref 0.9–4.8)
LYMPHOCYTES # BLD MANUAL: 1.15 10*3/MM3 (ref 0.9–4.8)
LYMPHOCYTES # BLD MANUAL: 1.46 10*3/MM3 (ref 0.9–4.8)
LYMPHOCYTES # BLD MANUAL: 1.67 10*3/MM3 (ref 0.9–4.8)
LYMPHOCYTES # BLD MANUAL: 1.98 10*3/MM3 (ref 0.9–4.8)
LYMPHOCYTES # BLD MANUAL: 2.98 10*3/MM3 (ref 0.9–4.8)
LYMPHOCYTES NFR BLD AUTO: 13.8 % (ref 19.6–45.3)
LYMPHOCYTES NFR BLD AUTO: 14.3 % (ref 19.6–45.3)
LYMPHOCYTES NFR BLD AUTO: 15.7 % (ref 19.6–45.3)
LYMPHOCYTES NFR BLD AUTO: 17.6 % (ref 19.6–45.3)
LYMPHOCYTES NFR BLD AUTO: 17.8 % (ref 19.6–45.3)
LYMPHOCYTES NFR BLD AUTO: 18.4 % (ref 19.6–45.3)
LYMPHOCYTES NFR BLD AUTO: 23.2 % (ref 19.6–45.3)
LYMPHOCYTES NFR BLD AUTO: 25 % (ref 19.6–45.3)
LYMPHOCYTES NFR BLD AUTO: 28.2 % (ref 19.6–45.3)
LYMPHOCYTES NFR BLD AUTO: 35.1 % (ref 19.6–45.3)
LYMPHOCYTES NFR BLD AUTO: 6.1 % (ref 19.6–45.3)
LYMPHOCYTES NFR BLD MANUAL: 11 % (ref 19.6–45.3)
LYMPHOCYTES NFR BLD MANUAL: 13 % (ref 19.6–45.3)
LYMPHOCYTES NFR BLD MANUAL: 15 % (ref 19.6–45.3)
LYMPHOCYTES NFR BLD MANUAL: 22 % (ref 19.6–45.3)
LYMPHOCYTES NFR BLD MANUAL: 34 % (ref 19.6–45.3)
LYMPHOCYTES NFR BLD MANUAL: 5 % (ref 5–12)
LYMPHOCYTES NFR BLD MANUAL: 6 % (ref 5–12)
LYMPHOCYTES NFR BLD MANUAL: 7 % (ref 5–12)
LYMPHOCYTES NFR BLD MANUAL: 9 % (ref 5–12)
LYMPHOCYTES NFR BLD MANUAL: 9 % (ref 5–12)
MAGNESIUM SERPL-MCNC: 1.9 MG/DL (ref 1.6–2.4)
MAGNESIUM SERPL-MCNC: 2.2 MG/DL (ref 1.6–2.4)
MCH RBC QN AUTO: 27.6 PG (ref 26.9–32)
MCH RBC QN AUTO: 27.9 PG (ref 26.9–32)
MCH RBC QN AUTO: 27.9 PG (ref 26.9–32)
MCH RBC QN AUTO: 28 PG (ref 26.9–32)
MCH RBC QN AUTO: 28.1 PG (ref 26.9–32)
MCH RBC QN AUTO: 28.1 PG (ref 26.9–32)
MCH RBC QN AUTO: 28.2 PG (ref 26.9–32)
MCH RBC QN AUTO: 28.3 PG (ref 26.9–32)
MCH RBC QN AUTO: 28.4 PG (ref 26.9–32)
MCH RBC QN AUTO: 28.6 PG (ref 26.9–32)
MCH RBC QN AUTO: 28.6 PG (ref 26.9–32)
MCH RBC QN AUTO: 28.7 PG (ref 26.9–32)
MCH RBC QN AUTO: 28.7 PG (ref 26.9–32)
MCH RBC QN AUTO: 28.9 PG (ref 26.9–32)
MCH RBC QN AUTO: 28.9 PG (ref 26.9–32)
MCH RBC QN AUTO: 29 PG (ref 26.9–32)
MCH RBC QN AUTO: 29.1 PG (ref 26.9–32)
MCH RBC QN AUTO: 29.2 PG (ref 26.9–32)
MCH RBC QN AUTO: 29.7 PG (ref 26.9–32)
MCHC RBC AUTO-ENTMCNC: 29.2 G/DL (ref 32.4–36.3)
MCHC RBC AUTO-ENTMCNC: 29.5 G/DL (ref 32.4–36.3)
MCHC RBC AUTO-ENTMCNC: 29.5 G/DL (ref 32.4–36.3)
MCHC RBC AUTO-ENTMCNC: 29.7 G/DL (ref 32.4–36.3)
MCHC RBC AUTO-ENTMCNC: 29.7 G/DL (ref 32.4–36.3)
MCHC RBC AUTO-ENTMCNC: 29.9 G/DL (ref 32.4–36.3)
MCHC RBC AUTO-ENTMCNC: 30 G/DL (ref 32.4–36.3)
MCHC RBC AUTO-ENTMCNC: 30.1 G/DL (ref 32.4–36.3)
MCHC RBC AUTO-ENTMCNC: 30.1 G/DL (ref 32.4–36.3)
MCHC RBC AUTO-ENTMCNC: 30.2 G/DL (ref 32.4–36.3)
MCHC RBC AUTO-ENTMCNC: 30.2 G/DL (ref 32.4–36.3)
MCHC RBC AUTO-ENTMCNC: 30.3 G/DL (ref 32.4–36.3)
MCHC RBC AUTO-ENTMCNC: 30.4 G/DL (ref 32.4–36.3)
MCHC RBC AUTO-ENTMCNC: 30.5 G/DL (ref 32.4–36.3)
MCHC RBC AUTO-ENTMCNC: 30.5 G/DL (ref 32.4–36.3)
MCHC RBC AUTO-ENTMCNC: 30.6 G/DL (ref 32.4–36.3)
MCHC RBC AUTO-ENTMCNC: 30.7 G/DL (ref 32.4–36.3)
MCHC RBC AUTO-ENTMCNC: 31 G/DL (ref 32.4–36.3)
MCHC RBC AUTO-ENTMCNC: 31.3 G/DL (ref 32.4–36.3)
MCHC RBC AUTO-ENTMCNC: 32.7 G/DL (ref 32.4–36.3)
MCV RBC AUTO: 90.3 FL (ref 80.5–98.2)
MCV RBC AUTO: 90.4 FL (ref 80.5–98.2)
MCV RBC AUTO: 90.8 FL (ref 80.5–98.2)
MCV RBC AUTO: 92 FL (ref 80.5–98.2)
MCV RBC AUTO: 92.6 FL (ref 80.5–98.2)
MCV RBC AUTO: 93.2 FL (ref 80.5–98.2)
MCV RBC AUTO: 93.3 FL (ref 80.5–98.2)
MCV RBC AUTO: 93.4 FL (ref 80.5–98.2)
MCV RBC AUTO: 93.4 FL (ref 80.5–98.2)
MCV RBC AUTO: 93.5 FL (ref 80.5–98.2)
MCV RBC AUTO: 93.9 FL (ref 80.5–98.2)
MCV RBC AUTO: 94.3 FL (ref 80.5–98.2)
MCV RBC AUTO: 94.4 FL (ref 80.5–98.2)
MCV RBC AUTO: 94.7 FL (ref 80.5–98.2)
MCV RBC AUTO: 94.7 FL (ref 80.5–98.2)
MCV RBC AUTO: 94.8 FL (ref 80.5–98.2)
MCV RBC AUTO: 95.1 FL (ref 80.5–98.2)
MCV RBC AUTO: 95.7 FL (ref 80.5–98.2)
MCV RBC AUTO: 95.7 FL (ref 80.5–98.2)
MCV RBC AUTO: 96.3 FL (ref 80.5–98.2)
MCV RBC AUTO: 96.5 FL (ref 80.5–98.2)
MCV RBC AUTO: 98.1 FL (ref 80.5–98.2)
MCV RBC AUTO: 98.2 FL (ref 80.5–98.2)
METAMYELOCYTES NFR BLD MANUAL: 2 % (ref 0–0)
METAMYELOCYTES NFR BLD MANUAL: 3 % (ref 0–0)
METAMYELOCYTES NFR BLD MANUAL: 5 % (ref 0–0)
MODALITY: ABNORMAL
MONOCYTES # BLD AUTO: 0.44 10*3/MM3 (ref 0.2–1.2)
MONOCYTES # BLD AUTO: 0.54 10*3/MM3 (ref 0.2–1.2)
MONOCYTES # BLD AUTO: 0.55 10*3/MM3 (ref 0.2–1.2)
MONOCYTES # BLD AUTO: 0.65 10*3/MM3 (ref 0.2–1.2)
MONOCYTES # BLD AUTO: 0.78 10*3/MM3 (ref 0.2–1.2)
MONOCYTES # BLD AUTO: 0.78 10*3/MM3 (ref 0.2–1.2)
MONOCYTES # BLD AUTO: 0.79 10*3/MM3 (ref 0.2–1.2)
MONOCYTES # BLD AUTO: 0.84 10*3/MM3 (ref 0.2–1.2)
MONOCYTES # BLD AUTO: 0.94 10*3/MM3 (ref 0.2–1.2)
MONOCYTES # BLD AUTO: 0.95 10*3/MM3 (ref 0.2–1.2)
MONOCYTES # BLD AUTO: 0.95 10*3/MM3 (ref 0.2–1.2)
MONOCYTES # BLD AUTO: 1 10*3/MM3 (ref 0.2–1.2)
MONOCYTES # BLD AUTO: 1.13 10*3/MM3 (ref 0.2–1.2)
MONOCYTES # BLD AUTO: 1.19 10*3/MM3 (ref 0.2–1.2)
MONOCYTES # BLD AUTO: 1.2 10*3/MM3 (ref 0.2–1.2)
MONOCYTES # BLD AUTO: 1.42 10*3/MM3 (ref 0.2–1.2)
MONOCYTES NFR BLD AUTO: 10.1 % (ref 5–12)
MONOCYTES NFR BLD AUTO: 10.4 % (ref 5–12)
MONOCYTES NFR BLD AUTO: 10.7 % (ref 5–12)
MONOCYTES NFR BLD AUTO: 12 % (ref 5–12)
MONOCYTES NFR BLD AUTO: 13.4 % (ref 5–12)
MONOCYTES NFR BLD AUTO: 6.6 % (ref 5–12)
MONOCYTES NFR BLD AUTO: 6.7 % (ref 5–12)
MONOCYTES NFR BLD AUTO: 6.8 % (ref 5–12)
MONOCYTES NFR BLD AUTO: 6.8 % (ref 5–12)
MONOCYTES NFR BLD AUTO: 8.4 % (ref 5–12)
MONOCYTES NFR BLD AUTO: 9.5 % (ref 5–12)
MRSA SPEC QL CULT: NORMAL
MUCOUS THREADS URNS QL MICRO: ABNORMAL /HPF
MYELOCYTES NFR BLD MANUAL: 2 % (ref 0–0)
MYELOCYTES NFR BLD MANUAL: 4 % (ref 0–0)
MYELOCYTES NFR BLD MANUAL: 4 % (ref 0–0)
MYELOCYTES NFR BLD MANUAL: 5 % (ref 0–0)
MYELOCYTES NFR BLD MANUAL: 6 % (ref 0–0)
NEUTROPHILS # BLD AUTO: 12.04 10*3/MM3 (ref 1.9–8.1)
NEUTROPHILS # BLD AUTO: 13.51 10*3/MM3 (ref 1.9–8.1)
NEUTROPHILS # BLD AUTO: 3.81 10*3/MM3 (ref 1.9–8.1)
NEUTROPHILS # BLD AUTO: 4.14 10*3/MM3 (ref 1.9–8.1)
NEUTROPHILS # BLD AUTO: 4.38 10*3/MM3 (ref 1.9–8.1)
NEUTROPHILS # BLD AUTO: 5.3 10*3/MM3 (ref 1.9–8.1)
NEUTROPHILS # BLD AUTO: 5.89 10*3/MM3 (ref 1.9–8.1)
NEUTROPHILS # BLD AUTO: 5.98 10*3/MM3 (ref 1.9–8.1)
NEUTROPHILS # BLD AUTO: 6.03 10*3/MM3 (ref 1.9–8.1)
NEUTROPHILS # BLD AUTO: 6.37 10*3/MM3 (ref 1.9–8.1)
NEUTROPHILS # BLD AUTO: 6.58 10*3/MM3 (ref 1.9–8.1)
NEUTROPHILS # BLD AUTO: 7.12 10*3/MM3 (ref 1.9–8.1)
NEUTROPHILS # BLD AUTO: 7.41 10*3/MM3 (ref 1.9–8.1)
NEUTROPHILS # BLD AUTO: 7.45 10*3/MM3 (ref 1.9–8.1)
NEUTROPHILS # BLD AUTO: 7.55 10*3/MM3 (ref 1.9–8.1)
NEUTROPHILS # BLD AUTO: 9.65 10*3/MM3 (ref 1.9–8.1)
NEUTROPHILS NFR BLD AUTO: 48.2 % (ref 42.7–76)
NEUTROPHILS NFR BLD AUTO: 54.9 % (ref 42.7–76)
NEUTROPHILS NFR BLD AUTO: 62.1 % (ref 42.7–76)
NEUTROPHILS NFR BLD AUTO: 64.9 % (ref 42.7–76)
NEUTROPHILS NFR BLD AUTO: 66.9 % (ref 42.7–76)
NEUTROPHILS NFR BLD AUTO: 68.3 % (ref 42.7–76)
NEUTROPHILS NFR BLD AUTO: 71.5 % (ref 42.7–76)
NEUTROPHILS NFR BLD AUTO: 71.8 % (ref 42.7–76)
NEUTROPHILS NFR BLD AUTO: 74.3 % (ref 42.7–76)
NEUTROPHILS NFR BLD AUTO: 77.7 % (ref 42.7–76)
NEUTROPHILS NFR BLD AUTO: 84.2 % (ref 42.7–76)
NEUTROPHILS NFR BLD MANUAL: 50 % (ref 42.7–76)
NEUTROPHILS NFR BLD MANUAL: 59 % (ref 42.7–76)
NEUTROPHILS NFR BLD MANUAL: 66 % (ref 42.7–76)
NEUTROPHILS NFR BLD MANUAL: 68 % (ref 42.7–76)
NEUTROPHILS NFR BLD MANUAL: 71 % (ref 42.7–76)
NEUTS BAND NFR BLD MANUAL: 0 % (ref 0–5)
NEUTS VAC BLD QL SMEAR: ABNORMAL
NITRITE UR QL STRIP: NEGATIVE
NITRITE UR QL STRIP: POSITIVE
NRBC BLD MANUAL-RTO: 0 /100 WBC (ref 0–0)
NRBC BLD MANUAL-RTO: 0.1 /100 WBC (ref 0–0)
NRBC BLD MANUAL-RTO: 0.7 /100 WBC (ref 0–0)
NRBC SPEC MANUAL: 1 /100 WBC (ref 0–0)
NT-PROBNP SERPL-MCNC: 3434 PG/ML (ref 0–1800)
NT-PROBNP SERPL-MCNC: 3495 PG/ML (ref 0–1800)
NT-PROBNP SERPL-MCNC: 5028 PG/ML (ref 0–1800)
PCO2 BLDA: 50.1 MM HG (ref 35–45)
PH BLDA: 7.36 PH UNITS (ref 7.35–7.45)
PH UR STRIP.AUTO: 6 [PH] (ref 5–8)
PH UR STRIP.AUTO: <=5 [PH] (ref 5–8)
PLAT MORPH BLD: NORMAL
PLATELET # BLD AUTO: 164 10*3/MM3 (ref 140–500)
PLATELET # BLD AUTO: 189 10*3/MM3 (ref 140–500)
PLATELET # BLD AUTO: 192 10*3/MM3 (ref 140–500)
PLATELET # BLD AUTO: 225 10*3/MM3 (ref 140–500)
PLATELET # BLD AUTO: 226 10*3/MM3 (ref 140–500)
PLATELET # BLD AUTO: 234 10*3/MM3 (ref 140–500)
PLATELET # BLD AUTO: 235 10*3/MM3 (ref 140–500)
PLATELET # BLD AUTO: 236 10*3/MM3 (ref 140–500)
PLATELET # BLD AUTO: 238 10*3/MM3 (ref 140–500)
PLATELET # BLD AUTO: 250 10*3/MM3 (ref 140–500)
PLATELET # BLD AUTO: 253 10*3/MM3 (ref 140–500)
PLATELET # BLD AUTO: 258 10*3/MM3 (ref 140–500)
PLATELET # BLD AUTO: 272 10*3/MM3 (ref 140–500)
PLATELET # BLD AUTO: 283 10*3/MM3 (ref 140–500)
PLATELET # BLD AUTO: 286 10*3/MM3 (ref 140–500)
PLATELET # BLD AUTO: 298 10*3/MM3 (ref 140–500)
PLATELET # BLD AUTO: 301 10*3/MM3 (ref 140–500)
PLATELET # BLD AUTO: 310 10*3/MM3 (ref 140–500)
PLATELET # BLD AUTO: 312 10*3/MM3 (ref 140–500)
PLATELET # BLD AUTO: 314 10*3/MM3 (ref 140–500)
PLATELET # BLD AUTO: 319 10*3/MM3 (ref 140–500)
PLATELET # BLD AUTO: 335 10*3/MM3 (ref 140–500)
PLATELET # BLD AUTO: 338 10*3/MM3 (ref 140–500)
PLATELET # BLD AUTO: 342 10*3/MM3 (ref 140–500)
PLATELET # BLD AUTO: 344 10*3/MM3 (ref 140–500)
PLATELET # BLD AUTO: 345 10*3/MM3 (ref 140–500)
PLATELET # BLD AUTO: 354 10*3/MM3 (ref 140–500)
PMV BLD AUTO: 11.2 FL (ref 6–12)
PMV BLD AUTO: 11.4 FL (ref 6–12)
PMV BLD AUTO: 11.4 FL (ref 6–12)
PMV BLD AUTO: 11.5 FL (ref 6–12)
PMV BLD AUTO: 11.6 FL (ref 6–12)
PMV BLD AUTO: 11.7 FL (ref 6–12)
PMV BLD AUTO: 11.9 FL (ref 6–12)
PMV BLD AUTO: 12 FL (ref 6–12)
PMV BLD AUTO: 12.1 FL (ref 6–12)
PMV BLD AUTO: 12.2 FL (ref 6–12)
PMV BLD AUTO: 12.3 FL (ref 6–12)
PO2 BLDA: 85.9 MM HG (ref 80–100)
POLYCHROMASIA BLD QL SMEAR: ABNORMAL
POTASSIUM BLD-SCNC: 2.8 MMOL/L (ref 3.5–5.2)
POTASSIUM BLD-SCNC: 2.9 MMOL/L (ref 3.5–5.2)
POTASSIUM BLD-SCNC: 3.1 MMOL/L (ref 3.5–5.2)
POTASSIUM BLD-SCNC: 3.2 MMOL/L (ref 3.5–5.2)
POTASSIUM BLD-SCNC: 3.2 MMOL/L (ref 3.5–5.2)
POTASSIUM BLD-SCNC: 3.3 MMOL/L (ref 3.5–5.2)
POTASSIUM BLD-SCNC: 3.5 MMOL/L (ref 3.5–5.2)
POTASSIUM BLD-SCNC: 3.6 MMOL/L (ref 3.5–5.2)
POTASSIUM BLD-SCNC: 3.7 MMOL/L (ref 3.5–5.2)
POTASSIUM BLD-SCNC: 3.8 MMOL/L (ref 3.5–5.2)
POTASSIUM BLD-SCNC: 4 MMOL/L (ref 3.5–5.2)
POTASSIUM BLD-SCNC: 4 MMOL/L (ref 3.5–5.2)
POTASSIUM BLD-SCNC: 4.1 MMOL/L (ref 3.5–5.2)
POTASSIUM BLD-SCNC: 4.2 MMOL/L (ref 3.5–5.2)
POTASSIUM BLD-SCNC: 4.2 MMOL/L (ref 3.5–5.2)
POTASSIUM BLD-SCNC: 4.3 MMOL/L (ref 3.5–5.2)
POTASSIUM BLD-SCNC: 4.3 MMOL/L (ref 3.5–5.2)
POTASSIUM BLD-SCNC: 4.4 MMOL/L (ref 3.5–5.2)
POTASSIUM BLD-SCNC: 5 MMOL/L (ref 3.5–5.2)
POTASSIUM BLD-SCNC: 5.2 MMOL/L (ref 3.5–5.2)
PROCALCITONIN SERPL-MCNC: 2.33 NG/ML (ref 0.1–0.25)
PROMYELOCYTES NFR BLD MANUAL: 2 % (ref 0–0)
PROT SERPL-MCNC: 6.5 G/DL (ref 6–8.5)
PROT SERPL-MCNC: 6.8 G/DL (ref 6–8.5)
PROT SERPL-MCNC: 6.8 G/DL (ref 6–8.5)
PROT SERPL-MCNC: 6.9 G/DL (ref 6–8.5)
PROT SERPL-MCNC: 7.1 G/DL (ref 6–8.5)
PROT SERPL-MCNC: 7.3 G/DL (ref 6–8.5)
PROT SERPL-MCNC: 8.3 G/DL (ref 6–8.5)
PROT SERPL-MCNC: 8.3 G/DL (ref 6–8.5)
PROT UR QL STRIP: ABNORMAL
PROT UR QL STRIP: NEGATIVE
PROTHROMBIN TIME: 13.8 SECONDS (ref 11.7–14.2)
RBC # BLD AUTO: 2.25 10*6/MM3 (ref 3.9–5.2)
RBC # BLD AUTO: 2.69 10*6/MM3 (ref 3.9–5.2)
RBC # BLD AUTO: 2.7 10*6/MM3 (ref 3.9–5.2)
RBC # BLD AUTO: 2.82 10*6/MM3 (ref 3.9–5.2)
RBC # BLD AUTO: 2.83 10*6/MM3 (ref 3.9–5.2)
RBC # BLD AUTO: 2.93 10*6/MM3 (ref 3.9–5.2)
RBC # BLD AUTO: 3.12 10*6/MM3 (ref 3.9–5.2)
RBC # BLD AUTO: 3.49 10*6/MM3 (ref 3.9–5.2)
RBC # BLD AUTO: 3.52 10*6/MM3 (ref 3.9–5.2)
RBC # BLD AUTO: 3.54 10*6/MM3 (ref 3.9–5.2)
RBC # BLD AUTO: 3.56 10*6/MM3 (ref 3.9–5.2)
RBC # BLD AUTO: 3.61 10*6/MM3 (ref 3.9–5.2)
RBC # BLD AUTO: 3.62 10*6/MM3 (ref 3.9–5.2)
RBC # BLD AUTO: 3.64 10*6/MM3 (ref 3.9–5.2)
RBC # BLD AUTO: 3.66 10*6/MM3 (ref 3.9–5.2)
RBC # BLD AUTO: 3.72 10*6/MM3 (ref 3.9–5.2)
RBC # BLD AUTO: 3.81 10*6/MM3 (ref 3.9–5.2)
RBC # BLD AUTO: 3.84 10*6/MM3 (ref 3.9–5.2)
RBC # BLD AUTO: 3.97 10*6/MM3 (ref 3.9–5.2)
RBC # BLD AUTO: 4.03 10*6/MM3 (ref 3.9–5.2)
RBC # BLD AUTO: 4.07 10*6/MM3 (ref 3.9–5.2)
RBC # BLD AUTO: 4.16 10*6/MM3 (ref 3.9–5.2)
RBC # BLD AUTO: 4.27 10*6/MM3 (ref 3.9–5.2)
RBC # BLD AUTO: 4.29 10*6/MM3 (ref 3.9–5.2)
RBC # BLD AUTO: 4.33 10*6/MM3 (ref 3.9–5.2)
RBC # BLD AUTO: 4.41 10*6/MM3 (ref 3.9–5.2)
RBC # BLD AUTO: 4.62 10*6/MM3 (ref 3.9–5.2)
RBC # UR: ABNORMAL /HPF
RBC MORPH BLD: NORMAL
REF LAB TEST METHOD: ABNORMAL
RH BLD: POSITIVE
SAO2 % BLDCOA: 95.9 % (ref 92–99)
SCAN SLIDE: NORMAL
SET MECH RESP RATE: 32
SODIUM BLD-SCNC: 126 MMOL/L (ref 136–145)
SODIUM BLD-SCNC: 128 MMOL/L (ref 136–145)
SODIUM BLD-SCNC: 131 MMOL/L (ref 136–145)
SODIUM BLD-SCNC: 133 MMOL/L (ref 136–145)
SODIUM BLD-SCNC: 133 MMOL/L (ref 136–145)
SODIUM BLD-SCNC: 134 MMOL/L (ref 136–145)
SODIUM BLD-SCNC: 135 MMOL/L (ref 136–145)
SODIUM BLD-SCNC: 136 MMOL/L (ref 136–145)
SODIUM BLD-SCNC: 137 MMOL/L (ref 136–145)
SODIUM BLD-SCNC: 137 MMOL/L (ref 136–145)
SODIUM BLD-SCNC: 138 MMOL/L (ref 136–145)
SODIUM BLD-SCNC: 138 MMOL/L (ref 136–145)
SODIUM BLD-SCNC: 139 MMOL/L (ref 136–145)
SODIUM BLD-SCNC: 140 MMOL/L (ref 136–145)
SODIUM BLD-SCNC: 141 MMOL/L (ref 136–145)
SODIUM BLD-SCNC: 142 MMOL/L (ref 136–145)
SODIUM BLD-SCNC: 144 MMOL/L (ref 136–145)
SODIUM BLD-SCNC: 145 MMOL/L (ref 136–145)
SODIUM BLD-SCNC: 146 MMOL/L (ref 136–145)
SODIUM BLD-SCNC: 147 MMOL/L (ref 136–145)
SODIUM UR-SCNC: 32 MMOL/L
SP GR UR STRIP: 1.01 (ref 1–1.03)
SP GR UR STRIP: 1.01 (ref 1–1.03)
SP GR UR STRIP: 1.02 (ref 1–1.03)
SP GR UR STRIP: 1.03 (ref 1–1.03)
SPHEROCYTES BLD QL SMEAR: ABNORMAL
SQUAMOUS #/AREA URNS HPF: ABNORMAL /HPF
TROPONIN T SERPL-MCNC: 0.13 NG/ML (ref 0–0.03)
TROPONIN T SERPL-MCNC: 0.15 NG/ML (ref 0–0.03)
UNIT  ABO: NORMAL
UNIT  ABO: NORMAL
UNIT  RH: NORMAL
UNIT  RH: NORMAL
UROBILINOGEN UR QL STRIP: ABNORMAL
VANCOMYCIN SERPL-MCNC: 16.6 MCG/ML (ref 5–40)
VANCOMYCIN SERPL-MCNC: 18 MCG/ML (ref 5–40)
VANCOMYCIN SERPL-MCNC: 18.5 MCG/ML (ref 5–40)
VANCOMYCIN SERPL-MCNC: 29.2 MCG/ML (ref 5–40)
WBC MORPH BLD: NORMAL
WBC NRBC COR # BLD: 10.31 10*3/MM3 (ref 4.5–10.7)
WBC NRBC COR # BLD: 10.49 10*3/MM3 (ref 4.5–10.7)
WBC NRBC COR # BLD: 10.59 10*3/MM3 (ref 4.5–10.7)
WBC NRBC COR # BLD: 10.86 10*3/MM3 (ref 4.5–10.7)
WBC NRBC COR # BLD: 11.1 10*3/MM3 (ref 4.5–10.7)
WBC NRBC COR # BLD: 11.22 10*3/MM3 (ref 4.5–10.7)
WBC NRBC COR # BLD: 13.24 10*3/MM3 (ref 4.5–10.7)
WBC NRBC COR # BLD: 14.04 10*3/MM3 (ref 4.5–10.7)
WBC NRBC COR # BLD: 14.09 10*3/MM3 (ref 4.5–10.7)
WBC NRBC COR # BLD: 14.11 10*3/MM3 (ref 4.5–10.7)
WBC NRBC COR # BLD: 14.31 10*3/MM3 (ref 4.5–10.7)
WBC NRBC COR # BLD: 16.81 10*3/MM3 (ref 4.5–10.7)
WBC NRBC COR # BLD: 17.39 10*3/MM3 (ref 4.5–10.7)
WBC NRBC COR # BLD: 25.21 10*3/MM3 (ref 4.5–10.7)
WBC NRBC COR # BLD: 7.53 10*3/MM3 (ref 4.5–10.7)
WBC NRBC COR # BLD: 7.92 10*3/MM3 (ref 4.5–10.7)
WBC NRBC COR # BLD: 8.12 10*3/MM3 (ref 4.5–10.7)
WBC NRBC COR # BLD: 8.23 10*3/MM3 (ref 4.5–10.7)
WBC NRBC COR # BLD: 8.75 10*3/MM3 (ref 4.5–10.7)
WBC NRBC COR # BLD: 8.82 10*3/MM3 (ref 4.5–10.7)
WBC NRBC COR # BLD: 8.87 10*3/MM3 (ref 4.5–10.7)
WBC NRBC COR # BLD: 8.95 10*3/MM3 (ref 4.5–10.7)
WBC NRBC COR # BLD: 8.99 10*3/MM3 (ref 4.5–10.7)
WBC NRBC COR # BLD: 9.17 10*3/MM3 (ref 4.5–10.7)
WBC NRBC COR # BLD: 9.3 10*3/MM3 (ref 4.5–10.7)
WBC NRBC COR # BLD: 9.59 10*3/MM3 (ref 4.5–10.7)
WBC NRBC COR # BLD: 9.88 10*3/MM3 (ref 4.5–10.7)
WBC UR QL AUTO: ABNORMAL /HPF
WHOLE BLOOD HOLD SPECIMEN: NORMAL
YEAST URNS QL MICRO: ABNORMAL /HPF

## 2018-01-01 PROCEDURE — 99232 SBSQ HOSP IP/OBS MODERATE 35: CPT | Performed by: INTERNAL MEDICINE

## 2018-01-01 PROCEDURE — 63710000001 INSULIN DETEMER PER 5 UNITS: Performed by: INTERNAL MEDICINE

## 2018-01-01 PROCEDURE — 80048 BASIC METABOLIC PNL TOTAL CA: CPT | Performed by: INTERNAL MEDICINE

## 2018-01-01 PROCEDURE — 94799 UNLISTED PULMONARY SVC/PX: CPT

## 2018-01-01 PROCEDURE — 82962 GLUCOSE BLOOD TEST: CPT

## 2018-01-01 PROCEDURE — 80053 COMPREHEN METABOLIC PANEL: CPT | Performed by: INTERNAL MEDICINE

## 2018-01-01 PROCEDURE — 85014 HEMATOCRIT: CPT | Performed by: NURSE PRACTITIONER

## 2018-01-01 PROCEDURE — 63710000001 INSULIN ASPART PER 5 UNITS: Performed by: INTERNAL MEDICINE

## 2018-01-01 PROCEDURE — 85018 HEMOGLOBIN: CPT | Performed by: INTERNAL MEDICINE

## 2018-01-01 PROCEDURE — 85007 BL SMEAR W/DIFF WBC COUNT: CPT | Performed by: NURSE PRACTITIONER

## 2018-01-01 PROCEDURE — 97110 THERAPEUTIC EXERCISES: CPT

## 2018-01-01 PROCEDURE — 80048 BASIC METABOLIC PNL TOTAL CA: CPT | Performed by: NURSE PRACTITIONER

## 2018-01-01 PROCEDURE — 81001 URINALYSIS AUTO W/SCOPE: CPT | Performed by: INTERNAL MEDICINE

## 2018-01-01 PROCEDURE — 25010000002 VANCOMYCIN 10 G RECONSTITUTED SOLUTION: Performed by: PHYSICIAN ASSISTANT

## 2018-01-01 PROCEDURE — C1769 GUIDE WIRE: HCPCS | Performed by: INTERNAL MEDICINE

## 2018-01-01 PROCEDURE — 85014 HEMATOCRIT: CPT | Performed by: INTERNAL MEDICINE

## 2018-01-01 PROCEDURE — 80202 ASSAY OF VANCOMYCIN: CPT | Performed by: INTERNAL MEDICINE

## 2018-01-01 PROCEDURE — 25010000002 ENOXAPARIN PER 10 MG: Performed by: INTERNAL MEDICINE

## 2018-01-01 PROCEDURE — 86900 BLOOD TYPING SEROLOGIC ABO: CPT

## 2018-01-01 PROCEDURE — 25010000002 HYDRALAZINE PER 20 MG: Performed by: INTERNAL MEDICINE

## 2018-01-01 PROCEDURE — 85025 COMPLETE CBC W/AUTO DIFF WBC: CPT | Performed by: NURSE PRACTITIONER

## 2018-01-01 PROCEDURE — 93306 TTE W/DOPPLER COMPLETE: CPT | Performed by: INTERNAL MEDICINE

## 2018-01-01 PROCEDURE — 36415 COLL VENOUS BLD VENIPUNCTURE: CPT | Performed by: EMERGENCY MEDICINE

## 2018-01-01 PROCEDURE — 93306 TTE W/DOPPLER COMPLETE: CPT

## 2018-01-01 PROCEDURE — 86901 BLOOD TYPING SEROLOGIC RH(D): CPT | Performed by: INTERNAL MEDICINE

## 2018-01-01 PROCEDURE — 25010000002 LORAZEPAM PER 2 MG: Performed by: INTERNAL MEDICINE

## 2018-01-01 PROCEDURE — 85018 HEMOGLOBIN: CPT | Performed by: NURSE PRACTITIONER

## 2018-01-01 PROCEDURE — 63710000001 PREDNISONE PER 1 MG: Performed by: INTERNAL MEDICINE

## 2018-01-01 PROCEDURE — 85025 COMPLETE CBC W/AUTO DIFF WBC: CPT | Performed by: INTERNAL MEDICINE

## 2018-01-01 PROCEDURE — 99222 1ST HOSP IP/OBS MODERATE 55: CPT | Performed by: INTERNAL MEDICINE

## 2018-01-01 PROCEDURE — 25010000002 ENOXAPARIN PER 10 MG: Performed by: NURSE PRACTITIONER

## 2018-01-01 PROCEDURE — 85027 COMPLETE CBC AUTOMATED: CPT | Performed by: INTERNAL MEDICINE

## 2018-01-01 PROCEDURE — 85730 THROMBOPLASTIN TIME PARTIAL: CPT | Performed by: INTERNAL MEDICINE

## 2018-01-01 PROCEDURE — 85025 COMPLETE CBC W/AUTO DIFF WBC: CPT | Performed by: EMERGENCY MEDICINE

## 2018-01-01 PROCEDURE — 0FPB8DZ REMOVAL OF INTRALUMINAL DEVICE FROM HEPATOBILIARY DUCT, VIA NATURAL OR ARTIFICIAL OPENING ENDOSCOPIC: ICD-10-PCS | Performed by: INTERNAL MEDICINE

## 2018-01-01 PROCEDURE — 63710000001 PREDNISONE PER 5 MG: Performed by: INTERNAL MEDICINE

## 2018-01-01 PROCEDURE — BF111ZZ FLUOROSCOPY OF BILIARY AND PANCREATIC DUCTS USING LOW OSMOLAR CONTRAST: ICD-10-PCS | Performed by: INTERNAL MEDICINE

## 2018-01-01 PROCEDURE — 25010000002 PIPERACILLIN SOD-TAZOBACTAM PER 1 G: Performed by: INTERNAL MEDICINE

## 2018-01-01 PROCEDURE — 99285 EMERGENCY DEPT VISIT HI MDM: CPT

## 2018-01-01 PROCEDURE — 25010000002 VANCOMYCIN 10 G RECONSTITUTED SOLUTION: Performed by: INTERNAL MEDICINE

## 2018-01-01 PROCEDURE — 63710000001 INSULIN REGULAR HUMAN PER 5 UNITS: Performed by: INTERNAL MEDICINE

## 2018-01-01 PROCEDURE — 80053 COMPREHEN METABOLIC PANEL: CPT | Performed by: NURSE PRACTITIONER

## 2018-01-01 PROCEDURE — 25010000002 LEVOFLOXACIN PER 250 MG: Performed by: INTERNAL MEDICINE

## 2018-01-01 PROCEDURE — 25010000002 MORPHINE PER 10 MG: Performed by: EMERGENCY MEDICINE

## 2018-01-01 PROCEDURE — 94640 AIRWAY INHALATION TREATMENT: CPT

## 2018-01-01 PROCEDURE — 80053 COMPREHEN METABOLIC PANEL: CPT | Performed by: EMERGENCY MEDICINE

## 2018-01-01 PROCEDURE — 84132 ASSAY OF SERUM POTASSIUM: CPT | Performed by: INTERNAL MEDICINE

## 2018-01-01 PROCEDURE — 71045 X-RAY EXAM CHEST 1 VIEW: CPT

## 2018-01-01 PROCEDURE — 25010000002 ONDANSETRON PER 1 MG: Performed by: INTERNAL MEDICINE

## 2018-01-01 PROCEDURE — 80076 HEPATIC FUNCTION PANEL: CPT | Performed by: NURSE PRACTITIONER

## 2018-01-01 PROCEDURE — 25010000002 MORPHINE PER 10 MG: Performed by: INTERNAL MEDICINE

## 2018-01-01 PROCEDURE — 83690 ASSAY OF LIPASE: CPT | Performed by: EMERGENCY MEDICINE

## 2018-01-01 PROCEDURE — P9016 RBC LEUKOCYTES REDUCED: HCPCS

## 2018-01-01 PROCEDURE — 97150 GROUP THERAPEUTIC PROCEDURES: CPT

## 2018-01-01 PROCEDURE — 71046 X-RAY EXAM CHEST 2 VIEWS: CPT

## 2018-01-01 PROCEDURE — 99309 SBSQ NF CARE MODERATE MDM 30: CPT | Performed by: NURSE PRACTITIONER

## 2018-01-01 PROCEDURE — C1874 STENT, COATED/COV W/DEL SYS: HCPCS | Performed by: INTERNAL MEDICINE

## 2018-01-01 PROCEDURE — 25810000003 POTASSIUM CHLORIDE PER 2 MEQ: Performed by: INTERNAL MEDICINE

## 2018-01-01 PROCEDURE — 25010000002 VANCOMYCIN 750 MG RECONSTITUTED SOLUTION: Performed by: INTERNAL MEDICINE

## 2018-01-01 PROCEDURE — 0DB68ZX EXCISION OF STOMACH, VIA NATURAL OR ARTIFICIAL OPENING ENDOSCOPIC, DIAGNOSTIC: ICD-10-PCS | Performed by: INTERNAL MEDICINE

## 2018-01-01 PROCEDURE — 87086 URINE CULTURE/COLONY COUNT: CPT | Performed by: INTERNAL MEDICINE

## 2018-01-01 PROCEDURE — 97162 PT EVAL MOD COMPLEX 30 MIN: CPT

## 2018-01-01 PROCEDURE — 25010000002 FUROSEMIDE PER 20 MG: Performed by: INTERNAL MEDICINE

## 2018-01-01 PROCEDURE — 97530 THERAPEUTIC ACTIVITIES: CPT | Performed by: PHYSICAL THERAPIST

## 2018-01-01 PROCEDURE — 43265 ERCP LITHOTRIPSY CALCULI: CPT | Performed by: INTERNAL MEDICINE

## 2018-01-01 PROCEDURE — 82272 OCCULT BLD FECES 1-3 TESTS: CPT | Performed by: INTERNAL MEDICINE

## 2018-01-01 PROCEDURE — 86923 COMPATIBILITY TEST ELECTRIC: CPT

## 2018-01-01 PROCEDURE — 25010000002 METHYLPREDNISOLONE PER 125 MG: Performed by: INTERNAL MEDICINE

## 2018-01-01 PROCEDURE — 86901 BLOOD TYPING SEROLOGIC RH(D): CPT

## 2018-01-01 PROCEDURE — 99308 SBSQ NF CARE LOW MDM 20: CPT | Performed by: NURSE PRACTITIONER

## 2018-01-01 PROCEDURE — 83605 ASSAY OF LACTIC ACID: CPT | Performed by: PHYSICIAN ASSISTANT

## 2018-01-01 PROCEDURE — 85610 PROTHROMBIN TIME: CPT | Performed by: INTERNAL MEDICINE

## 2018-01-01 PROCEDURE — 83690 ASSAY OF LIPASE: CPT | Performed by: INTERNAL MEDICINE

## 2018-01-01 PROCEDURE — 83880 ASSAY OF NATRIURETIC PEPTIDE: CPT | Performed by: INTERNAL MEDICINE

## 2018-01-01 PROCEDURE — 86850 RBC ANTIBODY SCREEN: CPT | Performed by: INTERNAL MEDICINE

## 2018-01-01 PROCEDURE — 84300 ASSAY OF URINE SODIUM: CPT | Performed by: INTERNAL MEDICINE

## 2018-01-01 PROCEDURE — 93010 ELECTROCARDIOGRAM REPORT: CPT | Performed by: INTERNAL MEDICINE

## 2018-01-01 PROCEDURE — 43274 ERCP DUCT STENT PLACEMENT: CPT | Performed by: INTERNAL MEDICINE

## 2018-01-01 PROCEDURE — 76775 US EXAM ABDO BACK WALL LIM: CPT

## 2018-01-01 PROCEDURE — 92610 EVALUATE SWALLOWING FUNCTION: CPT | Performed by: SPEECH-LANGUAGE PATHOLOGIST

## 2018-01-01 PROCEDURE — 25010000002 PROPOFOL 10 MG/ML EMULSION: Performed by: ANESTHESIOLOGY

## 2018-01-01 PROCEDURE — 25010000002 FUROSEMIDE PER 20 MG: Performed by: HOSPITALIST

## 2018-01-01 PROCEDURE — 99231 SBSQ HOSP IP/OBS SF/LOW 25: CPT | Performed by: INTERNAL MEDICINE

## 2018-01-01 PROCEDURE — 43235 EGD DIAGNOSTIC BRUSH WASH: CPT | Performed by: INTERNAL MEDICINE

## 2018-01-01 PROCEDURE — 85007 BL SMEAR W/DIFF WBC COUNT: CPT | Performed by: INTERNAL MEDICINE

## 2018-01-01 PROCEDURE — 25010000002 ERTAPENEM PER 500 MG: Performed by: INTERNAL MEDICINE

## 2018-01-01 PROCEDURE — 82803 BLOOD GASES ANY COMBINATION: CPT

## 2018-01-01 PROCEDURE — 93005 ELECTROCARDIOGRAM TRACING: CPT | Performed by: EMERGENCY MEDICINE

## 2018-01-01 PROCEDURE — 84484 ASSAY OF TROPONIN QUANT: CPT | Performed by: INTERNAL MEDICINE

## 2018-01-01 PROCEDURE — 74176 CT ABD & PELVIS W/O CONTRAST: CPT

## 2018-01-01 PROCEDURE — 81001 URINALYSIS AUTO W/SCOPE: CPT | Performed by: EMERGENCY MEDICINE

## 2018-01-01 PROCEDURE — 84484 ASSAY OF TROPONIN QUANT: CPT | Performed by: EMERGENCY MEDICINE

## 2018-01-01 PROCEDURE — 86900 BLOOD TYPING SEROLOGIC ABO: CPT | Performed by: INTERNAL MEDICINE

## 2018-01-01 PROCEDURE — 36600 WITHDRAWAL OF ARTERIAL BLOOD: CPT

## 2018-01-01 PROCEDURE — 87040 BLOOD CULTURE FOR BACTERIA: CPT | Performed by: PHYSICIAN ASSISTANT

## 2018-01-01 PROCEDURE — 82570 ASSAY OF URINE CREATININE: CPT | Performed by: INTERNAL MEDICINE

## 2018-01-01 PROCEDURE — 74328 X-RAY BILE DUCT ENDOSCOPY: CPT

## 2018-01-01 PROCEDURE — 25010000002 HYDROMORPHONE PER 4 MG: Performed by: INTERNAL MEDICINE

## 2018-01-01 PROCEDURE — 84145 PROCALCITONIN (PCT): CPT | Performed by: PHYSICIAN ASSISTANT

## 2018-01-01 PROCEDURE — 83880 ASSAY OF NATRIURETIC PEPTIDE: CPT | Performed by: EMERGENCY MEDICINE

## 2018-01-01 PROCEDURE — 0F798DZ DILATION OF COMMON BILE DUCT WITH INTRALUMINAL DEVICE, VIA NATURAL OR ARTIFICIAL OPENING ENDOSCOPIC: ICD-10-PCS | Performed by: INTERNAL MEDICINE

## 2018-01-01 PROCEDURE — 87493 C DIFF AMPLIFIED PROBE: CPT | Performed by: INTERNAL MEDICINE

## 2018-01-01 PROCEDURE — 83735 ASSAY OF MAGNESIUM: CPT | Performed by: NURSE PRACTITIONER

## 2018-01-01 PROCEDURE — 36430 TRANSFUSION BLD/BLD COMPNT: CPT

## 2018-01-01 PROCEDURE — 81003 URINALYSIS AUTO W/O SCOPE: CPT | Performed by: INTERNAL MEDICINE

## 2018-01-01 PROCEDURE — 0 IOPAMIDOL 61 % SOLUTION: Performed by: INTERNAL MEDICINE

## 2018-01-01 PROCEDURE — 25010000002 PIPERACILLIN SOD-TAZOBACTAM PER 1 G: Performed by: PHYSICIAN ASSISTANT

## 2018-01-01 PROCEDURE — 80074 ACUTE HEPATITIS PANEL: CPT | Performed by: EMERGENCY MEDICINE

## 2018-01-01 PROCEDURE — 25010000002 PERFLUTREN (DEFINITY) 8.476 MG IN SODIUM CHLORIDE 0.9 % 10 ML INJECTION: Performed by: INTERNAL MEDICINE

## 2018-01-01 PROCEDURE — 25010000002 ONDANSETRON PER 1 MG: Performed by: EMERGENCY MEDICINE

## 2018-01-01 DEVICE — WALLFLEX BILIARY RX COVERED 10X60
Type: IMPLANTABLE DEVICE | Site: BILE DUCT | Status: FUNCTIONAL
Brand: WALLFLEX™ BILIARY

## 2018-01-01 RX ORDER — DIPHENOXYLATE HYDROCHLORIDE AND ATROPINE SULFATE 2.5; .025 MG/1; MG/1
1 TABLET ORAL
Status: CANCELLED | OUTPATIENT
Start: 2018-01-01

## 2018-01-01 RX ORDER — MORPHINE SULFATE 10 MG/ML
6 INJECTION INTRAMUSCULAR; INTRAVENOUS; SUBCUTANEOUS
Status: DISCONTINUED | OUTPATIENT
Start: 2018-01-01 | End: 2018-01-01 | Stop reason: HOSPADM

## 2018-01-01 RX ORDER — GLYCOPYRROLATE 0.2 MG/ML
0.2 INJECTION INTRAMUSCULAR; INTRAVENOUS
Status: DISCONTINUED | OUTPATIENT
Start: 2018-01-01 | End: 2018-01-01 | Stop reason: HOSPADM

## 2018-01-01 RX ORDER — LORAZEPAM 2 MG/ML
1 CONCENTRATE ORAL
Status: CANCELLED | OUTPATIENT
Start: 2018-01-01 | End: 2018-03-08

## 2018-01-01 RX ORDER — GLYCOPYRROLATE 0.2 MG/ML
0.2 INJECTION INTRAMUSCULAR; INTRAVENOUS
Status: CANCELLED | OUTPATIENT
Start: 2018-01-01

## 2018-01-01 RX ORDER — SODIUM CHLORIDE AND POTASSIUM CHLORIDE 150; 450 MG/100ML; MG/100ML
100 INJECTION, SOLUTION INTRAVENOUS CONTINUOUS
Status: DISCONTINUED | OUTPATIENT
Start: 2018-01-01 | End: 2018-01-01

## 2018-01-01 RX ORDER — DOXYCYCLINE 100 MG/1
100 CAPSULE ORAL EVERY 12 HOURS SCHEDULED
Qty: 6 CAPSULE | Refills: 0 | Status: CANCELLED | OUTPATIENT
Start: 2018-01-01 | End: 2018-01-01

## 2018-01-01 RX ORDER — SODIUM CHLORIDE 9 MG/ML
150 INJECTION, SOLUTION INTRAVENOUS CONTINUOUS
Status: DISCONTINUED | OUTPATIENT
Start: 2018-01-01 | End: 2018-01-01

## 2018-01-01 RX ORDER — ALPRAZOLAM 0.25 MG/1
0.25 TABLET ORAL ONCE
Status: COMPLETED | OUTPATIENT
Start: 2018-01-01 | End: 2018-01-01

## 2018-01-01 RX ORDER — SODIUM CHLORIDE 0.9 % (FLUSH) 0.9 %
10 SYRINGE (ML) INJECTION AS NEEDED
Status: DISCONTINUED | OUTPATIENT
Start: 2018-01-01 | End: 2018-01-01 | Stop reason: HOSPADM

## 2018-01-01 RX ORDER — LORAZEPAM 2 MG/ML
0.5 INJECTION INTRAMUSCULAR
Status: DISCONTINUED | OUTPATIENT
Start: 2018-01-01 | End: 2018-01-01 | Stop reason: HOSPADM

## 2018-01-01 RX ORDER — HYDROCODONE BITARTRATE AND ACETAMINOPHEN 5; 325 MG/1; MG/1
1 TABLET ORAL EVERY 4 HOURS PRN
Status: DISCONTINUED | OUTPATIENT
Start: 2018-01-01 | End: 2018-01-01 | Stop reason: HOSPADM

## 2018-01-01 RX ORDER — HYDROMORPHONE HYDROCHLORIDE 1 MG/ML
0.25 INJECTION, SOLUTION INTRAMUSCULAR; INTRAVENOUS; SUBCUTANEOUS
Status: DISCONTINUED | OUTPATIENT
Start: 2018-01-01 | End: 2018-01-01

## 2018-01-01 RX ORDER — LORAZEPAM 2 MG/ML
2 CONCENTRATE ORAL
Status: CANCELLED | OUTPATIENT
Start: 2018-01-01 | End: 2018-03-08

## 2018-01-01 RX ORDER — ACETAMINOPHEN 325 MG/1
650 TABLET ORAL EVERY 4 HOURS PRN
Status: DISCONTINUED | OUTPATIENT
Start: 2018-01-01 | End: 2018-01-01 | Stop reason: HOSPADM

## 2018-01-01 RX ORDER — ACETAMINOPHEN 650 MG/1
650 SUPPOSITORY RECTAL EVERY 4 HOURS PRN
Status: CANCELLED | OUTPATIENT
Start: 2018-01-01

## 2018-01-01 RX ORDER — OSELTAMIVIR PHOSPHATE 30 MG/1
30 CAPSULE ORAL
Status: COMPLETED | OUTPATIENT
Start: 2018-01-01 | End: 2018-01-01

## 2018-01-01 RX ORDER — BUDESONIDE 0.5 MG/2ML
1 INHALANT ORAL
Qty: 1 EACH | Refills: 1 | Status: SHIPPED | OUTPATIENT
Start: 2018-01-01

## 2018-01-01 RX ORDER — LORAZEPAM 2 MG/ML
1 CONCENTRATE ORAL
Status: DISCONTINUED | OUTPATIENT
Start: 2018-01-01 | End: 2018-01-01 | Stop reason: HOSPADM

## 2018-01-01 RX ORDER — NICOTINE POLACRILEX 4 MG
15 LOZENGE BUCCAL
Status: DISCONTINUED | OUTPATIENT
Start: 2018-01-01 | End: 2018-01-01 | Stop reason: HOSPADM

## 2018-01-01 RX ORDER — PROPOFOL 10 MG/ML
VIAL (ML) INTRAVENOUS CONTINUOUS PRN
Status: DISCONTINUED | OUTPATIENT
Start: 2018-01-01 | End: 2018-01-01 | Stop reason: SURG

## 2018-01-01 RX ORDER — ACETAMINOPHEN 500 MG
500 TABLET ORAL EVERY 6 HOURS PRN
Status: DISCONTINUED | OUTPATIENT
Start: 2018-01-01 | End: 2018-01-01 | Stop reason: HOSPADM

## 2018-01-01 RX ORDER — DEXTROSE MONOHYDRATE 25 G/50ML
25 INJECTION, SOLUTION INTRAVENOUS
Status: DISCONTINUED | OUTPATIENT
Start: 2018-01-01 | End: 2018-01-01

## 2018-01-01 RX ORDER — PROMETHAZINE HYDROCHLORIDE 25 MG/ML
12.5 INJECTION, SOLUTION INTRAMUSCULAR; INTRAVENOUS EVERY 4 HOURS PRN
Status: CANCELLED | OUTPATIENT
Start: 2018-01-01

## 2018-01-01 RX ORDER — MORPHINE SULFATE 2 MG/ML
2 INJECTION, SOLUTION INTRAMUSCULAR; INTRAVENOUS
Status: CANCELLED | OUTPATIENT
Start: 2018-01-01 | End: 2018-03-08

## 2018-01-01 RX ORDER — DEXTROSE MONOHYDRATE 25 G/50ML
25 INJECTION, SOLUTION INTRAVENOUS
Status: DISCONTINUED | OUTPATIENT
Start: 2018-01-01 | End: 2018-01-01 | Stop reason: HOSPADM

## 2018-01-01 RX ORDER — SODIUM CHLORIDE 9 MG/ML
100 INJECTION, SOLUTION INTRAVENOUS CONTINUOUS
Status: DISCONTINUED | OUTPATIENT
Start: 2018-01-01 | End: 2018-01-01

## 2018-01-01 RX ORDER — MORPHINE SULFATE 2 MG/ML
2 INJECTION, SOLUTION INTRAMUSCULAR; INTRAVENOUS
Status: DISCONTINUED | OUTPATIENT
Start: 2018-01-01 | End: 2018-01-01 | Stop reason: HOSPADM

## 2018-01-01 RX ORDER — MORPHINE SULFATE 20 MG/ML
5 SOLUTION ORAL
Status: DISCONTINUED | OUTPATIENT
Start: 2018-01-01 | End: 2018-01-01 | Stop reason: HOSPADM

## 2018-01-01 RX ORDER — MORPHINE SULFATE 20 MG/ML
10 SOLUTION ORAL
Status: DISCONTINUED | OUTPATIENT
Start: 2018-01-01 | End: 2018-01-01 | Stop reason: HOSPADM

## 2018-01-01 RX ORDER — LORAZEPAM 2 MG/ML
0.5 INJECTION INTRAMUSCULAR ONCE
Status: COMPLETED | OUTPATIENT
Start: 2018-01-01 | End: 2018-01-01

## 2018-01-01 RX ORDER — SACCHAROMYCES BOULARDII 250 MG
250 CAPSULE ORAL 2 TIMES DAILY
Status: DISCONTINUED | OUTPATIENT
Start: 2018-01-01 | End: 2018-01-01

## 2018-01-01 RX ORDER — FUROSEMIDE 40 MG/1
40 TABLET ORAL DAILY
Qty: 30 TABLET | Refills: 0 | Status: SHIPPED | OUTPATIENT
Start: 2018-01-01

## 2018-01-01 RX ORDER — FUROSEMIDE 10 MG/ML
40 INJECTION INTRAMUSCULAR; INTRAVENOUS ONCE
Status: COMPLETED | OUTPATIENT
Start: 2018-01-01 | End: 2018-01-01

## 2018-01-01 RX ORDER — PREDNISONE 20 MG/1
20 TABLET ORAL 2 TIMES DAILY WITH MEALS
Status: DISCONTINUED | OUTPATIENT
Start: 2018-01-01 | End: 2018-01-01

## 2018-01-01 RX ORDER — IPRATROPIUM BROMIDE AND ALBUTEROL SULFATE 2.5; .5 MG/3ML; MG/3ML
3 SOLUTION RESPIRATORY (INHALATION)
Status: DISCONTINUED | OUTPATIENT
Start: 2018-01-01 | End: 2018-01-01

## 2018-01-01 RX ORDER — HYDROMORPHONE HYDROCHLORIDE 1 MG/ML
0.5 INJECTION, SOLUTION INTRAMUSCULAR; INTRAVENOUS; SUBCUTANEOUS
Status: DISCONTINUED | OUTPATIENT
Start: 2018-01-01 | End: 2018-01-01 | Stop reason: HOSPADM

## 2018-01-01 RX ORDER — BUDESONIDE AND FORMOTEROL FUMARATE DIHYDRATE 160; 4.5 UG/1; UG/1
2 AEROSOL RESPIRATORY (INHALATION)
Status: DISCONTINUED | OUTPATIENT
Start: 2018-01-01 | End: 2018-01-01 | Stop reason: HOSPADM

## 2018-01-01 RX ORDER — DOXYCYCLINE 100 MG/1
100 CAPSULE ORAL EVERY 12 HOURS SCHEDULED
Status: COMPLETED | OUTPATIENT
Start: 2018-01-01 | End: 2018-01-01

## 2018-01-01 RX ORDER — MORPHINE SULFATE 20 MG/ML
20 SOLUTION ORAL
Status: CANCELLED | OUTPATIENT
Start: 2018-01-01 | End: 2018-03-08

## 2018-01-01 RX ORDER — HYDROCODONE BITARTRATE AND ACETAMINOPHEN 5; 325 MG/1; MG/1
1 TABLET ORAL EVERY 4 HOURS PRN
Status: ON HOLD | COMMUNITY
End: 2018-01-01

## 2018-01-01 RX ORDER — LORAZEPAM 2 MG/ML
2 CONCENTRATE ORAL
Status: DISCONTINUED | OUTPATIENT
Start: 2018-01-01 | End: 2018-01-01 | Stop reason: HOSPADM

## 2018-01-01 RX ORDER — BUDESONIDE 1 MG/2ML
1 INHALANT ORAL
Status: DISCONTINUED | OUTPATIENT
Start: 2018-01-01 | End: 2018-01-01

## 2018-01-01 RX ORDER — ACETAMINOPHEN 160 MG/5ML
650 SOLUTION ORAL EVERY 4 HOURS PRN
Status: DISCONTINUED | OUTPATIENT
Start: 2018-01-01 | End: 2018-01-01 | Stop reason: HOSPADM

## 2018-01-01 RX ORDER — OSELTAMIVIR PHOSPHATE 30 MG/1
30 CAPSULE ORAL EVERY 12 HOURS SCHEDULED
Status: DISCONTINUED | OUTPATIENT
Start: 2018-01-01 | End: 2018-01-01

## 2018-01-01 RX ORDER — PROMETHAZINE HYDROCHLORIDE 25 MG/1
6.25 TABLET ORAL EVERY 4 HOURS PRN
Status: CANCELLED | OUTPATIENT
Start: 2018-01-01

## 2018-01-01 RX ORDER — DOCUSATE SODIUM 250 MG
250 CAPSULE ORAL DAILY
COMMUNITY

## 2018-01-01 RX ORDER — IPRATROPIUM BROMIDE AND ALBUTEROL SULFATE 2.5; .5 MG/3ML; MG/3ML
3 SOLUTION RESPIRATORY (INHALATION)
Status: DISCONTINUED | OUTPATIENT
Start: 2018-01-01 | End: 2018-01-01 | Stop reason: HOSPADM

## 2018-01-01 RX ORDER — BUDESONIDE AND FORMOTEROL FUMARATE DIHYDRATE 160; 4.5 UG/1; UG/1
2 AEROSOL RESPIRATORY (INHALATION)
Qty: 1 INHALER | Refills: 12 | Status: SHIPPED | OUTPATIENT
Start: 2018-01-01 | End: 2018-01-01 | Stop reason: HOSPADM

## 2018-01-01 RX ORDER — FUROSEMIDE 10 MG/ML
40 INJECTION INTRAMUSCULAR; INTRAVENOUS EVERY 12 HOURS
Status: DISCONTINUED | OUTPATIENT
Start: 2018-01-01 | End: 2018-01-01

## 2018-01-01 RX ORDER — LORAZEPAM 2 MG/ML
2 INJECTION INTRAMUSCULAR
Status: DISCONTINUED | OUTPATIENT
Start: 2018-01-01 | End: 2018-01-01 | Stop reason: HOSPADM

## 2018-01-01 RX ORDER — GLIPIZIDE 5 MG/1
5 TABLET ORAL
Status: DISCONTINUED | OUTPATIENT
Start: 2018-01-01 | End: 2018-01-01 | Stop reason: HOSPADM

## 2018-01-01 RX ORDER — MORPHINE SULFATE 2 MG/ML
2 INJECTION, SOLUTION INTRAMUSCULAR; INTRAVENOUS ONCE
Status: COMPLETED | OUTPATIENT
Start: 2018-01-01 | End: 2018-01-01

## 2018-01-01 RX ORDER — GLYCOPYRROLATE 0.2 MG/ML
0.4 INJECTION INTRAMUSCULAR; INTRAVENOUS
Status: CANCELLED | OUTPATIENT
Start: 2018-01-01

## 2018-01-01 RX ORDER — PROPOFOL 10 MG/ML
VIAL (ML) INTRAVENOUS AS NEEDED
Status: DISCONTINUED | OUTPATIENT
Start: 2018-01-01 | End: 2018-01-01 | Stop reason: SURG

## 2018-01-01 RX ORDER — AMLODIPINE BESYLATE 5 MG/1
5 TABLET ORAL
Status: DISCONTINUED | OUTPATIENT
Start: 2018-01-01 | End: 2018-01-01 | Stop reason: HOSPADM

## 2018-01-01 RX ORDER — IPRATROPIUM BROMIDE AND ALBUTEROL SULFATE 2.5; .5 MG/3ML; MG/3ML
3 SOLUTION RESPIRATORY (INHALATION) ONCE
Status: COMPLETED | OUTPATIENT
Start: 2018-01-01 | End: 2018-01-01

## 2018-01-01 RX ORDER — FUROSEMIDE 10 MG/ML
40 INJECTION INTRAMUSCULAR; INTRAVENOUS DAILY
Status: DISCONTINUED | OUTPATIENT
Start: 2018-01-01 | End: 2018-01-01

## 2018-01-01 RX ORDER — SCOLOPAMINE TRANSDERMAL SYSTEM 1 MG/1
1 PATCH, EXTENDED RELEASE TRANSDERMAL
Status: DISCONTINUED | OUTPATIENT
Start: 2018-01-01 | End: 2018-01-01 | Stop reason: HOSPADM

## 2018-01-01 RX ORDER — AMLODIPINE BESYLATE 10 MG/1
10 TABLET ORAL DAILY
Status: DISCONTINUED | OUTPATIENT
Start: 2018-01-01 | End: 2018-01-01 | Stop reason: HOSPADM

## 2018-01-01 RX ORDER — LORAZEPAM 2 MG/ML
0.5 INJECTION INTRAMUSCULAR
Status: CANCELLED | OUTPATIENT
Start: 2018-01-01 | End: 2018-03-08

## 2018-01-01 RX ORDER — LORAZEPAM 2 MG/ML
0.5 CONCENTRATE ORAL
Status: DISCONTINUED | OUTPATIENT
Start: 2018-01-01 | End: 2018-01-01 | Stop reason: HOSPADM

## 2018-01-01 RX ORDER — HYDRALAZINE HYDROCHLORIDE 20 MG/ML
10 INJECTION INTRAMUSCULAR; INTRAVENOUS EVERY 6 HOURS PRN
Status: DISCONTINUED | OUTPATIENT
Start: 2018-01-01 | End: 2018-01-01 | Stop reason: HOSPADM

## 2018-01-01 RX ORDER — BUDESONIDE 0.5 MG/2ML
1 INHALANT ORAL
Status: DISCONTINUED | OUTPATIENT
Start: 2018-01-01 | End: 2018-01-01 | Stop reason: HOSPADM

## 2018-01-01 RX ORDER — PROMETHAZINE HYDROCHLORIDE 25 MG/1
12.5 TABLET ORAL EVERY 4 HOURS PRN
Status: DISCONTINUED | OUTPATIENT
Start: 2018-01-01 | End: 2018-01-01 | Stop reason: HOSPADM

## 2018-01-01 RX ORDER — PROMETHAZINE HYDROCHLORIDE 25 MG/1
6.25 TABLET ORAL EVERY 4 HOURS PRN
Status: DISCONTINUED | OUTPATIENT
Start: 2018-01-01 | End: 2018-01-01 | Stop reason: HOSPADM

## 2018-01-01 RX ORDER — HYDRALAZINE HYDROCHLORIDE 20 MG/ML
10 INJECTION INTRAMUSCULAR; INTRAVENOUS EVERY 6 HOURS PRN
Status: DISCONTINUED | OUTPATIENT
Start: 2018-01-01 | End: 2018-01-01

## 2018-01-01 RX ORDER — ACETAMINOPHEN 500 MG
500 TABLET ORAL EVERY 6 HOURS PRN
COMMUNITY

## 2018-01-01 RX ORDER — IRON ASPGLY,PS/C/SUCCINIC ACID 150-50-50
1 CAPSULE ORAL
Status: DISCONTINUED | OUTPATIENT
Start: 2018-01-01 | End: 2018-01-01 | Stop reason: HOSPADM

## 2018-01-01 RX ORDER — METHYLPREDNISOLONE SODIUM SUCCINATE 125 MG/2ML
80 INJECTION, POWDER, LYOPHILIZED, FOR SOLUTION INTRAMUSCULAR; INTRAVENOUS EVERY 8 HOURS
Status: DISCONTINUED | OUTPATIENT
Start: 2018-01-01 | End: 2018-01-01

## 2018-01-01 RX ORDER — LEVOFLOXACIN 5 MG/ML
250 INJECTION, SOLUTION INTRAVENOUS EVERY 24 HOURS
Status: COMPLETED | OUTPATIENT
Start: 2018-01-01 | End: 2018-01-01

## 2018-01-01 RX ORDER — PROMETHAZINE HYDROCHLORIDE 12.5 MG/1
12.5 SUPPOSITORY RECTAL EVERY 4 HOURS PRN
Status: CANCELLED | OUTPATIENT
Start: 2018-01-01

## 2018-01-01 RX ORDER — BUDESONIDE 1 MG/2ML
1 INHALANT ORAL
Status: DISCONTINUED | OUTPATIENT
Start: 2018-01-01 | End: 2018-01-01 | Stop reason: CLARIF

## 2018-01-01 RX ORDER — HYDROCODONE BITARTRATE AND ACETAMINOPHEN 5; 325 MG/1; MG/1
1 TABLET ORAL EVERY 6 HOURS PRN
Qty: 40 TABLET | Refills: 0 | Status: SHIPPED | OUTPATIENT
Start: 2018-01-01

## 2018-01-01 RX ORDER — POTASSIUM CHLORIDE 750 MG/1
40 CAPSULE, EXTENDED RELEASE ORAL AS NEEDED
Status: DISCONTINUED | OUTPATIENT
Start: 2018-01-01 | End: 2018-01-01 | Stop reason: HOSPADM

## 2018-01-01 RX ORDER — PREDNISONE 10 MG/1
10 TABLET ORAL 2 TIMES DAILY WITH MEALS
Status: DISCONTINUED | OUTPATIENT
Start: 2018-01-01 | End: 2018-01-01

## 2018-01-01 RX ORDER — LIDOCAINE HYDROCHLORIDE 10 MG/ML
0.5 INJECTION, SOLUTION INFILTRATION; PERINEURAL ONCE AS NEEDED
Status: DISCONTINUED | OUTPATIENT
Start: 2018-01-01 | End: 2018-01-01

## 2018-01-01 RX ORDER — ACETAMINOPHEN 650 MG/1
650 SUPPOSITORY RECTAL EVERY 4 HOURS PRN
Status: DISCONTINUED | OUTPATIENT
Start: 2018-01-01 | End: 2018-01-01 | Stop reason: HOSPADM

## 2018-01-01 RX ORDER — FUROSEMIDE 40 MG/1
40 TABLET ORAL DAILY
Status: DISCONTINUED | OUTPATIENT
Start: 2018-01-01 | End: 2018-01-01

## 2018-01-01 RX ORDER — PROMETHAZINE HYDROCHLORIDE 25 MG/1
12.5 TABLET ORAL EVERY 4 HOURS PRN
Status: CANCELLED | OUTPATIENT
Start: 2018-01-01

## 2018-01-01 RX ORDER — GLYCOPYRROLATE 0.2 MG/ML
0.4 INJECTION INTRAMUSCULAR; INTRAVENOUS
Status: DISCONTINUED | OUTPATIENT
Start: 2018-01-01 | End: 2018-01-01 | Stop reason: HOSPADM

## 2018-01-01 RX ORDER — MORPHINE SULFATE 10 MG/ML
6 INJECTION INTRAMUSCULAR; INTRAVENOUS; SUBCUTANEOUS
Status: CANCELLED | OUTPATIENT
Start: 2018-01-01 | End: 2018-03-08

## 2018-01-01 RX ORDER — AMLODIPINE BESYLATE 10 MG/1
10 TABLET ORAL DAILY
Status: DISCONTINUED | OUTPATIENT
Start: 2018-01-01 | End: 2018-01-01

## 2018-01-01 RX ORDER — FUROSEMIDE 10 MG/ML
20 INJECTION INTRAMUSCULAR; INTRAVENOUS ONCE
Status: COMPLETED | OUTPATIENT
Start: 2018-01-01 | End: 2018-01-01

## 2018-01-01 RX ORDER — DIPHENOXYLATE HYDROCHLORIDE AND ATROPINE SULFATE 2.5; .025 MG/1; MG/1
1 TABLET ORAL
Status: DISCONTINUED | OUTPATIENT
Start: 2018-01-01 | End: 2018-01-01 | Stop reason: HOSPADM

## 2018-01-01 RX ORDER — PROMETHAZINE HYDROCHLORIDE 12.5 MG/1
6.25 SUPPOSITORY RECTAL EVERY 4 HOURS PRN
Status: DISCONTINUED | OUTPATIENT
Start: 2018-01-01 | End: 2018-01-01 | Stop reason: HOSPADM

## 2018-01-01 RX ORDER — PROMETHAZINE HYDROCHLORIDE 12.5 MG/1
12.5 SUPPOSITORY RECTAL EVERY 4 HOURS PRN
Status: DISCONTINUED | OUTPATIENT
Start: 2018-01-01 | End: 2018-01-01 | Stop reason: HOSPADM

## 2018-01-01 RX ORDER — IPRATROPIUM BROMIDE AND ALBUTEROL SULFATE 2.5; .5 MG/3ML; MG/3ML
3 SOLUTION RESPIRATORY (INHALATION)
Qty: 360 ML | Refills: 1 | Status: SHIPPED | OUTPATIENT
Start: 2018-01-01

## 2018-01-01 RX ORDER — ACETAMINOPHEN 325 MG/1
650 TABLET ORAL EVERY 4 HOURS PRN
Status: CANCELLED | OUTPATIENT
Start: 2018-01-01

## 2018-01-01 RX ORDER — PANTOPRAZOLE SODIUM 40 MG/1
40 TABLET, DELAYED RELEASE ORAL
Status: DISCONTINUED | OUTPATIENT
Start: 2018-01-01 | End: 2018-01-01 | Stop reason: HOSPADM

## 2018-01-01 RX ORDER — PROMETHAZINE HYDROCHLORIDE 6.25 MG/5ML
6.25 SYRUP ORAL EVERY 4 HOURS PRN
Status: DISCONTINUED | OUTPATIENT
Start: 2018-01-01 | End: 2018-01-01 | Stop reason: HOSPADM

## 2018-01-01 RX ORDER — PROMETHAZINE HYDROCHLORIDE 6.25 MG/5ML
12.5 SYRUP ORAL EVERY 4 HOURS PRN
Status: DISCONTINUED | OUTPATIENT
Start: 2018-01-01 | End: 2018-01-01 | Stop reason: HOSPADM

## 2018-01-01 RX ORDER — LORAZEPAM 2 MG/ML
1 INJECTION INTRAMUSCULAR
Status: DISCONTINUED | OUTPATIENT
Start: 2018-01-01 | End: 2018-01-01 | Stop reason: HOSPADM

## 2018-01-01 RX ORDER — PREDNISONE 10 MG/1
10 TABLET ORAL
Status: DISCONTINUED | OUTPATIENT
Start: 2018-01-01 | End: 2018-01-01

## 2018-01-01 RX ORDER — PROMETHAZINE HYDROCHLORIDE 25 MG/ML
12.5 INJECTION, SOLUTION INTRAMUSCULAR; INTRAVENOUS EVERY 4 HOURS PRN
Status: DISCONTINUED | OUTPATIENT
Start: 2018-01-01 | End: 2018-01-01 | Stop reason: HOSPADM

## 2018-01-01 RX ORDER — PROMETHAZINE HYDROCHLORIDE 25 MG/ML
6.25 INJECTION, SOLUTION INTRAMUSCULAR; INTRAVENOUS EVERY 4 HOURS PRN
Status: DISCONTINUED | OUTPATIENT
Start: 2018-01-01 | End: 2018-01-01 | Stop reason: HOSPADM

## 2018-01-01 RX ORDER — PROMETHAZINE HYDROCHLORIDE 6.25 MG/5ML
12.5 SYRUP ORAL EVERY 4 HOURS PRN
Status: CANCELLED | OUTPATIENT
Start: 2018-01-01

## 2018-01-01 RX ORDER — MORPHINE SULFATE 20 MG/ML
10 SOLUTION ORAL
Status: CANCELLED | OUTPATIENT
Start: 2018-01-01 | End: 2018-03-08

## 2018-01-01 RX ORDER — SODIUM CHLORIDE 0.9 % (FLUSH) 0.9 %
3 SYRINGE (ML) INJECTION AS NEEDED
Status: DISCONTINUED | OUTPATIENT
Start: 2018-01-01 | End: 2018-01-01

## 2018-01-01 RX ORDER — ONDANSETRON 2 MG/ML
4 INJECTION INTRAMUSCULAR; INTRAVENOUS EVERY 4 HOURS PRN
Status: DISCONTINUED | OUTPATIENT
Start: 2018-01-01 | End: 2018-01-01

## 2018-01-01 RX ORDER — POTASSIUM CHLORIDE 750 MG/1
40 CAPSULE, EXTENDED RELEASE ORAL ONCE
Status: COMPLETED | OUTPATIENT
Start: 2018-01-01 | End: 2018-01-01

## 2018-01-01 RX ORDER — SCOLOPAMINE TRANSDERMAL SYSTEM 1 MG/1
1 PATCH, EXTENDED RELEASE TRANSDERMAL
Status: CANCELLED | OUTPATIENT
Start: 2018-01-01

## 2018-01-01 RX ORDER — DOCUSATE SODIUM 100 MG/1
200 CAPSULE, LIQUID FILLED ORAL DAILY
Status: DISCONTINUED | OUTPATIENT
Start: 2018-01-01 | End: 2018-01-01 | Stop reason: HOSPADM

## 2018-01-01 RX ORDER — MORPHINE SULFATE 20 MG/ML
20 SOLUTION ORAL
Status: DISCONTINUED | OUTPATIENT
Start: 2018-01-01 | End: 2018-01-01 | Stop reason: HOSPADM

## 2018-01-01 RX ORDER — GLIPIZIDE 5 MG/1
2.5 TABLET ORAL
Status: DISCONTINUED | OUTPATIENT
Start: 2018-01-01 | End: 2018-01-01

## 2018-01-01 RX ORDER — ONDANSETRON 4 MG/1
4 TABLET, FILM COATED ORAL EVERY 6 HOURS PRN
COMMUNITY

## 2018-01-01 RX ORDER — ACETAMINOPHEN 160 MG/5ML
650 SOLUTION ORAL EVERY 4 HOURS PRN
Status: CANCELLED | OUTPATIENT
Start: 2018-01-01

## 2018-01-01 RX ORDER — NYSTATIN 100000 [USP'U]/G
POWDER TOPICAL EVERY 12 HOURS SCHEDULED
Status: DISCONTINUED | OUTPATIENT
Start: 2018-01-01 | End: 2018-01-01

## 2018-01-01 RX ORDER — ECHINACEA PURPUREA EXTRACT 125 MG
1 TABLET ORAL AS NEEDED
Status: DISCONTINUED | OUTPATIENT
Start: 2018-01-01 | End: 2018-01-01 | Stop reason: HOSPADM

## 2018-01-01 RX ORDER — POTASSIUM CHLORIDE 7.45 MG/ML
10 INJECTION INTRAVENOUS
Status: DISCONTINUED | OUTPATIENT
Start: 2018-01-01 | End: 2018-01-01 | Stop reason: HOSPADM

## 2018-01-01 RX ORDER — IRON ASPGLY,PS/C/B12/FA/CA/SUC 150-25-1
1 CAPSULE ORAL
Status: DISCONTINUED | OUTPATIENT
Start: 2018-01-01 | End: 2018-01-01 | Stop reason: CLARIF

## 2018-01-01 RX ORDER — BUDESONIDE 0.5 MG/2ML
1 INHALANT ORAL
Status: DISCONTINUED | OUTPATIENT
Start: 2018-01-01 | End: 2018-01-01 | Stop reason: CLARIF

## 2018-01-01 RX ORDER — CLOPIDOGREL BISULFATE 75 MG/1
75 TABLET ORAL DAILY
Status: DISCONTINUED | OUTPATIENT
Start: 2018-01-01 | End: 2018-01-01

## 2018-01-01 RX ORDER — HYDROMORPHONE HYDROCHLORIDE 1 MG/ML
0.5 INJECTION, SOLUTION INTRAMUSCULAR; INTRAVENOUS; SUBCUTANEOUS
Status: CANCELLED | OUTPATIENT
Start: 2018-01-01 | End: 2018-03-08

## 2018-01-01 RX ORDER — FAMOTIDINE 10 MG/ML
10 INJECTION, SOLUTION INTRAVENOUS EVERY 12 HOURS SCHEDULED
Status: DISCONTINUED | OUTPATIENT
Start: 2018-01-01 | End: 2018-01-01

## 2018-01-01 RX ORDER — LIDOCAINE HYDROCHLORIDE 20 MG/ML
INJECTION, SOLUTION INFILTRATION; PERINEURAL AS NEEDED
Status: DISCONTINUED | OUTPATIENT
Start: 2018-01-01 | End: 2018-01-01 | Stop reason: SURG

## 2018-01-01 RX ORDER — HYDRALAZINE HYDROCHLORIDE 25 MG/1
75 TABLET, FILM COATED ORAL EVERY 8 HOURS SCHEDULED
Qty: 90 TABLET | Refills: 12 | Status: SHIPPED | OUTPATIENT
Start: 2018-01-01

## 2018-01-01 RX ORDER — SODIUM CHLORIDE, SODIUM LACTATE, POTASSIUM CHLORIDE, CALCIUM CHLORIDE 600; 310; 30; 20 MG/100ML; MG/100ML; MG/100ML; MG/100ML
30 INJECTION, SOLUTION INTRAVENOUS CONTINUOUS PRN
Status: DISCONTINUED | OUTPATIENT
Start: 2018-01-01 | End: 2018-01-01

## 2018-01-01 RX ORDER — ROPINIROLE 0.25 MG/1
0.25 TABLET, FILM COATED ORAL NIGHTLY
Status: DISCONTINUED | OUTPATIENT
Start: 2018-01-01 | End: 2018-01-01 | Stop reason: HOSPADM

## 2018-01-01 RX ORDER — HYDROCODONE BITARTRATE AND ACETAMINOPHEN 5; 325 MG/1; MG/1
TABLET ORAL
Qty: 360 TABLET | Refills: 0 | Status: SHIPPED | OUTPATIENT
Start: 2018-01-01 | End: 2018-01-01 | Stop reason: SDUPTHER

## 2018-01-01 RX ORDER — ACETAMINOPHEN 325 MG/1
650 TABLET ORAL EVERY 6 HOURS PRN
Status: DISCONTINUED | OUTPATIENT
Start: 2018-01-01 | End: 2018-01-01

## 2018-01-01 RX ORDER — LEVOTHYROXINE SODIUM 0.1 MG/1
100 TABLET ORAL DAILY
Status: DISCONTINUED | OUTPATIENT
Start: 2018-01-01 | End: 2018-01-01 | Stop reason: HOSPADM

## 2018-01-01 RX ORDER — PROMETHAZINE HYDROCHLORIDE 6.25 MG/5ML
6.25 SYRUP ORAL EVERY 4 HOURS PRN
Status: CANCELLED | OUTPATIENT
Start: 2018-01-01

## 2018-01-01 RX ORDER — SODIUM CHLORIDE 0.9 % (FLUSH) 0.9 %
10 SYRINGE (ML) INJECTION AS NEEDED
Status: DISCONTINUED | OUTPATIENT
Start: 2018-01-01 | End: 2018-01-01

## 2018-01-01 RX ORDER — GINSENG 100 MG
1 CAPSULE ORAL 2 TIMES DAILY
COMMUNITY

## 2018-01-01 RX ORDER — SODIUM CHLORIDE, SODIUM LACTATE, POTASSIUM CHLORIDE, CALCIUM CHLORIDE 600; 310; 30; 20 MG/100ML; MG/100ML; MG/100ML; MG/100ML
1000 INJECTION, SOLUTION INTRAVENOUS CONTINUOUS PRN
Status: DISCONTINUED | OUTPATIENT
Start: 2018-01-01 | End: 2018-01-01

## 2018-01-01 RX ORDER — ASPIRIN 81 MG/1
81 TABLET, CHEWABLE ORAL DAILY
Status: DISCONTINUED | OUTPATIENT
Start: 2018-01-01 | End: 2018-01-01

## 2018-01-01 RX ORDER — POTASSIUM CHLORIDE 1.5 G/1.77G
40 POWDER, FOR SOLUTION ORAL 2 TIMES DAILY
Status: DISPENSED | OUTPATIENT
Start: 2018-01-01 | End: 2018-01-01

## 2018-01-01 RX ORDER — ATORVASTATIN CALCIUM 10 MG/1
10 TABLET, FILM COATED ORAL NIGHTLY
Status: DISCONTINUED | OUTPATIENT
Start: 2018-01-01 | End: 2018-01-01 | Stop reason: HOSPADM

## 2018-01-01 RX ORDER — HYDRALAZINE HYDROCHLORIDE 50 MG/1
50 TABLET, FILM COATED ORAL EVERY 8 HOURS SCHEDULED
Status: DISCONTINUED | OUTPATIENT
Start: 2018-01-01 | End: 2018-01-01

## 2018-01-01 RX ORDER — POTASSIUM CHLORIDE 750 MG/1
40 CAPSULE, EXTENDED RELEASE ORAL
Status: DISCONTINUED | OUTPATIENT
Start: 2018-01-01 | End: 2018-01-01

## 2018-01-01 RX ORDER — ONDANSETRON 2 MG/ML
4 INJECTION INTRAMUSCULAR; INTRAVENOUS EVERY 4 HOURS PRN
Status: DISCONTINUED | OUTPATIENT
Start: 2018-01-01 | End: 2018-01-01 | Stop reason: HOSPADM

## 2018-01-01 RX ORDER — PROMETHAZINE HYDROCHLORIDE 25 MG/ML
6.25 INJECTION, SOLUTION INTRAMUSCULAR; INTRAVENOUS EVERY 4 HOURS PRN
Status: CANCELLED | OUTPATIENT
Start: 2018-01-01

## 2018-01-01 RX ORDER — POTASSIUM CHLORIDE 1.5 G/1.77G
40 POWDER, FOR SOLUTION ORAL AS NEEDED
Status: DISCONTINUED | OUTPATIENT
Start: 2018-01-01 | End: 2018-01-01 | Stop reason: HOSPADM

## 2018-01-01 RX ORDER — ONDANSETRON 2 MG/ML
4 INJECTION INTRAMUSCULAR; INTRAVENOUS ONCE
Status: COMPLETED | OUTPATIENT
Start: 2018-01-01 | End: 2018-01-01

## 2018-01-01 RX ORDER — METOCLOPRAMIDE 5 MG/1
5 TABLET ORAL
Status: DISCONTINUED | OUTPATIENT
Start: 2018-01-01 | End: 2018-01-01 | Stop reason: HOSPADM

## 2018-01-01 RX ORDER — PROMETHAZINE HYDROCHLORIDE 12.5 MG/1
6.25 SUPPOSITORY RECTAL EVERY 4 HOURS PRN
Status: CANCELLED | OUTPATIENT
Start: 2018-01-01

## 2018-01-01 RX ORDER — LORAZEPAM 2 MG/ML
0.5 CONCENTRATE ORAL
Status: CANCELLED | OUTPATIENT
Start: 2018-01-01 | End: 2018-03-08

## 2018-01-01 RX ORDER — NITROGLYCERIN 0.4 MG/1
0.4 TABLET SUBLINGUAL
Status: DISCONTINUED | OUTPATIENT
Start: 2018-01-01 | End: 2018-01-01 | Stop reason: HOSPADM

## 2018-01-01 RX ORDER — PANTOPRAZOLE SODIUM 40 MG/10ML
40 INJECTION, POWDER, LYOPHILIZED, FOR SOLUTION INTRAVENOUS
Status: DISCONTINUED | OUTPATIENT
Start: 2018-01-01 | End: 2018-01-01 | Stop reason: CLARIF

## 2018-01-01 RX ORDER — HYDROCODONE BITARTRATE AND ACETAMINOPHEN 5; 325 MG/1; MG/1
1 TABLET ORAL EVERY 4 HOURS PRN
Qty: 90 TABLET | Refills: 0 | Status: SHIPPED | OUTPATIENT
Start: 2018-01-01 | End: 2018-01-01 | Stop reason: SDUPTHER

## 2018-01-01 RX ORDER — LORAZEPAM 2 MG/ML
1 INJECTION INTRAMUSCULAR
Status: CANCELLED | OUTPATIENT
Start: 2018-01-01 | End: 2018-03-08

## 2018-01-01 RX ORDER — POTASSIUM CHLORIDE 1.5 G/1.77G
40 POWDER, FOR SOLUTION ORAL 2 TIMES DAILY
Status: COMPLETED | OUTPATIENT
Start: 2018-01-01 | End: 2018-01-01

## 2018-01-01 RX ORDER — SODIUM CHLORIDE 9 MG/ML
75 INJECTION, SOLUTION INTRAVENOUS CONTINUOUS
Status: DISCONTINUED | OUTPATIENT
Start: 2018-01-01 | End: 2018-01-01

## 2018-01-01 RX ORDER — OSELTAMIVIR PHOSPHATE 75 MG/1
75 CAPSULE ORAL 2 TIMES DAILY
COMMUNITY
Start: 2018-01-01 | End: 2018-01-01 | Stop reason: HOSPADM

## 2018-01-01 RX ORDER — MORPHINE SULFATE 20 MG/ML
5 SOLUTION ORAL
Status: CANCELLED | OUTPATIENT
Start: 2018-01-01 | End: 2018-03-08

## 2018-01-01 RX ORDER — ISOSORBIDE MONONITRATE 30 MG/1
30 TABLET, EXTENDED RELEASE ORAL DAILY
Status: DISCONTINUED | OUTPATIENT
Start: 2018-01-01 | End: 2018-01-01 | Stop reason: HOSPADM

## 2018-01-01 RX ORDER — LORAZEPAM 2 MG/ML
2 INJECTION INTRAMUSCULAR
Status: CANCELLED | OUTPATIENT
Start: 2018-01-01 | End: 2018-03-08

## 2018-01-01 RX ORDER — IPRATROPIUM BROMIDE AND ALBUTEROL SULFATE 2.5; .5 MG/3ML; MG/3ML
3 SOLUTION RESPIRATORY (INHALATION) EVERY 4 HOURS PRN
Status: DISCONTINUED | OUTPATIENT
Start: 2018-01-01 | End: 2018-01-01 | Stop reason: HOSPADM

## 2018-01-01 RX ORDER — NICOTINE POLACRILEX 4 MG
15 LOZENGE BUCCAL
Status: DISCONTINUED | OUTPATIENT
Start: 2018-01-01 | End: 2018-01-01

## 2018-01-01 RX ORDER — LEVOFLOXACIN 5 MG/ML
500 INJECTION, SOLUTION INTRAVENOUS EVERY 24 HOURS
Status: DISCONTINUED | OUTPATIENT
Start: 2018-01-01 | End: 2018-01-01 | Stop reason: DRUGHIGH

## 2018-01-01 RX ORDER — FUROSEMIDE 40 MG/1
40 TABLET ORAL
Status: DISCONTINUED | OUTPATIENT
Start: 2018-01-01 | End: 2018-01-01 | Stop reason: HOSPADM

## 2018-01-01 RX ADMIN — HYDRALAZINE HYDROCHLORIDE 10 MG: 20 INJECTION INTRAMUSCULAR; INTRAVENOUS at 04:16

## 2018-01-01 RX ADMIN — ROPINIROLE 0.25 MG: 0.25 TABLET, FILM COATED ORAL at 22:03

## 2018-01-01 RX ADMIN — DOCUSATE SODIUM 200 MG: 100 CAPSULE, LIQUID FILLED ORAL at 08:58

## 2018-01-01 RX ADMIN — ACETAMINOPHEN 500 MG: 500 TABLET ORAL at 22:05

## 2018-01-01 RX ADMIN — METHYLPREDNISOLONE SODIUM SUCCINATE 80 MG: 125 INJECTION, POWDER, FOR SOLUTION INTRAMUSCULAR; INTRAVENOUS at 03:46

## 2018-01-01 RX ADMIN — DOCUSATE SODIUM 250 MG: 50 CAPSULE, LIQUID FILLED ORAL at 10:34

## 2018-01-01 RX ADMIN — METOCLOPRAMIDE 5 MG: 5 TABLET ORAL at 12:06

## 2018-01-01 RX ADMIN — HYDRALAZINE HYDROCHLORIDE 75 MG: 50 TABLET, FILM COATED ORAL at 15:13

## 2018-01-01 RX ADMIN — ISOSORBIDE MONONITRATE 30 MG: 30 TABLET ORAL at 08:41

## 2018-01-01 RX ADMIN — METOCLOPRAMIDE 5 MG: 5 TABLET ORAL at 12:26

## 2018-01-01 RX ADMIN — INSULIN ASPART 9 UNITS: 100 INJECTION, SOLUTION INTRAVENOUS; SUBCUTANEOUS at 17:16

## 2018-01-01 RX ADMIN — LIDOCAINE HYDROCHLORIDE 85 MG: 20 INJECTION, SOLUTION INFILTRATION; PERINEURAL at 10:40

## 2018-01-01 RX ADMIN — ACETAMINOPHEN 650 MG: 325 TABLET, FILM COATED ORAL at 20:57

## 2018-01-01 RX ADMIN — DOCUSATE SODIUM 200 MG: 100 CAPSULE, LIQUID FILLED ORAL at 08:47

## 2018-01-01 RX ADMIN — LEVOTHYROXINE SODIUM 100 MCG: 100 TABLET ORAL at 07:10

## 2018-01-01 RX ADMIN — SODIUM CHLORIDE 500 ML: 9 INJECTION, SOLUTION INTRAVENOUS at 23:27

## 2018-01-01 RX ADMIN — INSULIN ASPART 2 UNITS: 100 INJECTION, SOLUTION INTRAVENOUS; SUBCUTANEOUS at 22:04

## 2018-01-01 RX ADMIN — AMLODIPINE BESYLATE 10 MG: 10 TABLET ORAL at 08:59

## 2018-01-01 RX ADMIN — HYDROCODONE BITARTRATE AND ACETAMINOPHEN 1 TABLET: 5; 325 TABLET ORAL at 21:47

## 2018-01-01 RX ADMIN — METOCLOPRAMIDE 5 MG: 5 TABLET ORAL at 22:46

## 2018-01-01 RX ADMIN — IPRATROPIUM BROMIDE AND ALBUTEROL SULFATE 3 ML: .5; 3 SOLUTION RESPIRATORY (INHALATION) at 20:17

## 2018-01-01 RX ADMIN — ACETAMINOPHEN 500 MG: 500 TABLET ORAL at 11:29

## 2018-01-01 RX ADMIN — METOCLOPRAMIDE 5 MG: 5 TABLET ORAL at 18:57

## 2018-01-01 RX ADMIN — METOCLOPRAMIDE 5 MG: 5 TABLET ORAL at 12:22

## 2018-01-01 RX ADMIN — BUDESONIDE 1 MG: 1 SUSPENSION RESPIRATORY (INHALATION) at 07:30

## 2018-01-01 RX ADMIN — FUROSEMIDE 40 MG: 40 TABLET ORAL at 08:58

## 2018-01-01 RX ADMIN — Medication 1 CAPSULE: at 09:54

## 2018-01-01 RX ADMIN — ONDANSETRON 4 MG: 2 INJECTION INTRAMUSCULAR; INTRAVENOUS at 10:01

## 2018-01-01 RX ADMIN — FAMOTIDINE 10 MG: 10 INJECTION, SOLUTION INTRAVENOUS at 21:11

## 2018-01-01 RX ADMIN — INSULIN ASPART 2 UNITS: 100 INJECTION, SOLUTION INTRAVENOUS; SUBCUTANEOUS at 11:52

## 2018-01-01 RX ADMIN — HYDRALAZINE HYDROCHLORIDE 10 MG: 20 INJECTION INTRAMUSCULAR; INTRAVENOUS at 22:34

## 2018-01-01 RX ADMIN — GLIPIZIDE 2.5 MG: 5 TABLET ORAL at 18:11

## 2018-01-01 RX ADMIN — ACETAMINOPHEN 500 MG: 500 TABLET ORAL at 13:55

## 2018-01-01 RX ADMIN — METOCLOPRAMIDE 5 MG: 5 TABLET ORAL at 07:28

## 2018-01-01 RX ADMIN — DOXYCYCLINE 100 MG: 100 CAPSULE ORAL at 21:43

## 2018-01-01 RX ADMIN — TAZOBACTAM SODIUM AND PIPERACILLIN SODIUM 3.38 G: 375; 3 INJECTION, SOLUTION INTRAVENOUS at 12:06

## 2018-01-01 RX ADMIN — IPRATROPIUM BROMIDE AND ALBUTEROL SULFATE 3 ML: .5; 3 SOLUTION RESPIRATORY (INHALATION) at 11:36

## 2018-01-01 RX ADMIN — Medication 1 CAPSULE: at 12:42

## 2018-01-01 RX ADMIN — ROPINIROLE 0.25 MG: 0.25 TABLET, FILM COATED ORAL at 21:47

## 2018-01-01 RX ADMIN — GLYCOPYRROLATE 0.4 MG: 0.2 INJECTION, SOLUTION INTRAMUSCULAR; INTRAVENOUS at 20:32

## 2018-01-01 RX ADMIN — ENOXAPARIN SODIUM 30 MG: 30 INJECTION SUBCUTANEOUS at 08:58

## 2018-01-01 RX ADMIN — CLOPIDOGREL 75 MG: 75 TABLET, FILM COATED ORAL at 08:41

## 2018-01-01 RX ADMIN — INSULIN ASPART 6 UNITS: 100 INJECTION, SOLUTION INTRAVENOUS; SUBCUTANEOUS at 21:11

## 2018-01-01 RX ADMIN — METOCLOPRAMIDE 5 MG: 5 TABLET ORAL at 07:00

## 2018-01-01 RX ADMIN — ACETAMINOPHEN 650 MG: 325 TABLET, FILM COATED ORAL at 08:28

## 2018-01-01 RX ADMIN — NYSTATIN: 100000 POWDER TOPICAL at 22:06

## 2018-01-01 RX ADMIN — IPRATROPIUM BROMIDE AND ALBUTEROL SULFATE 3 ML: .5; 3 SOLUTION RESPIRATORY (INHALATION) at 02:20

## 2018-01-01 RX ADMIN — HYDRALAZINE HYDROCHLORIDE 75 MG: 50 TABLET, FILM COATED ORAL at 00:37

## 2018-01-01 RX ADMIN — PREDNISONE 10 MG: 10 TABLET ORAL at 09:53

## 2018-01-01 RX ADMIN — LINAGLIPTIN 5 MG: 5 TABLET, FILM COATED ORAL at 09:48

## 2018-01-01 RX ADMIN — METOCLOPRAMIDE 5 MG: 5 TABLET ORAL at 12:10

## 2018-01-01 RX ADMIN — INSULIN ASPART 2 UNITS: 100 INJECTION, SOLUTION INTRAVENOUS; SUBCUTANEOUS at 20:51

## 2018-01-01 RX ADMIN — PROPOFOL 170 MG: 10 INJECTION, EMULSION INTRAVENOUS at 10:40

## 2018-01-01 RX ADMIN — ENOXAPARIN SODIUM 30 MG: 30 INJECTION SUBCUTANEOUS at 12:56

## 2018-01-01 RX ADMIN — INSULIN ASPART 2 UNITS: 100 INJECTION, SOLUTION INTRAVENOUS; SUBCUTANEOUS at 09:35

## 2018-01-01 RX ADMIN — LEVOTHYROXINE SODIUM 100 MCG: 100 TABLET ORAL at 05:57

## 2018-01-01 RX ADMIN — FUROSEMIDE 40 MG: 10 INJECTION, SOLUTION INTRAMUSCULAR; INTRAVENOUS at 00:24

## 2018-01-01 RX ADMIN — PERFLUTREN 2 ML: 6.52 INJECTION, SUSPENSION INTRAVENOUS at 11:45

## 2018-01-01 RX ADMIN — HYDRALAZINE HYDROCHLORIDE 75 MG: 50 TABLET, FILM COATED ORAL at 22:19

## 2018-01-01 RX ADMIN — INSULIN DETEMIR 10 UNITS: 100 INJECTION, SOLUTION SUBCUTANEOUS at 08:53

## 2018-01-01 RX ADMIN — INSULIN ASPART 6 UNITS: 100 INJECTION, SOLUTION INTRAVENOUS; SUBCUTANEOUS at 22:25

## 2018-01-01 RX ADMIN — MORPHINE SULFATE 4 MG: 4 INJECTION, SOLUTION INTRAMUSCULAR; INTRAVENOUS at 00:22

## 2018-01-01 RX ADMIN — GLYCOPYRROLATE 0.4 MG: 0.2 INJECTION, SOLUTION INTRAMUSCULAR; INTRAVENOUS at 04:36

## 2018-01-01 RX ADMIN — METOCLOPRAMIDE 5 MG: 5 TABLET ORAL at 18:00

## 2018-01-01 RX ADMIN — DOCUSATE SODIUM 50 MG: 50 CAPSULE, LIQUID FILLED ORAL at 18:00

## 2018-01-01 RX ADMIN — CLOPIDOGREL 75 MG: 75 TABLET, FILM COATED ORAL at 09:53

## 2018-01-01 RX ADMIN — ENOXAPARIN SODIUM 30 MG: 30 INJECTION SUBCUTANEOUS at 12:05

## 2018-01-01 RX ADMIN — HYDRALAZINE HYDROCHLORIDE 10 MG: 20 INJECTION INTRAMUSCULAR; INTRAVENOUS at 08:15

## 2018-01-01 RX ADMIN — ISOSORBIDE MONONITRATE 30 MG: 30 TABLET ORAL at 09:26

## 2018-01-01 RX ADMIN — LEVOTHYROXINE SODIUM 100 MCG: 100 TABLET ORAL at 08:18

## 2018-01-01 RX ADMIN — METOPROLOL SUCCINATE 50 MG: 50 TABLET, FILM COATED, EXTENDED RELEASE ORAL at 10:00

## 2018-01-01 RX ADMIN — INSULIN ASPART 6 UNITS: 100 INJECTION, SOLUTION INTRAVENOUS; SUBCUTANEOUS at 21:47

## 2018-01-01 RX ADMIN — INSULIN ASPART 3 UNITS: 100 INJECTION, SOLUTION INTRAVENOUS; SUBCUTANEOUS at 18:11

## 2018-01-01 RX ADMIN — LEVOTHYROXINE SODIUM 100 MCG: 100 TABLET ORAL at 07:36

## 2018-01-01 RX ADMIN — LEVOTHYROXINE SODIUM 100 MCG: 100 TABLET ORAL at 07:32

## 2018-01-01 RX ADMIN — METOCLOPRAMIDE 5 MG: 5 TABLET ORAL at 09:36

## 2018-01-01 RX ADMIN — INSULIN ASPART 2 UNITS: 100 INJECTION, SOLUTION INTRAVENOUS; SUBCUTANEOUS at 08:02

## 2018-01-01 RX ADMIN — HYDRALAZINE HYDROCHLORIDE 75 MG: 50 TABLET, FILM COATED ORAL at 06:55

## 2018-01-01 RX ADMIN — HYDRALAZINE HYDROCHLORIDE 75 MG: 50 TABLET, FILM COATED ORAL at 07:00

## 2018-01-01 RX ADMIN — METOCLOPRAMIDE 5 MG: 5 TABLET ORAL at 22:25

## 2018-01-01 RX ADMIN — DOCUSATE SODIUM 250 MG: 50 CAPSULE, LIQUID FILLED ORAL at 09:35

## 2018-01-01 RX ADMIN — METOCLOPRAMIDE 5 MG: 5 TABLET ORAL at 21:12

## 2018-01-01 RX ADMIN — INSULIN ASPART 5 UNITS: 100 INJECTION, SOLUTION INTRAVENOUS; SUBCUTANEOUS at 18:02

## 2018-01-01 RX ADMIN — ROPINIROLE 0.25 MG: 0.25 TABLET, FILM COATED ORAL at 21:12

## 2018-01-01 RX ADMIN — METOCLOPRAMIDE 5 MG: 5 TABLET ORAL at 17:09

## 2018-01-01 RX ADMIN — HYDROCODONE BITARTRATE AND ACETAMINOPHEN 1 TABLET: 5; 325 TABLET ORAL at 20:24

## 2018-01-01 RX ADMIN — METOCLOPRAMIDE 5 MG: 5 TABLET ORAL at 18:11

## 2018-01-01 RX ADMIN — DOXYCYCLINE 100 MG: 100 CAPSULE ORAL at 00:37

## 2018-01-01 RX ADMIN — DOCUSATE SODIUM 250 MG: 50 CAPSULE, LIQUID FILLED ORAL at 09:30

## 2018-01-01 RX ADMIN — IPRATROPIUM BROMIDE AND ALBUTEROL SULFATE 3 ML: .5; 3 SOLUTION RESPIRATORY (INHALATION) at 11:20

## 2018-01-01 RX ADMIN — ROPINIROLE 0.25 MG: 0.25 TABLET, FILM COATED ORAL at 20:41

## 2018-01-01 RX ADMIN — ROPINIROLE 0.25 MG: 0.25 TABLET, FILM COATED ORAL at 22:04

## 2018-01-01 RX ADMIN — IPRATROPIUM BROMIDE AND ALBUTEROL SULFATE 3 ML: .5; 3 SOLUTION RESPIRATORY (INHALATION) at 20:13

## 2018-01-01 RX ADMIN — METOCLOPRAMIDE 5 MG: 5 TABLET ORAL at 12:42

## 2018-01-01 RX ADMIN — INSULIN ASPART 4 UNITS: 100 INJECTION, SOLUTION INTRAVENOUS; SUBCUTANEOUS at 12:43

## 2018-01-01 RX ADMIN — ONDANSETRON 4 MG: 2 INJECTION INTRAMUSCULAR; INTRAVENOUS at 22:06

## 2018-01-01 RX ADMIN — ENOXAPARIN SODIUM 30 MG: 30 INJECTION SUBCUTANEOUS at 08:46

## 2018-01-01 RX ADMIN — METOCLOPRAMIDE 5 MG: 5 TABLET ORAL at 02:19

## 2018-01-01 RX ADMIN — INSULIN ASPART 2 UNITS: 100 INJECTION, SOLUTION INTRAVENOUS; SUBCUTANEOUS at 12:22

## 2018-01-01 RX ADMIN — IPRATROPIUM BROMIDE AND ALBUTEROL SULFATE 3 ML: .5; 3 SOLUTION RESPIRATORY (INHALATION) at 20:22

## 2018-01-01 RX ADMIN — INSULIN ASPART 6 UNITS: 100 INJECTION, SOLUTION INTRAVENOUS; SUBCUTANEOUS at 17:03

## 2018-01-01 RX ADMIN — METOCLOPRAMIDE 5 MG: 5 TABLET ORAL at 17:08

## 2018-01-01 RX ADMIN — AMLODIPINE BESYLATE 10 MG: 10 TABLET ORAL at 09:48

## 2018-01-01 RX ADMIN — METOPROLOL SUCCINATE 50 MG: 50 TABLET, FILM COATED, EXTENDED RELEASE ORAL at 09:48

## 2018-01-01 RX ADMIN — ISOSORBIDE MONONITRATE 30 MG: 30 TABLET ORAL at 09:19

## 2018-01-01 RX ADMIN — LINAGLIPTIN 5 MG: 5 TABLET, FILM COATED ORAL at 09:26

## 2018-01-01 RX ADMIN — HYDRALAZINE HYDROCHLORIDE 10 MG: 20 INJECTION INTRAMUSCULAR; INTRAVENOUS at 12:41

## 2018-01-01 RX ADMIN — IPRATROPIUM BROMIDE AND ALBUTEROL SULFATE 3 ML: .5; 3 SOLUTION RESPIRATORY (INHALATION) at 10:34

## 2018-01-01 RX ADMIN — ASPIRIN 81 MG: 81 TABLET, CHEWABLE ORAL at 09:08

## 2018-01-01 RX ADMIN — PANTOPRAZOLE SODIUM 40 MG: 40 TABLET, DELAYED RELEASE ORAL at 07:36

## 2018-01-01 RX ADMIN — HYDRALAZINE HYDROCHLORIDE 10 MG: 20 INJECTION INTRAMUSCULAR; INTRAVENOUS at 04:07

## 2018-01-01 RX ADMIN — INSULIN ASPART 2 UNITS: 100 INJECTION, SOLUTION INTRAVENOUS; SUBCUTANEOUS at 21:19

## 2018-01-01 RX ADMIN — METOCLOPRAMIDE 5 MG: 5 TABLET ORAL at 08:52

## 2018-01-01 RX ADMIN — LIDOCAINE HYDROCHLORIDE 50 MG: 20 INJECTION, SOLUTION INFILTRATION; PERINEURAL at 14:25

## 2018-01-01 RX ADMIN — LINAGLIPTIN 5 MG: 5 TABLET, FILM COATED ORAL at 09:18

## 2018-01-01 RX ADMIN — LINAGLIPTIN 5 MG: 5 TABLET, FILM COATED ORAL at 10:33

## 2018-01-01 RX ADMIN — FUROSEMIDE 20 MG: 10 INJECTION, SOLUTION INTRAMUSCULAR; INTRAVENOUS at 04:57

## 2018-01-01 RX ADMIN — ISOSORBIDE MONONITRATE 30 MG: 30 TABLET ORAL at 09:53

## 2018-01-01 RX ADMIN — Medication 1 CAPSULE: at 09:49

## 2018-01-01 RX ADMIN — INSULIN ASPART 4 UNITS: 100 INJECTION, SOLUTION INTRAVENOUS; SUBCUTANEOUS at 18:00

## 2018-01-01 RX ADMIN — LINAGLIPTIN 5 MG: 5 TABLET, FILM COATED ORAL at 08:26

## 2018-01-01 RX ADMIN — INSULIN ASPART 2 UNITS: 100 INJECTION, SOLUTION INTRAVENOUS; SUBCUTANEOUS at 12:05

## 2018-01-01 RX ADMIN — LINAGLIPTIN 5 MG: 5 TABLET, FILM COATED ORAL at 08:42

## 2018-01-01 RX ADMIN — INSULIN ASPART 4 UNITS: 100 INJECTION, SOLUTION INTRAVENOUS; SUBCUTANEOUS at 22:20

## 2018-01-01 RX ADMIN — DOXYCYCLINE 100 MG: 100 CAPSULE ORAL at 09:35

## 2018-01-01 RX ADMIN — ROPINIROLE 0.25 MG: 0.25 TABLET, FILM COATED ORAL at 21:43

## 2018-01-01 RX ADMIN — FAMOTIDINE 10 MG: 10 INJECTION, SOLUTION INTRAVENOUS at 09:37

## 2018-01-01 RX ADMIN — PREDNISONE 20 MG: 20 TABLET ORAL at 09:48

## 2018-01-01 RX ADMIN — METOCLOPRAMIDE 5 MG: 5 TABLET ORAL at 06:54

## 2018-01-01 RX ADMIN — METOCLOPRAMIDE 5 MG: 5 TABLET ORAL at 17:28

## 2018-01-01 RX ADMIN — FUROSEMIDE 40 MG: 40 TABLET ORAL at 09:31

## 2018-01-01 RX ADMIN — HYDRALAZINE HYDROCHLORIDE 10 MG: 20 INJECTION INTRAMUSCULAR; INTRAVENOUS at 05:42

## 2018-01-01 RX ADMIN — ATORVASTATIN CALCIUM 10 MG: 10 TABLET, FILM COATED ORAL at 21:47

## 2018-01-01 RX ADMIN — GLYCOPYRROLATE 0.4 MG: 0.2 INJECTION, SOLUTION INTRAMUSCULAR; INTRAVENOUS at 00:44

## 2018-01-01 RX ADMIN — BUDESONIDE 1 MG: 1 SUSPENSION RESPIRATORY (INHALATION) at 20:00

## 2018-01-01 RX ADMIN — LEVOTHYROXINE SODIUM 100 MCG: 100 TABLET ORAL at 09:56

## 2018-01-01 RX ADMIN — POTASSIUM CHLORIDE 40 MEQ: 750 CAPSULE, EXTENDED RELEASE ORAL at 07:55

## 2018-01-01 RX ADMIN — Medication 250 MG: at 20:20

## 2018-01-01 RX ADMIN — GLYCOPYRROLATE 0.4 MG: 0.2 INJECTION, SOLUTION INTRAMUSCULAR; INTRAVENOUS at 16:35

## 2018-01-01 RX ADMIN — POTASSIUM CHLORIDE 40 MEQ: 750 CAPSULE, EXTENDED RELEASE ORAL at 08:17

## 2018-01-01 RX ADMIN — SODIUM CHLORIDE 75 ML/HR: 9 INJECTION, SOLUTION INTRAVENOUS at 09:31

## 2018-01-01 RX ADMIN — BUDESONIDE 1 MG: 1 SUSPENSION RESPIRATORY (INHALATION) at 10:46

## 2018-01-01 RX ADMIN — LORAZEPAM 1 MG: 2 INJECTION INTRAMUSCULAR; INTRAVENOUS at 02:18

## 2018-01-01 RX ADMIN — PREDNISONE 10 MG: 20 TABLET ORAL at 09:01

## 2018-01-01 RX ADMIN — ISOSORBIDE MONONITRATE 30 MG: 30 TABLET ORAL at 13:22

## 2018-01-01 RX ADMIN — ATORVASTATIN CALCIUM 10 MG: 10 TABLET, FILM COATED ORAL at 20:06

## 2018-01-01 RX ADMIN — HYDRALAZINE HYDROCHLORIDE 75 MG: 50 TABLET, FILM COATED ORAL at 21:17

## 2018-01-01 RX ADMIN — CLOPIDOGREL 75 MG: 75 TABLET, FILM COATED ORAL at 09:29

## 2018-01-01 RX ADMIN — HYDROCODONE BITARTRATE AND ACETAMINOPHEN 1 TABLET: 5; 325 TABLET ORAL at 21:12

## 2018-01-01 RX ADMIN — TAZOBACTAM SODIUM AND PIPERACILLIN SODIUM 3.38 G: 375; 3 INJECTION, SOLUTION INTRAVENOUS at 18:12

## 2018-01-01 RX ADMIN — PANTOPRAZOLE SODIUM 40 MG: 40 TABLET, DELAYED RELEASE ORAL at 06:16

## 2018-01-01 RX ADMIN — ASPIRIN 81 MG: 81 TABLET, CHEWABLE ORAL at 08:52

## 2018-01-01 RX ADMIN — INSULIN ASPART 2 UNITS: 100 INJECTION, SOLUTION INTRAVENOUS; SUBCUTANEOUS at 07:55

## 2018-01-01 RX ADMIN — FUROSEMIDE 40 MG: 40 TABLET ORAL at 17:49

## 2018-01-01 RX ADMIN — Medication 250 MG: at 21:29

## 2018-01-01 RX ADMIN — FUROSEMIDE 40 MG: 10 INJECTION, SOLUTION INTRAMUSCULAR; INTRAVENOUS at 00:23

## 2018-01-01 RX ADMIN — METOCLOPRAMIDE 5 MG: 5 TABLET ORAL at 06:51

## 2018-01-01 RX ADMIN — INSULIN ASPART 6 UNITS: 100 INJECTION, SOLUTION INTRAVENOUS; SUBCUTANEOUS at 13:23

## 2018-01-01 RX ADMIN — INSULIN ASPART 4 UNITS: 100 INJECTION, SOLUTION INTRAVENOUS; SUBCUTANEOUS at 22:35

## 2018-01-01 RX ADMIN — LEVOTHYROXINE SODIUM 100 MCG: 100 TABLET ORAL at 06:33

## 2018-01-01 RX ADMIN — METOPROLOL SUCCINATE 50 MG: 50 TABLET, FILM COATED, EXTENDED RELEASE ORAL at 08:59

## 2018-01-01 RX ADMIN — DOCUSATE SODIUM 250 MG: 50 CAPSULE, LIQUID FILLED ORAL at 09:17

## 2018-01-01 RX ADMIN — ASPIRIN 81 MG: 81 TABLET, CHEWABLE ORAL at 08:26

## 2018-01-01 RX ADMIN — ISOSORBIDE MONONITRATE 30 MG: 30 TABLET ORAL at 09:48

## 2018-01-01 RX ADMIN — ENOXAPARIN SODIUM 30 MG: 30 INJECTION SUBCUTANEOUS at 11:40

## 2018-01-01 RX ADMIN — BUDESONIDE 1 MG: 0.5 INHALANT RESPIRATORY (INHALATION) at 20:58

## 2018-01-01 RX ADMIN — GLIPIZIDE 5 MG: 5 TABLET ORAL at 17:43

## 2018-01-01 RX ADMIN — HYDRALAZINE HYDROCHLORIDE 75 MG: 50 TABLET, FILM COATED ORAL at 06:29

## 2018-01-01 RX ADMIN — NYSTATIN: 100000 POWDER TOPICAL at 20:34

## 2018-01-01 RX ADMIN — ATORVASTATIN CALCIUM 10 MG: 10 TABLET, FILM COATED ORAL at 22:03

## 2018-01-01 RX ADMIN — LEVOTHYROXINE SODIUM 100 MCG: 100 TABLET ORAL at 06:37

## 2018-01-01 RX ADMIN — INSULIN ASPART 4 UNITS: 100 INJECTION, SOLUTION INTRAVENOUS; SUBCUTANEOUS at 12:00

## 2018-01-01 RX ADMIN — ENOXAPARIN SODIUM 30 MG: 30 INJECTION SUBCUTANEOUS at 12:27

## 2018-01-01 RX ADMIN — ATORVASTATIN CALCIUM 10 MG: 10 TABLET, FILM COATED ORAL at 20:39

## 2018-01-01 RX ADMIN — ATORVASTATIN CALCIUM 10 MG: 10 TABLET, FILM COATED ORAL at 21:07

## 2018-01-01 RX ADMIN — POTASSIUM CHLORIDE 40 MEQ: 750 CAPSULE, EXTENDED RELEASE ORAL at 09:07

## 2018-01-01 RX ADMIN — OSELTAMIVIR PHOSPHATE 30 MG: 30 CAPSULE ORAL at 21:12

## 2018-01-01 RX ADMIN — IPRATROPIUM BROMIDE AND ALBUTEROL SULFATE 3 ML: .5; 3 SOLUTION RESPIRATORY (INHALATION) at 00:05

## 2018-01-01 RX ADMIN — ACETAMINOPHEN 650 MG: 325 TABLET ORAL at 17:28

## 2018-01-01 RX ADMIN — PREDNISONE 20 MG: 20 TABLET ORAL at 22:24

## 2018-01-01 RX ADMIN — FUROSEMIDE 40 MG: 10 INJECTION, SOLUTION INTRAMUSCULAR; INTRAVENOUS at 12:44

## 2018-01-01 RX ADMIN — CLOPIDOGREL 75 MG: 75 TABLET, FILM COATED ORAL at 09:32

## 2018-01-01 RX ADMIN — ASPIRIN 81 MG: 81 TABLET, CHEWABLE ORAL at 10:00

## 2018-01-01 RX ADMIN — TAZOBACTAM SODIUM AND PIPERACILLIN SODIUM 3.38 G: 375; 3 INJECTION, SOLUTION INTRAVENOUS at 09:33

## 2018-01-01 RX ADMIN — DOCUSATE SODIUM 250 MG: 50 CAPSULE, LIQUID FILLED ORAL at 08:53

## 2018-01-01 RX ADMIN — FAMOTIDINE 10 MG: 10 INJECTION, SOLUTION INTRAVENOUS at 08:03

## 2018-01-01 RX ADMIN — METOPROLOL SUCCINATE 50 MG: 50 TABLET, FILM COATED, EXTENDED RELEASE ORAL at 08:26

## 2018-01-01 RX ADMIN — METOCLOPRAMIDE 5 MG: 5 TABLET ORAL at 22:03

## 2018-01-01 RX ADMIN — PANTOPRAZOLE SODIUM 40 MG: 40 TABLET, DELAYED RELEASE ORAL at 07:10

## 2018-01-01 RX ADMIN — METOCLOPRAMIDE 5 MG: 5 TABLET ORAL at 18:02

## 2018-01-01 RX ADMIN — OSELTAMIVIR PHOSPHATE 30 MG: 30 CAPSULE ORAL at 08:18

## 2018-01-01 RX ADMIN — IPRATROPIUM BROMIDE AND ALBUTEROL SULFATE 3 ML: .5; 3 SOLUTION RESPIRATORY (INHALATION) at 11:35

## 2018-01-01 RX ADMIN — METOCLOPRAMIDE 5 MG: 5 TABLET ORAL at 12:43

## 2018-01-01 RX ADMIN — Medication 1 CAPSULE: at 08:27

## 2018-01-01 RX ADMIN — FUROSEMIDE 40 MG: 10 INJECTION, SOLUTION INTRAMUSCULAR; INTRAVENOUS at 10:00

## 2018-01-01 RX ADMIN — SODIUM CHLORIDE, POTASSIUM CHLORIDE, SODIUM LACTATE AND CALCIUM CHLORIDE 1000 ML: 600; 310; 30; 20 INJECTION, SOLUTION INTRAVENOUS at 14:01

## 2018-01-01 RX ADMIN — IPRATROPIUM BROMIDE AND ALBUTEROL SULFATE 3 ML: .5; 3 SOLUTION RESPIRATORY (INHALATION) at 14:07

## 2018-01-01 RX ADMIN — METOCLOPRAMIDE 5 MG: 5 TABLET ORAL at 21:47

## 2018-01-01 RX ADMIN — ROPINIROLE 0.25 MG: 0.25 TABLET, FILM COATED ORAL at 02:20

## 2018-01-01 RX ADMIN — DOCUSATE SODIUM 200 MG: 100 CAPSULE, LIQUID FILLED ORAL at 08:26

## 2018-01-01 RX ADMIN — ASPIRIN 81 MG: 81 TABLET, CHEWABLE ORAL at 10:24

## 2018-01-01 RX ADMIN — LINAGLIPTIN 5 MG: 5 TABLET, FILM COATED ORAL at 13:22

## 2018-01-01 RX ADMIN — ATORVASTATIN CALCIUM 10 MG: 10 TABLET, FILM COATED ORAL at 21:12

## 2018-01-01 RX ADMIN — INSULIN DETEMIR 10 UNITS: 100 INJECTION, SOLUTION SUBCUTANEOUS at 09:31

## 2018-01-01 RX ADMIN — Medication 10 ML: at 06:35

## 2018-01-01 RX ADMIN — METOCLOPRAMIDE 5 MG: 5 TABLET ORAL at 17:03

## 2018-01-01 RX ADMIN — ROPINIROLE 0.25 MG: 0.25 TABLET, FILM COATED ORAL at 21:11

## 2018-01-01 RX ADMIN — METOCLOPRAMIDE 5 MG: 5 TABLET ORAL at 11:48

## 2018-01-01 RX ADMIN — INSULIN ASPART 2 UNITS: 100 INJECTION, SOLUTION INTRAVENOUS; SUBCUTANEOUS at 18:12

## 2018-01-01 RX ADMIN — DOXYCYCLINE 100 MG: 100 CAPSULE ORAL at 13:22

## 2018-01-01 RX ADMIN — AMLODIPINE BESYLATE 5 MG: 5 TABLET ORAL at 08:42

## 2018-01-01 RX ADMIN — METOCLOPRAMIDE 5 MG: 5 TABLET ORAL at 06:30

## 2018-01-01 RX ADMIN — FUROSEMIDE 40 MG: 40 TABLET ORAL at 18:11

## 2018-01-01 RX ADMIN — FUROSEMIDE 40 MG: 40 TABLET ORAL at 08:51

## 2018-01-01 RX ADMIN — ROPINIROLE 0.25 MG: 0.25 TABLET, FILM COATED ORAL at 20:23

## 2018-01-01 RX ADMIN — ACETAMINOPHEN 650 MG: 325 TABLET ORAL at 08:53

## 2018-01-01 RX ADMIN — LEVOTHYROXINE SODIUM 100 MCG: 100 TABLET ORAL at 06:29

## 2018-01-01 RX ADMIN — METOCLOPRAMIDE 5 MG: 5 TABLET ORAL at 11:53

## 2018-01-01 RX ADMIN — ATORVASTATIN CALCIUM 10 MG: 10 TABLET, FILM COATED ORAL at 21:43

## 2018-01-01 RX ADMIN — HYDROCODONE BITARTRATE AND ACETAMINOPHEN 1 TABLET: 5; 325 TABLET ORAL at 18:15

## 2018-01-01 RX ADMIN — GLIPIZIDE 5 MG: 5 TABLET ORAL at 08:26

## 2018-01-01 RX ADMIN — ROPINIROLE 0.25 MG: 0.25 TABLET, FILM COATED ORAL at 20:45

## 2018-01-01 RX ADMIN — PREDNISONE 10 MG: 10 TABLET ORAL at 10:00

## 2018-01-01 RX ADMIN — FAMOTIDINE 10 MG: 10 INJECTION, SOLUTION INTRAVENOUS at 20:33

## 2018-01-01 RX ADMIN — ENOXAPARIN SODIUM 30 MG: 30 INJECTION SUBCUTANEOUS at 12:31

## 2018-01-01 RX ADMIN — INSULIN DETEMIR 25 UNITS: 100 INJECTION, SOLUTION SUBCUTANEOUS at 21:25

## 2018-01-01 RX ADMIN — SODIUM CHLORIDE 500 MG: 9 INJECTION, SOLUTION INTRAVENOUS at 10:25

## 2018-01-01 RX ADMIN — Medication 1 CAPSULE: at 07:57

## 2018-01-01 RX ADMIN — VANCOMYCIN HYDROCHLORIDE 1250 MG: 10 INJECTION, POWDER, LYOPHILIZED, FOR SOLUTION INTRAVENOUS at 10:29

## 2018-01-01 RX ADMIN — PANTOPRAZOLE SODIUM 40 MG: 40 TABLET, DELAYED RELEASE ORAL at 05:57

## 2018-01-01 RX ADMIN — METOCLOPRAMIDE 5 MG: 5 TABLET ORAL at 21:26

## 2018-01-01 RX ADMIN — SODIUM CHLORIDE, POTASSIUM CHLORIDE, SODIUM LACTATE AND CALCIUM CHLORIDE: 600; 310; 30; 20 INJECTION, SOLUTION INTRAVENOUS at 10:40

## 2018-01-01 RX ADMIN — Medication 1 CAPSULE: at 08:00

## 2018-01-01 RX ADMIN — VANCOMYCIN HYDROCHLORIDE 1500 MG: 10 INJECTION, POWDER, LYOPHILIZED, FOR SOLUTION INTRAVENOUS at 18:11

## 2018-01-01 RX ADMIN — ENOXAPARIN SODIUM 30 MG: 30 INJECTION SUBCUTANEOUS at 08:53

## 2018-01-01 RX ADMIN — ISOSORBIDE MONONITRATE 30 MG: 30 TABLET ORAL at 08:17

## 2018-01-01 RX ADMIN — Medication 250 MG: at 20:57

## 2018-01-01 RX ADMIN — ROPINIROLE 0.25 MG: 0.25 TABLET, FILM COATED ORAL at 22:46

## 2018-01-01 RX ADMIN — INSULIN DETEMIR 15 UNITS: 100 INJECTION, SOLUTION SUBCUTANEOUS at 21:48

## 2018-01-01 RX ADMIN — ISOSORBIDE MONONITRATE 30 MG: 30 TABLET ORAL at 09:30

## 2018-01-01 RX ADMIN — HYDRALAZINE HYDROCHLORIDE 10 MG: 20 INJECTION INTRAMUSCULAR; INTRAVENOUS at 18:29

## 2018-01-01 RX ADMIN — CLOPIDOGREL 75 MG: 75 TABLET, FILM COATED ORAL at 08:59

## 2018-01-01 RX ADMIN — FUROSEMIDE 40 MG: 40 TABLET ORAL at 08:18

## 2018-01-01 RX ADMIN — ROPINIROLE 0.25 MG: 0.25 TABLET, FILM COATED ORAL at 22:24

## 2018-01-01 RX ADMIN — PROPOFOL 100 MCG/KG/MIN: 10 INJECTION, EMULSION INTRAVENOUS at 14:38

## 2018-01-01 RX ADMIN — CLOPIDOGREL 75 MG: 75 TABLET, FILM COATED ORAL at 10:23

## 2018-01-01 RX ADMIN — HYDROCODONE BITARTRATE AND ACETAMINOPHEN 1 TABLET: 5; 325 TABLET ORAL at 15:34

## 2018-01-01 RX ADMIN — SODIUM CHLORIDE 750 MG: 900 INJECTION, SOLUTION INTRAVENOUS at 11:42

## 2018-01-01 RX ADMIN — AMLODIPINE BESYLATE 5 MG: 5 TABLET ORAL at 10:27

## 2018-01-01 RX ADMIN — FAMOTIDINE 10 MG: 10 INJECTION, SOLUTION INTRAVENOUS at 12:41

## 2018-01-01 RX ADMIN — POTASSIUM CHLORIDE AND SODIUM CHLORIDE 100 ML/HR: 450; 150 INJECTION, SOLUTION INTRAVENOUS at 05:50

## 2018-01-01 RX ADMIN — HYDRALAZINE HYDROCHLORIDE 10 MG: 20 INJECTION INTRAMUSCULAR; INTRAVENOUS at 20:24

## 2018-01-01 RX ADMIN — FAMOTIDINE 10 MG: 10 INJECTION, SOLUTION INTRAVENOUS at 22:05

## 2018-01-01 RX ADMIN — LINAGLIPTIN 5 MG: 5 TABLET, FILM COATED ORAL at 08:18

## 2018-01-01 RX ADMIN — INSULIN ASPART 4 UNITS: 100 INJECTION, SOLUTION INTRAVENOUS; SUBCUTANEOUS at 17:05

## 2018-01-01 RX ADMIN — SODIUM CHLORIDE 750 MG: 900 INJECTION, SOLUTION INTRAVENOUS at 13:04

## 2018-01-01 RX ADMIN — SCOPALAMINE 1 PATCH: 1 PATCH, EXTENDED RELEASE TRANSDERMAL at 18:49

## 2018-01-01 RX ADMIN — PREDNISONE 20 MG: 20 TABLET ORAL at 18:18

## 2018-01-01 RX ADMIN — TAZOBACTAM SODIUM AND PIPERACILLIN SODIUM 3.38 G: 375; 3 INJECTION, SOLUTION INTRAVENOUS at 06:38

## 2018-01-01 RX ADMIN — NYSTATIN: 100000 POWDER TOPICAL at 08:54

## 2018-01-01 RX ADMIN — INSULIN ASPART 3 UNITS: 100 INJECTION, SOLUTION INTRAVENOUS; SUBCUTANEOUS at 09:49

## 2018-01-01 RX ADMIN — INSULIN ASPART 4 UNITS: 100 INJECTION, SOLUTION INTRAVENOUS; SUBCUTANEOUS at 17:08

## 2018-01-01 RX ADMIN — HYDRALAZINE HYDROCHLORIDE 75 MG: 50 TABLET, FILM COATED ORAL at 17:07

## 2018-01-01 RX ADMIN — ENOXAPARIN SODIUM 30 MG: 30 INJECTION SUBCUTANEOUS at 17:30

## 2018-01-01 RX ADMIN — CLOPIDOGREL 75 MG: 75 TABLET, FILM COATED ORAL at 09:37

## 2018-01-01 RX ADMIN — ENOXAPARIN SODIUM 30 MG: 30 INJECTION SUBCUTANEOUS at 18:36

## 2018-01-01 RX ADMIN — FUROSEMIDE 40 MG: 40 TABLET ORAL at 09:41

## 2018-01-01 RX ADMIN — INSULIN ASPART 3 UNITS: 100 INJECTION, SOLUTION INTRAVENOUS; SUBCUTANEOUS at 08:46

## 2018-01-01 RX ADMIN — PANTOPRAZOLE SODIUM 40 MG: 40 INJECTION, POWDER, FOR SOLUTION INTRAVENOUS at 07:27

## 2018-01-01 RX ADMIN — IPRATROPIUM BROMIDE AND ALBUTEROL SULFATE 3 ML: .5; 3 SOLUTION RESPIRATORY (INHALATION) at 11:57

## 2018-01-01 RX ADMIN — TAZOBACTAM SODIUM AND PIPERACILLIN SODIUM 3.38 G: 375; 3 INJECTION, SOLUTION INTRAVENOUS at 00:24

## 2018-01-01 RX ADMIN — METOCLOPRAMIDE 5 MG: 5 TABLET ORAL at 10:22

## 2018-01-01 RX ADMIN — INSULIN DETEMIR 25 UNITS: 100 INJECTION, SOLUTION SUBCUTANEOUS at 21:48

## 2018-01-01 RX ADMIN — HYDROCODONE BITARTRATE AND ACETAMINOPHEN 1 TABLET: 5; 325 TABLET ORAL at 22:04

## 2018-01-01 RX ADMIN — LEVOTHYROXINE SODIUM 100 MCG: 100 TABLET ORAL at 07:20

## 2018-01-01 RX ADMIN — ROPINIROLE 0.25 MG: 0.25 TABLET, FILM COATED ORAL at 21:26

## 2018-01-01 RX ADMIN — DOCUSATE SODIUM 250 MG: 50 CAPSULE, LIQUID FILLED ORAL at 12:56

## 2018-01-01 RX ADMIN — TAZOBACTAM SODIUM AND PIPERACILLIN SODIUM 3.38 G: 375; 3 INJECTION, SOLUTION INTRAVENOUS at 05:10

## 2018-01-01 RX ADMIN — METOCLOPRAMIDE 5 MG: 5 TABLET ORAL at 05:58

## 2018-01-01 RX ADMIN — IPRATROPIUM BROMIDE AND ALBUTEROL SULFATE 3 ML: .5; 3 SOLUTION RESPIRATORY (INHALATION) at 16:10

## 2018-01-01 RX ADMIN — POTASSIUM CHLORIDE 40 MEQ: 750 CAPSULE, EXTENDED RELEASE ORAL at 17:08

## 2018-01-01 RX ADMIN — ROPINIROLE 0.25 MG: 0.25 TABLET, FILM COATED ORAL at 20:51

## 2018-01-01 RX ADMIN — LINAGLIPTIN 5 MG: 5 TABLET, FILM COATED ORAL at 08:52

## 2018-01-01 RX ADMIN — CLOPIDOGREL 75 MG: 75 TABLET, FILM COATED ORAL at 08:52

## 2018-01-01 RX ADMIN — Medication 1 CAPSULE: at 12:26

## 2018-01-01 RX ADMIN — TAZOBACTAM SODIUM AND PIPERACILLIN SODIUM 3.38 G: 375; 3 INJECTION, SOLUTION INTRAVENOUS at 21:12

## 2018-01-01 RX ADMIN — TAZOBACTAM SODIUM AND PIPERACILLIN SODIUM 3.38 G: 375; 3 INJECTION, SOLUTION INTRAVENOUS at 14:00

## 2018-01-01 RX ADMIN — MORPHINE SULFATE 2 MG: 2 INJECTION, SOLUTION INTRAMUSCULAR; INTRAVENOUS at 18:50

## 2018-01-01 RX ADMIN — PROPOFOL 70 MG: 10 INJECTION, EMULSION INTRAVENOUS at 14:25

## 2018-01-01 RX ADMIN — INSULIN ASPART 6 UNITS: 100 INJECTION, SOLUTION INTRAVENOUS; SUBCUTANEOUS at 10:37

## 2018-01-01 RX ADMIN — Medication 2 SPRAY: at 20:06

## 2018-01-01 RX ADMIN — PROPOFOL 100 MCG/KG/MIN: 10 INJECTION, EMULSION INTRAVENOUS at 14:33

## 2018-01-01 RX ADMIN — Medication 1 CAPSULE: at 09:19

## 2018-01-01 RX ADMIN — INSULIN DETEMIR 25 UNITS: 100 INJECTION, SOLUTION SUBCUTANEOUS at 22:05

## 2018-01-01 RX ADMIN — IPRATROPIUM BROMIDE AND ALBUTEROL SULFATE 3 ML: .5; 3 SOLUTION RESPIRATORY (INHALATION) at 07:55

## 2018-01-01 RX ADMIN — HYDRALAZINE HYDROCHLORIDE 10 MG: 20 INJECTION INTRAMUSCULAR; INTRAVENOUS at 21:20

## 2018-01-01 RX ADMIN — CLOPIDOGREL 75 MG: 75 TABLET, FILM COATED ORAL at 09:56

## 2018-01-01 RX ADMIN — LINAGLIPTIN 5 MG: 5 TABLET, FILM COATED ORAL at 08:51

## 2018-01-01 RX ADMIN — HYDRALAZINE HYDROCHLORIDE 50 MG: 50 TABLET, FILM COATED ORAL at 08:17

## 2018-01-01 RX ADMIN — FUROSEMIDE 40 MG: 40 TABLET ORAL at 09:36

## 2018-01-01 RX ADMIN — FUROSEMIDE 40 MG: 40 TABLET ORAL at 12:26

## 2018-01-01 RX ADMIN — ATORVASTATIN CALCIUM 10 MG: 10 TABLET, FILM COATED ORAL at 20:41

## 2018-01-01 RX ADMIN — DOCUSATE SODIUM 250 MG: 50 CAPSULE, LIQUID FILLED ORAL at 09:53

## 2018-01-01 RX ADMIN — FUROSEMIDE 40 MG: 10 INJECTION, SOLUTION INTRAMUSCULAR; INTRAVENOUS at 18:32

## 2018-01-01 RX ADMIN — INSULIN DETEMIR 25 UNITS: 100 INJECTION, SOLUTION SUBCUTANEOUS at 22:21

## 2018-01-01 RX ADMIN — METOCLOPRAMIDE 5 MG: 5 TABLET ORAL at 20:06

## 2018-01-01 RX ADMIN — METOCLOPRAMIDE 5 MG: 5 TABLET ORAL at 17:39

## 2018-01-01 RX ADMIN — Medication 1 CAPSULE: at 09:00

## 2018-01-01 RX ADMIN — NYSTATIN: 100000 POWDER TOPICAL at 20:26

## 2018-01-01 RX ADMIN — SODIUM CHLORIDE 100 ML/HR: 9 INJECTION, SOLUTION INTRAVENOUS at 01:02

## 2018-01-01 RX ADMIN — INSULIN ASPART 2 UNITS: 100 INJECTION, SOLUTION INTRAVENOUS; SUBCUTANEOUS at 09:17

## 2018-01-01 RX ADMIN — ISOSORBIDE MONONITRATE 30 MG: 30 TABLET ORAL at 08:26

## 2018-01-01 RX ADMIN — BUDESONIDE 1 MG: 0.5 INHALANT RESPIRATORY (INHALATION) at 22:06

## 2018-01-01 RX ADMIN — DOCUSATE SODIUM 50 MG: 50 CAPSULE, LIQUID FILLED ORAL at 10:27

## 2018-01-01 RX ADMIN — METOCLOPRAMIDE 5 MG: 5 TABLET ORAL at 06:37

## 2018-01-01 RX ADMIN — PANTOPRAZOLE SODIUM 40 MG: 40 INJECTION, POWDER, FOR SOLUTION INTRAVENOUS at 16:00

## 2018-01-01 RX ADMIN — ISOSORBIDE MONONITRATE 30 MG: 30 TABLET ORAL at 09:32

## 2018-01-01 RX ADMIN — LEVOTHYROXINE SODIUM 100 MCG: 100 TABLET ORAL at 06:54

## 2018-01-01 RX ADMIN — ENOXAPARIN SODIUM 30 MG: 30 INJECTION SUBCUTANEOUS at 18:21

## 2018-01-01 RX ADMIN — IPRATROPIUM BROMIDE AND ALBUTEROL SULFATE 3 ML: .5; 3 SOLUTION RESPIRATORY (INHALATION) at 20:04

## 2018-01-01 RX ADMIN — SODIUM CHLORIDE 100 ML/HR: 9 INJECTION, SOLUTION INTRAVENOUS at 15:47

## 2018-01-01 RX ADMIN — ASPIRIN 81 MG: 81 TABLET, CHEWABLE ORAL at 08:59

## 2018-01-01 RX ADMIN — INSULIN ASPART 3 UNITS: 100 INJECTION, SOLUTION INTRAVENOUS; SUBCUTANEOUS at 11:47

## 2018-01-01 RX ADMIN — HYDRALAZINE HYDROCHLORIDE 75 MG: 50 TABLET, FILM COATED ORAL at 22:24

## 2018-01-01 RX ADMIN — ONDANSETRON 4 MG: 2 INJECTION INTRAMUSCULAR; INTRAVENOUS at 18:51

## 2018-01-01 RX ADMIN — ACETAMINOPHEN 500 MG: 500 TABLET ORAL at 16:00

## 2018-01-01 RX ADMIN — LEVOTHYROXINE SODIUM 100 MCG: 100 TABLET ORAL at 09:07

## 2018-01-01 RX ADMIN — INSULIN ASPART 4 UNITS: 100 INJECTION, SOLUTION INTRAVENOUS; SUBCUTANEOUS at 22:12

## 2018-01-01 RX ADMIN — OSELTAMIVIR PHOSPHATE 30 MG: 30 CAPSULE ORAL at 20:06

## 2018-01-01 RX ADMIN — METOCLOPRAMIDE 5 MG: 5 TABLET ORAL at 18:18

## 2018-01-01 RX ADMIN — DOXYCYCLINE 100 MG: 100 CAPSULE ORAL at 12:26

## 2018-01-01 RX ADMIN — HYDRALAZINE HYDROCHLORIDE 10 MG: 20 INJECTION INTRAMUSCULAR; INTRAVENOUS at 12:11

## 2018-01-01 RX ADMIN — INSULIN DETEMIR 10 UNITS: 100 INJECTION, SOLUTION SUBCUTANEOUS at 09:08

## 2018-01-01 RX ADMIN — HYDRALAZINE HYDROCHLORIDE 50 MG: 50 TABLET, FILM COATED ORAL at 16:13

## 2018-01-01 RX ADMIN — HYDRALAZINE HYDROCHLORIDE 75 MG: 50 TABLET, FILM COATED ORAL at 05:13

## 2018-01-01 RX ADMIN — INSULIN ASPART 9 UNITS: 100 INJECTION, SOLUTION INTRAVENOUS; SUBCUTANEOUS at 12:27

## 2018-01-01 RX ADMIN — INSULIN DETEMIR 10 UNITS: 100 INJECTION, SOLUTION SUBCUTANEOUS at 09:48

## 2018-01-01 RX ADMIN — IPRATROPIUM BROMIDE AND ALBUTEROL SULFATE 3 ML: .5; 3 SOLUTION RESPIRATORY (INHALATION) at 20:15

## 2018-01-01 RX ADMIN — FUROSEMIDE 40 MG: 40 TABLET ORAL at 18:18

## 2018-01-01 RX ADMIN — HYDRALAZINE HYDROCHLORIDE 75 MG: 50 TABLET, FILM COATED ORAL at 06:33

## 2018-01-01 RX ADMIN — POTASSIUM CHLORIDE AND SODIUM CHLORIDE 50 ML/HR: 450; 150 INJECTION, SOLUTION INTRAVENOUS at 12:05

## 2018-01-01 RX ADMIN — HYDROCODONE BITARTRATE AND ACETAMINOPHEN 1 TABLET: 5; 325 TABLET ORAL at 20:41

## 2018-01-01 RX ADMIN — INSULIN ASPART 12 UNITS: 100 INJECTION, SOLUTION INTRAVENOUS; SUBCUTANEOUS at 11:47

## 2018-01-01 RX ADMIN — TAZOBACTAM SODIUM AND PIPERACILLIN SODIUM 3.38 G: 375; 3 INJECTION, SOLUTION INTRAVENOUS at 21:11

## 2018-01-01 RX ADMIN — ASPIRIN 81 MG: 81 TABLET, CHEWABLE ORAL at 08:18

## 2018-01-01 RX ADMIN — METOCLOPRAMIDE 5 MG: 5 TABLET ORAL at 09:55

## 2018-01-01 RX ADMIN — INSULIN ASPART 2 UNITS: 100 INJECTION, SOLUTION INTRAVENOUS; SUBCUTANEOUS at 22:19

## 2018-01-01 RX ADMIN — ISOSORBIDE MONONITRATE 30 MG: 30 TABLET ORAL at 08:51

## 2018-01-01 RX ADMIN — ISOSORBIDE MONONITRATE 30 MG: 30 TABLET ORAL at 10:00

## 2018-01-01 RX ADMIN — IPRATROPIUM BROMIDE AND ALBUTEROL SULFATE 3 ML: .5; 3 SOLUTION RESPIRATORY (INHALATION) at 10:52

## 2018-01-01 RX ADMIN — IPRATROPIUM BROMIDE AND ALBUTEROL SULFATE 3 ML: .5; 3 SOLUTION RESPIRATORY (INHALATION) at 22:05

## 2018-01-01 RX ADMIN — ENOXAPARIN SODIUM 30 MG: 30 INJECTION SUBCUTANEOUS at 11:56

## 2018-01-01 RX ADMIN — METOCLOPRAMIDE 5 MG: 5 TABLET ORAL at 07:36

## 2018-01-01 RX ADMIN — FUROSEMIDE 40 MG: 40 TABLET ORAL at 10:23

## 2018-01-01 RX ADMIN — HYDROCODONE BITARTRATE AND ACETAMINOPHEN 1 TABLET: 5; 325 TABLET ORAL at 02:15

## 2018-01-01 RX ADMIN — INSULIN ASPART 4 UNITS: 100 INJECTION, SOLUTION INTRAVENOUS; SUBCUTANEOUS at 22:51

## 2018-01-01 RX ADMIN — Medication 1 CAPSULE: at 08:53

## 2018-01-01 RX ADMIN — INSULIN ASPART 6 UNITS: 100 INJECTION, SOLUTION INTRAVENOUS; SUBCUTANEOUS at 12:25

## 2018-01-01 RX ADMIN — Medication 250 MG: at 09:06

## 2018-01-01 RX ADMIN — DOCUSATE SODIUM 250 MG: 50 CAPSULE, LIQUID FILLED ORAL at 09:07

## 2018-01-01 RX ADMIN — ALPRAZOLAM 0.25 MG: 0.25 TABLET ORAL at 03:46

## 2018-01-01 RX ADMIN — LORAZEPAM 0.5 MG: 2 INJECTION INTRAMUSCULAR; INTRAVENOUS at 01:02

## 2018-01-01 RX ADMIN — BUDESONIDE 1 MG: 1 SUSPENSION RESPIRATORY (INHALATION) at 12:24

## 2018-01-01 RX ADMIN — OSELTAMIVIR PHOSPHATE 30 MG: 30 CAPSULE ORAL at 09:53

## 2018-01-01 RX ADMIN — METOCLOPRAMIDE 5 MG: 5 TABLET ORAL at 10:00

## 2018-01-01 RX ADMIN — INSULIN ASPART 4 UNITS: 100 INJECTION, SOLUTION INTRAVENOUS; SUBCUTANEOUS at 17:43

## 2018-01-01 RX ADMIN — INSULIN ASPART 2 UNITS: 100 INJECTION, SOLUTION INTRAVENOUS; SUBCUTANEOUS at 22:02

## 2018-01-01 RX ADMIN — ATORVASTATIN CALCIUM 10 MG: 10 TABLET, FILM COATED ORAL at 02:20

## 2018-01-01 RX ADMIN — INSULIN ASPART 3 UNITS: 100 INJECTION, SOLUTION INTRAVENOUS; SUBCUTANEOUS at 22:23

## 2018-01-01 RX ADMIN — TAZOBACTAM SODIUM AND PIPERACILLIN SODIUM 3.38 G: 375; 3 INJECTION, SOLUTION INTRAVENOUS at 06:44

## 2018-01-01 RX ADMIN — SODIUM CHLORIDE 100 ML/HR: 9 INJECTION, SOLUTION INTRAVENOUS at 00:15

## 2018-01-01 RX ADMIN — IPRATROPIUM BROMIDE AND ALBUTEROL SULFATE 3 ML: .5; 3 SOLUTION RESPIRATORY (INHALATION) at 07:02

## 2018-01-01 RX ADMIN — INSULIN DETEMIR 15 UNITS: 100 INJECTION, SOLUTION SUBCUTANEOUS at 22:47

## 2018-01-01 RX ADMIN — Medication 250 MG: at 09:04

## 2018-01-01 RX ADMIN — ENOXAPARIN SODIUM 30 MG: 30 INJECTION SUBCUTANEOUS at 11:41

## 2018-01-01 RX ADMIN — INSULIN DETEMIR 10 UNITS: 100 INJECTION, SOLUTION SUBCUTANEOUS at 09:35

## 2018-01-01 RX ADMIN — Medication 1 CAPSULE: at 09:35

## 2018-01-01 RX ADMIN — IPRATROPIUM BROMIDE AND ALBUTEROL SULFATE 3 ML: .5; 3 SOLUTION RESPIRATORY (INHALATION) at 08:47

## 2018-01-01 RX ADMIN — NYSTATIN 1 APPLICATION: 100000 POWDER TOPICAL at 21:29

## 2018-01-01 RX ADMIN — PREDNISONE 10 MG: 20 TABLET ORAL at 08:47

## 2018-01-01 RX ADMIN — ATORVASTATIN CALCIUM 10 MG: 10 TABLET, FILM COATED ORAL at 20:45

## 2018-01-01 RX ADMIN — INSULIN ASPART 6 UNITS: 100 INJECTION, SOLUTION INTRAVENOUS; SUBCUTANEOUS at 18:18

## 2018-01-01 RX ADMIN — ASPIRIN 81 MG: 81 TABLET, CHEWABLE ORAL at 08:51

## 2018-01-01 RX ADMIN — ROPINIROLE 0.25 MG: 0.25 TABLET, FILM COATED ORAL at 22:19

## 2018-01-01 RX ADMIN — DOCUSATE SODIUM 250 MG: 50 CAPSULE, LIQUID FILLED ORAL at 09:56

## 2018-01-01 RX ADMIN — CLOPIDOGREL 75 MG: 75 TABLET, FILM COATED ORAL at 09:48

## 2018-01-01 RX ADMIN — HYDRALAZINE HYDROCHLORIDE 50 MG: 50 TABLET, FILM COATED ORAL at 21:30

## 2018-01-01 RX ADMIN — METOCLOPRAMIDE 5 MG: 5 TABLET ORAL at 22:19

## 2018-01-01 RX ADMIN — Medication 1 CAPSULE: at 13:22

## 2018-01-01 RX ADMIN — HYDROCODONE BITARTRATE AND ACETAMINOPHEN 1 TABLET: 5; 325 TABLET ORAL at 22:35

## 2018-01-01 RX ADMIN — DOCUSATE SODIUM 250 MG: 50 CAPSULE, LIQUID FILLED ORAL at 10:22

## 2018-01-01 RX ADMIN — DOCUSATE SODIUM 200 MG: 100 CAPSULE, LIQUID FILLED ORAL at 12:22

## 2018-01-01 RX ADMIN — METOCLOPRAMIDE 5 MG: 5 TABLET ORAL at 12:28

## 2018-01-01 RX ADMIN — ISOSORBIDE MONONITRATE 30 MG: 30 TABLET ORAL at 08:59

## 2018-01-01 RX ADMIN — SODIUM CHLORIDE 75 ML/HR: 9 INJECTION, SOLUTION INTRAVENOUS at 01:49

## 2018-01-01 RX ADMIN — FUROSEMIDE 40 MG: 10 INJECTION, SOLUTION INTRAMUSCULAR; INTRAVENOUS at 09:54

## 2018-01-01 RX ADMIN — ASPIRIN 81 MG: 81 TABLET, CHEWABLE ORAL at 09:32

## 2018-01-01 RX ADMIN — INSULIN ASPART 2 UNITS: 100 INJECTION, SOLUTION INTRAVENOUS; SUBCUTANEOUS at 12:06

## 2018-01-01 RX ADMIN — LINAGLIPTIN 5 MG: 5 TABLET, FILM COATED ORAL at 10:00

## 2018-01-01 RX ADMIN — DOXYCYCLINE 100 MG: 100 CAPSULE ORAL at 20:41

## 2018-01-01 RX ADMIN — ASPIRIN 81 MG: 81 TABLET, CHEWABLE ORAL at 08:42

## 2018-01-01 RX ADMIN — IPRATROPIUM BROMIDE AND ALBUTEROL SULFATE 3 ML: .5; 3 SOLUTION RESPIRATORY (INHALATION) at 07:32

## 2018-01-01 RX ADMIN — IPRATROPIUM BROMIDE AND ALBUTEROL SULFATE 3 ML: .5; 3 SOLUTION RESPIRATORY (INHALATION) at 15:18

## 2018-01-01 RX ADMIN — AMLODIPINE BESYLATE 10 MG: 10 TABLET ORAL at 08:18

## 2018-01-01 RX ADMIN — FAMOTIDINE 10 MG: 10 INJECTION, SOLUTION INTRAVENOUS at 09:07

## 2018-01-01 RX ADMIN — PREDNISONE 10 MG: 20 TABLET ORAL at 18:11

## 2018-01-01 RX ADMIN — HYDRALAZINE HYDROCHLORIDE 75 MG: 50 TABLET, FILM COATED ORAL at 14:09

## 2018-01-01 RX ADMIN — ASPIRIN 81 MG: 81 TABLET, CHEWABLE ORAL at 09:48

## 2018-01-01 RX ADMIN — OSELTAMIVIR PHOSPHATE 30 MG: 30 CAPSULE ORAL at 08:53

## 2018-01-01 RX ADMIN — HYDRALAZINE HYDROCHLORIDE 75 MG: 50 TABLET, FILM COATED ORAL at 22:04

## 2018-01-01 RX ADMIN — CLOPIDOGREL 75 MG: 75 TABLET, FILM COATED ORAL at 10:00

## 2018-01-01 RX ADMIN — PREDNISONE 10 MG: 10 TABLET ORAL at 08:42

## 2018-01-01 RX ADMIN — LINAGLIPTIN 5 MG: 5 TABLET, FILM COATED ORAL at 10:27

## 2018-01-01 RX ADMIN — INSULIN ASPART 3 UNITS: 100 INJECTION, SOLUTION INTRAVENOUS; SUBCUTANEOUS at 16:44

## 2018-01-01 RX ADMIN — METOCLOPRAMIDE 5 MG: 5 TABLET ORAL at 20:24

## 2018-01-01 RX ADMIN — DOCUSATE SODIUM 250 MG: 50 CAPSULE, LIQUID FILLED ORAL at 13:21

## 2018-01-01 RX ADMIN — PANTOPRAZOLE SODIUM 40 MG: 40 TABLET, DELAYED RELEASE ORAL at 06:57

## 2018-01-01 RX ADMIN — ROPINIROLE 0.25 MG: 0.25 TABLET, FILM COATED ORAL at 21:07

## 2018-01-01 RX ADMIN — METOCLOPRAMIDE 5 MG: 5 TABLET ORAL at 18:40

## 2018-01-01 RX ADMIN — LINAGLIPTIN 5 MG: 5 TABLET, FILM COATED ORAL at 09:36

## 2018-01-01 RX ADMIN — LEVOTHYROXINE SODIUM 100 MCG: 100 TABLET ORAL at 09:32

## 2018-01-01 RX ADMIN — INSULIN ASPART 4 UNITS: 100 INJECTION, SOLUTION INTRAVENOUS; SUBCUTANEOUS at 21:47

## 2018-01-01 RX ADMIN — SODIUM CHLORIDE, POTASSIUM CHLORIDE, SODIUM LACTATE AND CALCIUM CHLORIDE 30 ML/HR: 600; 310; 30; 20 INJECTION, SOLUTION INTRAVENOUS at 10:40

## 2018-01-01 RX ADMIN — METOCLOPRAMIDE 5 MG: 5 TABLET ORAL at 11:52

## 2018-01-01 RX ADMIN — METOCLOPRAMIDE 5 MG: 5 TABLET ORAL at 09:32

## 2018-01-01 RX ADMIN — IPRATROPIUM BROMIDE AND ALBUTEROL SULFATE 3 ML: .5; 3 SOLUTION RESPIRATORY (INHALATION) at 07:35

## 2018-01-01 RX ADMIN — INSULIN ASPART 5 UNITS: 100 INJECTION, SOLUTION INTRAVENOUS; SUBCUTANEOUS at 17:32

## 2018-01-01 RX ADMIN — DOXYCYCLINE 100 MG: 100 CAPSULE ORAL at 21:47

## 2018-01-01 RX ADMIN — TAZOBACTAM SODIUM AND PIPERACILLIN SODIUM 3.38 G: 375; 3 INJECTION, SOLUTION INTRAVENOUS at 22:02

## 2018-01-01 RX ADMIN — NYSTATIN: 100000 POWDER TOPICAL at 09:05

## 2018-01-01 RX ADMIN — METOCLOPRAMIDE 5 MG: 5 TABLET ORAL at 12:56

## 2018-01-01 RX ADMIN — INSULIN ASPART 7 UNITS: 100 INJECTION, SOLUTION INTRAVENOUS; SUBCUTANEOUS at 18:57

## 2018-01-01 RX ADMIN — IPRATROPIUM BROMIDE AND ALBUTEROL SULFATE 3 ML: .5; 3 SOLUTION RESPIRATORY (INHALATION) at 08:53

## 2018-01-01 RX ADMIN — INSULIN ASPART 5 UNITS: 100 INJECTION, SOLUTION INTRAVENOUS; SUBCUTANEOUS at 21:31

## 2018-01-01 RX ADMIN — LEVOTHYROXINE SODIUM 100 MCG: 100 TABLET ORAL at 07:28

## 2018-01-01 RX ADMIN — ISOSORBIDE MONONITRATE 30 MG: 30 TABLET ORAL at 10:33

## 2018-01-01 RX ADMIN — IPRATROPIUM BROMIDE AND ALBUTEROL SULFATE 3 ML: .5; 3 SOLUTION RESPIRATORY (INHALATION) at 15:34

## 2018-01-01 RX ADMIN — HYDRALAZINE HYDROCHLORIDE 75 MG: 50 TABLET, FILM COATED ORAL at 07:20

## 2018-01-01 RX ADMIN — HYDRALAZINE HYDROCHLORIDE 75 MG: 50 TABLET, FILM COATED ORAL at 02:15

## 2018-01-01 RX ADMIN — METOCLOPRAMIDE 5 MG: 5 TABLET ORAL at 11:41

## 2018-01-01 RX ADMIN — GLIPIZIDE 2.5 MG: 5 TABLET ORAL at 12:10

## 2018-01-01 RX ADMIN — IPRATROPIUM BROMIDE AND ALBUTEROL SULFATE 3 ML: .5; 3 SOLUTION RESPIRATORY (INHALATION) at 16:54

## 2018-01-01 RX ADMIN — METOCLOPRAMIDE 5 MG: 5 TABLET ORAL at 18:12

## 2018-01-01 RX ADMIN — CLOPIDOGREL 75 MG: 75 TABLET, FILM COATED ORAL at 08:47

## 2018-01-01 RX ADMIN — HYDRALAZINE HYDROCHLORIDE 50 MG: 50 TABLET, FILM COATED ORAL at 07:19

## 2018-01-01 RX ADMIN — ENOXAPARIN SODIUM 30 MG: 30 INJECTION SUBCUTANEOUS at 18:46

## 2018-01-01 RX ADMIN — DOXYCYCLINE 100 MG: 100 CAPSULE ORAL at 10:23

## 2018-01-01 RX ADMIN — LEVOTHYROXINE SODIUM 100 MCG: 100 TABLET ORAL at 06:59

## 2018-01-01 RX ADMIN — ISOSORBIDE MONONITRATE 30 MG: 30 TABLET ORAL at 09:07

## 2018-01-01 RX ADMIN — FAMOTIDINE 10 MG: 10 INJECTION, SOLUTION INTRAVENOUS at 08:54

## 2018-01-01 RX ADMIN — Medication 1 CAPSULE: at 10:27

## 2018-01-01 RX ADMIN — TAZOBACTAM SODIUM AND PIPERACILLIN SODIUM 3.38 G: 375; 3 INJECTION, SOLUTION INTRAVENOUS at 17:38

## 2018-01-01 RX ADMIN — LORAZEPAM 0.5 MG: 2 INJECTION INTRAMUSCULAR; INTRAVENOUS at 16:37

## 2018-01-01 RX ADMIN — INSULIN DETEMIR 15 UNITS: 100 INJECTION, SOLUTION SUBCUTANEOUS at 08:47

## 2018-01-01 RX ADMIN — IPRATROPIUM BROMIDE AND ALBUTEROL SULFATE 3 ML: .5; 3 SOLUTION RESPIRATORY (INHALATION) at 17:48

## 2018-01-01 RX ADMIN — CLOPIDOGREL 75 MG: 75 TABLET, FILM COATED ORAL at 09:08

## 2018-01-01 RX ADMIN — METOCLOPRAMIDE 5 MG: 5 TABLET ORAL at 11:47

## 2018-01-01 RX ADMIN — BUDESONIDE 1 MG: 1 SUSPENSION RESPIRATORY (INHALATION) at 21:52

## 2018-01-01 RX ADMIN — BUDESONIDE 1 MG: 0.5 INHALANT RESPIRATORY (INHALATION) at 19:28

## 2018-01-01 RX ADMIN — HYDRALAZINE HYDROCHLORIDE 50 MG: 50 TABLET, FILM COATED ORAL at 14:25

## 2018-01-01 RX ADMIN — HYDRALAZINE HYDROCHLORIDE 50 MG: 50 TABLET, FILM COATED ORAL at 16:30

## 2018-01-01 RX ADMIN — ENOXAPARIN SODIUM 30 MG: 30 INJECTION SUBCUTANEOUS at 08:26

## 2018-01-01 RX ADMIN — METOCLOPRAMIDE 5 MG: 5 TABLET ORAL at 06:59

## 2018-01-01 RX ADMIN — IPRATROPIUM BROMIDE AND ALBUTEROL SULFATE 3 ML: .5; 3 SOLUTION RESPIRATORY (INHALATION) at 10:55

## 2018-01-01 RX ADMIN — METOCLOPRAMIDE 5 MG: 5 TABLET ORAL at 06:16

## 2018-01-01 RX ADMIN — DOCUSATE SODIUM 250 MG: 50 CAPSULE, LIQUID FILLED ORAL at 12:21

## 2018-01-01 RX ADMIN — INSULIN ASPART 2 UNITS: 100 INJECTION, SOLUTION INTRAVENOUS; SUBCUTANEOUS at 17:08

## 2018-01-01 RX ADMIN — LINAGLIPTIN 5 MG: 5 TABLET, FILM COATED ORAL at 09:31

## 2018-01-01 RX ADMIN — LEVOTHYROXINE SODIUM 100 MCG: 100 TABLET ORAL at 06:34

## 2018-01-01 RX ADMIN — BUDESONIDE 1 MG: 1 SUSPENSION RESPIRATORY (INHALATION) at 22:17

## 2018-01-01 RX ADMIN — NYSTATIN: 100000 POWDER TOPICAL at 09:07

## 2018-01-01 RX ADMIN — HYDRALAZINE HYDROCHLORIDE 75 MG: 50 TABLET, FILM COATED ORAL at 21:47

## 2018-01-01 RX ADMIN — INSULIN ASPART 2 UNITS: 100 INJECTION, SOLUTION INTRAVENOUS; SUBCUTANEOUS at 18:00

## 2018-01-01 RX ADMIN — BUDESONIDE 1 MG: 0.5 INHALANT RESPIRATORY (INHALATION) at 06:37

## 2018-01-01 RX ADMIN — PANTOPRAZOLE SODIUM 40 MG: 40 TABLET, DELAYED RELEASE ORAL at 07:00

## 2018-01-01 RX ADMIN — ENOXAPARIN SODIUM 30 MG: 30 INJECTION SUBCUTANEOUS at 08:18

## 2018-01-01 RX ADMIN — METOPROLOL SUCCINATE 50 MG: 50 TABLET, FILM COATED, EXTENDED RELEASE ORAL at 08:18

## 2018-01-01 RX ADMIN — TAZOBACTAM SODIUM AND PIPERACILLIN SODIUM 3.38 G: 375; 3 INJECTION, SOLUTION INTRAVENOUS at 04:56

## 2018-01-01 RX ADMIN — INSULIN ASPART 12 UNITS: 100 INJECTION, SOLUTION INTRAVENOUS; SUBCUTANEOUS at 17:38

## 2018-01-01 RX ADMIN — ISOSORBIDE MONONITRATE 30 MG: 30 TABLET ORAL at 10:24

## 2018-01-01 RX ADMIN — IPRATROPIUM BROMIDE AND ALBUTEROL SULFATE 3 ML: .5; 3 SOLUTION RESPIRATORY (INHALATION) at 06:38

## 2018-01-01 RX ADMIN — TAZOBACTAM SODIUM AND PIPERACILLIN SODIUM 3.38 G: 375; 3 INJECTION, SOLUTION INTRAVENOUS at 05:13

## 2018-01-01 RX ADMIN — TAZOBACTAM SODIUM AND PIPERACILLIN SODIUM 3.38 G: 375; 3 INJECTION, SOLUTION INTRAVENOUS at 13:39

## 2018-01-01 RX ADMIN — BUDESONIDE 1 MG: 1 SUSPENSION RESPIRATORY (INHALATION) at 07:15

## 2018-01-01 RX ADMIN — FAMOTIDINE 10 MG: 10 INJECTION, SOLUTION INTRAVENOUS at 21:30

## 2018-01-01 RX ADMIN — POTASSIUM CHLORIDE 40 MEQ: 750 CAPSULE, EXTENDED RELEASE ORAL at 18:10

## 2018-01-01 RX ADMIN — ACETAMINOPHEN 650 MG: 325 TABLET, FILM COATED ORAL at 21:29

## 2018-01-01 RX ADMIN — INSULIN ASPART 3 UNITS: 100 INJECTION, SOLUTION INTRAVENOUS; SUBCUTANEOUS at 12:22

## 2018-01-01 RX ADMIN — CLOPIDOGREL 75 MG: 75 TABLET, FILM COATED ORAL at 08:18

## 2018-01-01 RX ADMIN — INSULIN ASPART 6 UNITS: 100 INJECTION, SOLUTION INTRAVENOUS; SUBCUTANEOUS at 12:56

## 2018-01-01 RX ADMIN — LEVOFLOXACIN 500 MG: 5 INJECTION, SOLUTION INTRAVENOUS at 21:29

## 2018-01-01 RX ADMIN — TAZOBACTAM SODIUM AND PIPERACILLIN SODIUM 3.38 G: 375; 3 INJECTION, SOLUTION INTRAVENOUS at 15:28

## 2018-01-01 RX ADMIN — GLYCOPYRROLATE 0.2 MG: 0.2 INJECTION, SOLUTION INTRAMUSCULAR; INTRAVENOUS at 18:49

## 2018-01-01 RX ADMIN — LEVOFLOXACIN 250 MG: 5 INJECTION, SOLUTION INTRAVENOUS at 20:26

## 2018-01-01 RX ADMIN — ROPINIROLE 0.25 MG: 0.25 TABLET, FILM COATED ORAL at 20:38

## 2018-01-01 RX ADMIN — METOCLOPRAMIDE 5 MG: 5 TABLET ORAL at 22:04

## 2018-01-01 RX ADMIN — FUROSEMIDE 40 MG: 40 TABLET ORAL at 09:48

## 2018-01-01 RX ADMIN — METOCLOPRAMIDE 5 MG: 5 TABLET ORAL at 17:43

## 2018-01-01 RX ADMIN — FAMOTIDINE 10 MG: 10 INJECTION, SOLUTION INTRAVENOUS at 09:03

## 2018-01-01 RX ADMIN — METOCLOPRAMIDE 5 MG: 5 TABLET ORAL at 17:05

## 2018-01-01 RX ADMIN — NYSTATIN: 100000 POWDER TOPICAL at 08:03

## 2018-01-01 RX ADMIN — LINAGLIPTIN 5 MG: 5 TABLET, FILM COATED ORAL at 09:53

## 2018-01-01 RX ADMIN — FUROSEMIDE 40 MG: 40 TABLET ORAL at 08:26

## 2018-01-01 RX ADMIN — HYDROCODONE BITARTRATE AND ACETAMINOPHEN 1 TABLET: 5; 325 TABLET ORAL at 17:03

## 2018-01-01 RX ADMIN — ENOXAPARIN SODIUM 30 MG: 30 INJECTION SUBCUTANEOUS at 09:53

## 2018-01-01 RX ADMIN — INSULIN ASPART 2 UNITS: 100 INJECTION, SOLUTION INTRAVENOUS; SUBCUTANEOUS at 22:34

## 2018-01-01 RX ADMIN — ACETAMINOPHEN 650 MG: 325 TABLET ORAL at 22:40

## 2018-01-01 RX ADMIN — DOCUSATE SODIUM 250 MG: 50 CAPSULE, LIQUID FILLED ORAL at 12:27

## 2018-01-01 RX ADMIN — INSULIN ASPART 2 UNITS: 100 INJECTION, SOLUTION INTRAVENOUS; SUBCUTANEOUS at 09:49

## 2018-01-01 RX ADMIN — ACETAMINOPHEN 325 MG: 325 TABLET ORAL at 01:56

## 2018-01-01 RX ADMIN — ISOSORBIDE MONONITRATE 30 MG: 30 TABLET ORAL at 08:52

## 2018-01-01 RX ADMIN — ISOSORBIDE MONONITRATE 30 MG: 30 TABLET ORAL at 09:56

## 2018-01-01 RX ADMIN — INSULIN DETEMIR 10 UNITS: 100 INJECTION, SOLUTION SUBCUTANEOUS at 10:37

## 2018-01-01 RX ADMIN — ATORVASTATIN CALCIUM 10 MG: 10 TABLET, FILM COATED ORAL at 22:04

## 2018-01-01 RX ADMIN — AMLODIPINE BESYLATE 5 MG: 5 TABLET ORAL at 09:26

## 2018-01-01 RX ADMIN — ISOSORBIDE MONONITRATE 30 MG: 30 TABLET ORAL at 10:28

## 2018-01-01 RX ADMIN — HYDROCODONE BITARTRATE AND ACETAMINOPHEN 1 TABLET: 5; 325 TABLET ORAL at 21:44

## 2018-01-01 RX ADMIN — HYDRALAZINE HYDROCHLORIDE 10 MG: 20 INJECTION INTRAMUSCULAR; INTRAVENOUS at 13:49

## 2018-01-01 RX ADMIN — HYDRALAZINE HYDROCHLORIDE 75 MG: 50 TABLET, FILM COATED ORAL at 14:16

## 2018-01-01 RX ADMIN — INSULIN ASPART 2 UNITS: 100 INJECTION, SOLUTION INTRAVENOUS; SUBCUTANEOUS at 08:58

## 2018-01-01 RX ADMIN — BUDESONIDE 1 MG: 0.5 INHALANT RESPIRATORY (INHALATION) at 07:02

## 2018-01-01 RX ADMIN — INSULIN DETEMIR 10 UNITS: 100 INJECTION, SOLUTION SUBCUTANEOUS at 10:19

## 2018-01-01 RX ADMIN — PANTOPRAZOLE SODIUM 40 MG: 40 TABLET, DELAYED RELEASE ORAL at 07:20

## 2018-01-01 RX ADMIN — ATORVASTATIN CALCIUM 10 MG: 10 TABLET, FILM COATED ORAL at 22:05

## 2018-01-01 RX ADMIN — METOCLOPRAMIDE 5 MG: 5 TABLET ORAL at 11:45

## 2018-01-01 RX ADMIN — LORAZEPAM 0.5 MG: 2 INJECTION INTRAMUSCULAR; INTRAVENOUS at 18:32

## 2018-01-01 RX ADMIN — INSULIN ASPART 6 UNITS: 100 INJECTION, SOLUTION INTRAVENOUS; SUBCUTANEOUS at 11:51

## 2018-01-01 RX ADMIN — METOPROLOL SUCCINATE 50 MG: 50 TABLET, FILM COATED, EXTENDED RELEASE ORAL at 08:47

## 2018-01-01 RX ADMIN — ATORVASTATIN CALCIUM 10 MG: 10 TABLET, FILM COATED ORAL at 20:51

## 2018-01-01 RX ADMIN — METOCLOPRAMIDE 5 MG: 5 TABLET ORAL at 18:06

## 2018-01-01 RX ADMIN — HYDRALAZINE HYDROCHLORIDE 75 MG: 50 TABLET, FILM COATED ORAL at 13:31

## 2018-01-01 RX ADMIN — LINAGLIPTIN 5 MG: 5 TABLET, FILM COATED ORAL at 08:59

## 2018-01-01 RX ADMIN — Medication 250 MG: at 09:37

## 2018-01-01 RX ADMIN — IPRATROPIUM BROMIDE AND ALBUTEROL SULFATE 3 ML: .5; 3 SOLUTION RESPIRATORY (INHALATION) at 10:13

## 2018-01-01 RX ADMIN — ATORVASTATIN CALCIUM 10 MG: 10 TABLET, FILM COATED ORAL at 22:46

## 2018-01-01 RX ADMIN — INSULIN ASPART 3 UNITS: 100 INJECTION, SOLUTION INTRAVENOUS; SUBCUTANEOUS at 13:00

## 2018-01-01 RX ADMIN — IPRATROPIUM BROMIDE AND ALBUTEROL SULFATE 3 ML: .5; 3 SOLUTION RESPIRATORY (INHALATION) at 03:29

## 2018-01-01 RX ADMIN — PANTOPRAZOLE SODIUM 40 MG: 40 TABLET, DELAYED RELEASE ORAL at 06:44

## 2018-01-01 RX ADMIN — HYDROMORPHONE HYDROCHLORIDE 0.25 MG: 1 INJECTION, SOLUTION INTRAMUSCULAR; INTRAVENOUS; SUBCUTANEOUS at 00:51

## 2018-01-01 RX ADMIN — AMLODIPINE BESYLATE 10 MG: 10 TABLET ORAL at 08:26

## 2018-01-01 RX ADMIN — METOCLOPRAMIDE 5 MG: 5 TABLET ORAL at 13:22

## 2018-01-01 RX ADMIN — HYDRALAZINE HYDROCHLORIDE 75 MG: 50 TABLET, FILM COATED ORAL at 22:03

## 2018-01-01 RX ADMIN — ENOXAPARIN SODIUM 30 MG: 30 INJECTION SUBCUTANEOUS at 17:44

## 2018-01-01 RX ADMIN — PANTOPRAZOLE SODIUM 40 MG: 40 TABLET, DELAYED RELEASE ORAL at 06:51

## 2018-01-01 RX ADMIN — DOXYCYCLINE 100 MG: 100 CAPSULE ORAL at 08:51

## 2018-01-01 RX ADMIN — METOCLOPRAMIDE 5 MG: 5 TABLET ORAL at 07:10

## 2018-01-01 RX ADMIN — LEVOTHYROXINE SODIUM 100 MCG: 100 TABLET ORAL at 06:51

## 2018-01-01 RX ADMIN — Medication 1 CAPSULE: at 08:51

## 2018-01-01 RX ADMIN — INSULIN ASPART 6 UNITS: 100 INJECTION, SOLUTION INTRAVENOUS; SUBCUTANEOUS at 21:43

## 2018-01-01 RX ADMIN — IPRATROPIUM BROMIDE AND ALBUTEROL SULFATE 3 ML: .5; 3 SOLUTION RESPIRATORY (INHALATION) at 08:07

## 2018-01-01 RX ADMIN — CLOPIDOGREL 75 MG: 75 TABLET, FILM COATED ORAL at 08:51

## 2018-01-01 RX ADMIN — IPRATROPIUM BROMIDE AND ALBUTEROL SULFATE 3 ML: .5; 3 SOLUTION RESPIRATORY (INHALATION) at 14:49

## 2018-01-01 RX ADMIN — Medication 1 CAPSULE: at 10:23

## 2018-01-01 RX ADMIN — MORPHINE SULFATE 4 MG: 4 INJECTION, SOLUTION INTRAMUSCULAR; INTRAVENOUS at 02:18

## 2018-01-01 RX ADMIN — METOCLOPRAMIDE 5 MG: 5 TABLET ORAL at 09:07

## 2018-01-01 RX ADMIN — TAZOBACTAM SODIUM AND PIPERACILLIN SODIUM 3.38 G: 375; 3 INJECTION, SOLUTION INTRAVENOUS at 05:06

## 2018-01-01 RX ADMIN — INSULIN DETEMIR 25 UNITS: 100 INJECTION, SOLUTION SUBCUTANEOUS at 22:23

## 2018-01-01 RX ADMIN — METOCLOPRAMIDE 5 MG: 5 TABLET ORAL at 21:11

## 2018-01-01 RX ADMIN — Medication 250 MG: at 08:03

## 2018-01-01 RX ADMIN — ATORVASTATIN CALCIUM 10 MG: 10 TABLET, FILM COATED ORAL at 22:25

## 2018-01-01 RX ADMIN — ACETAMINOPHEN 650 MG: 325 TABLET, FILM COATED ORAL at 02:12

## 2018-01-01 RX ADMIN — ROPINIROLE 0.25 MG: 0.25 TABLET, FILM COATED ORAL at 22:21

## 2018-01-01 RX ADMIN — ISOSORBIDE MONONITRATE 30 MG: 30 TABLET ORAL at 09:37

## 2018-01-01 RX ADMIN — ASPIRIN 81 MG: 81 TABLET, CHEWABLE ORAL at 10:13

## 2018-01-01 RX ADMIN — ATORVASTATIN CALCIUM 10 MG: 10 TABLET, FILM COATED ORAL at 20:24

## 2018-01-01 RX ADMIN — INSULIN DETEMIR 25 UNITS: 100 INJECTION, SOLUTION SUBCUTANEOUS at 22:06

## 2018-01-01 RX ADMIN — INSULIN ASPART 6 UNITS: 100 INJECTION, SOLUTION INTRAVENOUS; SUBCUTANEOUS at 08:53

## 2018-01-01 RX ADMIN — HYDROCODONE BITARTRATE AND ACETAMINOPHEN 1 TABLET: 5; 325 TABLET ORAL at 17:08

## 2018-01-01 RX ADMIN — Medication 1 CAPSULE: at 10:33

## 2018-01-01 RX ADMIN — Medication 1 CAPSULE: at 08:59

## 2018-01-01 RX ADMIN — DOCUSATE SODIUM 250 MG: 50 CAPSULE, LIQUID FILLED ORAL at 08:51

## 2018-01-01 RX ADMIN — METOCLOPRAMIDE 5 MG: 5 TABLET ORAL at 07:20

## 2018-01-01 RX ADMIN — IPRATROPIUM BROMIDE AND ALBUTEROL SULFATE 3 ML: .5; 3 SOLUTION RESPIRATORY (INHALATION) at 15:01

## 2018-01-01 RX ADMIN — BUDESONIDE 1 MG: 0.5 INHALANT RESPIRATORY (INHALATION) at 08:45

## 2018-01-01 RX ADMIN — LINAGLIPTIN 5 MG: 5 TABLET, FILM COATED ORAL at 10:24

## 2018-01-01 RX ADMIN — HYDRALAZINE HYDROCHLORIDE 50 MG: 50 TABLET, FILM COATED ORAL at 06:57

## 2018-01-01 RX ADMIN — HYDRALAZINE HYDROCHLORIDE 50 MG: 50 TABLET, FILM COATED ORAL at 07:32

## 2018-01-01 RX ADMIN — INSULIN ASPART 7 UNITS: 100 INJECTION, SOLUTION INTRAVENOUS; SUBCUTANEOUS at 11:48

## 2018-01-01 RX ADMIN — METOCLOPRAMIDE 5 MG: 5 TABLET ORAL at 20:39

## 2018-01-01 RX ADMIN — HYDROCODONE BITARTRATE AND ACETAMINOPHEN 1 TABLET: 5; 325 TABLET ORAL at 21:42

## 2018-01-01 RX ADMIN — AMLODIPINE BESYLATE 10 MG: 10 TABLET ORAL at 10:00

## 2018-01-01 RX ADMIN — METOCLOPRAMIDE 5 MG: 5 TABLET ORAL at 16:43

## 2018-01-01 RX ADMIN — METOCLOPRAMIDE 5 MG: 5 TABLET ORAL at 06:57

## 2018-01-01 RX ADMIN — Medication 250 MG: at 20:34

## 2018-01-01 RX ADMIN — HYDRALAZINE HYDROCHLORIDE 75 MG: 50 TABLET, FILM COATED ORAL at 22:14

## 2018-01-01 RX ADMIN — IPRATROPIUM BROMIDE AND ALBUTEROL SULFATE 3 ML: .5; 3 SOLUTION RESPIRATORY (INHALATION) at 19:28

## 2018-01-01 RX ADMIN — Medication 250 MG: at 08:54

## 2018-01-01 RX ADMIN — INSULIN ASPART 7 UNITS: 100 INJECTION, SOLUTION INTRAVENOUS; SUBCUTANEOUS at 18:05

## 2018-01-01 RX ADMIN — OSELTAMIVIR PHOSPHATE 30 MG: 30 CAPSULE ORAL at 10:00

## 2018-01-01 RX ADMIN — INSULIN ASPART 3 UNITS: 100 INJECTION, SOLUTION INTRAVENOUS; SUBCUTANEOUS at 17:29

## 2018-01-01 RX ADMIN — LEVOTHYROXINE SODIUM 100 MCG: 100 TABLET ORAL at 08:16

## 2018-01-01 RX ADMIN — ATORVASTATIN CALCIUM 10 MG: 10 TABLET, FILM COATED ORAL at 21:26

## 2018-01-01 RX ADMIN — ASPIRIN 81 MG: 81 TABLET, CHEWABLE ORAL at 08:47

## 2018-01-01 RX ADMIN — IPRATROPIUM BROMIDE AND ALBUTEROL SULFATE 3 ML: .5; 3 SOLUTION RESPIRATORY (INHALATION) at 14:23

## 2018-01-01 RX ADMIN — SALINE NASAL SPRAY 1 SPRAY: 1.5 SOLUTION NASAL at 15:06

## 2018-01-01 RX ADMIN — TAZOBACTAM SODIUM AND PIPERACILLIN SODIUM 3.38 G: 375; 3 INJECTION, SOLUTION INTRAVENOUS at 22:06

## 2018-01-01 RX ADMIN — FUROSEMIDE 40 MG: 40 TABLET ORAL at 17:43

## 2018-01-01 RX ADMIN — ASPIRIN 81 MG: 81 TABLET, CHEWABLE ORAL at 09:37

## 2018-01-01 RX ADMIN — METOCLOPRAMIDE 5 MG: 5 TABLET ORAL at 22:21

## 2018-01-01 RX ADMIN — AMLODIPINE BESYLATE 10 MG: 10 TABLET ORAL at 09:53

## 2018-01-01 RX ADMIN — IPRATROPIUM BROMIDE AND ALBUTEROL SULFATE 3 ML: .5; 3 SOLUTION RESPIRATORY (INHALATION) at 03:00

## 2018-01-01 RX ADMIN — FUROSEMIDE 40 MG: 10 INJECTION, SOLUTION INTRAMUSCULAR; INTRAVENOUS at 09:58

## 2018-01-01 RX ADMIN — SALINE NASAL SPRAY 1 SPRAY: 1.5 SOLUTION NASAL at 02:01

## 2018-01-01 RX ADMIN — MORPHINE SULFATE 2 MG: 2 INJECTION, SOLUTION INTRAMUSCULAR; INTRAVENOUS at 22:24

## 2018-01-01 RX ADMIN — METOPROLOL SUCCINATE 50 MG: 50 TABLET, FILM COATED, EXTENDED RELEASE ORAL at 08:41

## 2018-01-01 RX ADMIN — POTASSIUM CHLORIDE 40 MEQ: 750 CAPSULE, EXTENDED RELEASE ORAL at 22:08

## 2018-01-01 RX ADMIN — LINAGLIPTIN 5 MG: 5 TABLET, FILM COATED ORAL at 12:26

## 2018-01-01 RX ADMIN — INSULIN ASPART 3 UNITS: 100 INJECTION, SOLUTION INTRAVENOUS; SUBCUTANEOUS at 09:00

## 2018-01-01 RX ADMIN — ONDANSETRON 4 MG: 2 INJECTION INTRAMUSCULAR; INTRAVENOUS at 09:29

## 2018-01-01 RX ADMIN — BUDESONIDE 1 MG: 0.5 INHALANT RESPIRATORY (INHALATION) at 20:13

## 2018-01-01 RX ADMIN — HYDROCODONE BITARTRATE AND ACETAMINOPHEN 1 TABLET: 5; 325 TABLET ORAL at 22:54

## 2018-01-01 RX ADMIN — SODIUM CHLORIDE 1000 ML: 9 INJECTION, SOLUTION INTRAVENOUS at 07:55

## 2018-01-01 RX ADMIN — INSULIN ASPART 5 UNITS: 100 INJECTION, SOLUTION INTRAVENOUS; SUBCUTANEOUS at 22:06

## 2018-01-01 RX ADMIN — DOXYCYCLINE 100 MG: 100 CAPSULE ORAL at 21:07

## 2018-01-01 RX ADMIN — POTASSIUM CHLORIDE 40 MEQ: 1.5 POWDER, FOR SOLUTION ORAL at 11:41

## 2018-01-01 RX ADMIN — HYDROCODONE BITARTRATE AND ACETAMINOPHEN 1 TABLET: 5; 325 TABLET ORAL at 04:33

## 2018-01-01 RX ADMIN — Medication 1 CAPSULE: at 09:26

## 2018-01-01 RX ADMIN — INSULIN ASPART 6 UNITS: 100 INJECTION, SOLUTION INTRAVENOUS; SUBCUTANEOUS at 11:57

## 2018-01-01 RX ADMIN — LEVOTHYROXINE SODIUM 100 MCG: 100 TABLET ORAL at 06:57

## 2018-01-01 RX ADMIN — ASPIRIN 81 MG: 81 TABLET, CHEWABLE ORAL at 09:30

## 2018-01-01 RX ADMIN — BUDESONIDE 1 MG: 0.5 INHALANT RESPIRATORY (INHALATION) at 20:14

## 2018-01-01 RX ADMIN — ASPIRIN 81 MG: 81 TABLET, CHEWABLE ORAL at 09:53

## 2018-01-01 RX ADMIN — IPRATROPIUM BROMIDE AND ALBUTEROL SULFATE 3 ML: .5; 3 SOLUTION RESPIRATORY (INHALATION) at 09:37

## 2018-01-01 RX ADMIN — FUROSEMIDE 40 MG: 40 TABLET ORAL at 08:47

## 2018-01-01 RX ADMIN — Medication 1 CAPSULE: at 15:28

## 2018-01-01 RX ADMIN — Medication 1 CAPSULE: at 08:19

## 2018-01-01 RX ADMIN — POTASSIUM CHLORIDE 40 MEQ: 1.5 POWDER, FOR SOLUTION ORAL at 22:04

## 2018-01-01 RX ADMIN — ROPINIROLE 0.25 MG: 0.25 TABLET, FILM COATED ORAL at 20:06

## 2018-01-01 RX ADMIN — ONDANSETRON 4 MG: 2 INJECTION INTRAMUSCULAR; INTRAVENOUS at 17:28

## 2018-01-01 RX ADMIN — SODIUM CHLORIDE 100 ML/HR: 9 INJECTION, SOLUTION INTRAVENOUS at 06:42

## 2018-01-01 RX ADMIN — METOCLOPRAMIDE 5 MG: 5 TABLET ORAL at 21:07

## 2018-01-01 RX ADMIN — ENOXAPARIN SODIUM 30 MG: 30 INJECTION SUBCUTANEOUS at 10:00

## 2018-01-01 RX ADMIN — INSULIN ASPART 4 UNITS: 100 INJECTION, SOLUTION INTRAVENOUS; SUBCUTANEOUS at 22:05

## 2018-01-01 RX ADMIN — NYSTATIN 1 APPLICATION: 100000 POWDER TOPICAL at 21:11

## 2018-01-01 RX ADMIN — ATORVASTATIN CALCIUM 10 MG: 10 TABLET, FILM COATED ORAL at 22:21

## 2018-01-01 RX ADMIN — Medication 1 CAPSULE: at 09:11

## 2018-01-01 RX ADMIN — TAZOBACTAM SODIUM AND PIPERACILLIN SODIUM 3.38 G: 375; 3 INJECTION, SOLUTION INTRAVENOUS at 12:10

## 2018-01-01 RX ADMIN — FUROSEMIDE 40 MG: 10 INJECTION, SOLUTION INTRAMUSCULAR; INTRAVENOUS at 21:13

## 2018-01-01 RX ADMIN — HUMAN INSULIN 15 UNITS: 100 INJECTION, SOLUTION SUBCUTANEOUS at 21:09

## 2018-01-01 RX ADMIN — Medication 1 CAPSULE: at 16:02

## 2018-01-01 RX ADMIN — HYDRALAZINE HYDROCHLORIDE 75 MG: 50 TABLET, FILM COATED ORAL at 15:35

## 2018-01-01 RX ADMIN — LEVOFLOXACIN 250 MG: 5 INJECTION, SOLUTION INTRAVENOUS at 21:10

## 2018-01-01 RX ADMIN — LEVOTHYROXINE SODIUM 100 MCG: 100 TABLET ORAL at 13:21

## 2018-01-01 RX ADMIN — ISOSORBIDE MONONITRATE 30 MG: 30 TABLET ORAL at 12:26

## 2018-01-01 RX ADMIN — FUROSEMIDE 40 MG: 40 TABLET ORAL at 08:52

## 2018-01-01 RX ADMIN — INSULIN DETEMIR 25 UNITS: 100 INJECTION, SOLUTION SUBCUTANEOUS at 23:10

## 2018-01-01 RX ADMIN — ONDANSETRON 4 MG: 2 INJECTION INTRAMUSCULAR; INTRAVENOUS at 11:29

## 2018-01-01 RX ADMIN — FAMOTIDINE 10 MG: 10 INJECTION, SOLUTION INTRAVENOUS at 20:20

## 2018-01-01 RX ADMIN — FUROSEMIDE 40 MG: 10 INJECTION, SOLUTION INTRAMUSCULAR; INTRAVENOUS at 22:20

## 2018-01-01 RX ADMIN — LINAGLIPTIN 5 MG: 5 TABLET, FILM COATED ORAL at 08:47

## 2018-01-01 RX ADMIN — ONDANSETRON 4 MG: 2 INJECTION INTRAMUSCULAR; INTRAVENOUS at 18:50

## 2018-01-01 RX ADMIN — METOCLOPRAMIDE 5 MG: 5 TABLET ORAL at 20:45

## 2018-01-01 RX ADMIN — SODIUM CHLORIDE 1000 ML: 9 INJECTION, SOLUTION INTRAVENOUS at 21:25

## 2018-01-01 RX ADMIN — SODIUM CHLORIDE 75 ML/HR: 9 INJECTION, SOLUTION INTRAVENOUS at 06:32

## 2018-01-01 RX ADMIN — ATORVASTATIN CALCIUM 10 MG: 10 TABLET, FILM COATED ORAL at 22:18

## 2018-01-01 RX ADMIN — METOCLOPRAMIDE 5 MG: 5 TABLET ORAL at 20:51

## 2018-01-01 RX ADMIN — SODIUM CHLORIDE, POTASSIUM CHLORIDE, SODIUM LACTATE AND CALCIUM CHLORIDE: 600; 310; 30; 20 INJECTION, SOLUTION INTRAVENOUS at 14:20

## 2018-01-01 RX ADMIN — POTASSIUM CHLORIDE 40 MEQ: 750 CAPSULE, EXTENDED RELEASE ORAL at 12:21

## 2018-01-01 RX ADMIN — INSULIN ASPART 6 UNITS: 100 INJECTION, SOLUTION INTRAVENOUS; SUBCUTANEOUS at 13:02

## 2018-01-01 RX ADMIN — HYDRALAZINE HYDROCHLORIDE 75 MG: 50 TABLET, FILM COATED ORAL at 15:27

## 2018-01-01 RX ADMIN — LINAGLIPTIN 5 MG: 5 TABLET, FILM COATED ORAL at 09:07

## 2018-01-01 RX ADMIN — ONDANSETRON 4 MG: 2 INJECTION INTRAMUSCULAR; INTRAVENOUS at 15:59

## 2018-01-01 RX ADMIN — HYDRALAZINE HYDROCHLORIDE 75 MG: 50 TABLET, FILM COATED ORAL at 06:57

## 2018-01-01 RX ADMIN — AMLODIPINE BESYLATE 10 MG: 10 TABLET ORAL at 08:47

## 2018-01-01 RX ADMIN — SODIUM CHLORIDE 150 ML/HR: 9 INJECTION, SOLUTION INTRAVENOUS at 10:18

## 2018-01-01 RX ADMIN — LEVOFLOXACIN 250 MG: 5 INJECTION, SOLUTION INTRAVENOUS at 20:34

## 2018-01-01 RX ADMIN — INSULIN ASPART 5 UNITS: 100 INJECTION, SOLUTION INTRAVENOUS; SUBCUTANEOUS at 12:11

## 2018-01-01 RX ADMIN — SODIUM CHLORIDE 100 ML/HR: 9 INJECTION, SOLUTION INTRAVENOUS at 04:18

## 2018-01-01 RX ADMIN — CLOPIDOGREL 75 MG: 75 TABLET, FILM COATED ORAL at 08:26

## 2018-01-01 RX ADMIN — HYDRALAZINE HYDROCHLORIDE 75 MG: 50 TABLET, FILM COATED ORAL at 14:54

## 2018-01-01 RX ADMIN — HYDRALAZINE HYDROCHLORIDE 75 MG: 50 TABLET, FILM COATED ORAL at 16:02

## 2018-01-01 RX ADMIN — GLIPIZIDE 2.5 MG: 5 TABLET ORAL at 08:47

## 2018-01-01 RX ADMIN — HYDRALAZINE HYDROCHLORIDE 50 MG: 50 TABLET, FILM COATED ORAL at 22:24

## 2018-01-01 RX ADMIN — IPRATROPIUM BROMIDE AND ALBUTEROL SULFATE 3 ML: .5; 3 SOLUTION RESPIRATORY (INHALATION) at 21:06

## 2018-01-01 RX ADMIN — INSULIN ASPART 2 UNITS: 100 INJECTION, SOLUTION INTRAVENOUS; SUBCUTANEOUS at 12:42

## 2018-01-01 RX ADMIN — DOCUSATE SODIUM 200 MG: 100 CAPSULE, LIQUID FILLED ORAL at 10:28

## 2018-01-01 RX ADMIN — PANTOPRAZOLE SODIUM 40 MG: 40 TABLET, DELAYED RELEASE ORAL at 07:33

## 2018-01-01 RX ADMIN — HYDRALAZINE HYDROCHLORIDE 50 MG: 50 TABLET, FILM COATED ORAL at 22:03

## 2018-01-01 RX ADMIN — INSULIN ASPART 5 UNITS: 100 INJECTION, SOLUTION INTRAVENOUS; SUBCUTANEOUS at 12:42

## 2018-01-01 RX ADMIN — METOCLOPRAMIDE 5 MG: 5 TABLET ORAL at 17:49

## 2018-01-01 RX ADMIN — PANTOPRAZOLE SODIUM 40 MG: 40 TABLET, DELAYED RELEASE ORAL at 06:38

## 2018-01-01 RX ADMIN — BUDESONIDE 1 MG: 1 SUSPENSION RESPIRATORY (INHALATION) at 20:22

## 2018-01-01 RX ADMIN — NYSTATIN: 100000 POWDER TOPICAL at 09:38

## 2018-01-01 RX ADMIN — METOCLOPRAMIDE 5 MG: 5 TABLET ORAL at 11:57

## 2018-01-01 RX ADMIN — IPRATROPIUM BROMIDE AND ALBUTEROL SULFATE 3 ML: .5; 3 SOLUTION RESPIRATORY (INHALATION) at 09:24

## 2018-01-01 RX ADMIN — IPRATROPIUM BROMIDE AND ALBUTEROL SULFATE 3 ML: .5; 3 SOLUTION RESPIRATORY (INHALATION) at 16:20

## 2018-01-01 RX ADMIN — Medication 250 MG: at 22:06

## 2018-01-01 RX ADMIN — INSULIN ASPART 4 UNITS: 100 INJECTION, SOLUTION INTRAVENOUS; SUBCUTANEOUS at 08:58

## 2018-01-01 RX ADMIN — LINAGLIPTIN 5 MG: 5 TABLET, FILM COATED ORAL at 09:55

## 2018-01-01 RX ADMIN — ISOSORBIDE MONONITRATE 30 MG: 30 TABLET ORAL at 08:47

## 2018-01-01 RX ADMIN — TAZOBACTAM SODIUM AND PIPERACILLIN SODIUM 3.38 G: 375; 3 INJECTION, SOLUTION INTRAVENOUS at 13:59

## 2018-01-01 RX ADMIN — LEVOTHYROXINE SODIUM 100 MCG: 100 TABLET ORAL at 07:00

## 2018-01-01 RX ADMIN — INSULIN ASPART 2 UNITS: 100 INJECTION, SOLUTION INTRAVENOUS; SUBCUTANEOUS at 08:00

## 2018-01-01 RX ADMIN — INSULIN ASPART 4 UNITS: 100 INJECTION, SOLUTION INTRAVENOUS; SUBCUTANEOUS at 17:49

## 2018-01-01 RX ADMIN — METOCLOPRAMIDE 5 MG: 5 TABLET ORAL at 07:32

## 2018-01-01 RX ADMIN — METOCLOPRAMIDE 5 MG: 5 TABLET ORAL at 21:43

## 2018-01-01 RX ADMIN — VANCOMYCIN HYDROCHLORIDE 1500 MG: 10 INJECTION, POWDER, LYOPHILIZED, FOR SOLUTION INTRAVENOUS at 18:34

## 2018-01-01 NOTE — PROGRESS NOTES
DAILY PROGRESS NOTE  [facility], [facility]    2018    Patient Identification:  Name: Cynthia Arreola  Age: 89 y.o.  Sex: female  :  1927  MRN: [id number]           Primary Care Physician: Param Martinez MD    Subjective:    Interval History:    Patient feeling better today.  Off IV abx, only on Tamiflu and supportive therapy.  BS better, still high though (related to steroids)  Labs better, except WBC (elevated, but may be due to steroids)      ROS:     No nausea, vomiting, diarrhea, constipation.    Objective:    Scheduled Meds:    amLODIPine 5 mg Oral Daily   aspirin 81 mg Oral Daily   atorvastatin 10 mg Oral Nightly   clopidogrel 75 mg Oral Daily   docusate sodium 250 mg Oral Daily   enoxaparin 30 mg Subcutaneous Q24H   Ferrex 150 forte plus 1 capsule Oral Daily With Breakfast   insulin aspart 0-7 Units Subcutaneous 4x Daily With Meals & Nightly   insulin detemir 25 Units Subcutaneous Nightly   ipratropium-albuterol 3 mL Nebulization 4x Daily - RT   isosorbide mononitrate 30 mg Oral Daily   levothyroxine 100 mcg Oral QAM   linagliptin 5 mg Oral Daily   metoclopramide 5 mg Oral 4x Daily AC & at Bedtime   metoprolol succinate XL 50 mg Oral Daily   oseltamivir 30 mg Oral Q12H   pantoprazole 40 mg Oral QAM   predniSONE 10 mg Oral BID With Meals   rOPINIRole 0.25 mg Oral Nightly       Continuous Infusions:       PRN Meds:  •  acetaminophen  •  dextrose  •  dextrose  •  glucagon (human recombinant)  •  HYDROcodone-acetaminophen  •  influenza vaccine  •  ipratropium-albuterol  •  nitroglycerin  •  pneumococcal polysaccharide 23-valent  •  polyethylene glycol  •  promethazine  •  sodium chloride    Intake/Output:    Intake/Output Summary (Last 24 hours) at 18 1337  Last data filed at 18 1254   Gross per 24 hour   Intake              100 ml   Output                0 ml   Net              100 ml         Exam:    tMax 24 hrs: Temp (24hrs), Av.8 °F (36.6  "°C), Min:97.2 °F (36.2 °C), Max:98.7 °F (37.1 °C)    Vitals Ranges:   Temp:  [97.2 °F (36.2 °C)-98.7 °F (37.1 °C)] 97.2 °F (36.2 °C)  Heart Rate:  [48-68] 61  Resp:  [16-24] 22  BP: (164-173)/(62-90) 168/62    /62 (BP Location: Right arm, Patient Position: Lying)  Pulse 61  Temp 97.2 °F (36.2 °C) (Oral)   Resp 22  Ht 160 cm (63\")  Wt 78.2 kg (172 lb 4.8 oz)  SpO2 95%  BMI 30.52 kg/m2    General: Alert, oriented x 3. Cooperative, no distress, appears stated age  HEENT:    Head: Normocephalic, without obvious abnormality, atraumatic  Eyes: EOM are normal. Pupils are equal, round, and reactive to light.   Oropharynx: Mucosa and tongue dry  Neck: Supple, symmetrical, trachea midline, no adenopathy;              thyroid:  no enlargement/tenderness/nodules;              no carotid bruit or JVD  Cardiovascular: Normal rate, regular rhythm and intact distal pulses.              Exam reveals no gallop and no friction rub. No murmur heard  Chest wall: No tenderness or deformity  Pulmonary:  decreased breath sounds, less wheezing.  Less rhonchi bilaterally as well.  .  Abdominal: Soft, non-tender, bowel sounds active all four quadrants,     no masses, no hepatomegaly, no splenomegaly.   Extremities: Normal, atraumatic, no cyanosis. + edema both LE or edema  Pulses: 2 + symmetric all extremities  Neurological: He is alert and oriented to person, place, and time.                 CNII-XII intact, normal strength, sensation intact throughout  Skin: Healing ulcer on the dorsum of her right foot, small area healing on the dorsum of her right heel. 1.5 cm ulcerated area with mild surrounding erythema           Data Review:      Results from last 7 days  Lab Units 01/01/18  0631 12/31/17  0421 12/30/17  0448   WBC 10*3/mm3 14.04* 8.19 6.59   HEMOGLOBIN g/dL 11.6* 11.2* 11.5*   HEMATOCRIT % 38.0 36.9 38.0   PLATELETS 10*3/mm3 192 183 181         Results from last 7 days  Lab Units 01/01/18  0631 12/31/17  0421 " 12/30/17  0448  12/28/17 2054   SODIUM mmol/L 142 142 142  < > 142   POTASSIUM mmol/L 3.6 4.2 4.7  < > 3.3*   CHLORIDE mmol/L 105 107 106  < > 102   CO2 mmol/L 23.6 22.3 23.5  < > 23.8   BUN mg/dL 28* 31* 29*  < > 23   CREATININE mg/dL 1.17* 1.40* 1.54*  < > 1.29*   CALCIUM mg/dL 8.7 8.6 8.7  < > 9.0   BILIRUBIN mg/dL 0.2  --   --   --  0.2   ALK PHOS U/L 146*  --   --   --  263*   ALT (SGPT) U/L 25  --   --   --  47*   AST (SGOT) U/L 15  --   --   --  39*   GLUCOSE mg/dL 207* 299* 292*  < > 161*   < > = values in this interval not displayed.    Results from last 7 days  Lab Units 12/29/17  0008   INR  0.98         Results from last 7 days  Lab Units 12/28/17 2054   HEMOGLOBIN A1C % 9.00*         Lab Results  Lab Value Date/Time   TROPONINT 0.069 (H) 12/29/2017 1039   TROPONINT 0.101 (C) 12/28/2017 2054   TROPONINT <0.010 01/16/2017 0248   TROPONINT <0.010 12/28/2016 0039   TROPONINT <0.010 12/27/2016 1752   TROPONINT <0.010 12/27/2016 0617       Microbiology Results (last 10 days)     Procedure Component Value - Date/Time    MRSA Screen Culture - Swab, Nares [[id number]]  (Normal) Collected:  12/30/17 0036    Lab Status:  Final result Specimen:  Swab from Nares Updated:  01/01/18 0912     MRSA SCREEN CX No Methicillin Resistant Staphylococcus aureus isolated    Respiratory Panel, PCR - Swab, Nasopharynx [[id number]]  (Abnormal) Collected:  12/29/17 1923    Lab Status:  Final result Specimen:  Swab from Nasopharynx Updated:  12/29/17 2201     ADENOVIRUS, PCR Not Detected     Coronavirus 229E Not Detected     Coronavirus HKU1 Not Detected     Coronavirus NL63 Not Detected     Coronavirus OC43 Not Detected     Human Metapneumovirus Not Detected     Human Rhinovirus/Enterovirus Not Detected     Influenza B PCR Not Detected     Parainfluenza Virus 1 Not Detected     Parainfluenza Virus 2 Not Detected     Parainfluenza Virus 3 Not Detected     Parainfluenza Virus 4 Not Detected     Bordetella pertussis pcr Not Detected      Influenza 2009 H1N1 by PCR Detected (A)     Chlamydophila pneumoniae PCR Not Detected     Mycoplasma pneumo by PCR Not Detected     Influenza A PCR Not Detected     Influenza A H3 Not Detected     Influenza A H1 Not Detected     RSV, PCR Not Detected    Influenza Antigen, Rapid - Swab, Nasopharynx [[id number]]  (Normal) Collected:  12/28/17 2218    Lab Status:  Final result Specimen:  Swab from Nasopharynx Updated:  12/28/17 2252     Influenza A Ag, EIA Negative     Influenza B Ag, EIA Negative           Imaging Results (last 7 days)     Procedure Component Value Units Date/Time    XR Chest 2 View [[id number]] Collected:  12/28/17 2220     Updated:  12/29/17 2312    Narrative:       CHEST PA AND LATERAL.     HISTORY: Cough and congestion.     COMPARISON: 01/16/2017.     FINDINGS:  Mild cardiomegaly.         There is no consolidation or effusion. Mild pulmonary congestion  superimposed on emphysema.          Impression:       Pulmonary congestion, overall no significant change.         This report was finalized on 12/29/2017 11:09 PM by Dr. Fransisco Yoo MD.               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

## 2018-01-01 NOTE — PROGRESS NOTES
"  Infectious Diseases Progress Note    Vikas Parra MD     [facility]  Los: 2 days  Patient Identification:  Name: Cynthia Arreola  Age: 89 y.o.  Sex: female  :  1927  MRN: [id number]         Primary Care Physician: Param Martinez MD            Subjective: Feeling better denies any specific complaints.  Interval History: See consultation note.    Objective:    Scheduled Meds:  amLODIPine 5 mg Oral Daily   aspirin 81 mg Oral Daily   atorvastatin 10 mg Oral Nightly   clopidogrel 75 mg Oral Daily   docusate sodium 250 mg Oral Daily   enoxaparin 30 mg Subcutaneous Q24H   Ferrex 150 forte plus 1 capsule Oral Daily With Breakfast   insulin aspart 0-7 Units Subcutaneous 4x Daily With Meals & Nightly   insulin detemir 25 Units Subcutaneous Nightly   ipratropium-albuterol 3 mL Nebulization 4x Daily - RT   isosorbide mononitrate 30 mg Oral Daily   levothyroxine 100 mcg Oral QAM   linagliptin 5 mg Oral Daily   metoclopramide 5 mg Oral 4x Daily AC & at Bedtime   metoprolol succinate XL 50 mg Oral Daily   oseltamivir 30 mg Oral Q24H   pantoprazole 40 mg Oral QAM   predniSONE 10 mg Oral BID With Meals   rOPINIRole 0.25 mg Oral Nightly     Continuous Infusions:     Vital signs in last 24 hours:  Temp:  [98.4 °F (36.9 °C)-98.7 °F (37.1 °C)] 98.7 °F (37.1 °C)  Heart Rate:  [58-61] 60  Resp:  [16-21] 20  BP: (167-187)/(72-89) 167/89    Intake/Output:    Intake/Output Summary (Last 24 hours) at 17  Last data filed at 17 1300   Gross per 24 hour   Intake             2045 ml   Output                0 ml   Net             2045 ml       Exam:  /89 (BP Location: Left arm, Patient Position: Lying)  Pulse 60  Temp 98.7 °F (37.1 °C) (Oral)   Resp 20  Ht 160 cm (63\")  Wt 78.2 kg (172 lb 4.8 oz)  SpO2 94%  BMI 30.52 kg/m2    General Appearance:    Appears more comfortable does not appear flushed and ill as she was yesterday.                          Head:    Normocephalic, without obvious " abnormality, atraumatic                           Eyes:    PERRL, conjunctiva/corneas clear, EOM's intact, both eyes                         Throat:   Lips, tongue, gums normal; oral mucosa pink and moist                           Neck:   Supple, symmetrical, trachea midline, no JVD                         Lungs:    Scattered rhonchi anteriorly and posteriorly with no obvious use of accessory muscles of breathing noted                 Chest Wall:    No tenderness or deformity                          Heart:    Regular rate and rhythm, S1 and S2 normal, no murmur,no  Rub                                      or gallop                  Abdomen:     Soft, non-tender, bowel sounds active, no masses, no                                                        organomegaly                  Extremities:   Extremities normal, atraumatic, no cyanosis or edema                        Pulses:   Pulses palpable in all extremities                            Skin:   Skin is warm and dry,  no rashes or palpable lesions                    Data Review:    I reviewed the patient's new clinical results.    Results from last 7 days  Lab Units 12/31/17  0421 12/30/17 0448 12/28/17 2054   WBC 10*3/mm3 8.19 6.59 5.87   HEMOGLOBIN g/dL 11.2* 11.5* 13.4   PLATELETS 10*3/mm3 183 181 170       Results from last 7 days  Lab Units 12/31/17  0421 12/30/17  0448 12/29/17  1039 12/28/17 2054   SODIUM mmol/L 142 142 140 142   POTASSIUM mmol/L 4.2 4.7 3.7 3.3*   CHLORIDE mmol/L 107 106 101 102   CO2 mmol/L 22.3 23.5 23.7 23.8   BUN mg/dL 31* 29* 22 23   CREATININE mg/dL 1.40* 1.54* 1.51* 1.29*   CALCIUM mg/dL 8.6 8.7 8.6 9.0   GLUCOSE mg/dL 299* 292* 354* 161*       Microbiology Results (last 10 days)     Procedure Component Value - Date/Time    MRSA Screen Culture - Swab, Nares [[id number]]  (Normal) Collected:  12/30/17 0036    Lab Status:  Preliminary result Specimen:  Swab from Nares Updated:  12/31/17 1003     MRSA SCREEN CX No Methicillin  Resistant Staphylococcus aureus isolated    Respiratory Panel, PCR - Swab, Nasopharynx [[id number]]  (Abnormal) Collected:  12/29/17 1923    Lab Status:  Final result Specimen:  Swab from Nasopharynx Updated:  12/29/17 2201     ADENOVIRUS, PCR Not Detected     Coronavirus 229E Not Detected     Coronavirus HKU1 Not Detected     Coronavirus NL63 Not Detected     Coronavirus OC43 Not Detected     Human Metapneumovirus Not Detected     Human Rhinovirus/Enterovirus Not Detected     Influenza B PCR Not Detected     Parainfluenza Virus 1 Not Detected     Parainfluenza Virus 2 Not Detected     Parainfluenza Virus 3 Not Detected     Parainfluenza Virus 4 Not Detected     Bordetella pertussis pcr Not Detected     Influenza 2009 H1N1 by PCR Detected (A)     Chlamydophila pneumoniae PCR Not Detected     Mycoplasma pneumo by PCR Not Detected     Influenza A PCR Not Detected     Influenza A H3 Not Detected     Influenza A H1 Not Detected     RSV, PCR Not Detected    Influenza Antigen, Rapid - Swab, Nasopharynx [[id number]]  (Normal) Collected:  12/28/17 2218    Lab Status:  Final result Specimen:  Swab from Nasopharynx Updated:  12/28/17 2252     Influenza A Ag, EIA Negative     Influenza B Ag, EIA Negative          Assessment:  Principal Problem:    H1N1 influenza  Active Problems:    Chronic kidney disease, stage II (mild)    Ischemic cardiomyopathy    Hypertension, essential    Gastroesophageal reflux disease with esophagitis    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  Supportive care per internal medicine team.    Vikas Parra MD  12/31/2017  7:14 PM    Much of this encounter note is an electronic transcription/translation of spoken language to printed text. The  electronic translation of spoken language may permit erroneous, or at times, nonsensical words or phrases to be inadvertently transcribed; Although I have reviewed the note for such errors, some may still exist

## 2018-01-01 NOTE — PLAN OF CARE
Problem: Patient Care Overview (Adult)  Goal: Plan of Care Review  Outcome: Ongoing (interventions implemented as appropriate)   01/01/18 0044   Coping/Psychosocial Response Interventions   Plan Of Care Reviewed With patient   Patient Care Overview   Progress improving   Outcome Evaluation   Outcome Summary/Follow up Plan Pt VSS. Pt denies pain. continues on iv steroids. will continue to monitor.     Goal: Adult Individualization and Mutuality  Outcome: Ongoing (interventions implemented as appropriate)    Goal: Discharge Needs Assessment  Outcome: Ongoing (interventions implemented as appropriate)      Problem: Fall Risk (Adult)  Goal: Absence of Falls  Outcome: Ongoing (interventions implemented as appropriate)      Problem: Pressure Ulcer Risk (Akshat Scale) (Adult,Obstetrics,Pediatric)  Goal: Skin Integrity  Outcome: Ongoing (interventions implemented as appropriate)      Problem: Respiratory Insufficiency (Adult)  Goal: Acid/Base Balance  Outcome: Ongoing (interventions implemented as appropriate)    Goal: Effective Ventilation  Outcome: Ongoing (interventions implemented as appropriate)      Problem: Renal Failure/Kidney Injury, Acute (Adult)  Goal: Signs and Symptoms of Listed Potential Problems Will be Absent or Manageable (Renal Failure/Kidney Injury, Acute)  Outcome: Ongoing (interventions implemented as appropriate)

## 2018-01-01 NOTE — PROGRESS NOTES
"  Infectious Diseases Progress Note    Vikas Parra MD     [facility]  Los: 3 days  Patient Identification:  Name: Cynthia Arreola  Age: 89 y.o.  Sex: female  :  1927  MRN: [id number]         Primary Care Physician: Param Martinez MD            Subjective: Feeling better denies any specific complaints.  Interval History: See consultation note.    Objective:    Scheduled Meds:    amLODIPine 5 mg Oral Daily   aspirin 81 mg Oral Daily   atorvastatin 10 mg Oral Nightly   clopidogrel 75 mg Oral Daily   docusate sodium 250 mg Oral Daily   enoxaparin 30 mg Subcutaneous Q24H   Ferrex 150 forte plus 1 capsule Oral Daily With Breakfast   insulin aspart 0-7 Units Subcutaneous 4x Daily With Meals & Nightly   insulin detemir 25 Units Subcutaneous Nightly   ipratropium-albuterol 3 mL Nebulization 4x Daily - RT   isosorbide mononitrate 30 mg Oral Daily   levothyroxine 100 mcg Oral QAM   linagliptin 5 mg Oral Daily   metoclopramide 5 mg Oral 4x Daily AC & at Bedtime   metoprolol succinate XL 50 mg Oral Daily   oseltamivir 30 mg Oral Q12H   pantoprazole 40 mg Oral QAM   predniSONE 10 mg Oral BID With Meals   rOPINIRole 0.25 mg Oral Nightly     Continuous Infusions:     Vital signs in last 24 hours:  Temp:  [97.2 °F (36.2 °C)-98.7 °F (37.1 °C)] 97.2 °F (36.2 °C)  Heart Rate:  [48-68] 61  Resp:  [16-24] 22  BP: (164-173)/(62-90) 168/62    Intake/Output:    Intake/Output Summary (Last 24 hours) at 18 1201  Last data filed at 17 1300   Gross per 24 hour   Intake              875 ml   Output                0 ml   Net              875 ml       Exam:  /62 (BP Location: Right arm, Patient Position: Lying)  Pulse 61  Temp 97.2 °F (36.2 °C) (Oral)   Resp 22  Ht 160 cm (63\")  Wt 78.2 kg (172 lb 4.8 oz)  SpO2 95%  BMI 30.52 kg/m2    General Appearance:    Appears more comfortable does not appear flushed and ill as she was yesterday.                          Head:    Normocephalic, without obvious " abnormality, atraumatic                           Eyes:    PERRL, conjunctiva/corneas clear, EOM's intact, both eyes                         Throat:   Lips, tongue, gums normal; oral mucosa pink and moist                           Neck:   Supple, symmetrical, trachea midline, no JVD                         Lungs:    Scattered rhonchi anteriorly and posteriorly with no obvious use of accessory muscles of breathing noted                 Chest Wall:    No tenderness or deformity                          Heart:    Regular rate and rhythm, S1 and S2 normal, no murmur,no  Rub                                      or gallop                  Abdomen:     Soft, non-tender, bowel sounds active, no masses, no                                                        organomegaly                  Extremities:   Extremities normal, atraumatic, no cyanosis or edema                        Pulses:   Pulses palpable in all extremities                            Skin:   Skin is warm and dry,  no rashes or palpable lesions                    Data Review:    I reviewed the patient's new clinical results.    Results from last 7 days  Lab Units 01/01/18  0631 12/31/17  0421 12/30/17  0448 12/28/17  2054   WBC 10*3/mm3 14.04* 8.19 6.59 5.87   HEMOGLOBIN g/dL 11.6* 11.2* 11.5* 13.4   PLATELETS 10*3/mm3 192 183 181 170       Results from last 7 days  Lab Units 01/01/18  0631 12/31/17  0421 12/30/17  0448 12/29/17  1039 12/28/17  2054   SODIUM mmol/L 142 142 142 140 142   POTASSIUM mmol/L 3.6 4.2 4.7 3.7 3.3*   CHLORIDE mmol/L 105 107 106 101 102   CO2 mmol/L 23.6 22.3 23.5 23.7 23.8   BUN mg/dL 28* 31* 29* 22 23   CREATININE mg/dL 1.17* 1.40* 1.54* 1.51* 1.29*   CALCIUM mg/dL 8.7 8.6 8.7 8.6 9.0   GLUCOSE mg/dL 207* 299* 292* 354* 161*       Microbiology Results (last 10 days)     Procedure Component Value - Date/Time    MRSA Screen Culture - Swab, Nares [[id number]]  (Normal) Collected:  12/30/17 0036    Lab Status:  Final result Specimen:   Swab from Nares Updated:  01/01/18 0912     MRSA SCREEN CX No Methicillin Resistant Staphylococcus aureus isolated    Respiratory Panel, PCR - Swab, Nasopharynx [[id number]]  (Abnormal) Collected:  12/29/17 1923    Lab Status:  Final result Specimen:  Swab from Nasopharynx Updated:  12/29/17 2201     ADENOVIRUS, PCR Not Detected     Coronavirus 229E Not Detected     Coronavirus HKU1 Not Detected     Coronavirus NL63 Not Detected     Coronavirus OC43 Not Detected     Human Metapneumovirus Not Detected     Human Rhinovirus/Enterovirus Not Detected     Influenza B PCR Not Detected     Parainfluenza Virus 1 Not Detected     Parainfluenza Virus 2 Not Detected     Parainfluenza Virus 3 Not Detected     Parainfluenza Virus 4 Not Detected     Bordetella pertussis pcr Not Detected     Influenza 2009 H1N1 by PCR Detected (A)     Chlamydophila pneumoniae PCR Not Detected     Mycoplasma pneumo by PCR Not Detected     Influenza A PCR Not Detected     Influenza A H3 Not Detected     Influenza A H1 Not Detected     RSV, PCR Not Detected    Influenza Antigen, Rapid - Swab, Nasopharynx [[id number]]  (Normal) Collected:  12/28/17 2218    Lab Status:  Final result Specimen:  Swab from Nasopharynx Updated:  12/28/17 2252     Influenza A Ag, EIA Negative     Influenza B Ag, EIA Negative          Assessment:  Principal Problem:    H1N1 influenza  Active Problems:    Chronic kidney disease, stage II (mild)    Ischemic cardiomyopathy    Hypertension, essential    Gastroesophageal reflux disease with esophagitis    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    · Continue to observe off of antibiotic therapy.    · Her leukocytosis could very well be due to steroid effect.    · Need to  monitor her clinical course if she changes her shows evidence of decline and may need workup to rule out postviral pneumonia versus C. difficile infections and so forth.    Vikas Parra MD  1/1/2018  12:01 PM    Much of this encounter note is an electronic transcription/translation of spoken language to printed text. The electronic translation of spoken language may permit erroneous, or at times, nonsensical words or phrases to be inadvertently transcribed; Although I have reviewed the note for such errors, some may still exist

## 2018-01-01 NOTE — NURSING NOTE
S/w pt's alex who called to check on pt status.  Pt advised he does not want pt to return to [facility] until he has a chance to renegotiate her care with them.  I advised him I would pass this information on to dayshift RN.

## 2018-01-01 NOTE — PLAN OF CARE
Problem: Patient Care Overview (Adult)  Goal: Plan of Care Review  Outcome: Ongoing (interventions implemented as appropriate)   01/01/18 1651   Coping/Psychosocial Response Interventions   Plan Of Care Reviewed With patient   Patient Care Overview   Progress progress toward functional goals is gradual   Outcome Evaluation   Outcome Summary/Follow up Plan VSS. patient denies pain, no S/S of discomfort or distress. will continue to monitor      Goal: Adult Individualization and Mutuality  Outcome: Ongoing (interventions implemented as appropriate)      Problem: Fall Risk (Adult)  Goal: Absence of Falls  Outcome: Ongoing (interventions implemented as appropriate)      Problem: Pressure Ulcer Risk (Akshat Scale) (Adult,Obstetrics,Pediatric)  Goal: Skin Integrity  Outcome: Ongoing (interventions implemented as appropriate)      Problem: Respiratory Insufficiency (Adult)  Goal: Acid/Base Balance  Outcome: Ongoing (interventions implemented as appropriate)    Goal: Effective Ventilation  Outcome: Ongoing (interventions implemented as appropriate)      Problem: Renal Failure/Kidney Injury, Acute (Adult)  Goal: Signs and Symptoms of Listed Potential Problems Will be Absent or Manageable (Renal Failure/Kidney Injury, Acute)  Outcome: Ongoing (interventions implemented as appropriate)

## 2018-01-02 NOTE — PLAN OF CARE
Problem: Patient Care Overview (Adult)  Goal: Plan of Care Review  Outcome: Ongoing (interventions implemented as appropriate)   01/02/18 1613   Coping/Psychosocial Response Interventions   Plan Of Care Reviewed With patient   Patient Care Overview   Progress no change   Outcome Evaluation   Outcome Summary/Follow up Plan patient reports being able to breathe better today- still audible wheezing. lasix given per order today. New PIV site. pulm consulted. solu-medrol d/c, turned q2 today and wounds protected. PT eval again. colostomy bag changed.will cont to monitor       Problem: Fall Risk (Adult)  Goal: Absence of Falls  Outcome: Ongoing (interventions implemented as appropriate)      Problem: Pressure Ulcer Risk (Akshat Scale) (Adult,Obstetrics,Pediatric)  Goal: Skin Integrity  Outcome: Ongoing (interventions implemented as appropriate)      Problem: Respiratory Insufficiency (Adult)  Goal: Acid/Base Balance  Outcome: Ongoing (interventions implemented as appropriate)    Goal: Effective Ventilation  Outcome: Ongoing (interventions implemented as appropriate)      Problem: Renal Failure/Kidney Injury, Acute (Adult)  Goal: Signs and Symptoms of Listed Potential Problems Will be Absent or Manageable (Renal Failure/Kidney Injury, Acute)  Outcome: Ongoing (interventions implemented as appropriate)

## 2018-01-02 NOTE — CONSULTS
Patient Identification:  Cynthia Beth y.o.  female  11/26/1927  [id number]          LOS 4    Requesting physician: Dr Manzano    Reason for Consultation:  SOA    History of Present Illness:   Thank you for this pulmonary consult.  89-year-old female presents with increasing shortness of breath with cough, runny nose and nasal congestion.  Resides in nursing home.  Symptoms have been worsening over the last few days prior to admission.  Has a cough productive of thick white sputum.  Shortness of breath is described as severe with exertion.  Mild dyspnea at rest.  Admitted on the 29th with bronchitis.  Symptoms have not significantly improved.  Respiratory viral panel positive for influenza H1 N1.  Complains of significant weakness.  Appears mildly confused but able to answer questions appropriately.    Past Medical History:  Past Medical History:   Diagnosis Date   • Anemia    • COPD exacerbation 12/30/2017   • Coronary artery disease    • Diabetes mellitus    • Disease of thyroid gland    • Dysphagia    • Hypertension    • Ischemic cardiomyopathy    • Malignant neoplasm of colon    • PVD (peripheral vascular disease)    • Renal disorder        Past Surgical History:  Past Surgical History:   Procedure Laterality Date   • CHOLECYSTECTOMY     • COLON RESECTION WITH COLOSTOMY      25 years ago   • COLON SURGERY          Home Meds:  Prescriptions Prior to Admission   Medication Sig Dispense Refill Last Dose   • acetaminophen (TYLENOL) 325 MG tablet Take 650 mg by mouth Every 6 (Six) Hours As Needed for mild pain (1-3).      • albuterol (PROVENTIL) (2.5 MG/3ML) 0.083% nebulizer solution Take 2.5 mg by nebulization Every 4 (Four) Hours As Needed for wheezing.      • calcium carbonate (TUMS) 500 MG chewable tablet Chew 1 tablet Daily.      • clopidogrel (PLAVIX) 75 MG tablet Take 75 mg by mouth Daily.      • dextrose (GLUTOSE) 40 % gel Take 15 g by mouth Every 15 (Fifteen) Minutes As Needed for low blood sugar.  37.5 each 0    • Docusate Sodium (DSS) 100 MG capsule Take 100 mg by mouth 2 (Two) Times a Day. (Patient taking differently: Take 250 mg by mouth Daily.)  0    • insulin glargine (LANTUS) 100 UNIT/ML injection Inject 14 Units under the skin Daily.  12    • ipratropium-albuterol (DUO-NEB) 0.5-2.5 mg/mL nebulizer Take 3 mL by nebulization Every 4 (Four) Hours As Needed for Wheezing.      • isosorbide mononitrate (IMDUR) 30 MG 24 hr tablet Take 30 mg by mouth Daily.      • levothyroxine (SYNTHROID, LEVOTHROID) 100 MCG tablet Take 100 mcg by mouth Daily.      • metoclopramide (REGLAN) 10 MG tablet Take 10 mg by mouth 4 (Four) Times a Day Before Meals & at Bedtime.      • metoprolol succinate XL (TOPROL-XL) 50 MG 24 hr tablet Take 50 mg by mouth Daily.      • nitroglycerin (NITROSTAT) 0.4 MG SL tablet Place 1 tablet under the tongue Every 5 (Five) Minutes As Needed for chest pain (Hold systolic BP < 100 mm/Hg.). Max 3 doses / 15 min  12    • pantoprazole (PROTONIX) 40 MG EC tablet Take 40 mg by mouth Daily.      • polyethylene glycol (MIRALAX) packet Take 17 g by mouth Daily As Needed.      • rOPINIRole (REQUIP) 0.25 MG tablet Take 0.25 mg by mouth Every Night. Take 1 hour before bedtime.      • sucralfate (CARAFATE) 1 G tablet Take 1 g by mouth 3 (Three) Times a Day With Meals.      • trolamine salicylate (ASPERCREME) 10 % cream Apply  topically 2 (Two) Times a Day. Apply to bilateral knees topically two times a day      • amLODIPine (NORVASC) 5 MG tablet Take 5 mg by mouth Daily.   Unknown at Unknown time   • aspirin 81 MG chewable tablet Chew 81 mg Daily.   Unknown at Unknown time   • atenolol (TENORMIN) 25 MG tablet Take 25 mg by mouth Daily.   Unknown at Unknown time   • atorvastatin (LIPITOR) 10 MG tablet Take 1 tablet by mouth Every Night.   Unknown at Unknown time   • Fe-Succ Ac-C-Thre Ac-B12-FA (FERREX 150 FORTE PLUS)  MG capsule capsule Take 1 capsule by mouth Daily With Breakfast. 30 each  Unknown at  Unknown time   • glimepiride (AMARYL) 4 MG tablet Take 4 mg by mouth Every Morning Before Breakfast.   Unknown at Unknown time   • glimepiride (AMARYL) 4 MG tablet Take 1 tablet by mouth 2 (Two) Times a Day With Meals.   Unknown at Unknown time   • glucagon, human recombinant, (GLUCAGEN DIAGNOSTIC) 1 MG injection Inject 1 mg under the skin 1 (One) Time As Needed (hypoglycemia) for up to 1 dose.   Unknown at Unknown time   • HYDROcodone-acetaminophen (NORCO) 5-325 MG per tablet Take 1 tablet by mouth Every 4 (Four) Hours As Needed ([facility]). 180 tablet 0 Unknown at Unknown time   • insulin aspart (novoLOG) 100 UNIT/ML injection Inject 0-7 Units under the skin 4 (Four) Times a Day Before Meals & at Bedtime.  12 Unknown at Unknown time   • linagliptin (TRADJENTA) 5 MG tablet tablet Take 1 tablet by mouth Daily. 30 tablet  Unknown at Unknown time   • promethazine (PHENERGAN) 12.5 MG tablet Take 1 tablet by mouth Every 6 (Six) Hours As Needed for nausea or vomiting.  0 Unknown at Unknown time   • silver sulfadiazine (SILVADENE, SSD) 1 % cream Apply  topically Every 12 (Twelve) Hours.  0 Unknown at Unknown time   • traMADol (ULTRAM) 50 MG tablet Take 1 tablet by mouth Every 6 (Six) Hours As Needed for moderate pain (4-6). 60 tablet 0 Unknown at Unknown time         Allergies:  No Known Allergies    Social History:   Social History     Social History   • Marital status:      Spouse name: N/A   • Number of children: N/A   • Years of education: N/A     Occupational History   • Not on file.     Social History Main Topics   • Smoking status: Former Smoker     Packs/day: 0.50     Types: Cigarettes     Start date: 6/16/1941     Quit date: 5/16/2007   • Smokeless tobacco: Never Used   • Alcohol use No   • Drug use: No   • Sexual activity: Defer     Other Topics Concern   • Not on file     Social History Narrative       Family History:  Family History   Problem Relation Age of Onset   •  "Diabetes Daughter    • Hyperlipidemia Daughter    • Hypertension Daughter    • Cancer Son        Review of Systems:  Denies fevers or chills  Denies nausea or vomiting  No new vision or hearing changes  No chest pain  Positive shortness of breath with cough and wheezing  No diarrhea, hematemesis or hematochezia, no dysuria or frequency  Positive myalgias  No heat or cold intolerance  No skin rashes  No dizziness or confusion.  No seizure activity  No new anxiety or depression  12 system review of systems performed and all else negative    Objective:    PHYSICAL EXAM:    BP (!) 194/75 (BP Location: Right arm, Patient Position: Lying)  Pulse 60  Temp 97.7 °F (36.5 °C) (Oral)   Resp 20  Ht 160 cm (63\")  Wt 78.2 kg (172 lb 4.8 oz)  SpO2 95%  BMI 30.52 kg/m2 Body mass index is 30.52 kg/(m^2). 95% 78.2 kg (172 lb 4.8 oz)    GENERAL APPEARANCE:   · Well developed  · Well nourished  · No acute distress   · Mildly confused  EYES:    · PERRL                                                                           · Conjunctiva normal  · Sclera non-icteric.  HENT:   · Atraumatic, normocephalic  · External ears and nose normal  · Moist mucus membranes and no ulcers  NECK:  · Thyroid not enlarged  · Trachea midline   RESPIRATORY:    · Nonlabored breathing   · Diminished bilateral breath sounds  · No rales.  Positive bilateral wheezing  · No dullness  CARDIOVASCULAR:    · RRR  · Normal S1, S2  · No murmur  · Lower extremity edema: none    GI:   · Bowel sounds normal  · Abdomen soft , non-distended, non-tender  · No abdominal masses.    MUSCULOSKELETAL:  · Normal movement of extremities  · No tenderness, no deformities  · No clubbing or cyanosis   Skin:    · No visible rashes  · No palpable nodules  PSYCHIATRIC:  · Speech and behavior appropriate  · Normal mood and affect  · Oriented to person, place and time  NEUROLOGIC:      Lab Review:      Lab Results   Component Value Date    WBC 13.24 (H) 01/02/2018    HGB 12.4 " 01/02/2018    HCT 41.1 01/02/2018    MCV 96.3 01/02/2018     01/02/2018            Lab Results   Component Value Date    CREATININE 1.11 (H) 01/02/2018    BUN 22 01/02/2018     01/02/2018    K 3.6 01/02/2018     01/02/2018    CO2 23.6 01/02/2018        Lab Results   Component Value Date    TROPONINT 0.069 (H) 12/29/2017     Influenza 2009 H1N1 by PCR Detected (A)                Imaging reviewed  chest X-ray viewed: Shows signs of interstitial edema and CHF       Assessment:  H1 N1 influenza  Acute bronchitis  COPD with acute exacerbation  Acute hypoxemic respiratory failure  Metabolic encephalopathy  Ischemic cardiomyopathy  CKD II  GERD with esophagitis  Diabetes mellitus  Weakness/immobility  Hypertension    Recommendations:  Tamiflu for influenza.  Antibiotics for bacterial component of bronchitis.  Suspect that this is predominantly viral however.  Scheduled meds needed bronchodilators and on prednisone for COPD.  Encourage pulmonary toilet.  Diuretics for CHF symptoms.  Control glucose.      Thank you for allowing me to participate in the care of this patient.  I will continue to follow along with you.      Ramon Brady MD  [facility], [facility]  Pulmonary and Critical Care Medicine    1/2/2018  9:34 AM

## 2018-01-02 NOTE — PROGRESS NOTES
Infectious Diseases Progress Note    Vikas Parra MD     [facility]  [place]: 4 days  Patient Identification:  Name: Cynthia Arreola  Age: 89 y.o.  Sex: female  :  1927  MRN: [id number]         Primary Care Physician: Param Martinez MD            Subjective: Had issues with breathing last night with significant wheezing and crackles along with spike in blood pressure.  No fever spike.  Received diuretics and nebulizer treatment with significant improvement in his status and was also started on Zosyn.  Currently feeling much better.  Interval History: See consultation note.    Objective:    Scheduled Meds:    amLODIPine 10 mg Oral Daily   aspirin 81 mg Oral Daily   atorvastatin 10 mg Oral Nightly   clopidogrel 75 mg Oral Daily   docusate sodium 250 mg Oral Daily   enoxaparin 30 mg Subcutaneous Q24H   Ferrex 150 forte plus 1 capsule Oral Daily With Breakfast   furosemide 40 mg Intravenous Once   insulin aspart 0-7 Units Subcutaneous 4x Daily With Meals & Nightly   insulin detemir 25 Units Subcutaneous Nightly   ipratropium-albuterol 3 mL Nebulization 4x Daily - RT   isosorbide mononitrate 30 mg Oral Daily   levothyroxine 100 mcg Oral QAM   linagliptin 5 mg Oral Daily   metoclopramide 5 mg Oral 4x Daily AC & at Bedtime   metoprolol succinate XL 50 mg Oral Daily   oseltamivir 30 mg Oral Q12H   pantoprazole 40 mg Oral QAM   piperacillin-tazobactam 3.375 g Intravenous Q8H   predniSONE 10 mg Oral Daily With Breakfast   rOPINIRole 0.25 mg Oral Nightly     Continuous Infusions:    Pharmacy to Dose Zosyn        Vital signs in last 24 hours:  Temp:  [96.5 °F (35.8 °C)-97.7 °F (36.5 °C)] 97.7 °F (36.5 °C)  Heart Rate:  [53-83] 60  Resp:  [18-32] 20  BP: (160-218)/() 194/75    Intake/Output:    Intake/Output Summary (Last 24 hours) at 18 0936  Last data filed at 18 2300   Gross per 24 hour   Intake              250 ml   Output               75 ml   Net              175 ml       Exam:  BP  "(!) 194/75 (BP Location: Right arm, Patient Position: Lying)  Pulse 60  Temp 97.7 °F (36.5 °C) (Oral)   Resp 20  Ht 160 cm (63\")  Wt 78.2 kg (172 lb 4.8 oz)  SpO2 95%  BMI 30.52 kg/m2    General Appearance:    Appears more comfortable does not appear flushed and ill as she was yesterday.                          Head:    Normocephalic, without obvious abnormality, atraumatic                           Eyes:    PERRL, conjunctiva/corneas clear, EOM's intact, both eyes                         Throat:   Lips, tongue, gums normal; oral mucosa pink and moist                           Neck:   Supple, symmetrical, trachea midline, no JVD                         Lungs:   Scattered rhonchi bilaterally no obvious use of accessory muscles of breathing noted                 Chest Wall:    No tenderness or deformity                          Heart:    Regular rate and rhythm, S1 and S2 normal, no murmur,no  Rub                                      or gallop                  Abdomen:     Soft, non-tender, bowel sounds active, no masses, no                                                        organomegaly                  Extremities:   Extremities normal, atraumatic, no cyanosis, 1+ edema                        Pulses:   Pulses palpable in all extremities                            Skin:   Skin is warm and dry,  no rashes or palpable lesions                    Data Review:    I reviewed the patient's new clinical results.    Results from last 7 days  Lab Units 01/02/18 0445 01/01/18 0631 12/31/17 0421 12/30/17 0448 12/28/17 2054   WBC 10*3/mm3 13.24* 14.04* 8.19 6.59 5.87   HEMOGLOBIN g/dL 12.4 11.6* 11.2* 11.5* 13.4   PLATELETS 10*3/mm3 164 192 183 181 170       Results from last 7 days  Lab Units 01/02/18  0445 01/01/18  0631 12/31/17  0421 12/30/17 0448 12/29/17  1039 12/28/17 2054   SODIUM mmol/L 139 142 142 142 140 142   POTASSIUM mmol/L 3.6 3.6 4.2 4.7 3.7 3.3*   CHLORIDE mmol/L 103 105 107 106 101 102   CO2 " mmol/L 23.6 23.6 22.3 23.5 23.7 23.8   BUN mg/dL 22 28* 31* 29* 22 23   CREATININE mg/dL 1.11* 1.17* 1.40* 1.54* 1.51* 1.29*   CALCIUM mg/dL 9.0 8.7 8.6 8.7 8.6 9.0   GLUCOSE mg/dL 132* 207* 299* 292* 354* 161*       Microbiology Results (last 10 days)     Procedure Component Value - Date/Time    MRSA Screen Culture - Swab, Nares [[id number]]  (Normal) Collected:  12/30/17 0036    Lab Status:  Final result Specimen:  Swab from Nares Updated:  01/01/18 0912     MRSA SCREEN CX No Methicillin Resistant Staphylococcus aureus isolated    Respiratory Panel, PCR - Swab, Nasopharynx [[id number]]  (Abnormal) Collected:  12/29/17 1923    Lab Status:  Final result Specimen:  Swab from Nasopharynx Updated:  12/29/17 2201     ADENOVIRUS, PCR Not Detected     Coronavirus 229E Not Detected     Coronavirus HKU1 Not Detected     Coronavirus NL63 Not Detected     Coronavirus OC43 Not Detected     Human Metapneumovirus Not Detected     Human Rhinovirus/Enterovirus Not Detected     Influenza B PCR Not Detected     Parainfluenza Virus 1 Not Detected     Parainfluenza Virus 2 Not Detected     Parainfluenza Virus 3 Not Detected     Parainfluenza Virus 4 Not Detected     Bordetella pertussis pcr Not Detected     Influenza 2009 H1N1 by PCR Detected (A)     Chlamydophila pneumoniae PCR Not Detected     Mycoplasma pneumo by PCR Not Detected     Influenza A PCR Not Detected     Influenza A H3 Not Detected     Influenza A H1 Not Detected     RSV, PCR Not Detected    Influenza Antigen, Rapid - Swab, Nasopharynx [[id number]]  (Normal) Collected:  12/28/17 2218    Lab Status:  Final result Specimen:  Swab from Nasopharynx Updated:  12/28/17 2252     Influenza A Ag, EIA Negative     Influenza B Ag, EIA Negative          Assessment:  Principal Problem:    H1N1 influenza  Active Problems:    Chronic kidney disease, stage II (mild)    Ischemic cardiomyopathy    Hypertension, essential    Gastroesophageal reflux disease with esophagitis    Peripheral  vascular occlusive disease aorta iliac and femoraL    Bronchitis with bronchospasm    DM (diabetes mellitus)    KOFI (acute kidney injury)    Hypokalemia    Elevated LFTs    COPD exacerbation      Recommendations/Discussion:  · Evidence of volume overload related issues noted.  Doubt she had aspiration pneumonia.  Strongly suggest discontinuation of antibiotics as his status improved significantly with diuretics and a very short period of time.  · Continue treatment with Tamiflu and inhalers and supportive care.    · Continue to observe off of antibiotic therapy.    · Her leukocytosis could very well be due to steroid effect.    · Need to monitor her clinical course if she changes her shows evidence of decline and may need workup to rule out postviral pneumonia versus C. difficile infections and so forth.    Vikas Parra MD  1/2/2018  9:36 AM    Much of this encounter note is an electronic transcription/translation of spoken language to printed text. The electronic translation of spoken language may permit erroneous, or at times, nonsensical words or phrases to be inadvertently transcribed; Although I have reviewed the note for such errors, some may still exist

## 2018-01-02 NOTE — PLAN OF CARE
Problem: Patient Care Overview (Adult)  Goal: Plan of Care Review  Outcome: Ongoing (interventions implemented as appropriate)   01/02/18 1709   Coping/Psychosocial Response Interventions   Plan Of Care Reviewed With patient   Outcome Evaluation   Outcome Summary/Follow up Plan Pt presents with impaired functional moblity and gait secondary to generalized weakness and decreased activity tolerance s/p the flu and bronchitis. Pt may benefit from skilled PT to address strength, mobility, and functional independence.       Problem: Inpatient Physical Therapy  Goal: Bed Mobility Goal LTG- PT  Outcome: Ongoing (interventions implemented as appropriate)   01/02/18 1709   Bed Mobility PT LTG   Bed Mobility PT LTG, Time to Achieve 1 wk   Bed Mobility PT LTG, Activity Type all bed mobility   Bed Mobility PT LTG, [place] Level minimum assist (75% patient effort)     Goal: Transfer Training Goal 1 LTG- PT  Outcome: Ongoing (interventions implemented as appropriate)   01/02/18 1709   Transfer Training PT LTG   Transfer Training PT LTG, Time to Achieve 1 wk   Transfer Training PT LTG, Activity Type sit to stand/stand to sit   Transfer Training PT LTG, [place] Level moderate assist (50% patient effort);2 person assist required   Transfer Training PT LTG, Assist Device walker, rolling     Goal: Transfer Training Goal 2 LTG- PT  Outcome: Ongoing (interventions implemented as appropriate)   01/02/18 1709   Transfer Training 2 PT LTG   Transfer Training PT 2 LTG, Time to Achieve 1 wk   Transfer Training PT 2 LTG, Activity Type bed to chair /chair to bed   Transfer Training PT 2 LTG, [place] Level moderate assist (50% patient effort);2 person assist required   Transfer Training PT 2 LTG, Assist Device walker, rolling

## 2018-01-02 NOTE — THERAPY EVALUATION
Acute Care - Physical Therapy Initial Evaluation  [facility]     Patient Name: Cynthia Arreola  : 1927  MRN: [id number]  Today's Date: 2018   Onset of Illness/Injury or Date of Surgery Date: 17  Date of Referral to PT: 17  Referring Physician: Dr. Moya      Admit Date: 2017     Visit Dx:    ICD-10-CM ICD-9-CM   1. Bronchitis with bronchospasm J20.9 490   2. Hypokalemia E87.6 276.8   3. Renal insufficiency N28.9 593.9   4. Elevated troponin R74.8 790.6   5. Elevated LFTs R79.89 790.6   6. 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   • Chronic kidney disease, stage II (mild)   • H/O small bowel obstruction   • Anemia of chronic disease   • Recurrent UTI   • Ischemic cardiomyopathy   • Peripheral arterial disease   • Hypertension, essential   • Immobility syndrome   • Acquired hypothyroidism   • MRSA cellulitis of right foot   • Sacral decubitus ulcer   • SSS (sick sinus syndrome)   • S/P placement of cardiac pacemaker   • Gastroesophageal reflux disease with esophagitis   • Hyperlipidemia   • Ischemic ulcer of right heel with fat layer exposed   • 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   • Acute bronchitis due to Rhinovirus (enterovirus)   • Bilateral viral bronchopneumonia   • Respiratory infection due to enterovirus/ Human Rhinovirus   • Common bile duct stone   • Bronchitis with bronchospasm   • DM (diabetes mellitus)   • KOFI (acute kidney injury)   • Hypokalemia   • Elevated LFTs   • H1N1 influenza   • COPD exacerbation     Past Medical History:   Diagnosis Date   • Anemia    • COPD exacerbation 2017   • Coronary artery disease    • Diabetes mellitus    • Disease of thyroid gland    • Dysphagia    • Hypertension    • Ischemic cardiomyopathy    • Malignant neoplasm of colon    • PVD (peripheral vascular  disease)    • Renal disorder      Past Surgical History:   Procedure Laterality Date   • CHOLECYSTECTOMY     • COLON RESECTION WITH COLOSTOMY      25 years ago   • COLON SURGERY            PT ASSESSMENT (last 72 hours)      PT Evaluation       01/02/18 1646       Rehab Evaluation    Document Type re-evaluation  -     Subjective Information agree to therapy  -     Patient Effort, Rehab Treatment good  -     Symptoms Noted During/After Treatment none  -     General Information    Onset of Illness/Injury or Date of Surgery Date 12/28/17  -     General Observations supine in bed, no acute distress noted at rest  -     Pertinent History Of Current Problem pt admitted with bronchitis, hypokalemia, HTN, Renal insufficiency and increased troponin  -     Precautions/Limitations fall precautions  -     Prior Level of Function mod assist:;gait;transfer;bed mobility   pt uses standing lift, then walks with walker and assist  -     Equipment Currently Used at Home walker, rolling  -     Plans/Goals Discussed With patient and family  -     Barriers to Rehab medically complex  -     Clinical Impression    Patient/Family Goals Statement to return to [facility]  -     Criteria for Skilled Therapeutic Interventions Met treatment indicated  -     Impairments Found (describe specific impairments) gait, locomotion, and balance;muscle performance  -     Rehab Potential good, to achieve stated therapy goals  -     Pain Assessment    Pain Assessment No/denies pain  -     Cognitive Assessment/Intervention    Current Cognitive/Communication Assessment functional  -     Orientation Status oriented x 4  -     Follows Commands/Answers Questions 75% of the time;able to follow single-step instructions;needs cueing;needs increased time;needs repetition   pt is [place]  -     Personal Safety mild impairment  -     Personal Safety Interventions fall prevention program maintained;nonskid shoes/slippers when out of bed   -     ROM (Range of Motion)    General ROM no range of motion deficits identified  -     MMT (Manual Muscle Testing)    General MMT Assessment other (see comments)   generalized weakness noted with functional mobility  -     Bed Mobility, Assessment/Treatment    Bed Mobility, Scoot/Bridge, [place] verbal cues required;nonverbal cues required (demo/gesture);maximum assist (25% patient effort)  -     Bed Mob, Supine to Sit, [place] verbal cues required;nonverbal cues required (demo/gesture);maximum assist (25% patient effort)  -     Bed Mobility, Impairments strength decreased  -     Transfer Assessment/Treatment    Transfers, Sit-Stand [place] not tested  -     Transfers, Stand-Sit [place] not tested  -     Transfer, Comment son states pt uses a standing lift or pulls up on a wall railing from her wheelchair  -     Balance Skills Training    Sitting-Level of Assistance Minimum assistance  -     Therapy Exercises    Bilateral Lower Extremities AROM:;10 reps;ankle pumps/circles;hip flexion;LAQ  -CH     Bilateral Upper Extremity AROM:;10 reps;elbow flexion/extension;shoulder extension/flexion  -     Positioning and Restraints    Pre-Treatment Position in bed  -     Post Treatment Position bed  -CH     In Bed supine;call light within reach;encouraged to call for assist;with family/caregiver  -       User Key  (r) = Recorded By, (t) = Taken By, (c) = Cosigned By    Initials Name Provider Type     Maureen Ayala PT Physical Therapist          Physical Therapy Education     Title: PT OT SLP Therapies (Active)     Topic: Physical Therapy (Active)     Point: Mobility training (Done)    Learning Progress Summary    Learner Readiness Method Response Comment Documented by Status   Patient Acceptance E,TB,D REGGIE,NR   01/02/18 9015 Done               Point: Body mechanics (Done)    Learning Progress Summary    Learner Readiness Method Response Comment Documented by Status    Patient Acceptance JOANNA HUGGINS D VU,NR   01/02/18 1708 Done               Point: Precautions (Done)    Learning Progress Summary    Learner Readiness Method Response Comment Documented by Status   Patient Acceptance JOANNA HUGGINS D VU,NR   01/02/18 1708 Done                      User Key     Initials Effective Dates Name Provider Type Discipline     12/01/15 -  Maureen Ayala, PT Physical Therapist PT                PT Recommendation and Plan  Anticipated Discharge Disposition: skilled nursing facility  Planned Therapy Interventions: balance training, bed mobility training, gait training, home exercise program, patient/family education, transfer training  PT Frequency: 5 times/wk  Plan of Care Review  Plan Of Care Reviewed With: patient  Outcome Summary/Follow up Plan: Pt presents with impaired functional moblity and gait secondary to generalized weakness and decreased activity tolerance s/p the flu and bronchitis. Pt may benefit from skilled PT to address strength, mobility, and functional independence.          IP PT Goals       01/02/18 1709          Bed Mobility PT LTG    Bed Mobility PT LTG, Time to Achieve 1 wk  -CH      Bed Mobility PT LTG, Activity Type all bed mobility  -CH      Bed Mobility PT LTG, [place] Level minimum assist (75% patient effort)  -CH      Transfer Training PT LTG    Transfer Training PT LTG, Time to Achieve 1 wk  -CH      Transfer Training PT LTG, Activity Type sit to stand/stand to sit  -CH      Transfer Training PT LTG, [place] Level moderate assist (50% patient effort);2 person assist required  -CH      Transfer Training PT LTG, Assist Device walker, rolling  -CH      Transfer Training 2 PT LTG    Transfer Training PT 2 LTG, Time to Achieve 1 wk  -CH      Transfer Training PT 2 LTG, Activity Type bed to chair /chair to bed  -CH      Transfer Training PT 2 LTG, [place] Level moderate assist (50% patient effort);2 person assist required  -CH      Transfer Training PT 2 LTG,  Assist Device walker, rolling  -        User Key  (r) = Recorded By, (t) = Taken By, (c) = Cosigned By    Initials Name Provider Type     Maureen Ayala PT Physical Therapist                Outcome Measures       01/02/18 1700          How much help from another person do you currently need...    Turning from your back to your side while in flat bed without using bedrails? 2  -CH      Moving from lying on back to sitting on the side of a flat bed without bedrails? 2  -CH      Moving to and from a bed to a chair (including a wheelchair)? 1  -CH      Standing up from a chair using your arms (e.g., wheelchair, bedside chair)? 1  -CH      Climbing 3-5 steps with a railing? 1  -CH      To walk in hospital room? 1  -CH      AM-PAC 6 Clicks Score 8  -      Functional Assessment    Outcome Measure Options AM-PAC 6 Clicks Basic Mobility (PT)  -        User Key  (r) = Recorded By, (t) = Taken By, (c) = Cosigned By    Initials Name Provider Type     Maureen Ayala PT Physical Therapist           Time Calculation:         PT Charges       01/02/18 1711          Time Calculation    Start Time 1628  -      Stop Time 1640  -      Time Calculation (min) 12 min  -      PT Received On 01/02/18  -      PT - Next Appointment 01/03/18  -      PT Goal Re-Cert Due Date 01/09/18  -        User Key  (r) = Recorded By, (t) = Taken By, (c) = Cosigned By    Initials Name Provider Type     Maureen Ayala PT Physical Therapist          Therapy Charges for Today     Code Description Service Date Service Provider Modifiers Qty    92011963939  PT EVAL MOD COMPLEXITY 2 1/2/2018 Maureen Ayala, PT GP 1    [id number]  PT THER PROC EA 15 MIN 1/2/2018 Maureen Ayala, PT GP 1          PT G-Codes  PT Professional Judgement Used?: Yes  Outcome Measure Options: AM-PAC 6 Clicks Basic Mobility (PT)  Functional Limitation: Mobility: Walking and moving around  Mobility: Walking and Moving Around Current Status  (): At least 80 percent but less than 100 percent impaired, limited or restricted  Mobility: Walking and Moving Around Goal Status (): At least 80 percent but less than 100 percent impaired, limited or restricted  Mobility: Walking and Moving Around Discharge Status (): At least 80 percent but less than 100 percent impaired, limited or restricted      Maureen Ayala, PT  1/2/2018

## 2018-01-02 NOTE — PROGRESS NOTES
"Pharmacy Consult - Zosyn Dosing     Cynthia Arreola has a consult for pharmacy to dose Zosyn pending chest x-ray.  Pharmacy dosing Zosyn per Dr. Moya's request.       Relevant clinical data and objective history reviewed:  89 y.o. female 160 cm (63\") 78.2 kg (172 lb 4.8 oz)    Past Medical History:   Diagnosis Date   • Anemia    • COPD exacerbation 12/30/2017   • Coronary artery disease    • Diabetes mellitus    • Disease of thyroid gland    • Dysphagia    • Hypertension    • Ischemic cardiomyopathy    • Malignant neoplasm of colon    • PVD (peripheral vascular disease)    • Renal disorder      Creatinine   Date Value Ref Range Status   01/01/2018 1.17 (H) 0.57 - 1.00 mg/dL Final   12/31/2017 1.40 (H) 0.57 - 1.00 mg/dL Final   12/30/2017 1.54 (H) 0.57 - 1.00 mg/dL Final     BUN   Date Value Ref Range Status   01/01/2018 28 (H) 8 - 23 mg/dL Final     Estimated Creatinine Clearance: 31.6 mL/min (by C-G formula based on Cr of 1.17).    Lab Results   Component Value Date    WBC 14.04 (H) 01/01/2018     Temp Readings from Last 3 Encounters:   01/02/18 97.4 °F (36.3 °C) (Oral)   01/20/17 99.6 °F (37.6 °C) (Oral)   12/29/16 97.6 °F (36.4 °C) (Oral)          Assessment/Plan  Will start Zosyn 3.375 g IV q8h.. Pharmacy will continue to follow daily while on Zosyn and adjust as needed.     Joy Boykin, [facility]  "

## 2018-01-02 NOTE — NURSING NOTE
01/02/18 1710   Colostomy     Placement Date/Time: (c) (c)   Existing LDA: present on admission to this facility   Stoma Appearance red   Peristomal Skin unable to assess, covered by appliance   Appliance 2-piece;drainable pouch;intact;no leakage   Stoma Function stool   Stool Color brown   Tolerance no signs/symptoms of discomfort   Pouch intact . Unit RN states she changed pouch today

## 2018-01-02 NOTE — PLAN OF CARE
Problem: Patient Care Overview (Adult)  Goal: Plan of Care Review  Outcome: Ongoing (interventions implemented as appropriate)   01/02/18 0536   Coping/Psychosocial Response Interventions   Plan Of Care Reviewed With patient   Patient Care Overview   Progress no change   Outcome Evaluation   Outcome Summary/Follow up Plan Increased SOA and audible wheezing, Solumedrol and Zosyn started, chest xray showed worsening CHR, Pulmonary consulted and Lasix given, continue to monitor       Problem: Fall Risk (Adult)  Goal: Absence of Falls  Outcome: Ongoing (interventions implemented as appropriate)      Problem: Pressure Ulcer Risk (Akshat Scale) (Adult,Obstetrics,Pediatric)  Goal: Skin Integrity  Outcome: Ongoing (interventions implemented as appropriate)      Problem: Respiratory Insufficiency (Adult)  Goal: Acid/Base Balance  Outcome: Ongoing (interventions implemented as appropriate)    Goal: Effective Ventilation  Outcome: Ongoing (interventions implemented as appropriate)      Problem: Renal Failure/Kidney Injury, Acute (Adult)  Goal: Signs and Symptoms of Listed Potential Problems Will be Absent or Manageable (Renal Failure/Kidney Injury, Acute)  Outcome: Ongoing (interventions implemented as appropriate)

## 2018-01-03 NOTE — PROGRESS NOTES
DAILY PROGRESS NOTE  [facility], [facility]    2018    Patient Identification:  Name: Cynthia Arreola  Age: 89 y.o.  Sex: female  :  1927  MRN: [id number]           Primary Care Physician: Param Martinez MD    Subjective:    Interval History:    Events noted, shortness of breath last night, chest x-ray with pulmonary congestion.  Responded to Lasix.  On Tamiflu and supportive therapy for influenza.  BS better, still high though (related to steroids), improving.  Labs better, except WBC (elevated, but may be due to steroids), improving.      ROS:     No nausea, vomiting, diarrhea, constipation.    Objective:    Scheduled Meds:    amLODIPine 10 mg Oral Daily   aspirin 81 mg Oral Daily   atorvastatin 10 mg Oral Nightly   clopidogrel 75 mg Oral Daily   docusate sodium 250 mg Oral Daily   enoxaparin 30 mg Subcutaneous Q24H   Ferrex 150 forte plus 1 capsule Oral Daily With Breakfast   insulin aspart 0-7 Units Subcutaneous 4x Daily With Meals & Nightly   insulin detemir 25 Units Subcutaneous Nightly   ipratropium-albuterol 3 mL Nebulization 4x Daily - RT   isosorbide mononitrate 30 mg Oral Daily   levothyroxine 100 mcg Oral QAM   linagliptin 5 mg Oral Daily   metoclopramide 5 mg Oral 4x Daily AC & at Bedtime   metoprolol succinate XL 50 mg Oral Daily   oseltamivir 30 mg Oral Q12H   pantoprazole 40 mg Oral QAM   piperacillin-tazobactam 3.375 g Intravenous Q8H   predniSONE 10 mg Oral Daily With Breakfast   rOPINIRole 0.25 mg Oral Nightly       Continuous Infusions:    Pharmacy to Dose Zosyn        PRN Meds:  •  acetaminophen  •  dextrose  •  dextrose  •  glucagon (human recombinant)  •  hydrALAZINE  •  HYDROcodone-acetaminophen  •  influenza vaccine  •  ipratropium-albuterol  •  nitroglycerin  •  Pharmacy to Dose Zosyn  •  pneumococcal polysaccharide 23-valent  •  polyethylene glycol  •  promethazine  •  sodium chloride    Intake/Output:    Intake/Output Summary (Last 24  "hours) at 18 1921  Last data filed at 18 1842   Gross per 24 hour   Intake              500 ml   Output               75 ml   Net              425 ml         Exam:    tMax 24 hrs: Temp (24hrs), Av.5 °F (36.4 °C), Min:97.1 °F (36.2 °C), Max:97.9 °F (36.6 °C)    Vitals Ranges:   Temp:  [97.1 °F (36.2 °C)-97.9 °F (36.6 °C)] 97.1 °F (36.2 °C)  Heart Rate:  [59-83] 60  Resp:  [18-32] 20  BP: (149-218)/() 149/50    /50 (BP Location: Left arm, Patient Position: Lying)  Pulse 60  Temp 97.1 °F (36.2 °C) (Oral)   Resp 20  Ht 160 cm (63\")  Wt 78.2 kg (172 lb 4.8 oz)  SpO2 95%  BMI 30.52 kg/m2    General: Alert, oriented x 3. Cooperative, no distress, appears stated age  HEENT:    Head: Normocephalic, without obvious abnormality, atraumatic  Eyes: EOM are normal. Pupils are equal, round, and reactive to light.   Oropharynx: Mucosa and tongue dry  Neck: Supple, symmetrical, trachea midline, no adenopathy;              thyroid:  no enlargement/tenderness/nodules;              no carotid bruit or JVD  Cardiovascular: Normal rate, regular rhythm and intact distal pulses.              Exam reveals no gallop and no friction rub. No murmur heard  Chest wall: No tenderness or deformity  Pulmonary:  decreased breath sounds, less wheezing.  Rhonchi bilaterally, few rales at bases.    Abdominal: Soft, non-tender, bowel sounds active all four quadrants,     no masses, no hepatomegaly, no splenomegaly.   Extremities: Normal, atraumatic, no cyanosis. + edema both LE or edema  Pulses: 2 + symmetric all extremities  Neurological: He is alert and oriented to person, place, and time.                 CNII-XII intact, normal strength, sensation intact throughout  Skin: Healing ulcer on the dorsum of her right foot, small area healing on the dorsum of her right heel. 1.5 cm ulcerated area with mild surrounding erythema           Data Review:      Results from last 7 days  Lab Units 18  0445 18  0631 " 12/31/17  0421   WBC 10*3/mm3 13.24* 14.04* 8.19   HEMOGLOBIN g/dL 12.4 11.6* 11.2*   HEMATOCRIT % 41.1 38.0 36.9   PLATELETS 10*3/mm3 164 192 183         Results from last 7 days  Lab Units 01/02/18  0445 01/01/18  0631 12/31/17  0421  12/28/17 2054   SODIUM mmol/L 139 142 142  < > 142   POTASSIUM mmol/L 3.6 3.6 4.2  < > 3.3*   CHLORIDE mmol/L 103 105 107  < > 102   CO2 mmol/L 23.6 23.6 22.3  < > 23.8   BUN mg/dL 22 28* 31*  < > 23   CREATININE mg/dL 1.11* 1.17* 1.40*  < > 1.29*   CALCIUM mg/dL 9.0 8.7 8.6  < > 9.0   BILIRUBIN mg/dL  --  0.2  --   --  0.2   ALK PHOS U/L  --  146*  --   --  263*   ALT (SGPT) U/L  --  25  --   --  47*   AST (SGOT) U/L  --  15  --   --  39*   GLUCOSE mg/dL 132* 207* 299*  < > 161*   < > = values in this interval not displayed.    Results from last 7 days  Lab Units 12/29/17  0008   INR  0.98         Results from last 7 days  Lab Units 12/28/17 2054   HEMOGLOBIN A1C % 9.00*         Lab Results  Lab Value Date/Time   TROPONINT 0.069 (H) 12/29/2017 1039   TROPONINT 0.101 (C) 12/28/2017 2054   TROPONINT <0.010 01/16/2017 0248   TROPONINT <0.010 12/28/2016 0039   TROPONINT <0.010 12/27/2016 1752   TROPONINT <0.010 12/27/2016 0617       Microbiology Results (last 10 days)     Procedure Component Value - Date/Time    MRSA Screen Culture - Swab, Nares [[id number]]  (Normal) Collected:  12/30/17 0036    Lab Status:  Final result Specimen:  Swab from Nares Updated:  01/01/18 0912     MRSA SCREEN CX No Methicillin Resistant Staphylococcus aureus isolated    Respiratory Panel, PCR - Swab, Nasopharynx [[id number]]  (Abnormal) Collected:  12/29/17 1923    Lab Status:  Final result Specimen:  Swab from Nasopharynx Updated:  12/29/17 2201     ADENOVIRUS, PCR Not Detected     Coronavirus 229E Not Detected     Coronavirus HKU1 Not Detected     Coronavirus NL63 Not Detected     Coronavirus OC43 Not Detected     Human Metapneumovirus Not Detected     Human Rhinovirus/Enterovirus Not Detected      Influenza B PCR Not Detected     Parainfluenza Virus 1 Not Detected     Parainfluenza Virus 2 Not Detected     Parainfluenza Virus 3 Not Detected     Parainfluenza Virus 4 Not Detected     Bordetella pertussis pcr Not Detected     Influenza 2009 H1N1 by PCR Detected (A)     Chlamydophila pneumoniae PCR Not Detected     Mycoplasma pneumo by PCR Not Detected     Influenza A PCR Not Detected     Influenza A H3 Not Detected     Influenza A H1 Not Detected     RSV, PCR Not Detected    Influenza Antigen, Rapid - Swab, Nasopharynx [[id number]]  (Normal) Collected:  12/28/17 2218    Lab Status:  Final result Specimen:  Swab from Nasopharynx Updated:  12/28/17 2252     Influenza A Ag, EIA Negative     Influenza B Ag, EIA Negative           Imaging Results (last 7 days)     Procedure Component Value Units Date/Time    XR Chest 2 View [[id number]] Collected:  12/28/17 2220     Updated:  12/29/17 2312    Narrative:       CHEST PA AND LATERAL.     HISTORY: Cough and congestion.     COMPARISON: 01/16/2017.     FINDINGS:  Mild cardiomegaly.         There is no consolidation or effusion. Mild pulmonary congestion  superimposed on emphysema.          Impression:       Pulmonary congestion, overall no significant change.         This report was finalized on 12/29/2017 11:09 PM by Dr. Fransisco Yoo MD.               Assessment:      Principal Problem:    H1N1 influenza  Active Problems:    Bronchitis with bronchospasm    KOFI (acute kidney injury)    Hypokalemia    Elevated LFTs    COPD exacerbation    Chronic kidney disease, stage II (mild)    Ischemic cardiomyopathy    Hypertension, essential    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    DM (diabetes mellitus)    Gastroesophageal reflux disease with esophagitis  Probably diastolic congestive heart failure acute on chronic.         Plan:    Lasix IV twice a day, for exacerbation of congestive heart failure.    Check 2-D echo   Continue Tamiflu  Continue supportive  therapy  Taper steroids to off  LFT much better  Monitor and correct electrolytes  Adjust insulin as needed  Monitor mental status  Continue home medications  PT to see pt  DVT/stress ulcer prophylaxis  Labs in am      Roberto Carlos Moya MD  1/2/2018  7:21 PM

## 2018-01-03 NOTE — PLAN OF CARE
Problem: Patient Care Overview (Adult)  Goal: Plan of Care Review  Outcome: Ongoing (interventions implemented as appropriate)   01/03/18 1838   Coping/Psychosocial Response Interventions   Plan Of Care Reviewed With patient;family   Patient Care Overview   Progress improving   Outcome Evaluation   Outcome Summary/Follow up Plan able to mallory tfer to chair after 3 attempts at standing

## 2018-01-03 NOTE — PROGRESS NOTES
[facility] INPATIENT PROGRESS NOTE         [address]    1/3/2018      PATIENT IDENTIFICATION:   Name:  Cynthia Arreola      MRN:  [id number]     89 y.o.  female             LOS 5    Reason for visit: Follow-up respiratory failure associated with influenza/bronchitis and COPD exacerbation      SUBJECTIVE:    Patient still with some dyspnea but less short of breath than yesterday.  No chest pain or productive cough.  Complains of generalized weakness.    Objective   OBJECTIVE:    Vital Sign Min/Max for last 24 hours  Temp  Min: 97.1 °F (36.2 °C)  Max: 98.7 °F (37.1 °C)   BP  Min: 149/50  Max: 188/75   Pulse  Min: 60  Max: 71   Resp  Min: 20  Max: 22   SpO2  Min: 94 %  Max: 96 %   Flow (L/min)  Min: 2  Max: 3   No Data Recorded                         Body mass index is 30.52 kg/(m^2).    Intake/Output Summary (Last 24 hours) at 01/03/18 1213  Last data filed at 01/03/18 0900   Gross per 24 hour   Intake              450 ml   Output                0 ml   Net              450 ml         Exam:  GEN:  No distress, appears stated age  EYES:   PERRL, anicteric sclera  ENT:    External ears/nose normal, OP clear  NECK:  No adenopathy, midline trachea  LUNGS: Normal chest on inspection, palpation and Diminished bilaterally on auscultation  CV:  Normal S1S2, without murmur  ABD:  Non tender, non distended, no hepatosplenomegaly, +BS  EXT:  No cyanosis or clubbing    Scheduled meds:    amLODIPine 10 mg Oral Daily   aspirin 81 mg Oral Daily   atorvastatin 10 mg Oral Nightly   clopidogrel 75 mg Oral Daily   docusate sodium 250 mg Oral Daily   enoxaparin 30 mg Subcutaneous Q24H   Ferrex 150 forte plus 1 capsule Oral Daily With Breakfast   furosemide 40 mg Intravenous Q12H   insulin aspart 0-7 Units Subcutaneous 4x Daily With Meals & Nightly   insulin detemir 25 Units Subcutaneous Nightly   ipratropium-albuterol 3 mL Nebulization 4x Daily - RT   isosorbide mononitrate 30 mg Oral Daily   levothyroxine 100 mcg Oral  QAM   linagliptin 5 mg Oral Daily   metoclopramide 5 mg Oral 4x Daily AC & at Bedtime   metoprolol succinate XL 50 mg Oral Daily   oseltamivir 30 mg Oral Q12H   pantoprazole 40 mg Oral QAM   piperacillin-tazobactam 3.375 g Intravenous Q8H   predniSONE 10 mg Oral Daily With Breakfast   rOPINIRole 0.25 mg Oral Nightly     IV meds:                        Pharmacy to Dose Zosyn      Data Review:    Results from last 7 days  Lab Units 01/03/18 0405 01/02/18 0445 01/01/18 0631 12/31/17  0421 12/30/17 0448   SODIUM mmol/L 139 139 142 142 142   POTASSIUM mmol/L 3.5 3.6 3.6 4.2 4.7   CHLORIDE mmol/L 96* 103 105 107 106   CO2 mmol/L 30.8* 23.6 23.6 22.3 23.5   BUN mg/dL 33* 22 28* 31* 29*   CREATININE mg/dL 1.72* 1.11* 1.17* 1.40* 1.54*   GLUCOSE mg/dL 277* 132* 207* 299* 292*   CALCIUM mg/dL 8.6 9.0 8.7 8.6 8.7         Estimated Creatinine Clearance: 21.5 mL/min (by C-G formula based on Cr of 1.72).    Results from last 7 days  Lab Units 01/03/18 0405 01/02/18 0445 01/01/18 0631 12/31/17 0421 12/30/17 0448   WBC 10*3/mm3 8.12 13.24* 14.04* 8.19 6.59   HEMOGLOBIN g/dL 11.7* 12.4 11.6* 11.2* 11.5*   PLATELETS 10*3/mm3 189 164 192 183 181       Results from last 7 days  Lab Units 12/29/17  0008   INR  0.98       Results from last 7 days  Lab Units 01/01/18 0631 12/28/17  2054   ALT (SGPT) U/L 25 47*   AST (SGOT) U/L 15 39*       Results from last 7 days  Lab Units 01/02/18  0317   PH, ARTERIAL pH units 7.360   PO2 ART mm Hg 85.9   PCO2, ARTERIAL mm Hg 50.1*   HCO3 ART mmol/L 28.3*       Results from last 7 days  Lab Units 12/29/17  0008   LACTATE mmol/L 1.1     Influenza A H1N1 2009 PCR Detected (A)               Chest x-ray reviewed 1/2/18    2-D echo result is pending    )Assessment   ASSESSMENT:   H1 N1 influenza  Acute bronchitis  COPD with acute exacerbation  Acute hypoxemic respiratory failure  Metabolic encephalopathy  Ischemic cardiomyopathy  CKD II  GERD with esophagitis  Diabetes  mellitus  Weakness/immobility  Hypertension    PLAN:  Complete Tamiflu course for influenza.  Antibiotic for bacterial component of bronchitis.  This can likely be short course.  Bronchodilators and steroid for COPD symptoms.  On diuretics for CHF symptoms.  Encourage pulmonary toilet.  Consult physical therapy for weakness.    Ramon Brady MD  Pulmonary and Critical Care Medicine  [facility], [facility]  1/3/2018    12:13 PM

## 2018-01-03 NOTE — PROGRESS NOTES
"  Infectious Diseases Progress Note    Vikas Parra MD     [facility]  Los: 5 days  Patient Identification:  Name: Cynthia Arreola  Age: 89 y.o.  Sex: female  :  1927  MRN: [id number]         Primary Care Physician: Param Martinez MD            Subjective: Feeling better denies any specific complaints.  Much improved.  Currently feeling much better.  Interval History: See consultation note.    Objective:    Scheduled Meds:    amLODIPine 10 mg Oral Daily   aspirin 81 mg Oral Daily   atorvastatin 10 mg Oral Nightly   clopidogrel 75 mg Oral Daily   docusate sodium 250 mg Oral Daily   enoxaparin 30 mg Subcutaneous Q24H   Ferrex 150 forte plus 1 capsule Oral Daily With Breakfast   furosemide 40 mg Intravenous Q12H   insulin aspart 0-7 Units Subcutaneous 4x Daily With Meals & Nightly   insulin detemir 25 Units Subcutaneous Nightly   ipratropium-albuterol 3 mL Nebulization 4x Daily - RT   isosorbide mononitrate 30 mg Oral Daily   levothyroxine 100 mcg Oral QAM   linagliptin 5 mg Oral Daily   metoclopramide 5 mg Oral 4x Daily AC & at Bedtime   metoprolol succinate XL 50 mg Oral Daily   oseltamivir 30 mg Oral Q12H   pantoprazole 40 mg Oral QAM   piperacillin-tazobactam 3.375 g Intravenous Q8H   predniSONE 10 mg Oral Daily With Breakfast   rOPINIRole 0.25 mg Oral Nightly     Continuous Infusions:    Pharmacy to Dose Zosyn        Vital signs in last 24 hours:  Temp:  [97.1 °F (36.2 °C)-98.7 °F (37.1 °C)] 98.7 °F (37.1 °C)  Heart Rate:  [60-71] 71  Resp:  [20-22] 20  BP: (149-188)/(50-75) 188/75    Intake/Output:    Intake/Output Summary (Last 24 hours) at 18 1202  Last data filed at 18 0900   Gross per 24 hour   Intake              450 ml   Output                0 ml   Net              450 ml       Exam:  BP (!) 188/75 (BP Location: Right arm, Patient Position: Lying)  Pulse 71  Temp 98.7 °F (37.1 °C) (Oral)   Resp 20  Ht 160 cm (63\")  Wt 78.2 kg (172 lb 4.8 oz)  SpO2 95%  BMI 30.52 " kg/m2    General Appearance:    Appears more comfortable does not appear flushed and ill as she was yesterday.                          Head:    Normocephalic, without obvious abnormality, atraumatic                           Eyes:    PERRL, conjunctiva/corneas clear, EOM's intact, both eyes                         Throat:   Lips, tongue, gums normal; oral mucosa pink and moist                           Neck:   Supple, symmetrical, trachea midline, no JVD                         Lungs:   Scattered rhonchi bilaterally no obvious use of accessory muscles of breathing noted                 Chest Wall:    No tenderness or deformity                          Heart:    Regular rate and rhythm, S1 and S2 normal, no murmur,no  Rub                                      or gallop                  Abdomen:     Soft, non-tender, bowel sounds active, no masses, no                                                        organomegaly                  Extremities:   Extremities normal, atraumatic, no cyanosis, 1+ edema                        Pulses:   Pulses palpable in all extremities                            Skin:   Skin is warm and dry,  no rashes or palpable lesions                    Data Review:    I reviewed the patient's new clinical results.    Results from last 7 days  Lab Units 01/03/18  0405 01/02/18  0445 01/01/18  0631 12/31/17  0421 12/30/17 0448 12/28/17  2054   WBC 10*3/mm3 8.12 13.24* 14.04* 8.19 6.59 5.87   HEMOGLOBIN g/dL 11.7* 12.4 11.6* 11.2* 11.5* 13.4   PLATELETS 10*3/mm3 189 164 192 183 181 170       Results from last 7 days  Lab Units 01/03/18  0405 01/02/18  0445 01/01/18  0631 12/31/17  0421 12/30/17  0448 12/29/17  1039 12/28/17  2054   SODIUM mmol/L 139 139 142 142 142 140 142   POTASSIUM mmol/L 3.5 3.6 3.6 4.2 4.7 3.7 3.3*   CHLORIDE mmol/L 96* 103 105 107 106 101 102   CO2 mmol/L 30.8* 23.6 23.6 22.3 23.5 23.7 23.8   BUN mg/dL 33* 22 28* 31* 29* 22 23   CREATININE mg/dL 1.72* 1.11* 1.17* 1.40* 1.54*  1.51* 1.29*   CALCIUM mg/dL 8.6 9.0 8.7 8.6 8.7 8.6 9.0   GLUCOSE mg/dL 277* 132* 207* 299* 292* 354* 161*       Microbiology Results (last 10 days)     Procedure Component Value - Date/Time    MRSA Screen Culture - Swab, Nares [[id number]]  (Normal) Collected:  12/30/17 0036    Lab Status:  Final result Specimen:  Swab from Nares Updated:  01/01/18 0912     MRSA SCREEN CX No Methicillin Resistant Staphylococcus aureus isolated    Respiratory Panel, PCR - Swab, Nasopharynx [[id number]]  (Abnormal) Collected:  12/29/17 1923    Lab Status:  Final result Specimen:  Swab from Nasopharynx Updated:  12/29/17 2201     ADENOVIRUS, PCR Not Detected     Coronavirus 229E Not Detected     Coronavirus HKU1 Not Detected     Coronavirus NL63 Not Detected     Coronavirus OC43 Not Detected     Human Metapneumovirus Not Detected     Human Rhinovirus/Enterovirus Not Detected     Influenza B PCR Not Detected     Parainfluenza Virus 1 Not Detected     Parainfluenza Virus 2 Not Detected     Parainfluenza Virus 3 Not Detected     Parainfluenza Virus 4 Not Detected     Bordetella pertussis pcr Not Detected     Influenza A H1N1 2009 PCR Detected (A)     Chlamydophila pneumoniae PCR Not Detected     Mycoplasma pneumo by PCR Not Detected     Influenza A PCR Not Detected     Influenza A H3 Not Detected     Influenza A H1 Not Detected     RSV, PCR Not Detected    Influenza Antigen, Rapid - Swab, Nasopharynx [[id number]]  (Normal) Collected:  12/28/17 2218    Lab Status:  Final result Specimen:  Swab from Nasopharynx Updated:  12/28/17 2252     Influenza A Ag, EIA Negative     Influenza B Ag, EIA Negative          Assessment:  Principal Problem:    H1N1 influenza  Active Problems:    Chronic kidney disease, stage II (mild)    Ischemic cardiomyopathy    Hypertension, essential    Gastroesophageal reflux disease with esophagitis    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  Doubt she had aspiration pneumonia.  Strongly suggest discontinuation of antibiotics as his status improved significantly with diuretics and a very short period of time.  · Continue treatment with Tamiflu and inhalers and supportive care.    · Continue to observe off of antibiotic therapy.    · Her leukocytosis could very well be due to steroid effect.    · Need to monitor her clinical course if she changes her shows evidence of decline and may need workup to rule out postviral pneumonia versus C. difficile infections and so forth.    Vikas Parra MD  1/3/2018  12:02 PM    Much of this encounter note is an electronic transcription/translation of spoken language to printed text. The electronic translation of spoken language may permit erroneous, or at times, nonsensical words or phrases to be inadvertently transcribed; Although I have reviewed the note for such errors, some may still exist

## 2018-01-03 NOTE — THERAPY TREATMENT NOTE
Acute Care - Physical Therapy Treatment Note  [facility]     Patient Name: Cynthia Arreola  : 1927  MRN: [id number]  Today's Date: 1/3/2018  Onset of Illness/Injury or Date of Surgery Date: 17  Date of Referral to PT: 17  Referring Physician: Dr. Moya    Admit Date: 2017    Visit Dx:    ICD-10-CM ICD-9-CM   1. Bronchitis with bronchospasm J20.9 490   2. Hypokalemia E87.6 276.8   3. Renal insufficiency N28.9 593.9   4. Elevated troponin R74.8 790.6   5. Elevated LFTs R79.89 790.6   6. 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   • Chronic kidney disease, stage II (mild)   • H/O small bowel obstruction   • Anemia of chronic disease   • Recurrent UTI   • Ischemic cardiomyopathy   • Peripheral arterial disease   • Hypertension, essential   • Immobility syndrome   • Acquired hypothyroidism   • MRSA cellulitis of right foot   • Sacral decubitus ulcer   • SSS (sick sinus syndrome)   • S/P placement of cardiac pacemaker   • Gastroesophageal reflux disease with esophagitis   • Hyperlipidemia   • Ischemic ulcer of right heel with fat layer exposed   • 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   • Acute bronchitis due to Rhinovirus (enterovirus)   • Bilateral viral bronchopneumonia   • Respiratory infection due to enterovirus/ Human Rhinovirus   • Common bile duct stone   • Bronchitis with bronchospasm   • DM (diabetes mellitus)   • KOFI (acute kidney injury)   • Hypokalemia   • Elevated LFTs   • H1N1 influenza   • COPD exacerbation               Adult Rehabilitation Note       18 1540          Rehab Assessment/Intervention    Discipline physical therapy assistant  -MINA      Document Type therapy note (daily note)  -MINA      Subjective Information agree to therapy;complains of;weakness;fatigue  -MINA      Precautions/Limitations  "fall precautions  -      Recorded by [MINA] Romy Alfonso PTA      Pain Assessment    Pain Assessment No/denies pain  -JM      Recorded by [MINA] Romy Alfonso PTA      Bed Mobility, Assessment/Treatment    Bed Mobility, Assistive Device draw sheet;head of bed elevated  -      Bed Mob, Supine to Sit, [place] moderate assist (50% patient effort);maximum assist (25% patient effort);2 person assist required  -      Bed Mobility, Safety Issues decreased use of arms for pushing/pulling;decreased use of legs for bridging/pushing;impaired trunk control for bed mobility  -      Bed Mobility, Impairments strength decreased;decreased flexibility;coordination impaired;impaired balance  -JM      Recorded by [MINA] Romy Alfonso PTA      Transfer Assessment/Treatment    Transfers, Sit-Stand [place] maximum assist (25% patient effort);2 person assist required  -      Transfer, Comment stood x2 unsuccessfully, third attempt used std wx and let pt \"pull up\" on it while one blocked knees-stood successfully  -JM      Recorded by [MINA] Romy Alfonso PTA      Gait Assessment/Treatment    Gait, [place] Level moderate assist (50% patient effort);maximum assist (25% patient effort);2 person assist required  -      Gait, Assistive Device standard walker  -      Gait, Comment 5 shuffle steps to chair, nsg aware they may need to use lift to get pt back to bed once fatigued  -JM      Recorded by [MINA] Romy Alfonso PTA      Therapy Exercises    Bilateral Lower Extremities AROM:;10 reps;supine;ankle pumps/circles;quad sets;sitting;LAQ   x5 LAQs  -      Recorded by [MINA] Romy Alfonso PTA      Positioning and Restraints    Pre-Treatment Position in bed  -      Post Treatment Position chair  -      In Chair reclined;call light within reach;encouraged to call for assist;with family/caregiver   no alarm upon entry  -JM      Recorded by [MINA] Romy Alfonso PTA        User Key  (r) = Recorded By, (t) = " Taken By, (c) = Cosigned By    Initials Name Effective Dates     Romy Alfonso, PTA 02/18/16 -                 IP PT Goals       01/02/18 1709          Bed Mobility PT LTG    Bed Mobility PT LTG, Time to Achieve 1 wk  -CH      Bed Mobility PT LTG, Activity Type all bed mobility  -CH      Bed Mobility PT LTG, [place] Level minimum assist (75% patient effort)  -CH      Transfer Training PT LTG    Transfer Training PT LTG, Time to Achieve 1 wk  -CH      Transfer Training PT LTG, Activity Type sit to stand/stand to sit  -CH      Transfer Training PT LTG, [place] Level moderate assist (50% patient effort);2 person assist required  -CH      Transfer Training PT LTG, Assist Device walker, rolling  -CH      Transfer Training 2 PT LTG    Transfer Training PT 2 LTG, Time to Achieve 1 wk  -CH      Transfer Training PT 2 LTG, Activity Type bed to chair /chair to bed  -CH      Transfer Training PT 2 LTG, [place] Level moderate assist (50% patient effort);2 person assist required  -CH      Transfer Training PT 2 LTG, Assist Device walker, rolling  -CH        User Key  (r) = Recorded By, (t) = Taken By, (c) = Cosigned By    Initials Name Provider Type    LITZY Ayala, PT Physical Therapist          Physical Therapy Education     Title: PT OT SLP Therapies (Done)     Topic: Physical Therapy (Done)     Point: Mobility training (Done)    Learning Progress Summary    Learner Readiness Method Response Comment Documented by Status   Patient Acceptance EJOANNA D VU,QASIM   01/03/18 1650 Done    Acceptance EJOANNA D VU,NR   01/02/18 1708 Done   Family Acceptance JOANNA HUGGINS D VU,QASIM   01/03/18 1650 Done               Point: Home exercise program (Done)    Learning Progress Summary    Learner Readiness Method Response Comment Documented by Status   Patient Acceptance EJOANNA D VU,NR   01/03/18 1650 Done   Family Acceptance JOANNA HUGGINS D VU,QASIM   01/03/18 1650 Done               Point: Body mechanics (Done)    Learning Progress  Summary    Learner Readiness Method Response Comment Documented by Status   Patient Acceptance E,TB,D VU,NR   01/03/18 1650 Done    Acceptance E,TB,D VU,NR   01/02/18 1708 Done   Family Acceptance E,TB,D VU,NR   01/03/18 1650 Done               Point: Precautions (Done)    Learning Progress Summary    Learner Readiness Method Response Comment Documented by Status   Patient Acceptance E,TB,D VU,NR   01/03/18 1650 Done    Acceptance E,TB,D VU,NR   01/02/18 1708 Done   Family Acceptance E,TB,D VU,NR   01/03/18 1650 Done                      User Key     Initials Effective Dates Name Provider Type Discipline     12/01/15 -  Maureen Ayala, PT Physical Therapist PT     02/18/16 -  Romy Alfonso, PTA Physical Therapy Assistant PT                    PT Recommendation and Plan  Anticipated Discharge Disposition: skilled nursing facility  Planned Therapy Interventions: balance training, bed mobility training, gait training, home exercise program, patient/family education, transfer training  PT Frequency: 5 times/wk  Plan of Care Review  Plan Of Care Reviewed With: patient, family  Progress: improving  Outcome Summary/Follow up Plan: able to mallory tfer to chair after 3 attempts at standing          Outcome Measures       01/03/18 1600 01/02/18 1700       How much help from another person do you currently need...    Turning from your back to your side while in flat bed without using bedrails? 2  - 2  -CH     Moving from lying on back to sitting on the side of a flat bed without bedrails? 2  - 2  -CH     Moving to and from a bed to a chair (including a wheelchair)? 2  - 1  -CH     Standing up from a chair using your arms (e.g., wheelchair, bedside chair)? 2  - 1  -CH     Climbing 3-5 steps with a railing? 1  - 1  -CH     To walk in hospital room? 1  - 1  -CH     AM-PAC 6 Clicks Score 10  - 8  -CH     Functional Assessment    Outcome Measure Options  AM-PAC 6 Clicks Basic Mobility (PT)  -CH        User Key  (r) = Recorded By, (t) = Taken By, (c) = Cosigned By    Initials Name Provider Type    LITZY Ayala, PT Physical Therapist    MINA Alfonso PTA Physical Therapy Assistant           Time Calculation:         PT Charges       01/03/18 1650          Time Calculation    Start Time 1505  -      Stop Time 1531  -MINA      Time Calculation (min) 26 min  -MINA      PT Received On 01/03/18  -MINA      PT - Next Appointment 01/04/18  -MINA        User Key  (r) = Recorded By, (t) = Taken By, (c) = Cosigned By    Initials Name Provider Type    MINA Alfonso PTA Physical Therapy Assistant          Therapy Charges for Today     Code Description Service Date Service Provider Modifiers Qty    27025709732 HC PT THER PROC EA 15 MIN 1/3/2018 Romy Alfonso PTA GP 2    32418630530 HC PT THER SUPP EA 15 MIN 1/3/2018 Romy Alfonso PTA GP 2          PT G-Codes  PT Professional Judgement Used?: Yes  Outcome Measure Options: AM-PAC 6 Clicks Basic Mobility (PT)  Functional Limitation: Mobility: Walking and moving around  Mobility: Walking and Moving Around Current Status (): At least 80 percent but less than 100 percent impaired, limited or restricted  Mobility: Walking and Moving Around Goal Status (): At least 80 percent but less than 100 percent impaired, limited or restricted  Mobility: Walking and Moving Around Discharge Status (): At least 80 percent but less than 100 percent impaired, limited or restricted    Romy Alfonso PTA  1/3/2018

## 2018-01-03 NOTE — PROGRESS NOTES
"DAILY PROGRESS NOTE  [facility], [facility]    1/3/2018    Patient Identification:  Name: Cynthia Arreola  Age: 89 y.o.  Sex: female  :  1927  MRN: [id number]           Primary Care Physician: Param Martinez MD    Subjective:    Interval History:    Continues with IV  Lasix.  On Tamiflu and supportive therapy for influenza.  BS better, still high though (related to steroids), improving.  Labs better, except WBC (elevated, but may be due to steroids), improving.      ROS:     No nausea, vomiting, diarrhea, constipation.Denies CP. A \" little SOA\" this morning.     Objective:    Scheduled Meds:    amLODIPine 10 mg Oral Daily   aspirin 81 mg Oral Daily   atorvastatin 10 mg Oral Nightly   clopidogrel 75 mg Oral Daily   docusate sodium 250 mg Oral Daily   enoxaparin 30 mg Subcutaneous Q24H   Ferrex 150 forte plus 1 capsule Oral Daily With Breakfast   furosemide 40 mg Intravenous Q12H   insulin aspart 0-7 Units Subcutaneous 4x Daily With Meals & Nightly   insulin detemir 25 Units Subcutaneous Nightly   ipratropium-albuterol 3 mL Nebulization 4x Daily - RT   isosorbide mononitrate 30 mg Oral Daily   levothyroxine 100 mcg Oral QAM   linagliptin 5 mg Oral Daily   metoclopramide 5 mg Oral 4x Daily AC & at Bedtime   metoprolol succinate XL 50 mg Oral Daily   oseltamivir 30 mg Oral Q12H   pantoprazole 40 mg Oral QAM   piperacillin-tazobactam 3.375 g Intravenous Q8H   predniSONE 10 mg Oral Daily With Breakfast   rOPINIRole 0.25 mg Oral Nightly       Continuous Infusions:    Pharmacy to Dose Zosyn        PRN Meds:  •  acetaminophen  •  dextrose  •  dextrose  •  glucagon (human recombinant)  •  hydrALAZINE  •  HYDROcodone-acetaminophen  •  influenza vaccine  •  ipratropium-albuterol  •  nitroglycerin  •  Pharmacy to Dose Zosyn  •  pneumococcal polysaccharide 23-valent  •  polyethylene glycol  •  promethazine  •  sodium chloride    Intake/Output:    Intake/Output Summary (Last 24 " "hours) at 18 1055  Last data filed at 18 0900   Gross per 24 hour   Intake              450 ml   Output                0 ml   Net              450 ml         Exam:    tMax 24 hrs: Temp (24hrs), Av.9 °F (36.6 °C), Min:97.1 °F (36.2 °C), Max:98.7 °F (37.1 °C)    Vitals Ranges:   Temp:  [97.1 °F (36.2 °C)-98.7 °F (37.1 °C)] 98.7 °F (37.1 °C)  Heart Rate:  [60-71] 71  Resp:  [20-22] 20  BP: (149-214)/(50-76) 188/75    BP (!) 188/75 (BP Location: Right arm, Patient Position: Lying)  Pulse 71  Temp 98.7 °F (37.1 °C) (Oral)   Resp 20  Ht 160 cm (63\")  Wt 78.2 kg (172 lb 4.8 oz)  SpO2 95%  BMI 30.52 kg/m2    General: Alert, oriented x 3. Cooperative, no distress, appears stated age  HEENT:    Head: Normocephalic, without obvious abnormality, atraumatic  Eyes: EOM are normal. Pupils are equal, round, and reactive to light.   Oropharynx: Mucosa and tongue dry  Neck: Supple, symmetrical, trachea midline, no adenopathy;              thyroid:  no enlargement/tenderness/nodules;              no carotid bruit or JVD  Cardiovascular: Normal rate, regular rhythm and intact distal pulses.              Exam reveals no gallop and no friction rub. No murmur heard  Chest wall: No tenderness or deformity  Pulmonary:  decreased breath sounds, No wheezing.  Rhonchi bilaterally, no rales.    Abdominal: Soft, non-tender, bowel sounds active all four quadrants,     no masses, no hepatomegaly, no splenomegaly.   Extremities: Normal, atraumatic, no cyanosis. + edema both LE or edema  Pulses: 2 + symmetric all extremities  Neurological: She is alert and oriented to person, place, and time.                 CNII-XII intact, normal strength, sensation intact throughout  Skin: Healing ulcer on the dorsum of her right foot, small area healing on the dorsum of her right heel. 1.5 cm ulcerated area with mild surrounding erythema           Data Review:      Results from last 7 days  Lab Units 18  0405 18  0445 " 01/01/18  0631   WBC 10*3/mm3 8.12 13.24* 14.04*   HEMOGLOBIN g/dL 11.7* 12.4 11.6*   HEMATOCRIT % 38.2 41.1 38.0   PLATELETS 10*3/mm3 189 164 192         Results from last 7 days  Lab Units 01/03/18  0405 01/02/18  0445 01/01/18  0631  12/28/17 2054   SODIUM mmol/L 139 139 142  < > 142   POTASSIUM mmol/L 3.5 3.6 3.6  < > 3.3*   CHLORIDE mmol/L 96* 103 105  < > 102   CO2 mmol/L 30.8* 23.6 23.6  < > 23.8   BUN mg/dL 33* 22 28*  < > 23   CREATININE mg/dL 1.72* 1.11* 1.17*  < > 1.29*   CALCIUM mg/dL 8.6 9.0 8.7  < > 9.0   BILIRUBIN mg/dL  --   --  0.2  --  0.2   ALK PHOS U/L  --   --  146*  --  263*   ALT (SGPT) U/L  --   --  25  --  47*   AST (SGOT) U/L  --   --  15  --  39*   GLUCOSE mg/dL 277* 132* 207*  < > 161*   < > = values in this interval not displayed.    Results from last 7 days  Lab Units 12/29/17  0008   INR  0.98         Results from last 7 days  Lab Units 12/28/17 2054   HEMOGLOBIN A1C % 9.00*         Lab Results  Lab Value Date/Time   TROPONINT 0.069 (H) 12/29/2017 1039   TROPONINT 0.101 (C) 12/28/2017 2054   TROPONINT <0.010 01/16/2017 0248   TROPONINT <0.010 12/28/2016 0039   TROPONINT <0.010 12/27/2016 1752   TROPONINT <0.010 12/27/2016 0617       Microbiology Results (last 10 days)     Procedure Component Value - Date/Time    MRSA Screen Culture - Swab, Nares [[id number]]  (Normal) Collected:  12/30/17 0036    Lab Status:  Final result Specimen:  Swab from Nares Updated:  01/01/18 0912     MRSA SCREEN CX No Methicillin Resistant Staphylococcus aureus isolated    Respiratory Panel, PCR - Swab, Nasopharynx [[id number]]  (Abnormal) Collected:  12/29/17 1923    Lab Status:  Final result Specimen:  Swab from Nasopharynx Updated:  12/29/17 2201     ADENOVIRUS, PCR Not Detected     Coronavirus 229E Not Detected     Coronavirus HKU1 Not Detected     Coronavirus NL63 Not Detected     Coronavirus OC43 Not Detected     Human Metapneumovirus Not Detected     Human Rhinovirus/Enterovirus Not Detected      Influenza B PCR Not Detected     Parainfluenza Virus 1 Not Detected     Parainfluenza Virus 2 Not Detected     Parainfluenza Virus 3 Not Detected     Parainfluenza Virus 4 Not Detected     Bordetella pertussis pcr Not Detected     Influenza A H1N1 2009 PCR Detected (A)     Chlamydophila pneumoniae PCR Not Detected     Mycoplasma pneumo by PCR Not Detected     Influenza A PCR Not Detected     Influenza A H3 Not Detected     Influenza A H1 Not Detected     RSV, PCR Not Detected    Influenza Antigen, Rapid - Swab, Nasopharynx [[id number]]  (Normal) Collected:  12/28/17 2218    Lab Status:  Final result Specimen:  Swab from Nasopharynx Updated:  12/28/17 2252     Influenza A Ag, EIA Negative     Influenza B Ag, EIA Negative           Imaging Results (last 7 days)     Procedure Component Value Units Date/Time    XR Chest 2 View [[id number]] Collected:  12/28/17 2220     Updated:  12/29/17 2312    Narrative:       CHEST PA AND LATERAL.     HISTORY: Cough and congestion.     COMPARISON: 01/16/2017.     FINDINGS:  Mild cardiomegaly.         There is no consolidation or effusion. Mild pulmonary congestion  superimposed on emphysema.          Impression:       Pulmonary congestion, overall no significant change.         This report was finalized on 12/29/2017 11:09 PM by Dr. Fransisco Yoo MD.           2D echo:  · Left ventricular systolic function is normal. Estimated EF = 55%.  · Left ventricular diastolic dysfunction (grade I) consistent with impaired relaxation.  · Mild mitral valve regurgitation is present  · Mild tricuspid valve regurgitation is present.  · Estimated right ventricular systolic pressure from tricuspid regurgitation is normal (<35 mmHg).  · There is calcification of the aortic root and valve.  · Mild aortic valve stenosis is present.    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         Plan:    Continue Lasix IV  F/U ECHO results  Continue Tamiflu  No need for IV abx  Continue supportive therapy  DC steroids  LFT much better  Monitor and correct electrolytes  Adjust insulin as needed  Monitor mental status  Continue home medications  PT to see pt  DVT/stress ulcer prophylaxis  Labs in am    Roberto Carlos Moya MD  1/3/2018  10:55 AM

## 2018-01-03 NOTE — PROGRESS NOTES
"Pharmacy Consult - Zosyn Dosing (Follow-up Note)    Cynthia Arreola is on day 2 pharmacy to dose Zosyn for acute bronchitis per Dr. Moya's request.    Duration of Therapy: 5 days    Other Antimicrobials: Tamiflu 30 mg PO daily x 5 days    Relevant clinical data and objective history reviewed:  89 y.o. female 160 cm (63\") 78.2 kg (172 lb 4.8 oz)  Patient with respiratory failure associated with influenza/bronchitis and COPD exacerbation. Still with generalized weakness, but less short of breath today. Diminished breath sounds on physical exam per Pulmonary note. Renal function continues to decline.     Creatinine   Date Value Ref Range Status   01/03/2018 1.72 (H) 0.57 - 1.00 mg/dL Final   01/02/2018 1.11 (H) 0.57 - 1.00 mg/dL Final   01/01/2018 1.17 (H) 0.57 - 1.00 mg/dL Final     BUN   Date Value Ref Range Status   01/03/2018 33 (H) 8 - 23 mg/dL Final     Estimated Creatinine Clearance: 21.5 mL/min (by C-G formula based on Cr of 1.72).    Lab Results   Component Value Date    WBC 8.12 01/03/2018     Temp Readings from Last 3 Encounters:   01/03/18 98.7 °F (37.1 °C) (Oral)     Baseline culture/source/susceptibility:   12/28: Influenza Antigen-negative  12/29: RVP-Influenza H1N1 detected  12/30: MRSA screen-negative    Anti-Infectives     Ordered     Dose/Rate Route Frequency Start Stop    01/03/18 1359  oseltamivir (TAMIFLU) capsule 30 mg     Ordering Provider:  Roberto Carlos Moya MD    30 mg Oral Every 24 Hours Scheduled 01/04/18 0900 01/05/18 0859    01/03/18 1417  piperacillin-tazobactam (ZOSYN) 3.375 g in iso-osmotic dextrose 50 ml (premix)     Ordering Provider:  Roberto Carlos Moya MD    3.375 g  12.5 mL/hr over 4 Hours Intravenous Every 12 Hours 01/04/18 0200 01/07/18 0159     Assessment/Plan    1. Zosyn was dosed at 3.375g IV q8h EI. Renal function continues to decline, and is now ~20 mL/min. Based on risk stratification and renal trend, will decrease Zosyn dosing to 3.375g IV q12h extended infusion.     2. Will " monitor serum creatinine every 24 hours since renal function is declining so rapidly. SCr jumped from 1.11 on 1/2 to 1.72 today.     3. Pharmacy will continue to follow daily while on Zosyn, and adjust back if needed.     Estelita Emanuel, Pharm.D., BCPS

## 2018-01-04 NOTE — PROGRESS NOTES
"  Infectious Diseases Progress Note    Vikas Parra MD     [facility]  [place]: 6 days  Patient Identification:  Name: Cynthia Arreola  Age: 89 y.o.  Sex: female  :  1927  MRN: [id number]         Primary Care Physician: Param Martinez MD            Subjective: Feeling better and states that her nose is stopped up otherwise she's improved  Currently feeling much better.  Interval History: See consultation note.    Objective:    Scheduled Meds:    amLODIPine 10 mg Oral Daily   aspirin 81 mg Oral Daily   atorvastatin 10 mg Oral Nightly   clopidogrel 75 mg Oral Daily   docusate sodium 250 mg Oral Daily   enoxaparin 30 mg Subcutaneous Q24H   Ferrex 150 forte plus 1 capsule Oral Daily With Breakfast   furosemide 40 mg Oral BID   hydrALAZINE 50 mg Oral Q8H   insulin aspart 0-7 Units Subcutaneous 4x Daily With Meals & Nightly   insulin detemir 25 Units Subcutaneous Nightly   ipratropium-albuterol 3 mL Nebulization 4x Daily - RT   isosorbide mononitrate 30 mg Oral Daily   levothyroxine 100 mcg Oral QAM   linagliptin 5 mg Oral Daily   metoclopramide 5 mg Oral 4x Daily AC & at Bedtime   metoprolol succinate XL 50 mg Oral Daily   pantoprazole 40 mg Oral QAM   rOPINIRole 0.25 mg Oral Nightly     Continuous Infusions:       Vital signs in last 24 hours:  Temp:  [96.3 °F (35.7 °C)-97.1 °F (36.2 °C)] 97.1 °F (36.2 °C)  Heart Rate:  [60-66] 60  Resp:  [11-20] 20  BP: (152-177)/(55-80) 152/55    Intake/Output:    Intake/Output Summary (Last 24 hours) at 18 0828  Last data filed at 18 1800   Gross per 24 hour   Intake              630 ml   Output                0 ml   Net              630 ml       Exam:  /55 (BP Location: Right arm, Patient Position: Lying)  Pulse 60  Temp 97.1 °F (36.2 °C) (Oral)   Resp 20  Ht 160 cm (63\")  Wt 78.2 kg (172 lb 4.8 oz)  SpO2 97%  BMI 30.52 kg/m2    General Appearance:    Feeling better denies any specific complaints.  Comfortable and did participate in " physical therapy.                          Head:    Normocephalic, without obvious abnormality, atraumatic                           Eyes:    PERRL, conjunctiva/corneas clear, EOM's intact, both eyes                         Throat:   Lips, tongue, gums normal; oral mucosa pink and moist                           Neck:   Supple, symmetrical, trachea midline, no JVD                         Lungs:   Improved air entry bilaterally significant improvement in her rhonchi decreased decreased crackles at the bases.                 Chest Wall:    No tenderness or deformity                          Heart:    Regular rate and rhythm, S1 and S2 normal, no murmur,no  Rub                                      or gallop                  Abdomen:     Soft, non-tender, bowel sounds active, no masses, no                                                        organomegaly                  Extremities:   Extremities normal, atraumatic, no cyanosis, 1+ edema                        Pulses:   Pulses palpable in all extremities                            Skin:   Skin is warm and dry,  no rashes or palpable lesions                    Data Review:    I reviewed the patient's new clinical results.    Results from last 7 days  Lab Units 01/04/18  0514 01/03/18  0405 01/02/18  0445 01/01/18  0631 12/31/17  0421 12/30/17  0448 12/28/17  2054   WBC 10*3/mm3 7.53 8.12 13.24* 14.04* 8.19 6.59 5.87   HEMOGLOBIN g/dL 12.5 11.7* 12.4 11.6* 11.2* 11.5* 13.4   PLATELETS 10*3/mm3 238 189 164 192 183 181 170       Results from last 7 days  Lab Units 01/04/18  0514 01/03/18  0405 01/02/18  0445 01/01/18  0631 12/31/17  0421 12/30/17  0448 12/29/17  1039   SODIUM mmol/L 141 139 139 142 142 142 140   POTASSIUM mmol/L 2.8* 3.5 3.6 3.6 4.2 4.7 3.7   CHLORIDE mmol/L 97* 96* 103 105 107 106 101   CO2 mmol/L 30.3* 30.8* 23.6 23.6 22.3 23.5 23.7   BUN mg/dL 40* 33* 22 28* 31* 29* 22   CREATININE mg/dL 1.60* 1.72* 1.11* 1.17* 1.40* 1.54* 1.51*   CALCIUM mg/dL 8.9  8.6 9.0 8.7 8.6 8.7 8.6   GLUCOSE mg/dL 137* 277* 132* 207* 299* 292* 354*       Microbiology Results (last 10 days)     Procedure Component Value - Date/Time    MRSA Screen Culture - Swab, Nares [[id number]]  (Normal) Collected:  12/30/17 0036    Lab Status:  Final result Specimen:  Swab from Nares Updated:  01/01/18 0912     MRSA SCREEN CX No Methicillin Resistant Staphylococcus aureus isolated    Respiratory Panel, PCR - Swab, Nasopharynx [[id number]]  (Abnormal) Collected:  12/29/17 1923    Lab Status:  Final result Specimen:  Swab from Nasopharynx Updated:  12/29/17 2201     ADENOVIRUS, PCR Not Detected     Coronavirus 229E Not Detected     Coronavirus HKU1 Not Detected     Coronavirus NL63 Not Detected     Coronavirus OC43 Not Detected     Human Metapneumovirus Not Detected     Human Rhinovirus/Enterovirus Not Detected     Influenza B PCR Not Detected     Parainfluenza Virus 1 Not Detected     Parainfluenza Virus 2 Not Detected     Parainfluenza Virus 3 Not Detected     Parainfluenza Virus 4 Not Detected     Bordetella pertussis pcr Not Detected     Influenza A H1N1 2009 PCR Detected (A)     Chlamydophila pneumoniae PCR Not Detected     Mycoplasma pneumo by PCR Not Detected     Influenza A PCR Not Detected     Influenza A H3 Not Detected     Influenza A H1 Not Detected     RSV, PCR Not Detected    Influenza Antigen, Rapid - Swab, Nasopharynx [[id number]]  (Normal) Collected:  12/28/17 2218    Lab Status:  Final result Specimen:  Swab from Nasopharynx Updated:  12/28/17 2252     Influenza A Ag, EIA Negative     Influenza B Ag, EIA Negative          Assessment:  Principal Problem:    H1N1 influenza  Active Problems:    Chronic kidney disease, stage II (mild)    Ischemic cardiomyopathy    Hypertension, essential    Gastroesophageal reflux disease with esophagitis    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  · Evidence of volume overload related issues noted.  Doubt she had aspiration pneumonia.  Strongly suggest discontinuation of antibiotics as his status improved significantly with diuretics and a very short period of time.  · Continue treatment with Tamiflu and inhalers and supportive care.    · Continue to observe off of antibiotic therapy.    · Her leukocytosis could very well be due to steroid effect.    · Need to monitor her clinical course if she changes her shows evidence of decline and may need workup to rule out postviral pneumonia versus C. difficile infections and so forth.    Vikas Parra MD  1/4/2018  8:28 AM    Much of this encounter note is an electronic transcription/translation of spoken language to printed text. The electronic translation of spoken language may permit erroneous, or at times, nonsensical words or phrases to be inadvertently transcribed; Although I have reviewed the note for such errors, some may still exist

## 2018-01-04 NOTE — THERAPY TREATMENT NOTE
Acute Care - Physical Therapy Treatment Note  [facility]     Patient Name: Cynthia Arreola  : 1927  MRN: [id number]  Today's Date: 2018  Onset of Illness/Injury or Date of Surgery Date: 17  Date of Referral to PT: 17  Referring Physician: Dr. Moya    Admit Date: 2017    Visit Dx:    ICD-10-CM ICD-9-CM   1. Bronchitis with bronchospasm J20.9 490   2. Hypokalemia E87.6 276.8   3. Renal insufficiency N28.9 593.9   4. Elevated troponin R74.8 790.6   5. Elevated LFTs R79.89 790.6   6. 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   • Chronic kidney disease, stage II (mild)   • H/O small bowel obstruction   • Anemia of chronic disease   • Recurrent UTI   • Ischemic cardiomyopathy   • Peripheral arterial disease   • Hypertension, essential   • Immobility syndrome   • Acquired hypothyroidism   • MRSA cellulitis of right foot   • Sacral decubitus ulcer   • SSS (sick sinus syndrome)   • S/P placement of cardiac pacemaker   • Gastroesophageal reflux disease with esophagitis   • Hyperlipidemia   • Ischemic ulcer of right heel with fat layer exposed   • 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   • Acute bronchitis due to Rhinovirus (enterovirus)   • Bilateral viral bronchopneumonia   • Respiratory infection due to enterovirus/ Human Rhinovirus   • Common bile duct stone   • Bronchitis with bronchospasm   • DM (diabetes mellitus)   • KOFI (acute kidney injury)   • Hypokalemia   • Elevated LFTs   • H1N1 influenza   • COPD exacerbation               Adult Rehabilitation Note       18 1558 18 1500 18 1540    Rehab Assessment/Intervention    Discipline physical therapy assistant  -MINA --  -MINA physical therapy assistant  -MINA    Document Type therapy note (daily note)  -MINA --  -MINA therapy note (daily note)  -MINA    Subjective  Information agree to therapy;complains of;weakness;fatigue;pain  - --  - agree to therapy;complains of;weakness;fatigue  -    Precautions/Limitations fall precautions;oxygen therapy device and L/min  - --  - fall precautions  -    Recorded by [MINA] Romy Alfonso PTA [JM] Romy Alfonso PTA [JM] Romy Alfonso PTA    Pain Assessment    Pain Assessment 0-10  -JM --  - No/denies pain  -    Pain Score 5  -JM --  -     Pain Type Acute pain  -      Pain Location Back  -      Pain Orientation Mid  -      Pain Intervention(s) Repositioned;Rest  -      Response to Interventions mallory  -      Recorded by [MINA] Romy Alfonso PTA [JM] Romy Alfonso PTA [] Romy Alfonso PTA    Bed Mobility, Assessment/Treatment    Bed Mobility, Assistive Device draw sheet;head of bed elevated  -  draw sheet;head of bed elevated  -    Bed Mob, Supine to Sit, [place] moderate assist (50% patient effort);maximum assist (25% patient effort);2 person assist required  -  moderate assist (50% patient effort);maximum assist (25% patient effort);2 person assist required  -    Bed Mobility, Safety Issues decreased use of arms for pushing/pulling;decreased use of legs for bridging/pushing;impaired trunk control for bed mobility  -  decreased use of arms for pushing/pulling;decreased use of legs for bridging/pushing;impaired trunk control for bed mobility  -    Bed Mobility, Impairments strength decreased;decreased flexibility;coordination impaired;impaired balance  -  strength decreased;decreased flexibility;coordination impaired;impaired balance  -    Recorded by [MINA] Romy Alfonso PTA  [JM] Romy Alfonso PTA    Transfer Assessment/Treatment    Transfers, Sit-Stand [place] maximum assist (25% patient effort);2 person assist required  -  maximum assist (25% patient effort);2 person assist required  -    Transfers, Stand-Sit [place] minimum assist (75% patient effort);2  "person assist required  -  moderate assist (50% patient effort);2 person assist required  -    Transfers, Sit-Stand-Sit, Assist Device standard walker  -  standard walker  -    Transfer, Comment stood x2 w/supported knees and std wx  -JM  stood x2 unsuccessfully, third attempt used std wx and let pt \"pull up\" on it while one blocked knees-stood successfully  -JM    Recorded by [MINA] Romy Alfonso PTA  [JM] Romy Alfonso PTA    Gait Assessment/Treatment    Gait, [place] Level   moderate assist (50% patient effort);maximum assist (25% patient effort);2 person assist required  -    Gait, Assistive Device   standard walker  -    Gait, Comment declined chair today--too weak at present   unsure if pt will even agree to lift into chair later w/nsg  -JM  5 shuffle steps to chair, nsg aware they may need to use lift to get pt back to bed once fatigued  -JM    Recorded by [MINA] Romy Alfonso PTA  [JM] Romy Alfonso PTA    Therapy Exercises    Bilateral Lower Extremities AROM:;10 reps;ankle pumps/circles;sitting;LAQ   x5 LAQs  -JM  AROM:;10 reps;supine;ankle pumps/circles;quad sets;sitting;LAQ   x5 LAQs  -JM    Recorded by [MINA] Romy Alfonso PTA  [JM] Romy Alfonso PTA    Positioning and Restraints    Pre-Treatment Position in bed  -JM  in bed  -JM    Post Treatment Position   chair  -JM    In Bed side lying left;pillow between legs;call light within reach;encouraged to call for assist;exit alarm on;notified nsg  -JM      In Chair   reclined;call light within reach;encouraged to call for assist;with family/caregiver   no alarm upon entry  -JM    Recorded by [MINA] Romy Alfonso PTA  [JM] Romy Alfonso PTA      User Key  (r) = Recorded By, (t) = Taken By, (c) = Cosigned By    Initials Name Effective Dates    MINA Alfonso PTA 02/18/16 -                 IP PT Goals       01/02/18 1701          Bed Mobility PT LTG    Bed Mobility PT LTG, Time to Achieve 1 wk  -CH      Bed Mobility PT " LTG, Activity Type all bed mobility  -CH      Bed Mobility PT LTG, [place] Level minimum assist (75% patient effort)  -CH      Transfer Training PT LTG    Transfer Training PT LTG, Time to Achieve 1 wk  -CH      Transfer Training PT LTG, Activity Type sit to stand/stand to sit  -CH      Transfer Training PT LTG, [place] Level moderate assist (50% patient effort);2 person assist required  -CH      Transfer Training PT LTG, Assist Device walker, rolling  -CH      Transfer Training 2 PT LTG    Transfer Training PT 2 LTG, Time to Achieve 1 wk  -CH      Transfer Training PT 2 LTG, Activity Type bed to chair /chair to bed  -CH      Transfer Training PT 2 LTG, [place] Level moderate assist (50% patient effort);2 person assist required  -CH      Transfer Training PT 2 LTG, Assist Device walker, rolling  -CH        User Key  (r) = Recorded By, (t) = Taken By, (c) = Cosigned By    Initials Name Provider Type    LITZY Ayala, PT Physical Therapist          Physical Therapy Education     Title: PT OT SLP Therapies (Active)     Topic: Physical Therapy (Active)     Point: Mobility training (Active)    Learning Progress Summary    Learner Readiness Method Response Comment Documented by Status   Patient Acceptance E,D NR   01/04/18 1756 Active    Acceptance E,TBJOSE VU,NR   01/03/18 1650 Done    Acceptance ETBJOSE VU,NR   01/02/18 1708 Done   Family Acceptance E,TB,D VU,NR   01/03/18 1650 Done               Point: Home exercise program (Active)    Learning Progress Summary    Learner Readiness Method Response Comment Documented by Status   Patient Acceptance E,D QASIM   01/04/18 1756 Active    Acceptance ETBD VU,NR   01/03/18 1650 Done   Family Acceptance E,TBD VU,NR   01/03/18 1650 Done               Point: Body mechanics (Active)    Learning Progress Summary    Learner Readiness Method Response Comment Documented by Status   Patient Acceptance E,D QASIM   01/04/18 1756 Active    Acceptance E,TBJOSE  VU,NR   01/03/18 1650 Done    Acceptance E,TB,D VU,NR   01/02/18 1708 Done   Family Acceptance E,TB,D VU,NR   01/03/18 1650 Done               Point: Precautions (Active)    Learning Progress Summary    Learner Readiness Method Response Comment Documented by Status   Patient Acceptance E,D NR   01/04/18 1756 Active    Acceptance E,TB,D VU,NR   01/03/18 1650 Done    Acceptance E,TB,D VU,NR   01/02/18 1708 Done   Family Acceptance E,TB,D VU,NR   01/03/18 1650 Done                      User Key     Initials Effective Dates Name Provider Type Discipline     12/01/15 -  Maureen Ayala, PT Physical Therapist PT     02/18/16 -  Romy Alfonso, PTA Physical Therapy Assistant PT                    PT Recommendation and Plan  Anticipated Discharge Disposition: skilled nursing facility  Planned Therapy Interventions: balance training, bed mobility training, gait training, home exercise program, patient/family education, transfer training  PT Frequency: 5 times/wk  Plan of Care Review  Plan Of Care Reviewed With: patient  Progress: progress toward functional goals is gradual  Outcome Summary/Follow up Plan: pt very agreeable to PT session, but c/o incr fatigue and declined up to chair; worked on sit to stand w/std wx(uses std wx for RHB at NH)          Outcome Measures       01/04/18 1700 01/03/18 1600 01/02/18 1700    How much help from another person do you currently need...    Turning from your back to your side while in flat bed without using bedrails? 2  -JM 2  -JM 2  -CH    Moving from lying on back to sitting on the side of a flat bed without bedrails? 2  -JM 2  -JM 2  -CH    Moving to and from a bed to a chair (including a wheelchair)? 1  -JM 2  -JM 1  -CH    Standing up from a chair using your arms (e.g., wheelchair, bedside chair)? 2  -JM 2  -JM 1  -CH    Climbing 3-5 steps with a railing? 1  -JM 1  -JM 1  -CH    To walk in hospital room? 1  -JM 1  -JM 1  -CH    AM-PAC 6 Clicks Score 9  -JM 10   - 8  -    Functional Assessment    Outcome Measure Options   AM-PAC 6 Clicks Basic Mobility (PT)  -      User Key  (r) = Recorded By, (t) = Taken By, (c) = Cosigned By    Initials Name Provider Type     Maureen Ayala, PT Physical Therapist    MINA Alfonso PTA Physical Therapy Assistant           Time Calculation:         PT Charges       01/04/18 1723          Time Calculation    Start Time 1430  -      Stop Time 1453  -      Time Calculation (min) 23 min  -      PT Received On 01/04/18  -      PT - Next Appointment 01/05/18  -        User Key  (r) = Recorded By, (t) = Taken By, (c) = Cosigned By    Initials Name Provider Type    MINA Alfonso PTA Physical Therapy Assistant          Therapy Charges for Today     Code Description Service Date Service Provider Modifiers Qty    84705874027 HC PT THER PROC EA 15 MIN 1/3/2018 Romy Alfonso PTA GP 2    30221215519 HC PT THER SUPP EA 15 MIN 1/3/2018 Romy Alfonso PTA GP 2    11885379864 HC PT THER PROC EA 15 MIN 1/4/2018 Romy Alfonso PTA GP 2    62040446161 HC PT THER SUPP EA 15 MIN 1/4/2018 Romy Alfonso PTA GP 2          PT G-Codes  PT Professional Judgement Used?: Yes  Outcome Measure Options: AM-[facility] 6 Clicks Basic Mobility (PT)  Functional Limitation: Mobility: Walking and moving around  Mobility: Walking and Moving Around Current Status (): At least 80 percent but less than 100 percent impaired, limited or restricted  Mobility: Walking and Moving Around Goal Status (): At least 80 percent but less than 100 percent impaired, limited or restricted  Mobility: Walking and Moving Around Discharge Status (): At least 80 percent but less than 100 percent impaired, limited or restricted    Romy Alfonso PTA  1/4/2018

## 2018-01-04 NOTE — PROGRESS NOTES
DAILY PROGRESS NOTE  [facility], [facility]    2018    Patient Identification:  Name: Cynthia Arreola  Age: 89 y.o.  Sex: female  :  1927  MRN: [id number]           Primary Care Physician: Param Martinez MD    Subjective:    Interval History:    Continues with IV  Lasix.  Completed Tamiflu. On supportive therapy for influenza.  BS improved, WBC back to normal. Potassium low today.  B/P elevated through the night. PO hydralazine added to regimen.   More wheezing today though      ROS:     No nausea, vomiting, diarrhea, constipation.Denies CP. Continues with CUEVAS     Objective:    Scheduled Meds:    amLODIPine 10 mg Oral Daily   aspirin 81 mg Oral Daily   atorvastatin 10 mg Oral Nightly   clopidogrel 75 mg Oral Daily   docusate sodium 250 mg Oral Daily   enoxaparin 30 mg Subcutaneous Q24H   Ferrex 150 forte plus 1 capsule Oral Daily With Breakfast   furosemide 40 mg Oral BID   hydrALAZINE 50 mg Oral Q8H   insulin aspart 0-7 Units Subcutaneous 4x Daily With Meals & Nightly   insulin detemir 25 Units Subcutaneous Nightly   ipratropium-albuterol 3 mL Nebulization 4x Daily - RT   isosorbide mononitrate 30 mg Oral Daily   levothyroxine 100 mcg Oral QAM   linagliptin 5 mg Oral Daily   metoclopramide 5 mg Oral 4x Daily AC & at Bedtime   metoprolol succinate XL 50 mg Oral Daily   pantoprazole 40 mg Oral QAM   rOPINIRole 0.25 mg Oral Nightly       Continuous Infusions:       PRN Meds:  •  acetaminophen  •  dextrose  •  dextrose  •  glucagon (human recombinant)  •  hydrALAZINE  •  HYDROcodone-acetaminophen  •  influenza vaccine  •  ipratropium-albuterol  •  nitroglycerin  •  pneumococcal polysaccharide 23-valent  •  polyethylene glycol  •  potassium chloride **OR** potassium chloride **OR** potassium chloride  •  promethazine  •  sodium chloride    Intake/Output:    Intake/Output Summary (Last 24 hours) at 18 1031  Last data filed at 18 1800   Gross per 24 hour  "  Intake              530 ml   Output                0 ml   Net              530 ml         Exam:    tMax 24 hrs: Temp (24hrs), Av.7 °F (35.9 °C), Min:96.3 °F (35.7 °C), Max:97.1 °F (36.2 °C)    Vitals Ranges:   Temp:  [96.3 °F (35.7 °C)-97.1 °F (36.2 °C)] 97.1 °F (36.2 °C)  Heart Rate:  [60-66] 60  Resp:  [11-20] 20  BP: (152-177)/(55-80) 152/55    /55 (BP Location: Right arm, Patient Position: Lying)  Pulse 60  Temp 97.1 °F (36.2 °C) (Oral)   Resp 20  Ht 160 cm (63\")  Wt 78.2 kg (172 lb 4.8 oz)  SpO2 97%  BMI 30.52 kg/m2    General: Alert, oriented x 3. Cooperative, no distress, appears stated age  HEENT:    Head: Normocephalic, without obvious abnormality, atraumatic  Eyes: EOM are normal. Pupils are equal, round, and reactive to light.   Oropharynx: Mucosa and tongue dry  Neck: Supple, symmetrical, trachea midline, no adenopathy;              thyroid:  no enlargement/tenderness/nodules;              no carotid bruit or JVD  Cardiovascular: Normal rate, regular rhythm and intact distal pulses.              Exam reveals no gallop and no friction rub. No murmur heard  Chest wall: No tenderness or deformity  Pulmonary:  decreased breath sounds. Expiratory wheeze bilateral bases.  Rhonchi bilaterally, no rales.    Abdominal: Soft, non-tender, bowel sounds active all four quadrants,     no masses, no hepatomegaly, no splenomegaly.   Extremities: Normal, atraumatic, no cyanosis. + edema both LE or edema  Pulses: 2 + symmetric all extremities  Neurological: She is alert and oriented to person, place, and time.                 CNII-XII intact, normal strength, sensation intact throughout  Skin: Healing ulcer on the dorsum of her right foot, small area healing on the dorsum of her right heel. 1.5 cm ulcerated area with mild surrounding erythema           Data Review:      Results from last 7 days  Lab Units 18  0514 18  0405 18  0445   WBC 10*3/mm3 7.53 8.12 13.24*   HEMOGLOBIN g/dL 12.5 " 11.7* 12.4   HEMATOCRIT % 40.3 38.2 41.1   PLATELETS 10*3/mm3 238 189 164         Results from last 7 days  Lab Units 01/04/18  0514 01/03/18  0405 01/02/18  0445 01/01/18  0631  12/28/17 2054   SODIUM mmol/L 141 139 139 142  < > 142   POTASSIUM mmol/L 2.8* 3.5 3.6 3.6  < > 3.3*   CHLORIDE mmol/L 97* 96* 103 105  < > 102   CO2 mmol/L 30.3* 30.8* 23.6 23.6  < > 23.8   BUN mg/dL 40* 33* 22 28*  < > 23   CREATININE mg/dL 1.60* 1.72* 1.11* 1.17*  < > 1.29*   CALCIUM mg/dL 8.9 8.6 9.0 8.7  < > 9.0   BILIRUBIN mg/dL  --   --   --  0.2  --  0.2   ALK PHOS U/L  --   --   --  146*  --  263*   ALT (SGPT) U/L  --   --   --  25  --  47*   AST (SGOT) U/L  --   --   --  15  --  39*   GLUCOSE mg/dL 137* 277* 132* 207*  < > 161*   < > = values in this interval not displayed.    Results from last 7 days  Lab Units 12/29/17  0008   INR  0.98         Results from last 7 days  Lab Units 12/28/17 2054   HEMOGLOBIN A1C % 9.00*         Lab Results  Lab Value Date/Time   TROPONINT 0.069 (H) 12/29/2017 1039   TROPONINT 0.101 (C) 12/28/2017 2054   TROPONINT <0.010 01/16/2017 0248   TROPONINT <0.010 12/28/2016 0039   TROPONINT <0.010 12/27/2016 1752   TROPONINT <0.010 12/27/2016 0617       Microbiology Results (last 10 days)     Procedure Component Value - Date/Time    MRSA Screen Culture - Swab, Nares [[id number]]  (Normal) Collected:  12/30/17 0036    Lab Status:  Final result Specimen:  Swab from Nares Updated:  01/01/18 0912     MRSA SCREEN CX No Methicillin Resistant Staphylococcus aureus isolated    Respiratory Panel, PCR - Swab, Nasopharynx [[id number]]  (Abnormal) Collected:  12/29/17 1923    Lab Status:  Final result Specimen:  Swab from Nasopharynx Updated:  12/29/17 2201     ADENOVIRUS, PCR Not Detected     Coronavirus 229E Not Detected     Coronavirus HKU1 Not Detected     Coronavirus NL63 Not Detected     Coronavirus OC43 Not Detected     Human Metapneumovirus Not Detected     Human Rhinovirus/Enterovirus Not Detected      Influenza B PCR Not Detected     Parainfluenza Virus 1 Not Detected     Parainfluenza Virus 2 Not Detected     Parainfluenza Virus 3 Not Detected     Parainfluenza Virus 4 Not Detected     Bordetella pertussis pcr Not Detected     Influenza A H1N1 2009 PCR Detected (A)     Chlamydophila pneumoniae PCR Not Detected     Mycoplasma pneumo by PCR Not Detected     Influenza A PCR Not Detected     Influenza A H3 Not Detected     Influenza A H1 Not Detected     RSV, PCR Not Detected    Influenza Antigen, Rapid - Swab, Nasopharynx [[id number]]  (Normal) Collected:  12/28/17 2218    Lab Status:  Final result Specimen:  Swab from Nasopharynx Updated:  12/28/17 2252     Influenza A Ag, EIA Negative     Influenza B Ag, EIA Negative           Imaging Results (last 7 days)     Procedure Component Value Units Date/Time    XR Chest 2 View [[id number]] Collected:  12/28/17 2220     Updated:  12/29/17 2312    Narrative:       CHEST PA AND LATERAL.     HISTORY: Cough and congestion.     COMPARISON: 01/16/2017.     FINDINGS:  Mild cardiomegaly.         There is no consolidation or effusion. Mild pulmonary congestion  superimposed on emphysema.          Impression:       Pulmonary congestion, overall no significant change.         This report was finalized on 12/29/2017 11:09 PM by Dr. Fransisco Yoo MD.           2D echo:  · Left ventricular systolic function is normal. Estimated EF = 55%.  · Left ventricular diastolic dysfunction (grade I) consistent with impaired relaxation.  · Mild mitral valve regurgitation is present  · Mild tricuspid valve regurgitation is present.  · Estimated right ventricular systolic pressure from tricuspid regurgitation is normal (<35 mmHg).  · There is calcification of the aortic root and valve.  · Mild aortic valve stenosis is present.    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         Plan:    Change Lasix to po  F/U ECHO results  Tamiflu finished  Increase duonebs- wheezing today. Restart steroids  Continue supportive therapy  LFT much better  Monitor and correct electrolytes- replace potassium; check mg+  Adjust insulin as needed  Monitor mental status  Continue home medications  PT to see pt  DVT/stress ulcer prophylaxis  Labs in am    Roberto Carlos Moya MD  1/4/2018  10:31 AM

## 2018-01-04 NOTE — PLAN OF CARE
Problem: Patient Care Overview (Adult)  Goal: Plan of Care Review  Outcome: Ongoing (interventions implemented as appropriate)   01/04/18 0521   Coping/Psychosocial Response Interventions   Plan Of Care Reviewed With patient   Patient Care Overview   Progress progress toward functional goals is gradual   Outcome Evaluation   Outcome Summary/Follow up Plan pt remains hypertensive. Pt was given prn bp meds twice on shift. Pt was unable to bare weight at all. Pt should be transfered with a lift . Pt was not placed in breifs due to redness in groin area. Will continue to monitor        Problem: Fall Risk (Adult)  Goal: Absence of Falls  Outcome: Ongoing (interventions implemented as appropriate)   01/04/18 0521   Fall Risk (Adult)   Absence of Falls making progress toward outcome       Problem: Pressure Ulcer Risk (Akshat Scale) (Adult,Obstetrics,Pediatric)  Goal: Skin Integrity  Outcome: Ongoing (interventions implemented as appropriate)   01/04/18 0521   Pressure Ulcer Risk (Akshat Scale) (Adult,Obstetrics,Pediatric)   Skin Integrity making progress toward outcome       Problem: Respiratory Insufficiency (Adult)  Goal: Effective Ventilation  Outcome: Ongoing (interventions implemented as appropriate)   01/04/18 0521   Respiratory Insufficiency (Adult)   Effective Ventilation making progress toward outcome

## 2018-01-04 NOTE — NURSING NOTE
1/4/18  0624    Called Md, concerning pt's uncontrolled hypertension.  Received new orders for PO hydralazine.  Will continue to monitor.

## 2018-01-04 NOTE — PLAN OF CARE
Problem: Patient Care Overview (Adult)  Goal: Plan of Care Review  Outcome: Ongoing (interventions implemented as appropriate)   01/04/18 0521 01/04/18 1640   Coping/Psychosocial Response Interventions   Plan Of Care Reviewed With patient --    Patient Care Overview   Progress progress toward functional goals is gradual --    Outcome Evaluation   Outcome Summary/Follow up Plan --  VSS, wheezing noted this morning and breathing tx frequency increased, BP running in high 150's, turn every 2 hours,Tamiflu and abx complete     Goal: Adult Individualization and Mutuality  Outcome: Ongoing (interventions implemented as appropriate)      Problem: Fall Risk (Adult)  Goal: Absence of Falls  Outcome: Ongoing (interventions implemented as appropriate)      Problem: Pressure Ulcer Risk (Akshat Scale) (Adult,Obstetrics,Pediatric)  Goal: Skin Integrity  Outcome: Ongoing (interventions implemented as appropriate)      Problem: Respiratory Insufficiency (Adult)  Goal: Acid/Base Balance  Outcome: Ongoing (interventions implemented as appropriate)    Goal: Effective Ventilation  Outcome: Ongoing (interventions implemented as appropriate)      Problem: Renal Failure/Kidney Injury, Acute (Adult)  Goal: Signs and Symptoms of Listed Potential Problems Will be Absent or Manageable (Renal Failure/Kidney Injury, Acute)  Outcome: Ongoing (interventions implemented as appropriate)

## 2018-01-04 NOTE — PLAN OF CARE
Problem: Patient Care Overview (Adult)  Goal: Plan of Care Review  Outcome: Ongoing (interventions implemented as appropriate)   01/04/18 3683   Coping/Psychosocial Response Interventions   Plan Of Care Reviewed With patient   Patient Care Overview   Progress progress toward functional goals is gradual   Outcome Evaluation   Outcome Summary/Follow up Plan pt very agreeable to PT session, but c/o incr fatigue and declined up to chair; worked on sit to stand w/std wx(uses std wx for RHB at NH)

## 2018-01-04 NOTE — PROGRESS NOTES
[facility] INPATIENT PROGRESS NOTE         [address]    1/4/2018      PATIENT IDENTIFICATION:   Name:  Cynthia Arreola      MRN:  [id number]     89 y.o.  female             LOS 6    Reason for visit: Follow-up respiratory failure associated with influenza/bronchitis and COPD exacerbation      SUBJECTIVE:    Complaints of weakness.  Gets short of breath with activity.    Objective   OBJECTIVE:    Vital Sign Min/Max for last 24 hours  Temp  Min: 96.3 °F (35.7 °C)  Max: 97.1 °F (36.2 °C)   BP  Min: 151/100  Max: 177/80   Pulse  Min: 60  Max: 66   Resp  Min: 11  Max: 20   SpO2  Min: 93 %  Max: 98 %   Flow (L/min)  Min: 2  Max: 3   No Data Recorded                         Body mass index is 30.52 kg/(m^2).    Intake/Output Summary (Last 24 hours) at 01/04/18 1303  Last data filed at 01/03/18 1800   Gross per 24 hour   Intake              290 ml   Output                0 ml   Net              290 ml         Exam:  GEN:  No distress, appears stated age  EYES:   PERRL, anicteric sclera  ENT:    External ears/nose normal, OP clear  NECK:  No adenopathy, midline trachea  LUNGS: Normal chest on inspection, palpation and Diminished bilaterally With mild  Wheeze on auscultation  CV:  Normal S1S2, without murmur  ABD:  Non tender, non distended, no hepatosplenomegaly, +BS  EXT:  No cyanosis or clubbing    Scheduled meds:      amLODIPine 10 mg Oral Daily   aspirin 81 mg Oral Daily   atorvastatin 10 mg Oral Nightly   clopidogrel 75 mg Oral Daily   docusate sodium 250 mg Oral Daily   enoxaparin 30 mg Subcutaneous Q24H   Ferrex 150 forte plus 1 capsule Oral Daily With Breakfast   furosemide 40 mg Oral BID   hydrALAZINE 50 mg Oral Q8H   insulin aspart 0-7 Units Subcutaneous 4x Daily With Meals & Nightly   insulin detemir 25 Units Subcutaneous Nightly   ipratropium-albuterol 3 mL Nebulization 4x Daily - RT   ipratropium-albuterol 3 mL Nebulization Q4H - RT   isosorbide mononitrate 30 mg Oral Daily   levothyroxine 100  mcg Oral QAM   linagliptin 5 mg Oral Daily   metoclopramide 5 mg Oral 4x Daily AC & at Bedtime   metoprolol succinate XL 50 mg Oral Daily   pantoprazole 40 mg Oral QAM   potassium chloride 40 mEq Oral BID   rOPINIRole 0.25 mg Oral Nightly     IV meds:                         Data Review:    Results from last 7 days  Lab Units 01/04/18  0514 01/03/18  0405 01/02/18  0445 01/01/18  0631 12/31/17  0421   SODIUM mmol/L 141 139 139 142 142   POTASSIUM mmol/L 2.8* 3.5 3.6 3.6 4.2   CHLORIDE mmol/L 97* 96* 103 105 107   CO2 mmol/L 30.3* 30.8* 23.6 23.6 22.3   BUN mg/dL 40* 33* 22 28* 31*   CREATININE mg/dL 1.60* 1.72* 1.11* 1.17* 1.40*   GLUCOSE mg/dL 137* 277* 132* 207* 299*   CALCIUM mg/dL 8.9 8.6 9.0 8.7 8.6         Estimated Creatinine Clearance: 23.1 mL/min (by C-G formula based on Cr of 1.6).    Results from last 7 days  Lab Units 01/04/18  0514 01/03/18  0405 01/02/18 0445 01/01/18  0631 12/31/17  0421   WBC 10*3/mm3 7.53 8.12 13.24* 14.04* 8.19   HEMOGLOBIN g/dL 12.5 11.7* 12.4 11.6* 11.2*   PLATELETS 10*3/mm3 238 189 164 192 183       Results from last 7 days  Lab Units 12/29/17  0008   INR  0.98       Results from last 7 days  Lab Units 01/01/18  0631 12/28/17  2054   ALT (SGPT) U/L 25 47*   AST (SGOT) U/L 15 39*       Results from last 7 days  Lab Units 01/02/18  0317   PH, ARTERIAL pH units 7.360   PO2 ART mm Hg 85.9   PCO2, ARTERIAL mm Hg 50.1*   HCO3 ART mmol/L 28.3*       Results from last 7 days  Lab Units 12/29/17  0008   LACTATE mmol/L 1.1     Influenza A H1N1 2009 PCR Detected (A)               Chest x-ray reviewed 1/2/18    2-D echo result is pending    )Assessment   ASSESSMENT:   H1 N1 influenza  Acute bronchitis  COPD with acute exacerbation  Acute hypoxemic respiratory failure  Metabolic encephalopathy  Ischemic cardiomyopathy  CKD II  GERD with esophagitis  Diabetes mellitus  Weakness/immobility  Hypertension    PLAN:  Completed Tamiflu course for influenza.  Antibiotic short course  completed.  Bronchodilators and steroid for COPD symptoms.  On diuretics for CHF symptoms.  Encourage pulmonary toilet.  Physical therapy for weakness.    Ramon Brady MD  Pulmonary and Critical Care Medicine  [facility], [facility]  1/4/2018    1:03 PM

## 2018-01-05 NOTE — PLAN OF CARE
Problem: Patient Care Overview (Adult)  Goal: Plan of Care Review  Outcome: Ongoing (interventions implemented as appropriate)   01/05/18 1852   Coping/Psychosocial Response Interventions   Plan Of Care Reviewed With patient   Patient Care Overview   Progress progress toward functional goals as expected   Outcome Evaluation   Outcome Summary/Follow up Plan vss, weaned oxygen, completed oral abx and oral tamiflu, await family decision on facility      Goal: Adult Individualization and Mutuality  Outcome: Ongoing (interventions implemented as appropriate)      Problem: Fall Risk (Adult)  Goal: Absence of Falls  Outcome: Ongoing (interventions implemented as appropriate)   01/05/18 1852   Fall Risk (Adult)   Absence of Falls making progress toward outcome       Problem: Pressure Ulcer Risk (Akshat Scale) (Adult,Obstetrics,Pediatric)  Goal: Skin Integrity  Outcome: Ongoing (interventions implemented as appropriate)   01/05/18 1852   Pressure Ulcer Risk (Akshat Scale) (Adult,Obstetrics,Pediatric)   Skin Integrity making progress toward outcome       Problem: Respiratory Insufficiency (Adult)  Goal: Acid/Base Balance  Outcome: Ongoing (interventions implemented as appropriate)   01/05/18 1852   Respiratory Insufficiency (Adult)   Acid/Base Balance making progress toward outcome     Goal: Effective Ventilation  Outcome: Ongoing (interventions implemented as appropriate)   01/05/18 1852   Respiratory Insufficiency (Adult)   Effective Ventilation making progress toward outcome       Problem: Renal Failure/Kidney Injury, Acute (Adult)  Goal: Signs and Symptoms of Listed Potential Problems Will be Absent or Manageable (Renal Failure/Kidney Injury, Acute)  Outcome: Ongoing (interventions implemented as appropriate)   01/05/18 1852   Renal Failure/Kidney Injury, Acute   Problems Assessed (Acute Renal Failure/Kidney Injury) all   Problems Present (Acute Renal Failure/Kidney Injury) situational response

## 2018-01-05 NOTE — PROGRESS NOTES
"  Infectious Diseases Progress Note    Vikas Parra MD     [facility]  [place]: 7 days  Patient Identification:  Name: Cynthia Arreola  Age: 89 y.o.  Sex: female  :  1927  MRN: [id number]         Primary Care Physician: Param Martinez MD            Subjective: Feeling better.  Good breakfast this morning breathing much better.  Denies any fever and chills.  Interval History: See consultation note.    Objective:    Scheduled Meds:    amLODIPine 10 mg Oral Daily   aspirin 81 mg Oral Daily   atorvastatin 10 mg Oral Nightly   budesonide-formoterol 2 puff Inhalation BID - RT   clopidogrel 75 mg Oral Daily   docusate sodium 250 mg Oral Daily   enoxaparin 30 mg Subcutaneous Q24H   Ferrex 150 forte plus 1 capsule Oral Daily With Breakfast   furosemide 40 mg Oral BID   hydrALAZINE 50 mg Oral Q8H   insulin aspart 0-7 Units Subcutaneous 4x Daily With Meals & Nightly   insulin detemir 25 Units Subcutaneous Nightly   ipratropium-albuterol 3 mL Nebulization Q4H - RT   isosorbide mononitrate 30 mg Oral Daily   levothyroxine 100 mcg Oral QAM   linagliptin 5 mg Oral Daily   metoclopramide 5 mg Oral 4x Daily AC & at Bedtime   metoprolol succinate XL 50 mg Oral Daily   pantoprazole 40 mg Oral QAM   potassium chloride 40 mEq Oral BID   predniSONE 20 mg Oral BID With Meals   rOPINIRole 0.25 mg Oral Nightly     Continuous Infusions:       Vital signs in last 24 hours:  Temp:  [97 °F (36.1 °C)-98.9 °F (37.2 °C)] 97 °F (36.1 °C)  Heart Rate:  [60-72] 62  Resp:  [14-20] 14  BP: (150-187)/(52-87) 167/60    Intake/Output:    Intake/Output Summary (Last 24 hours) at 18 1203  Last data filed at 18 1913   Gross per 24 hour   Intake              530 ml   Output                0 ml   Net              530 ml       Exam:  /60 (BP Location: Right arm, Patient Position: Lying)  Pulse 62  Temp 97 °F (36.1 °C) (Oral)   Resp 14  Ht 160 cm (63\")  Wt 78.2 kg (172 lb 4.8 oz)  SpO2 94%  BMI 30.52 kg/m2    General " Appearance:    Feeling better denies any specific complaints.  Comfortable and did participate in physical therapy.                          Head:    Normocephalic, without obvious abnormality, atraumatic                           Eyes:    PERRL, conjunctiva/corneas clear, EOM's intact, both eyes                         Throat:   Lips, tongue, gums normal; oral mucosa pink and moist                           Neck:   Supple, symmetrical, trachea midline, no JVD                         Lungs:   Improved air entry bilaterally significant improvement in her rhonchi decreased decreased crackles at the bases.                 Chest Wall:    No tenderness or deformity                          Heart:    Regular rate and rhythm, S1 and S2 normal, no murmur,no  Rub                                      or gallop                  Abdomen:     Soft, non-tender, bowel sounds active, no masses, no                                                        organomegaly                  Extremities:   Extremities normal, atraumatic, no cyanosis, 1+ edema                        Pulses:   Pulses palpable in all extremities                            Skin:   Skin is warm and dry,  no rashes or palpable lesions                    Data Review:    I reviewed the patient's new clinical results.    Results from last 7 days  Lab Units 01/05/18  0533 01/04/18  0514 01/03/18  0405 01/02/18  0445 01/01/18  0631 12/31/17  0421 12/30/17  0448   WBC 10*3/mm3 9.59 7.53 8.12 13.24* 14.04* 8.19 6.59   HEMOGLOBIN g/dL 12.8 12.5 11.7* 12.4 11.6* 11.2* 11.5*   PLATELETS 10*3/mm3 234 238 189 164 192 183 181       Results from last 7 days  Lab Units 01/05/18  0533 01/04/18  2209 01/04/18  0514 01/03/18  0405 01/02/18  0445 01/01/18  0631 12/31/17  0421 12/30/17  0448   SODIUM mmol/L 140  --  141 139 139 142 142 142   POTASSIUM mmol/L 5.0 4.2 2.8* 3.5 3.6 3.6 4.2 4.7   CHLORIDE mmol/L 98  --  97* 96* 103 105 107 106   CO2 mmol/L 31.6*  --  30.3* 30.8* 23.6 23.6  22.3 23.5   BUN mg/dL 36*  --  40* 33* 22 28* 31* 29*   CREATININE mg/dL 1.43*  --  1.60* 1.72* 1.11* 1.17* 1.40* 1.54*   CALCIUM mg/dL 9.1  --  8.9 8.6 9.0 8.7 8.6 8.7   GLUCOSE mg/dL 224*  --  137* 277* 132* 207* 299* 292*       Microbiology Results (last 10 days)     Procedure Component Value - Date/Time    MRSA Screen Culture - Swab, Nares [[id number]]  (Normal) Collected:  12/30/17 0036    Lab Status:  Final result Specimen:  Swab from Nares Updated:  01/01/18 0912     MRSA SCREEN CX No Methicillin Resistant Staphylococcus aureus isolated    Respiratory Panel, PCR - Swab, Nasopharynx [[id number]]  (Abnormal) Collected:  12/29/17 1923    Lab Status:  Final result Specimen:  Swab from Nasopharynx Updated:  12/29/17 2201     ADENOVIRUS, PCR Not Detected     Coronavirus 229E Not Detected     Coronavirus HKU1 Not Detected     Coronavirus NL63 Not Detected     Coronavirus OC43 Not Detected     Human Metapneumovirus Not Detected     Human Rhinovirus/Enterovirus Not Detected     Influenza B PCR Not Detected     Parainfluenza Virus 1 Not Detected     Parainfluenza Virus 2 Not Detected     Parainfluenza Virus 3 Not Detected     Parainfluenza Virus 4 Not Detected     Bordetella pertussis pcr Not Detected     Influenza A H1N1 2009 PCR Detected (A)     Chlamydophila pneumoniae PCR Not Detected     Mycoplasma pneumo by PCR Not Detected     Influenza A PCR Not Detected     Influenza A H3 Not Detected     Influenza A H1 Not Detected     RSV, PCR Not Detected    Influenza Antigen, Rapid - Swab, Nasopharynx [[id number]]  (Normal) Collected:  12/28/17 2218    Lab Status:  Final result Specimen:  Swab from Nasopharynx Updated:  12/28/17 2252     Influenza A Ag, EIA Negative     Influenza B Ag, EIA Negative          Assessment:  Principal Problem:    H1N1 influenza  Active Problems:    Chronic kidney disease, stage II (mild)    Ischemic cardiomyopathy    Hypertension, essential    Gastroesophageal reflux disease with esophagitis     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  She continues to do well off of antibiotic therapy.  · Evidence of volume overload related issues noted.  Doubt she had aspiration pneumonia.  Strongly suggest discontinuation of antibiotics as his status improved significantly with diuretics and a very short period of time.  · Continue treatment with Tamiflu and inhalers and supportive care.    · Continue to observe off of antibiotic therapy.    · Her leukocytosis could very well be due to steroid effect.    · Need to monitor her clinical course if she changes her shows evidence of decline and may need workup to rule out postviral pneumonia versus C. difficile infections and so forth.    Vikas Parra MD  1/5/2018  12:03 PM    Much of this encounter note is an electronic transcription/translation of spoken language to printed text. The electronic translation of spoken language may permit erroneous, or at times, nonsensical words or phrases to be inadvertently transcribed; Although I have reviewed the note for such errors, some may still exist

## 2018-01-05 NOTE — THERAPY TREATMENT NOTE
Acute Care - Physical Therapy Treatment Note  [facility]     Patient Name: Cynthia Arreola  : 1927  MRN: [id number]  Today's Date: 2018  Onset of Illness/Injury or Date of Surgery Date: 17  Date of Referral to PT: 17  Referring Physician: Dr. Moya    Admit Date: 2017    Visit Dx:    ICD-10-CM ICD-9-CM   1. Bronchitis with bronchospasm J20.9 490   2. Hypokalemia E87.6 276.8   3. Renal insufficiency N28.9 593.9   4. Elevated troponin R74.8 790.6   5. Elevated LFTs R79.89 790.6   6. 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   • Chronic kidney disease, stage II (mild)   • H/O small bowel obstruction   • Anemia of chronic disease   • Recurrent UTI   • Ischemic cardiomyopathy   • Peripheral arterial disease   • Hypertension, essential   • Immobility syndrome   • Acquired hypothyroidism   • MRSA cellulitis of right foot   • Sacral decubitus ulcer   • SSS (sick sinus syndrome)   • S/P placement of cardiac pacemaker   • Gastroesophageal reflux disease with esophagitis   • Hyperlipidemia   • Ischemic ulcer of right heel with fat layer exposed   • 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   • Acute bronchitis due to Rhinovirus (enterovirus)   • Bilateral viral bronchopneumonia   • Respiratory infection due to enterovirus/ Human Rhinovirus   • Common bile duct stone   • Bronchitis with bronchospasm   • DM (diabetes mellitus)   • KOFI (acute kidney injury)   • Hypokalemia   • Elevated LFTs   • H1N1 influenza   • COPD exacerbation               Adult Rehabilitation Note       18 1645 18 1558 18 1500    Rehab Assessment/Intervention    Discipline physical therapy assistant  -MINA physical therapy assistant  -MINA --  -MINA    Document Type therapy note (daily note)  -MINA therapy note (daily note)  -MINA --  -MINA    Subjective  Information agree to therapy;complains of;weakness;fatigue  - agree to therapy;complains of;weakness;fatigue;pain  - --  -    Precautions/Limitations fall precautions;oxygen therapy device and L/min   3L  - fall precautions;oxygen therapy device and L/min  - --  -    Precautions/Limitations, Hearing hearing impairment, bilaterally  -      Recorded by [MINA] Romy Alfonso PTA [JM] Romy Alfonso PTA [JM] Romy Alfonso PTA    Pain Assessment    Pain Assessment No/denies pain  - 0-10  -JM --  -    Pain Score  5  -JM --  -    Pain Type  Acute pain  -     Pain Location  Back  -     Pain Orientation  Mid  -     Pain Intervention(s)  Repositioned;Rest  -     Response to Interventions  mallory  -     Recorded by [MINA] Romy Alfonso PTA [JM] Romy Alfonso PTA [] Romy Alfonso PTA    Bed Mobility, Assessment/Treatment    Bed Mobility, Assistive Device draw sheet;head of bed elevated;bed rails  - draw sheet;head of bed elevated  -     Bed Mob, Supine to Sit, [place] moderate assist (50% patient effort);maximum assist (25% patient effort)  - moderate assist (50% patient effort);maximum assist (25% patient effort);2 person assist required  -     Bed Mobility, Safety Issues decreased use of arms for pushing/pulling;decreased use of legs for bridging/pushing;impaired trunk control for bed mobility  - decreased use of arms for pushing/pulling;decreased use of legs for bridging/pushing;impaired trunk control for bed mobility  -     Bed Mobility, Impairments strength decreased;decreased flexibility;coordination impaired;impaired balance  - strength decreased;decreased flexibility;coordination impaired;impaired balance  -     Recorded by [JM] Romy Alfonso PTA [JM] Romy Alfonso PTA     Transfer Assessment/Treatment    Transfers, Sit-Stand [place] maximum assist (25% patient effort);2 person assist required  - maximum assist (25% patient effort);2 person  assist required  -     Transfers, Stand-Sit [place] moderate assist (50% patient effort);2 person assist required  - minimum assist (75% patient effort);2 person assist required  -     Transfers, Sit-Stand-Sit, Assist Device standard walker  - standard walker  -     Transfer, Comment blocked knees , let pt pull up on wx--does that at NH   difficult to change her routine  - stood x2 w/supported knees and std wx  -JM     Recorded by [MINA] Romy Alfonso PTA [JM] Romy Alfonso PTA     Gait Assessment/Treatment    Gait, [place] Level moderate assist (50% patient effort);2 person assist required  -      Gait, Assistive Device standard walker  -      Gait, Comment 5 steps to chair but picked feet up today  - declined chair today--too weak at present   unsure if pt will even agree to lift into chair later w/nsg  -JM     Recorded by [MINA] Romy Alfonso PTA [JM] Romy Alfonso PTA     Therapy Exercises    Bilateral Lower Extremities AROM:;10 reps;supine;ankle pumps/circles;quad sets;sitting;LAQ   x5 LAQs  -JM AROM:;10 reps;ankle pumps/circles;sitting;LAQ   x5 LAQs  -JM     Recorded by [MINA] Romy Alfonso PTA [JM] Romy Alfonso PTA     Positioning and Restraints    Pre-Treatment Position in bed  - in bed  -     In Bed  side lying left;pillow between legs;call light within reach;encouraged to call for assist;exit alarm on;notified nsg  -     In Chair reclined;call light within reach;encouraged to call for assist;notified nsg  -      Recorded by [MINA] Romy Alfonso PTA [JM] Romy Alfonso PTA       01/03/18 4850          Rehab Assessment/Intervention    Discipline physical therapy assistant  -MINA      Document Type therapy note (daily note)  -MINA      Subjective Information agree to therapy;complains of;weakness;fatigue  -MINA      Precautions/Limitations fall precautions  -      Recorded by [MINA] Romy Alfonso PTA      Pain Assessment    Pain Assessment No/denies pain  -MINA    "   Recorded by [MINA] Romy Alfonso PTA      Bed Mobility, Assessment/Treatment    Bed Mobility, Assistive Device draw sheet;head of bed elevated  -      Bed Mob, Supine to Sit, [place] moderate assist (50% patient effort);maximum assist (25% patient effort);2 person assist required  -      Bed Mobility, Safety Issues decreased use of arms for pushing/pulling;decreased use of legs for bridging/pushing;impaired trunk control for bed mobility  -      Bed Mobility, Impairments strength decreased;decreased flexibility;coordination impaired;impaired balance  -      Recorded by [MINA] Romy Alfonso PTA      Transfer Assessment/Treatment    Transfers, Sit-Stand [place] maximum assist (25% patient effort);2 person assist required  -      Transfers, Stand-Sit [place] moderate assist (50% patient effort);2 person assist required  -      Transfers, Sit-Stand-Sit, Assist Device standard walker  -      Transfer, Comment stood x2 unsuccessfully, third attempt used std wx and let pt \"pull up\" on it while one blocked knees-stood successfully  -JM      Recorded by [MINA] Romy Alfonso PTA      Gait Assessment/Treatment    Gait, [place] Level moderate assist (50% patient effort);maximum assist (25% patient effort);2 person assist required  -      Gait, Assistive Device standard walker  -      Gait, Comment 5 shuffle steps to chair, nsg aware they may need to use lift to get pt back to bed once fatigued  -      Recorded by [MINA] Romy Alfonso PTA      Therapy Exercises    Bilateral Lower Extremities AROM:;10 reps;supine;ankle pumps/circles;quad sets;sitting;LAQ   x5 LAQs  -      Recorded by [MINA] Romy Alfonso PTA      Positioning and Restraints    Pre-Treatment Position in bed  -      Post Treatment Position chair  -      In Chair reclined;call light within reach;encouraged to call for assist;with family/caregiver   no alarm upon entry  -      Recorded by [MINA] Romy Alfonso, " PTA        User Key  (r) = Recorded By, (t) = Taken By, (c) = Cosigned By    Initials Name Effective Dates     Romy Alfonso, PTA 02/18/16 -                 IP PT Goals       01/02/18 1709          Bed Mobility PT LTG    Bed Mobility PT LTG, Time to Achieve 1 wk  -CH      Bed Mobility PT LTG, Activity Type all bed mobility  -CH      Bed Mobility PT LTG, [place] Level minimum assist (75% patient effort)  -CH      Transfer Training PT LTG    Transfer Training PT LTG, Time to Achieve 1 wk  -CH      Transfer Training PT LTG, Activity Type sit to stand/stand to sit  -CH      Transfer Training PT LTG, [place] Level moderate assist (50% patient effort);2 person assist required  -CH      Transfer Training PT LTG, Assist Device walker, rolling  -CH      Transfer Training 2 PT LTG    Transfer Training PT 2 LTG, Time to Achieve 1 wk  -CH      Transfer Training PT 2 LTG, Activity Type bed to chair /chair to bed  -CH      Transfer Training PT 2 LTG, [place] Level moderate assist (50% patient effort);2 person assist required  -CH      Transfer Training PT 2 LTG, Assist Device walker, rolling  -CH        User Key  (r) = Recorded By, (t) = Taken By, (c) = Cosigned By    Initials Name Provider Type    LITZY Ayala, PT Physical Therapist          Physical Therapy Education     Title: PT OT SLP Therapies (Done)     Topic: Physical Therapy (Done)     Point: Mobility training (Done)    Learning Progress Summary    Learner Readiness Method Response Comment Documented by Status   Patient Acceptance JOANNA HUGGINS D VU   01/05/18 1700 Done    Acceptance JOSE HUGGINS 01/04/18 1756 Active    Acceptance JOANNA HUGGINS D VU, NR   01/03/18 1650 Done    Acceptance JOANNA HUGGINS D VU,NR   01/02/18 1708 Done   Family Acceptance JOANNA HUGGINS D VU,QASIM   01/03/18 1650 Done               Point: Home exercise program (Done)    Learning Progress Summary    Learner Readiness Method Response Comment Documented by Status   Patient Acceptance JOANNA HUGGINS D VU JM  01/05/18 1700 Done    Acceptance E,D NR   01/04/18 1756 Active    Acceptance E,TB,D VU,NR   01/03/18 1650 Done   Family Acceptance E,TB,D VU,NR   01/03/18 1650 Done               Point: Body mechanics (Done)    Learning Progress Summary    Learner Readiness Method Response Comment Documented by Status   Patient Acceptance E,TB,D VU   01/05/18 1700 Done    Acceptance E,D NR   01/04/18 1756 Active    Acceptance E,TB,D VU,NR   01/03/18 1650 Done    Acceptance E,TB,D VU,NR   01/02/18 1708 Done   Family Acceptance E,TB,D VU,NR   01/03/18 1650 Done               Point: Precautions (Done)    Learning Progress Summary    Learner Readiness Method Response Comment Documented by Status   Patient Acceptance E,TB,D VU   01/05/18 1700 Done    Acceptance E,D [facility] 01/04/18 1756 Active    Acceptance E,TB,D VU,NR   01/03/18 1650 Done    Acceptance E,TB,D VU,NR   01/02/18 1708 Done   Family Acceptance E,TB,D VU,NR   01/03/18 1650 Done                      User Key     Initials Effective Dates Name Provider Type Discipline     12/01/15 -  Maureen Ayala, PT Physical Therapist PT     02/18/16 -  Romy Alfonso PTA Physical Therapy Assistant PT                    PT Recommendation and Plan  Anticipated Discharge Disposition: skilled nursing facility  Planned Therapy Interventions: balance training, bed mobility training, gait training, home exercise program, patient/family education, transfer training  PT Frequency: 5 times/wk  Plan of Care Review  Plan Of Care Reviewed With: patient  Progress: improving  Outcome Summary/Follow up Plan: able to take steps to chair w/o shuffling req mod 2 for safety, pt may req lift back to bed since she is used to that at [facility] w/ns about using lift back to bed once fatigued          Outcome Measures       01/05/18 1700 01/04/18 1700 01/03/18 1600    How much help from another person do you currently need...    Turning from your back to your side while in flat bed  without using bedrails? 2  -JM 2  -JM 2  -JM    Moving from lying on back to sitting on the side of a flat bed without bedrails? 2  -JM 2  -JM 2  -JM    Moving to and from a bed to a chair (including a wheelchair)? 2  -JM 1  -JM 2  -JM    Standing up from a chair using your arms (e.g., wheelchair, bedside chair)? 2  -JM 2  -JM 2  -JM    Climbing 3-5 steps with a railing? 1  -JM 1  -JM 1  -JM    To walk in hospital room? 1  -JM 1  -JM 1  -JM    AM-PAC 6 Clicks Score 10  -JM 9  -JM 10  -JM      User Key  (r) = Recorded By, (t) = Taken By, (c) = Cosigned By    Initials Name Provider Type    MINA Alfonso PTA Physical Therapy Assistant           Time Calculation:         PT Charges       01/05/18 1702          Time Calculation    Start Time 1620  -JM      Stop Time 1645  -JM      Time Calculation (min) 25 min  -MINA      PT Received On 01/05/18  -MINA      PT - Next Appointment 01/07/18  -MINA        User Key  (r) = Recorded By, (t) = Taken By, (c) = Cosigned By    Initials Name Provider Type    MINA Alfonso PTA Physical Therapy Assistant          Therapy Charges for Today     Code Description Service Date Service Provider Modifiers Qty    73936342749 HC PT THER PROC EA 15 MIN 1/4/2018 Romy Alfonso PTA GP 2    66939078349 HC PT THER SUPP EA 15 MIN 1/4/2018 Romy Alfonso PTA GP 2    95436916353 HC PT THER PROC EA 15 MIN 1/5/2018 Romy Alfonso PTA GP 2    24126609161 HC PT THER SUPP EA 15 MIN 1/5/2018 Romy Alfonso PTA GP 1          PT G-Codes  PT Professional Judgement Used?: Yes  Outcome Measure Options: AM-PAC 6 Clicks Basic Mobility (PT)  Functional Limitation: Mobility: Walking and moving around  Mobility: Walking and Moving Around Current Status (): At least 80 percent but less than 100 percent impaired, limited or restricted  Mobility: Walking and Moving Around Goal Status (): At least 80 percent but less than 100 percent impaired, limited or restricted  Mobility: Walking and Moving  Around Discharge Status (): At least 80 percent but less than 100 percent impaired, limited or restricted    Romy Alfonso, PTA  1/5/2018

## 2018-01-05 NOTE — NURSING NOTE
01/05/18 1255   Colostomy     Placement Date/Time: (c) (c)   Existing LDA: present on admission to this facility   Stoma Appearance round;moist;red   Peristomal Skin dry;intact   Appliance 2-piece;drainable pouch;intact;no leakage;changed   Accessories/Skin Care cleansed with water   Stoma Function stool   Stool Color brown   Tolerance no signs/symptoms of discomfort

## 2018-01-05 NOTE — PLAN OF CARE
Problem: Patient Care Overview (Adult)  Goal: Plan of Care Review  Outcome: Ongoing (interventions implemented as appropriate)   01/05/18 6869   Coping/Psychosocial Response Interventions   Plan Of Care Reviewed With patient   Patient Care Overview   Progress improving   Outcome Evaluation   Outcome Summary/Follow up Plan able to take steps to chair w/o shuffling req mod 2 for safety, pt may req lift back to bed since she is used to that at NH-educ w/nsg about using lift back to bed once fatigued

## 2018-01-05 NOTE — PROGRESS NOTES
Continued Stay Note  [facility]     Patient Name: Cynthia Arreola  MRN: [id number]  Today's Date: 1/5/2018    Admit Date: 12/28/2017          Discharge Plan       01/05/18 1325    Case Management/Social Work Plan    Plan Return to [facility]     Patient/Family In Agreement With Plan yes    Additional Comments Met with pt's son who states he is exploring different facility for mother.  Offered to place referral, denies at this time.  Does request different unit at [facility] when his mother returns.  Spoke with Benny with [facility] who will reach out to building to see if that is an option.  RICHARD Bocanegra RN              Discharge Codes     None        Expected Discharge Date and Time     Expected Discharge Date Expected Discharge Time    Jan 5, 2018             Rita Bocanegra

## 2018-01-05 NOTE — PROGRESS NOTES
[facility] INPATIENT PROGRESS NOTE         [address]    1/5/2018      PATIENT IDENTIFICATION:   Name:  Cynthia Arreola      MRN:  [id number]     89 y.o.  female             LOS 7    Reason for visit: Follow-up respiratory failure associated with influenza/bronchitis and COPD exacerbation      SUBJECTIVE:    Less short of breath today.  No productive cough.  Still has some dyspnea with exertion.    Objective   OBJECTIVE:    Vital Sign Min/Max for last 24 hours  Temp  Min: 97 °F (36.1 °C)  Max: 98.9 °F (37.2 °C)   BP  Min: 150/52  Max: 187/83   Pulse  Min: 60  Max: 72   Resp  Min: 14  Max: 20   SpO2  Min: 94 %  Max: 98 %   Flow (L/min)  Min: 3  Max: 3   No Data Recorded                         Body mass index is 30.52 kg/(m^2).    Intake/Output Summary (Last 24 hours) at 01/05/18 1152  Last data filed at 01/04/18 1913   Gross per 24 hour   Intake              530 ml   Output                0 ml   Net              530 ml         Exam:  GEN:  No distress, appears stated age  EYES:   PERRL, anicteric sclera  ENT:    External ears/nose normal, OP clear  NECK:  No adenopathy, midline trachea  LUNGS: Normal chest on inspection, palpation and Diminished bilaterally With mild  Wheeze on auscultation  CV:  Normal S1S2, without murmur  ABD:  Non tender, non distended, no hepatosplenomegaly, +BS  EXT:  No cyanosis or clubbing    Scheduled meds:      amLODIPine 10 mg Oral Daily   aspirin 81 mg Oral Daily   atorvastatin 10 mg Oral Nightly   clopidogrel 75 mg Oral Daily   docusate sodium 250 mg Oral Daily   enoxaparin 30 mg Subcutaneous Q24H   Ferrex 150 forte plus 1 capsule Oral Daily With Breakfast   furosemide 40 mg Oral BID   hydrALAZINE 50 mg Oral Q8H   insulin aspart 0-7 Units Subcutaneous 4x Daily With Meals & Nightly   insulin detemir 25 Units Subcutaneous Nightly   ipratropium-albuterol 3 mL Nebulization Q4H - RT   isosorbide mononitrate 30 mg Oral Daily   levothyroxine 100 mcg Oral QAM   linagliptin 5 mg  Oral Daily   metoclopramide 5 mg Oral 4x Daily AC & at Bedtime   metoprolol succinate XL 50 mg Oral Daily   pantoprazole 40 mg Oral QAM   potassium chloride 40 mEq Oral BID   predniSONE 20 mg Oral BID With Meals   rOPINIRole 0.25 mg Oral Nightly     IV meds:                         Data Review:    Results from last 7 days  Lab Units 01/05/18  0533 01/04/18  2209 01/04/18  0514 01/03/18  0405 01/02/18  0445 01/01/18  0631   SODIUM mmol/L 140  --  141 139 139 142   POTASSIUM mmol/L 5.0 4.2 2.8* 3.5 3.6 3.6   CHLORIDE mmol/L 98  --  97* 96* 103 105   CO2 mmol/L 31.6*  --  30.3* 30.8* 23.6 23.6   BUN mg/dL 36*  --  40* 33* 22 28*   CREATININE mg/dL 1.43*  --  1.60* 1.72* 1.11* 1.17*   GLUCOSE mg/dL 224*  --  137* 277* 132* 207*   CALCIUM mg/dL 9.1  --  8.9 8.6 9.0 8.7         Estimated Creatinine Clearance: 25.9 mL/min (by C-G formula based on Cr of 1.43).    Results from last 7 days  Lab Units 01/05/18  0533 01/04/18  0514 01/03/18  0405 01/02/18  0445 01/01/18  0631   WBC 10*3/mm3 9.59 7.53 8.12 13.24* 14.04*   HEMOGLOBIN g/dL 12.8 12.5 11.7* 12.4 11.6*   PLATELETS 10*3/mm3 234 238 189 164 192           Results from last 7 days  Lab Units 01/01/18  0631   ALT (SGPT) U/L 25   AST (SGOT) U/L 15       Results from last 7 days  Lab Units 01/02/18  0317   PH, ARTERIAL pH units 7.360   PO2 ART mm Hg 85.9   PCO2, ARTERIAL mm Hg 50.1*   HCO3 ART mmol/L 28.3*         Influenza A H1N1 2009 PCR Detected (A)               Chest x-ray reviewed 1/2/18    2-D echo result is pending    )Assessment   ASSESSMENT:   H1 N1 influenza  Acute bronchitis  COPD with acute exacerbation  Acute hypoxemic respiratory failure  Metabolic encephalopathy  Ischemic cardiomyopathy  CKD II  GERD with esophagitis  Diabetes mellitus  Weakness/immobility  Hypertension    PLAN:  Completed Tamiflu course for influenza.  Antibiotic short course completed.  Bronchodilators and steroid for COPD symptoms.  Add LABA/ICS.  On diuretics for CHF symptoms.  Encourage  pulmonary toilet.  Physical therapy for weakness.  Suspect likely discharge soon.    Ramon Brady MD  Pulmonary and Critical Care Medicine  [facility], [facility]  1/5/2018    11:52 AM

## 2018-01-06 NOTE — THERAPY TREATMENT NOTE
Acute Care - Physical Therapy Treatment Note  [facility]     Patient Name: Cynthia Arreola  : 1927  MRN: [id number]  Today's Date: 2018  Onset of Illness/Injury or Date of Surgery Date: 17  Date of Referral to PT: 17  Referring Physician: Dr. Moya    Admit Date: 2017    Visit Dx:    ICD-10-CM ICD-9-CM   1. Bronchitis with bronchospasm J20.9 490   2. Hypokalemia E87.6 276.8   3. Renal insufficiency N28.9 593.9   4. Elevated troponin R74.8 790.6   5. Elevated LFTs R79.89 790.6   6. 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   • Chronic kidney disease, stage II (mild)   • H/O small bowel obstruction   • Anemia of chronic disease   • Recurrent UTI   • Ischemic cardiomyopathy   • Peripheral arterial disease   • Hypertension, essential   • Immobility syndrome   • Acquired hypothyroidism   • MRSA cellulitis of right foot   • Sacral decubitus ulcer   • SSS (sick sinus syndrome)   • S/P placement of cardiac pacemaker   • Gastroesophageal reflux disease with esophagitis   • Hyperlipidemia   • Ischemic ulcer of right heel with fat layer exposed   • 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   • Acute bronchitis due to Rhinovirus (enterovirus)   • Bilateral viral bronchopneumonia   • Respiratory infection due to enterovirus/ Human Rhinovirus   • Common bile duct stone   • Bronchitis with bronchospasm   • DM (diabetes mellitus)   • KOFI (acute kidney injury)   • Hypokalemia   • Elevated LFTs   • H1N1 influenza   • COPD exacerbation               Adult Rehabilitation Note       18 1500 18 1645 18 1558    Rehab Assessment/Intervention    Discipline physical therapist  -MANDY physical therapy assistant  -MINA physical therapy assistant  -MINA    Document Type therapy note (daily note)  -MANDY therapy note (daily note)  -MINA  therapy note (daily note)  -    Subjective Information agree to therapy;complains of;weakness;fatigue;pain  - agree to therapy;complains of;weakness;fatigue  - agree to therapy;complains of;weakness;fatigue;pain  -    Precautions/Limitations fall precautions  - fall precautions;oxygen therapy device and L/min   3L  - fall precautions;oxygen therapy device and L/min  -    Precautions/Limitations, Hearing hearing impairment, bilaterally  - hearing impairment, bilaterally  -     Recorded by [] Wander Long, PT DPT [] Romy Alfonso PTA [] Romy Alfonso PTA    Pain Assessment    Pain Assessment No/denies pain  - No/denies pain  - 0-10  -JM    Pain Score   5  -JM    Pain Type   Acute pain  -    Pain Location   Back  -    Pain Orientation   Mid  -    Pain Intervention(s)   Repositioned;Rest  -    Response to Interventions   mallory  -    Recorded by [LC] Wander Long, PT DPT [JM] Romy Alfonso PTA [] Romy Alfonso PTA    Cognitive Assessment/Intervention    Current Cognitive/Communication Assessment functional  -      Orientation Status oriented x 4  -      Follows Commands/Answers Questions 75% of the time;able to follow multi-step instructions  -      Personal Safety mild impairment;at risk behaviors demonstrated;decreased awareness, need for assist;decreased awareness, need for safety;decreased insight to deficits  -      Personal Safety Interventions fall prevention program maintained;muscle strengthening facilitated;gait belt;nonskid shoes/slippers when out of bed  -      Recorded by [] Wander Logn, PT DPT      Bed Mobility, Assessment/Treatment    Bed Mobility, Assistive Device bed rails;draw sheet  - draw sheet;head of bed elevated;bed rails  - draw sheet;head of bed elevated  -    Bed Mobility, Scoot/Bridge, [place] contact guard assist  -      Bed Mob, Supine to Sit, [place] minimum assist (75% patient effort);verbal cues required   -LC moderate assist (50% patient effort);maximum assist (25% patient effort)  - moderate assist (50% patient effort);maximum assist (25% patient effort);2 person assist required  -JM    Bed Mobility, Safety Issues decreased use of arms for pushing/pulling;decreased use of legs for bridging/pushing;impaired trunk control for bed mobility  -LC decreased use of arms for pushing/pulling;decreased use of legs for bridging/pushing;impaired trunk control for bed mobility  -JM decreased use of arms for pushing/pulling;decreased use of legs for bridging/pushing;impaired trunk control for bed mobility  -JM    Bed Mobility, Impairments strength decreased;decreased flexibility;coordination impaired;impaired balance  - strength decreased;decreased flexibility;coordination impaired;impaired balance  - strength decreased;decreased flexibility;coordination impaired;impaired balance  -    Recorded by [LC] Wander Long, PT DPT [] Romy Alfonso PTA [JM] Romy Alfonso PTA    Transfer Assessment/Treatment    Transfers, Sit-Stand [place] moderate assist (50% patient effort);hand held assist  - maximum assist (25% patient effort);2 person assist required  - maximum assist (25% patient effort);2 person assist required  -    Transfers, Stand-Sit [place] moderate assist (50% patient effort);hand held assist  - moderate assist (50% patient effort);2 person assist required  - minimum assist (75% patient effort);2 person assist required  -    Transfers, Sit-Stand-Sit, Assist Device  standard walker  - standard walker  -    Transfer, Comment hand held assist to stand  - blocked knees , let pt pull up on wx--does that at NH   difficult to change her routine  - stood x2 w/supported knees and std wx  -JM    Recorded by [LC] Wander Long, PT DPT [JM] Romy Alfonso, PTA [JM] Romy Alfonso PTA    Gait Assessment/Treatment    Gait, [place] Level minimum assist (75% patient effort);1 person +  1 person to manage equipment  - moderate assist (50% patient effort);2 person assist required  -     Gait, Assistive Device  standard walker  -     Gait, Distance (Feet) 4  -      Gait, Safety Issues step length decreased;balance decreased during turns;sequencing ability decreased  -      Gait, Impairments strength decreased;impaired balance;pain  -      Gait, Comment steps to chair to sit  - 5 steps to chair but picked feet up today  - declined chair today--too weak at present   unsure if pt will even agree to lift into chair later w/nsg  -JM    Recorded by [] Wander Long, PT DPT [] Romy Alfonso PTA [MINA] Romy Alfonso PTA    Therapy Exercises    Bilateral Lower Extremities  AROM:;10 reps;supine;ankle pumps/circles;quad sets;sitting;LAQ   x5 LAQs  -JM AROM:;10 reps;ankle pumps/circles;sitting;LAQ   x5 LAQs  -JM    Recorded by  [MINA] Romy Alfonso PTA [JM] Romy Alfonso PTA    Positioning and Restraints    Pre-Treatment Position in bed  - in bed  - in bed  -    Post Treatment Position chair  -      In Bed   side lying left;pillow between legs;call light within reach;encouraged to call for assist;exit alarm on;notified nsg  -JM    In Chair notified nsg;reclined;call light within reach;encouraged to call for assist;exit alarm on  -LC reclined;call light within reach;encouraged to call for assist;notified nsg  -JM     Recorded by [] Wander Long, PT DPT [] Romy Alfonso PTA [MINA] Romy Alfonso PTA      01/04/18 1500          Rehab Assessment/Intervention    Discipline --  -      Document Type --  -      Subjective Information --  -MINA      Precautions/Limitations --  -JM      Recorded by [VICKI Alfonso PTA      Pain Assessment    Pain Assessment --  -MINA      Pain Score --  -      Recorded by [MINA] Romy Alfonso PTA        User Key  (r) = Recorded By, (t) = Taken By, (c) = Cosigned By    Initials Name Effective Dates    MINA Alfonso PTA 02/18/16 -      MANDY Long, PT DPT 08/02/16 -                 IP PT Goals       01/02/18 1709          Bed Mobility PT LTG    Bed Mobility PT LTG, Time to Achieve 1 wk  -CH      Bed Mobility PT LTG, Activity Type all bed mobility  -CH      Bed Mobility PT LTG, [place] Level minimum assist (75% patient effort)  -CH      Transfer Training PT LTG    Transfer Training PT LTG, Time to Achieve 1 wk  -CH      Transfer Training PT LTG, Activity Type sit to stand/stand to sit  -CH      Transfer Training PT LTG, [place] Level moderate assist (50% patient effort);2 person assist required  -CH      Transfer Training PT LTG, Assist Device walker, rolling  -CH      Transfer Training 2 PT LTG    Transfer Training PT 2 LTG, Time to Achieve 1 wk  -CH      Transfer Training PT 2 LTG, Activity Type bed to chair /chair to bed  -CH      Transfer Training PT 2 LTG, [place] Level moderate assist (50% patient effort);2 person assist required  -CH      Transfer Training PT 2 LTG, Assist Device walker, rolling  -CH        User Key  (r) = Recorded By, (t) = Taken By, (c) = Cosigned By    Initials Name Provider Type    LITZY Ayala, NAREN Physical Therapist          Physical Therapy Education     Title: PT OT SLP Therapies (Done)     Topic: Physical Therapy (Done)     Point: Mobility training (Done)    Learning Progress Summary    Learner Readiness Method Response Comment Documented by Status   Patient Acceptance JOSE HUGGINS DUNR benefits of activity, benefits of standing and walking  01/06/18 1600 Done    Acceptance JOANNA HUGGINS D VU JM 01/05/18 1700 Done    Acceptance JOSE HUGGINS 01/04/18 1756 Active    Acceptance JOANNA HUGGINS D VU, NR   01/03/18 1650 Done    Acceptance JOANNA HUGGINS D VU, NR   01/02/18 1708 Done   Family Acceptance JOANNA HUGGINS D VU, NR   01/03/18 1650 Done               Point: Home exercise program (Done)    Learning Progress Summary    Learner Readiness Method Response Comment Documented by Status   Patient Acceptance JOANNA HUGGINS D VU JM  01/05/18 1700 Done    Acceptance E,D NR   01/04/18 1756 Active    Acceptance E,TB,D VU,NR   01/03/18 1650 Done   Family Acceptance E,TB,D VU,NR   01/03/18 1650 Done               Point: Body mechanics (Done)    Learning Progress Summary    Learner Readiness Method Response Comment Documented by Status   Patient Acceptance E,TB,D VU   01/05/18 1700 Done    Acceptance E,D NR   01/04/18 1756 Active    Acceptance E,TB,D VU,NR   01/03/18 1650 Done    Acceptance E,TB,D VU,NR   01/02/18 1708 Done   Family Acceptance E,TB,D VU,NR   01/03/18 1650 Done               Point: Precautions (Done)    Learning Progress Summary    Learner Readiness Method Response Comment Documented by Status   Patient Acceptance E,TB,D VU   01/05/18 1700 Done    Acceptance E,D NR   01/04/18 1756 Active    Acceptance E,TB,D VU,NR   01/03/18 1650 Done    Acceptance E,TB,D VU,NR   01/02/18 1708 Done   Family Acceptance E,TB,D VU,NR   01/03/18 1650 Done                      User Key     Initials Effective Dates Name Provider Type Discipline     12/01/15 -  Maureen Ayala, PT Physical Therapist PT     02/18/16 -  Romy Alfonso PTA Physical Therapy Assistant PT     08/02/16 -  Wander Long, PT DPT Physical Therapist PT                    PT Recommendation and Plan  Anticipated Discharge Disposition: skilled nursing facility  Planned Therapy Interventions: balance training, bed mobility training, gait training, home exercise program, patient/family education, transfer training  PT Frequency: 5 times/wk  Plan of Care Review  Plan Of Care Reviewed With: patient  Progress: improving  Outcome Summary/Follow up Plan: Pt transferred supine to sit with bed rail and draw sheet and min x 2. Pt performed sit to stand with mod x 1 and 1 person spotting. Pt ambulated 4 ft to chair with mod x 1 and 1 person spotting.           Outcome Measures       01/06/18 1600 01/05/18 1700 01/04/18 1700    How much help from another person do  you currently need...    Turning from your back to your side while in flat bed without using bedrails? 3  -LC 2  -JM 2  -JM    Moving from lying on back to sitting on the side of a flat bed without bedrails? 3  -LC 2  -JM 2  -JM    Moving to and from a bed to a chair (including a wheelchair)? 2  -LC 2  -JM 1  -JM    Standing up from a chair using your arms (e.g., wheelchair, bedside chair)? 2  -LC 2  -JM 2  -JM    Climbing 3-5 steps with a railing? 1  -LC 1  -JM 1  -JM    To walk in hospital room? 2  -LC 1  -JM 1  -JM    AM-PAC 6 Clicks Score 13  -LC 10  -JM 9  -JM    Functional Assessment    Outcome Measure Options AM-PAC 6 Clicks Basic Mobility (PT)  -        User Key  (r) = Recorded By, (t) = Taken By, (c) = Cosigned By    Initials Name Provider Type    MINA Alfonso PTA Physical Therapy Assistant    MANDY Long, PT DPT Physical Therapist           Time Calculation:         PT Charges       01/06/18 1602          Time Calculation    Start Time 1545  -      Stop Time 1602  -      Time Calculation (min) 17 min  -      PT Received On 01/06/18  -      PT - Next Appointment 01/08/18  -      Time Calculation- PT    Total Timed Code Minutes- PT 17 minute(s)  -        User Key  (r) = Recorded By, (t) = Taken By, (c) = Cosigned By    Initials Name Provider Type    MANDY Long, PT DPT Physical Therapist          Therapy Charges for Today     Code Description Service Date Service Provider Modifiers Qty    90026832051  PT THERAPEUTIC ACT EA 15 MIN 1/6/2018 Wander Long, PT DPT GP 1          PT G-Codes  PT Professional Judgement Used?: Yes  Outcome Measure Options: AM-PAC 6 Clicks Basic Mobility (PT)  Functional Limitation: Mobility: Walking and moving around  Mobility: Walking and Moving Around Current Status (): At least 80 percent but less than 100 percent impaired, limited or restricted  Mobility: Walking and Moving Around Goal Status (): At least 80 percent but less than 100  percent impaired, limited or restricted  Mobility: Walking and Moving Around Discharge Status (): At least 80 percent but less than 100 percent impaired, limited or restricted    Wander Long, PT DPT  1/6/2018

## 2018-01-06 NOTE — PROGRESS NOTES
DAILY PROGRESS NOTE  [facility], [facility]    2018    Patient Identification:  Name: Cynthia Arreola  Age: 89 y.o.  Sex: female  :  1927  MRN: [id number]           Primary Care Physician: Param Martinez MD    Subjective:    Interval History:    Patient is feeling okay today, saturation room air is 92-94%.  She slept better.    She is following commands without any problems.  She does not show any signs of focal deficit.    Spoke with son, all questions answered    ROS:     No nausea, vomiting, diarrhea, constipation.Denies CP. Continues with CUEVAS     Objective:    Scheduled Meds:    amLODIPine 10 mg Oral Daily   aspirin 81 mg Oral Daily   atorvastatin 10 mg Oral Nightly   budesonide-formoterol 2 puff Inhalation BID - RT   clopidogrel 75 mg Oral Daily   docusate sodium 200 mg Oral Daily   enoxaparin 30 mg Subcutaneous Q24H   Ferrex 150 forte plus 1 capsule Oral Daily With Breakfast   furosemide 40 mg Oral BID   glipiZIDE 2.5 mg Oral BID AC   hydrALAZINE 50 mg Oral Q8H   insulin aspart 0-7 Units Subcutaneous 4x Daily With Meals & Nightly   insulin detemir 25 Units Subcutaneous Nightly   ipratropium-albuterol 3 mL Nebulization Q4H - RT   isosorbide mononitrate 30 mg Oral Daily   levothyroxine 100 mcg Oral QAM   linagliptin 5 mg Oral Daily   metoclopramide 5 mg Oral 4x Daily AC & at Bedtime   metoprolol succinate XL 50 mg Oral Daily   pantoprazole 40 mg Oral QAM   potassium chloride 40 mEq Oral BID   predniSONE 10 mg Oral BID With Meals   rOPINIRole 0.25 mg Oral Nightly       Continuous Infusions:       PRN Meds:  •  acetaminophen  •  dextrose  •  dextrose  •  glucagon (human recombinant)  •  hydrALAZINE  •  HYDROcodone-acetaminophen  •  influenza vaccine  •  ipratropium-albuterol  •  nitroglycerin  •  pneumococcal polysaccharide 23-valent  •  polyethylene glycol  •  potassium chloride **OR** potassium chloride **OR** potassium chloride  •  promethazine  •  sodium  "chloride    Intake/Output:  No intake or output data in the 24 hours ending 18 0857      Exam:    tMax 24 hrs: Temp (24hrs), Av.1 °F (36.2 °C), Min:96 °F (35.6 °C), Max:98.1 °F (36.7 °C)    Vitals Ranges:   Temp:  [96 °F (35.6 °C)-98.1 °F (36.7 °C)] 96 °F (35.6 °C)  Heart Rate:  [59-62] 59  Resp:  [14-20] 18  BP: (151-189)/(58-80) 177/65    /65 (BP Location: Left arm, Patient Position: Lying)  Pulse 59  Temp 96 °F (35.6 °C) (Oral)   Resp 18  Ht 160 cm (63\")  Wt 78.2 kg (172 lb 4.8 oz)  SpO2 94%  BMI 30.52 kg/m2    General: Alert, oriented x 3. Cooperative, no distress, appears stated age  Neck: Supple, symmetrical, trachea midline, no adenopathy;              thyroid:  no enlargement/tenderness/nodules;              no carotid bruit or JVD  Cardiovascular: Normal rate, regular rhythm and intact distal pulses.              Exam reveals no gallop and no friction rub. No murmur heard  Chest wall: No tenderness or deformity  Pulmonary:  decreased breath sounds. Expiratory wheeze bilateral bases, L > R. .  Rhonchi bilaterally, no rales.    Abdominal: Soft, non-tender, bowel sounds active all four quadrants,     no masses, no hepatomegaly, no splenomegaly.   Extremities: Normal, atraumatic, no cyanosis. + edema both LE or edema  Pulses: 2 + symmetric all extremities  Neurological: She is alert and oriented to person, place, and time. No focal motor or sensory deficits                CNII-XII intact, normal strength, sensation intact throughout  Skin: Healing ulcer on the dorsum of her right foot, small area healing on the dorsum of her right heel. 1.5 cm ulcerated area with mild surrounding erythema           Data Review:      Results from last 7 days  Lab Units 18  0605 18  0533 18  0514   WBC 10*3/mm3 8.23 9.59 7.53   HEMOGLOBIN g/dL 12.5 12.8 12.5   HEMATOCRIT % 41.0 42.2 40.3   PLATELETS 10*3/mm3 225 234 238         Results from last 7 days  Lab Units 18  0605 " 01/05/18  0533 01/04/18 2209 01/04/18  0514  01/01/18  0631   SODIUM mmol/L 137 140  --  141  < > 142   POTASSIUM mmol/L 4.1 5.0 4.2 2.8*  < > 3.6   CHLORIDE mmol/L 96* 98  --  97*  < > 105   CO2 mmol/L 30.1* 31.6*  --  30.3*  < > 23.6   BUN mg/dL 38* 36*  --  40*  < > 28*   CREATININE mg/dL 1.34* 1.43*  --  1.60*  < > 1.17*   CALCIUM mg/dL 9.1 9.1  --  8.9  < > 8.7   BILIRUBIN mg/dL  --   --   --   --   --  0.2   ALK PHOS U/L  --   --   --   --   --  146*   ALT (SGPT) U/L  --   --   --   --   --  25   AST (SGOT) U/L  --   --   --   --   --  15   GLUCOSE mg/dL 322* 224*  --  137*  < > 207*   < > = values in this interval not displayed.                Lab Results  Lab Value Date/Time   TROPONINT 0.069 (H) 12/29/2017 1039   TROPONINT 0.101 (C) 12/28/2017 2054   TROPONINT <0.010 01/16/2017 0248   TROPONINT <0.010 12/28/2016 0039   TROPONINT <0.010 12/27/2016 1752   TROPONINT <0.010 12/27/2016 0617       Microbiology Results (last 10 days)     Procedure Component Value - Date/Time    MRSA Screen Culture - Swab, Nares [[id number]]  (Normal) Collected:  12/30/17 0036    Lab Status:  Final result Specimen:  Swab from Nares Updated:  01/01/18 0912     MRSA SCREEN CX No Methicillin Resistant Staphylococcus aureus isolated    Respiratory Panel, PCR - Swab, Nasopharynx [[id number]]  (Abnormal) Collected:  12/29/17 1923    Lab Status:  Final result Specimen:  Swab from Nasopharynx Updated:  12/29/17 2201     ADENOVIRUS, PCR Not Detected     Coronavirus 229E Not Detected     Coronavirus HKU1 Not Detected     Coronavirus NL63 Not Detected     Coronavirus OC43 Not Detected     Human Metapneumovirus Not Detected     Human Rhinovirus/Enterovirus Not Detected     Influenza B PCR Not Detected     Parainfluenza Virus 1 Not Detected     Parainfluenza Virus 2 Not Detected     Parainfluenza Virus 3 Not Detected     Parainfluenza Virus 4 Not Detected     Bordetella pertussis pcr Not Detected     Influenza A H1N1 2009 PCR Detected (A)      Chlamydophila pneumoniae PCR Not Detected     Mycoplasma pneumo by PCR Not Detected     Influenza A PCR Not Detected     Influenza A H3 Not Detected     Influenza A H1 Not Detected     RSV, PCR Not Detected    Influenza Antigen, Rapid - Swab, Nasopharynx [[id number]]  (Normal) Collected:  12/28/17 2218    Lab Status:  Final result Specimen:  Swab from Nasopharynx Updated:  12/28/17 2252     Influenza A Ag, EIA Negative     Influenza B Ag, EIA Negative           Imaging Results (last 7 days)     Procedure Component Value Units Date/Time    XR Chest 2 View [[id number]] Collected:  12/28/17 2220     Updated:  12/29/17 2312    Narrative:       CHEST PA AND LATERAL.     HISTORY: Cough and congestion.     COMPARISON: 01/16/2017.     FINDINGS:  Mild cardiomegaly.         There is no consolidation or effusion. Mild pulmonary congestion  superimposed on emphysema.          Impression:       Pulmonary congestion, overall no significant change.         This report was finalized on 12/29/2017 11:09 PM by Dr. Fransisco Yoo MD.           2D echo:  · Left ventricular systolic function is normal. Estimated EF = 55%.  · Left ventricular diastolic dysfunction (grade I) consistent with impaired relaxation.  · Mild mitral valve regurgitation is present  · Mild tricuspid valve regurgitation is present.  · Estimated right ventricular systolic pressure from tricuspid regurgitation is normal (<35 mmHg).  · There is calcification of the aortic root and valve.  · Mild aortic valve stenosis is present.    Assessment:      Principal Problem:    H1N1 influenza  Active Problems:    Bronchitis with bronchospasm    KOFI (acute kidney injury)    Hypokalemia    Elevated LFTs    COPD exacerbation    Chronic kidney disease, stage II (mild)    Ischemic cardiomyopathy    Hypertension, essential    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    DM (diabetes mellitus)    Gastroesophageal reflux disease with esophagitis  Diastolic congestive  heart failure acute on chronic.         Plan:      Continue current supportive therapy: duonebs, steroids, PO lasix  Wean steroids to off  Monitor and correct electrolytes  Adjust insulin as needed  Monitor mental status  Continue home medications  PT to see pt  DVT/stress ulcer prophylaxis  Labs in am  DC tomas Moya MD  1/6/2018  8:57 AM

## 2018-01-06 NOTE — PLAN OF CARE
Problem: Patient Care Overview (Adult)  Goal: Plan of Care Review  Outcome: Ongoing (interventions implemented as appropriate)   01/06/18 1601   Coping/Psychosocial Response Interventions   Plan Of Care Reviewed With patient   Patient Care Overview   Progress improving   Outcome Evaluation   Outcome Summary/Follow up Plan Pt transferred supine to sit with bed rail and draw sheet and min x 2. Pt performed sit to stand with mod x 1 and 1 person spotting. Pt ambulated 4 ft to chair with mod x 1 and 1 person spotting.

## 2018-01-06 NOTE — PLAN OF CARE
Problem: Patient Care Overview (Adult)  Goal: Plan of Care Review  Outcome: Ongoing (interventions implemented as appropriate)   01/06/18 0228   Coping/Psychosocial Response Interventions   Plan Of Care Reviewed With patient   Patient Care Overview   Progress progress toward functional goals as expected   Outcome Evaluation   Outcome Summary/Follow up Plan VSS,pt currently tolerating room air. oral abx and oral tamiflu complete, awaiting family decision on facility.     Goal: Adult Individualization and Mutuality  Outcome: Ongoing (interventions implemented as appropriate)    Goal: Discharge Needs Assessment  Outcome: Ongoing (interventions implemented as appropriate)      Problem: Fall Risk (Adult)  Goal: Absence of Falls  Outcome: Ongoing (interventions implemented as appropriate)      Problem: Pressure Ulcer Risk (Akshat Scale) (Adult,Obstetrics,Pediatric)  Goal: Skin Integrity  Outcome: Ongoing (interventions implemented as appropriate)      Problem: Respiratory Insufficiency (Adult)  Goal: Acid/Base Balance  Outcome: Ongoing (interventions implemented as appropriate)    Goal: Effective Ventilation  Outcome: Ongoing (interventions implemented as appropriate)      Problem: Renal Failure/Kidney Injury, Acute (Adult)  Goal: Signs and Symptoms of Listed Potential Problems Will be Absent or Manageable (Renal Failure/Kidney Injury, Acute)  Outcome: Ongoing (interventions implemented as appropriate)

## 2018-01-06 NOTE — PROGRESS NOTES
"  PROGRESS NOTE   LOS: 8 days   Patient Care Team:  Param Martinez MD as PCP - General (Family Medicine)  Meghann Serna, RN as Care Coordinator (Population Health)    Chief Complaint: Admitted with influenza and pneumonia and hypoxemia, doing better    Interval History: Patient finished her course of Tamiflu and antibiotic and she has been afebrile.  She had rhonchus spasm and she is on bronchodilator and steroid taper.  She had some edema and she was resumed on her Lasix oral regimen and she seems to be volume compensated at this point.  She did not have any follow-up chest x-ray after the admission films.  She is overall feeling well she is able to work with physical therapy and seems to be ready for discharge in a.m. if her x-ray is looking better    REVIEW OF SYSTEMS:   CARDIOVASCULAR: No chest pain, chest pressure or chest discomfort. No palpitations or edema.   RESPIRATORY: Improved shortness of breath still positive cough and some wheezes with no productive secretion.   GASTROINTESTINAL: No anorexia, nausea, vomiting or diarrhea. No abdominal pain or blood.   HEMATOLOGIC: No bleeding or bruising.     Ventilator/Non-Invasive Ventilation Settings     None            Vital Signs  Temp:  [96 °F (35.6 °C)-98.6 °F (37 °C)] 97.8 °F (36.6 °C)  Heart Rate:  [59-62] 61  Resp:  [18-20] 18  BP: (160-189)/(58-89) 179/89  SpO2:  [91 %-95 %] 94 %  on  Flow (L/min):  [1] 1 O2 Device: room air    Intake/Output Summary (Last 24 hours) at 01/06/18 1827  Last data filed at 01/06/18 1300   Gross per 24 hour   Intake              550 ml   Output                0 ml   Net              550 ml     Flowsheet Rows         First Filed Value    Admission Height  160 cm (63\") Documented at 12/28/2017 2016    Admission Weight  73.9 kg (163 lb) Documented at 12/28/2017 2016        Body mass index is 30.52 kg/(m^2).  Last 3 weights    12/28/17 2016 12/29/17  0452   Weight: 73.9 kg (163 lb) 78.2 kg (172 lb 4.8 oz)       Physical " Exam:  GEN:  No acute distress, alert, cooperative, well developed , On room air  EYES:   Sclera clear. No icterus. PERRL. Normal EOM  ENT:   External ears/nose normal, no oral lesions, no thrush, mucous membranes moist  NECK:  Supple, midline trachea, no JVD  LUNGS: Normal chest on inspection, positive expiratory wheezes without prolongation of the expiratory phase, minimal crackles. Respirations regular, even and unlabored.   CV:  Regular rhythm and rate. Normal S1/S2. No murmurs, gallops, or rubs noted.  ABD:  Soft, non-tender and non-distended. Normal bowel sounds. No guarding, colostomy bag on the left with green colored stool  EXT:  Moves all extremities well. No cyanosis. No redness. No edema.   Skin: dry, intact, no bleeding    Results Review:        Results from last 7 days  Lab Units 01/06/18  0605 01/05/18  0533 01/04/18  2209 01/04/18  0514 01/03/18  0405 01/02/18  0445 01/01/18  0631 12/31/17  0421   SODIUM mmol/L 137 140  --  141 139 139 142 142   POTASSIUM mmol/L 4.1 5.0 4.2 2.8* 3.5 3.6 3.6 4.2   CHLORIDE mmol/L 96* 98  --  97* 96* 103 105 107   CO2 mmol/L 30.1* 31.6*  --  30.3* 30.8* 23.6 23.6 22.3   BUN mg/dL 38* 36*  --  40* 33* 22 28* 31*   CREATININE mg/dL 1.34* 1.43*  --  1.60* 1.72* 1.11* 1.17* 1.40*   CALCIUM mg/dL 9.1 9.1  --  8.9 8.6 9.0 8.7 8.6   AST (SGOT) U/L  --   --   --   --   --   --  15  --    ALT (SGPT) U/L  --   --   --   --   --   --  25  --    ANION GAP mmol/L 10.9 10.4  --  13.7 12.2 12.4 13.4 12.7   ALBUMIN g/dL  --   --   --   --   --   --  3.10*  --                Results from last 7 days  Lab Units 01/03/18  0405 01/02/18  0445   PROBNP pg/mL 3434.0* 3495.0*       Results from last 7 days  Lab Units 01/06/18  0605 01/05/18  0533 01/04/18  0514 01/03/18  0405 01/02/18  0445 01/01/18  0631 12/31/17  0421   WBC 10*3/mm3 8.23 9.59 7.53 8.12 13.24* 14.04* 8.19   HEMOGLOBIN g/dL 12.5 12.8 12.5 11.7* 12.4 11.6* 11.2*   HEMATOCRIT % 41.0 42.2 40.3 38.2 41.1 38.0 36.9   PLATELETS  10*3/mm3 225 234 238 189 164 192 183   MCV fL 94.7 95.7 93.9 94.8 96.3 95.7 95.8   NEUTROPHIL % % 71.5 74.3 54.9  --   --   --   --    LYMPHOCYTE % % 17.6* 14.3* 28.2  --   --   --   --    MONOCYTES % % 6.7 6.8 10.4  --   --   --   --    EOSINOPHIL % % 0.0* 0.9 1.6  --   --   --   --    BASOPHIL % % 0.4 0.3 0.5  --   --   --   --    IMM GRAN % % 3.8* 3.4* 4.4*  --   --   --   --            Results from last 7 days  Lab Units 01/05/18  0533   MAGNESIUM mg/dL 2.2           Results from last 7 days  Lab Units 01/02/18  0317   PH, ARTERIAL pH units 7.360   PCO2, ARTERIAL mm Hg 50.1*   PO2 ART mm Hg 85.9   HCO3 ART mmol/L 28.3*         Glucose   Date/Time Value Ref Range Status   01/06/2018 1634 246 (H) 70 - 130 mg/dL Final   01/06/2018 1138 321 (H) 70 - 130 mg/dL Final   01/06/2018 0856 253 (H) 70 - 130 mg/dL Final   01/05/2018 2046 325 (H) 70 - 130 mg/dL Final   01/05/2018 1630 365 (H) 70 - 130 mg/dL Final   01/05/2018 1143 368 (H) 70 - 130 mg/dL Final   01/05/2018 0745 228 (H) 70 - 130 mg/dL Final   01/04/2018 2020 216 (H) 70 - 130 mg/dL Final                           Imaging:   Imaging Results (all)     Procedure Component Value Units Date/Time    XR Chest 2 View [[id number]] Collected:  12/28/17 2220     Updated:  12/29/17 2312    Narrative:       CHEST PA AND LATERAL.     HISTORY: Cough and congestion.     COMPARISON: 01/16/2017.     FINDINGS:  Mild cardiomegaly.         There is no consolidation or effusion. Mild pulmonary congestion  superimposed on emphysema.          Impression:       Pulmonary congestion, overall no significant change.         This report was finalized on 12/29/2017 11:09 PM by Dr. Fransisco Yoo MD.       XR Chest PA & Lateral [[id number]] Collected:  12/31/17 1016     Updated:  12/31/17 1020    Narrative:       TWO-VIEW CHEST     HISTORY: Flu symptoms. Cough.     There is mild cardiomegaly with a pacemaker in place and there is  further interstitial prominence and slight alveolar haziness  compared to  the study of 3 days ago and most consistent with changes of mild  congestive heart failure.     This report was finalized on 12/31/2017 10:17 AM by Dr. Joe Hart MD.       XR Chest 1 View [[id number]] Collected:  01/02/18 0352     Updated:  01/03/18 0224    Narrative:       X-RAY CHEST 1 VIEW.     HISTORY: Shortness of breath.     COMPARISON: 12/31/2017.     FINDINGS:  Cardiomegaly, worsening pulmonary congestion.         Bilateral tiny effusions are suspected.              Impression:       Severe congestive heart failure, interval worsening.         This report was finalized on 1/3/2018 2:21 AM by Dr. Fransisco Yoo MD.             I reviewed the patient's new clinical results.  I personally viewed and interpreted the patient's imaging results:Bilateral pulmonary edema with some more dense processes over the bases with possible superimposed pneumonia component        Medication Review:     amLODIPine 10 mg Oral Daily   aspirin 81 mg Oral Daily   atorvastatin 10 mg Oral Nightly   budesonide-formoterol 2 puff Inhalation BID - RT   clopidogrel 75 mg Oral Daily   docusate sodium 200 mg Oral Daily   enoxaparin 30 mg Subcutaneous Q24H   Ferrex 150 forte plus 1 capsule Oral Daily With Breakfast   furosemide 40 mg Oral BID   glipiZIDE 2.5 mg Oral BID AC   hydrALAZINE 50 mg Oral Q8H   insulin aspart 0-7 Units Subcutaneous 4x Daily With Meals & Nightly   insulin detemir 25 Units Subcutaneous Nightly   ipratropium-albuterol 3 mL Nebulization Q4H - RT   isosorbide mononitrate 30 mg Oral Daily   levothyroxine 100 mcg Oral QAM   linagliptin 5 mg Oral Daily   metoclopramide 5 mg Oral 4x Daily AC & at Bedtime   metoprolol succinate XL 50 mg Oral Daily   pantoprazole 40 mg Oral QAM   predniSONE 10 mg Oral BID With Meals   rOPINIRole 0.25 mg Oral Nightly            ASSESSMENT:   H1 N1 influenza  Acute bronchitis  COPD with acute exacerbation  Acute hypoxemic respiratory failure  Metabolic encephalopathy  Ischemic  cardiomyopathy  CKD II, With acute worsening initially with improvement over the last several days  GERD with esophagitis  Diabetes mellitus  Weakness/immobility  Hypertension    PLAN:  Chest x-ray in a.m. to follow-up on the resolution of the infiltrate and edema  Patient seems to be compensating very well on room air off the antibiotic was no sign of sepsis  Patient is back on her diuretics was no sign of volume overload  She still have some wheezes on exam and needs to continue with the steroids    Discussed with patient and staff    Disposition: Discharge tomorrow is okay from the pulmonary standpoint especially the x-ray is looking better    Debo Nelson MD  01/06/18  6:27 PM       EMR Dragon/Transcription:   Much of this encounter note is an electronic transcription/translation of spoken language to printed text. The electronic translation of spoken language may permit erroneous, or at times, nonsensical words or phrases to be inadvertently transcribed; Although I have reviewed the note for such errors, some may still exist.

## 2018-01-06 NOTE — PROGRESS NOTES
"  Infectious Diseases Progress Note    Vikas Parra MD     [facility]  [place]: 8 days  Patient Identification:  Name: Cynthia Arreola  Age: 89 y.o.  Sex: female  :  1927  MRN: [id number]         Primary Care Physician: Param Martinez MD            Subjective: Feeling better breathing is better.  Interval History: See consultation note.    Objective:    Scheduled Meds:    amLODIPine 10 mg Oral Daily   aspirin 81 mg Oral Daily   atorvastatin 10 mg Oral Nightly   budesonide-formoterol 2 puff Inhalation BID - RT   clopidogrel 75 mg Oral Daily   docusate sodium 200 mg Oral Daily   enoxaparin 30 mg Subcutaneous Q24H   Ferrex 150 forte plus 1 capsule Oral Daily With Breakfast   furosemide 40 mg Oral BID   glipiZIDE 2.5 mg Oral BID AC   hydrALAZINE 50 mg Oral Q8H   insulin aspart 0-7 Units Subcutaneous 4x Daily With Meals & Nightly   insulin detemir 25 Units Subcutaneous Nightly   ipratropium-albuterol 3 mL Nebulization Q4H - RT   isosorbide mononitrate 30 mg Oral Daily   levothyroxine 100 mcg Oral QAM   linagliptin 5 mg Oral Daily   metoclopramide 5 mg Oral 4x Daily AC & at Bedtime   metoprolol succinate XL 50 mg Oral Daily   pantoprazole 40 mg Oral QAM   predniSONE 10 mg Oral BID With Meals   rOPINIRole 0.25 mg Oral Nightly     Continuous Infusions:       Vital signs in last 24 hours:  Temp:  [96 °F (35.6 °C)-98.6 °F (37 °C)] 97.8 °F (36.6 °C)  Heart Rate:  [59-62] 61  Resp:  [18-20] 18  BP: (160-189)/(58-89) 179/89    Intake/Output:    Intake/Output Summary (Last 24 hours) at 18 1743  Last data filed at 18 1300   Gross per 24 hour   Intake              550 ml   Output                0 ml   Net              550 ml       Exam:  /89 (BP Location: Left arm, Patient Position: Lying)  Pulse 61  Temp 97.8 °F (36.6 °C) (Oral)   Resp 18  Ht 160 cm (63\")  Wt 78.2 kg (172 lb 4.8 oz)  SpO2 94%  BMI 30.52 kg/m2    General Appearance:    Feeling better denies any specific complaints.  " Comfortable and did participate in physical therapy.                          Head:    Normocephalic, without obvious abnormality, atraumatic                           Eyes:    PERRL, conjunctiva/corneas clear, EOM's intact, both eyes                         Throat:   Lips, tongue, gums normal; oral mucosa pink and moist                           Neck:   Supple, symmetrical, trachea midline, no JVD                         Lungs:   Improved air entry no obvious rhonchi and crackles heard..                 Chest Wall:    No tenderness or deformity                          Heart:    Regular rate and rhythm, S1 and S2 normal, no murmur,no  Rub                                      or gallop                  Abdomen:     Soft, non-tender, bowel sounds active, no masses, no                                                        organomegaly                  Extremities:   Extremities normal, atraumatic, no cyanosis, 1+ edema                        Pulses:   Pulses palpable in all extremities                            Skin:   Skin is warm and dry,  no rashes or palpable lesions                    Data Review:    I reviewed the patient's new clinical results.    Results from last 7 days  Lab Units 01/06/18  0605 01/05/18  0533 01/04/18  0514 01/03/18  0405 01/02/18  0445 01/01/18  0631 12/31/17  0421   WBC 10*3/mm3 8.23 9.59 7.53 8.12 13.24* 14.04* 8.19   HEMOGLOBIN g/dL 12.5 12.8 12.5 11.7* 12.4 11.6* 11.2*   PLATELETS 10*3/mm3 225 234 238 189 164 192 183       Results from last 7 days  Lab Units 01/06/18  0605 01/05/18  0533 01/04/18  2209 01/04/18  0514 01/03/18  0405 01/02/18  0445 01/01/18  0631 12/31/17  0421   SODIUM mmol/L 137 140  --  141 139 139 142 142   POTASSIUM mmol/L 4.1 5.0 4.2 2.8* 3.5 3.6 3.6 4.2   CHLORIDE mmol/L 96* 98  --  97* 96* 103 105 107   CO2 mmol/L 30.1* 31.6*  --  30.3* 30.8* 23.6 23.6 22.3   BUN mg/dL 38* 36*  --  40* 33* 22 28* 31*   CREATININE mg/dL 1.34* 1.43*  --  1.60* 1.72* 1.11* 1.17*  1.40*   CALCIUM mg/dL 9.1 9.1  --  8.9 8.6 9.0 8.7 8.6   GLUCOSE mg/dL 322* 224*  --  137* 277* 132* 207* 299*       Microbiology Results (last 10 days)     Procedure Component Value - Date/Time    MRSA Screen Culture - Swab, Nares [[id number]]  (Normal) Collected:  12/30/17 0036    Lab Status:  Final result Specimen:  Swab from Nares Updated:  01/01/18 0912     MRSA SCREEN CX No Methicillin Resistant Staphylococcus aureus isolated    Respiratory Panel, PCR - Swab, Nasopharynx [[id number]]  (Abnormal) Collected:  12/29/17 1923    Lab Status:  Final result Specimen:  Swab from Nasopharynx Updated:  12/29/17 2201     ADENOVIRUS, PCR Not Detected     Coronavirus 229E Not Detected     Coronavirus HKU1 Not Detected     Coronavirus NL63 Not Detected     Coronavirus OC43 Not Detected     Human Metapneumovirus Not Detected     Human Rhinovirus/Enterovirus Not Detected     Influenza B PCR Not Detected     Parainfluenza Virus 1 Not Detected     Parainfluenza Virus 2 Not Detected     Parainfluenza Virus 3 Not Detected     Parainfluenza Virus 4 Not Detected     Bordetella pertussis pcr Not Detected     Influenza A H1N1 2009 PCR Detected (A)     Chlamydophila pneumoniae PCR Not Detected     Mycoplasma pneumo by PCR Not Detected     Influenza A PCR Not Detected     Influenza A H3 Not Detected     Influenza A H1 Not Detected     RSV, PCR Not Detected    Influenza Antigen, Rapid - Swab, Nasopharynx [[id number]]  (Normal) Collected:  12/28/17 2218    Lab Status:  Final result Specimen:  Swab from Nasopharynx Updated:  12/28/17 2252     Influenza A Ag, EIA Negative     Influenza B Ag, EIA Negative          Assessment:  Principal Problem:    H1N1 influenza  Active Problems:    Chronic kidney disease, stage II (mild)    Ischemic cardiomyopathy    Hypertension, essential    Gastroesophageal reflux disease with esophagitis    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  She continues to do well off of antibiotic therapy.  · Evidence of volume overload related issues noted.  Doubt she had aspiration pneumonia.  Strongly suggest discontinuation of antibiotics as his status improved significantly with diuretics and a very short period of time.  · Continue treatment with Tamiflu and inhalers and supportive care.    · Continue to observe off of antibiotic therapy.    · Her leukocytosis could very well be due to steroid effect.    · Need to monitor her clinical course if she changes her shows evidence of decline and may need workup to rule out postviral pneumonia versus C. difficile infections and so forth.    Vikas Parra MD  1/6/2018  5:43 PM    Much of this encounter note is an electronic transcription/translation of spoken language to printed text. The electronic translation of spoken language may permit erroneous, or at times, nonsensical words or phrases to be inadvertently transcribed; Although I have reviewed the note for such errors, some may still exist

## 2018-01-07 NOTE — PLAN OF CARE
Problem: Patient Care Overview (Adult)  Goal: Plan of Care Review  Outcome: Ongoing (interventions implemented as appropriate)   01/07/18 0438   Coping/Psychosocial Response Interventions   Plan Of Care Reviewed With patient   Patient Care Overview   Progress improving   Outcome Evaluation   Outcome Summary/Follow up Plan VSS, no c/o pain, tolerating RA, prednisone taper, BP getting better control this PM, q2h turn, plan to return to [facility] on d/c continue to monitor.      Goal: Adult Individualization and Mutuality  Outcome: Ongoing (interventions implemented as appropriate)    Goal: Discharge Needs Assessment  Outcome: Ongoing (interventions implemented as appropriate)   01/07/18 0438   Discharge Needs Assessment   Discharge Disposition still a patient       Problem: Fall Risk (Adult)  Goal: Absence of Falls  Outcome: Ongoing (interventions implemented as appropriate)   01/07/18 0438   Fall Risk (Adult)   Absence of Falls making progress toward outcome       Problem: Pressure Ulcer Risk (Akshat Scale) (Adult,Obstetrics,Pediatric)  Goal: Skin Integrity  Outcome: Ongoing (interventions implemented as appropriate)   01/07/18 0438   Pressure Ulcer Risk (Akshat Scale) (Adult,Obstetrics,Pediatric)   Skin Integrity making progress toward outcome       Problem: Respiratory Insufficiency (Adult)  Goal: Acid/Base Balance  Outcome: Ongoing (interventions implemented as appropriate)   01/07/18 0438   Respiratory Insufficiency (Adult)   Acid/Base Balance making progress toward outcome     Goal: Effective Ventilation  Outcome: Ongoing (interventions implemented as appropriate)   01/07/18 0438   Respiratory Insufficiency (Adult)   Effective Ventilation making progress toward outcome       Problem: Renal Failure/Kidney Injury, Acute (Adult)  Goal: Signs and Symptoms of Listed Potential Problems Will be Absent or Manageable (Renal Failure/Kidney Injury, Acute)  Outcome: Ongoing (interventions implemented as appropriate)    01/07/18 0438   Renal Failure/Kidney Injury, Acute   Problems Assessed (Acute Renal Failure/Kidney Injury) all   Problems Present (Acute Renal Failure/Kidney Injury) situational response;hypertension

## 2018-01-07 NOTE — PLAN OF CARE
Problem: Patient Care Overview (Adult)  Goal: Plan of Care Review  Outcome: Ongoing (interventions implemented as appropriate)   01/07/18 0438 01/07/18 1422   Coping/Psychosocial Response Interventions   Plan Of Care Reviewed With patient --    Patient Care Overview   Progress improving --    Outcome Evaluation   Outcome Summary/Follow up Plan --  VSS, blood sugar and blood pressure improving, prednisone d/c, up in chair for a couple hours, no c/o pain, encouraging patients to do breathing treatments and pulmonary hygiene, possible d/c tomorrow     Goal: Adult Individualization and Mutuality  Outcome: Ongoing (interventions implemented as appropriate)      Problem: Fall Risk (Adult)  Goal: Absence of Falls  Outcome: Ongoing (interventions implemented as appropriate)      Problem: Pressure Ulcer Risk (Akshat Scale) (Adult,Obstetrics,Pediatric)  Goal: Skin Integrity  Outcome: Ongoing (interventions implemented as appropriate)      Problem: Respiratory Insufficiency (Adult)  Goal: Acid/Base Balance  Outcome: Ongoing (interventions implemented as appropriate)    Goal: Effective Ventilation  Outcome: Ongoing (interventions implemented as appropriate)      Problem: Renal Failure/Kidney Injury, Acute (Adult)  Goal: Signs and Symptoms of Listed Potential Problems Will be Absent or Manageable (Renal Failure/Kidney Injury, Acute)  Outcome: Ongoing (interventions implemented as appropriate)

## 2018-01-07 NOTE — PROGRESS NOTES
"  Infectious Diseases Progress Note    Vikas Parra MD     [facility]  [place]: 9 days  Patient Identification:  Name: Cynthia Arreola  Age: 89 y.o.  Sex: female  :  1927  MRN: [id number]         Primary Care Physician: Param Martinez MD            Subjective: Doing well and wanting to know when can she go home.  Denies cough fever chills or stuffy nose.  Interval History: See consultation note.    Objective:    Scheduled Meds:    amLODIPine 10 mg Oral Daily   aspirin 81 mg Oral Daily   atorvastatin 10 mg Oral Nightly   budesonide-formoterol 2 puff Inhalation BID - RT   clopidogrel 75 mg Oral Daily   docusate sodium 200 mg Oral Daily   enoxaparin 30 mg Subcutaneous Q24H   Ferrex 150 forte plus 1 capsule Oral Daily With Breakfast   furosemide 40 mg Oral BID   glipiZIDE 5 mg Oral BID AC   hydrALAZINE 75 mg Oral Q8H   insulin aspart 0-7 Units Subcutaneous 4x Daily With Meals & Nightly   insulin detemir 25 Units Subcutaneous Nightly   ipratropium-albuterol 3 mL Nebulization Q6H - RT   isosorbide mononitrate 30 mg Oral Daily   levothyroxine 100 mcg Oral QAM   linagliptin 5 mg Oral Daily   metoclopramide 5 mg Oral 4x Daily AC & at Bedtime   metoprolol succinate XL 50 mg Oral Daily   pantoprazole 40 mg Oral QAM   rOPINIRole 0.25 mg Oral Nightly     Continuous Infusions:       Vital signs in last 24 hours:  Temp:  [97.5 °F (36.4 °C)-98.3 °F (36.8 °C)] 97.6 °F (36.4 °C)  Heart Rate:  [60-69] 60  Resp:  [20] 20  BP: (152-185)/(58-84) 160/58    Intake/Output:    Intake/Output Summary (Last 24 hours) at 18 1805  Last data filed at 18 0810   Gross per 24 hour   Intake              300 ml   Output                0 ml   Net              300 ml       Exam:  /58  Pulse 60  Temp 97.6 °F (36.4 °C) (Oral)   Resp 20  Ht 160 cm (63\")  Wt 78.2 kg (172 lb 4.8 oz)  SpO2 94%  BMI 30.52 kg/m2    General Appearance:    Feeling better denies any specific complaints.  Comfortable and did participate " in physical therapy.                          Head:    Normocephalic, without obvious abnormality, atraumatic                           Eyes:    PERRL, conjunctiva/corneas clear, EOM's intact, both eyes                         Throat:   Lips, tongue, gums normal; oral mucosa pink and moist                           Neck:   Supple, symmetrical, trachea midline, no JVD                         Lungs:   Improved air entry bilaterally                 Chest Wall:    No tenderness or deformity                          Heart:    Regular rate and rhythm, S1 and S2 normal, no murmur,no  Rub                                      or gallop                  Abdomen:     Soft, non-tender, bowel sounds active, no masses, no                                                        organomegaly                  Extremities:   Extremities normal, atraumatic, no cyanosis, 1+ edema                        Pulses:   Pulses palpable in all extremities                            Skin:   Skin is warm and dry,  no rashes or palpable lesions                    Data Review:    I reviewed the patient's new clinical results.    Results from last 7 days  Lab Units 01/06/18  0605 01/05/18  0533 01/04/18  0514 01/03/18  0405 01/02/18  0445 01/01/18  0631   WBC 10*3/mm3 8.23 9.59 7.53 8.12 13.24* 14.04*   HEMOGLOBIN g/dL 12.5 12.8 12.5 11.7* 12.4 11.6*   PLATELETS 10*3/mm3 225 234 238 189 164 192       Results from last 7 days  Lab Units 01/06/18  0605 01/05/18  0533 01/04/18 2209 01/04/18  0514 01/03/18  0405 01/02/18  0445 01/01/18  0631   SODIUM mmol/L 137 140  --  141 139 139 142   POTASSIUM mmol/L 4.1 5.0 4.2 2.8* 3.5 3.6 3.6   CHLORIDE mmol/L 96* 98  --  97* 96* 103 105   CO2 mmol/L 30.1* 31.6*  --  30.3* 30.8* 23.6 23.6   BUN mg/dL 38* 36*  --  40* 33* 22 28*   CREATININE mg/dL 1.34* 1.43*  --  1.60* 1.72* 1.11* 1.17*   CALCIUM mg/dL 9.1 9.1  --  8.9 8.6 9.0 8.7   GLUCOSE mg/dL 322* 224*  --  137* 277* 132* 207*       Microbiology Results (last  10 days)     Procedure Component Value - Date/Time    MRSA Screen Culture - Swab, Nares [[id number]]  (Normal) Collected:  12/30/17 0036    Lab Status:  Final result Specimen:  Swab from Nares Updated:  01/01/18 0912     MRSA SCREEN CX No Methicillin Resistant Staphylococcus aureus isolated    Respiratory Panel, PCR - Swab, Nasopharynx [[id number]]  (Abnormal) Collected:  12/29/17 1923    Lab Status:  Final result Specimen:  Swab from Nasopharynx Updated:  12/29/17 2201     ADENOVIRUS, PCR Not Detected     Coronavirus 229E Not Detected     Coronavirus HKU1 Not Detected     Coronavirus NL63 Not Detected     Coronavirus OC43 Not Detected     Human Metapneumovirus Not Detected     Human Rhinovirus/Enterovirus Not Detected     Influenza B PCR Not Detected     Parainfluenza Virus 1 Not Detected     Parainfluenza Virus 2 Not Detected     Parainfluenza Virus 3 Not Detected     Parainfluenza Virus 4 Not Detected     Bordetella pertussis pcr Not Detected     Influenza A H1N1 2009 PCR Detected (A)     Chlamydophila pneumoniae PCR Not Detected     Mycoplasma pneumo by PCR Not Detected     Influenza A PCR Not Detected     Influenza A H3 Not Detected     Influenza A H1 Not Detected     RSV, PCR Not Detected    Influenza Antigen, Rapid - Swab, Nasopharynx [[id number]]  (Normal) Collected:  12/28/17 2218    Lab Status:  Final result Specimen:  Swab from Nasopharynx Updated:  12/28/17 2252     Influenza A Ag, EIA Negative     Influenza B Ag, EIA Negative          Assessment:  Principal Problem:    H1N1 influenza  Active Problems:    Chronic kidney disease, stage II (mild)    Ischemic cardiomyopathy    Hypertension, essential    Gastroesophageal reflux disease with esophagitis    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  She continues  to do well off of antibiotic therapy.  · Evidence of volume overload related issues noted.  Doubt she had aspiration pneumonia.  Strongly suggest discontinuation of antibiotics as his status improved significantly with diuretics and a very short period of time.  · Continue treatment with Tamiflu and inhalers and supportive care.    · Continue to observe off of antibiotic therapy.    · Her leukocytosis could very well be due to steroid effect.    · Need to monitor her clinical course if she changes her shows evidence of decline and may need workup to rule out postviral pneumonia versus C. difficile infections and so forth.    Vikas Parra MD  1/7/2018  6:05 PM    Much of this encounter note is an electronic transcription/translation of spoken language to printed text. The electronic translation of spoken language may permit erroneous, or at times, nonsensical words or phrases to be inadvertently transcribed; Although I have reviewed the note for such errors, some may still exist

## 2018-01-07 NOTE — PROGRESS NOTES
DAILY PROGRESS NOTE  [facility], [facility]    2018    Patient Identification:  Name: Cynthia Arreola  Age: 89 y.o.  Sex: female  :  1927  MRN: [id number]           Primary Care Physician: Param Martinez MD    Subjective:    Interval History:    Patient is feeling better.  Up to chair.  Saturation in room air 94%.  High blood pressure has been trending up, medication to be adjusted.  Mental status improved, close to baseline.  Renal function better as well.    ROS:     No nausea, vomiting, diarrhea, constipation.Denies CP. Continues with C[facility]AS     Objective:    Scheduled Meds:    amLODIPine 10 mg Oral Daily   aspirin 81 mg Oral Daily   atorvastatin 10 mg Oral Nightly   budesonide-formoterol 2 puff Inhalation BID - RT   clopidogrel 75 mg Oral Daily   docusate sodium 200 mg Oral Daily   enoxaparin 30 mg Subcutaneous Q24H   Ferrex 150 forte plus 1 capsule Oral Daily With Breakfast   furosemide 40 mg Oral BID   glipiZIDE 2.5 mg Oral BID AC   hydrALAZINE 75 mg Oral Q8H   insulin aspart 0-7 Units Subcutaneous 4x Daily With Meals & Nightly   insulin detemir 25 Units Subcutaneous Nightly   ipratropium-albuterol 3 mL Nebulization Q4H - RT   isosorbide mononitrate 30 mg Oral Daily   levothyroxine 100 mcg Oral QAM   linagliptin 5 mg Oral Daily   metoclopramide 5 mg Oral 4x Daily AC & at Bedtime   metoprolol succinate XL 50 mg Oral Daily   pantoprazole 40 mg Oral QAM   [START ON 2018] predniSONE 10 mg Oral Daily With Breakfast   rOPINIRole 0.25 mg Oral Nightly       Continuous Infusions:       PRN Meds:  •  acetaminophen  •  dextrose  •  dextrose  •  glucagon (human recombinant)  •  hydrALAZINE  •  HYDROcodone-acetaminophen  •  influenza vaccine  •  ipratropium-albuterol  •  nitroglycerin  •  pneumococcal polysaccharide 23-valent  •  polyethylene glycol  •  potassium chloride **OR** potassium chloride **OR** potassium chloride  •  promethazine  •  sodium  "chloride    Intake/Output:    Intake/Output Summary (Last 24 hours) at 18 1144  Last data filed at 18 1300   Gross per 24 hour   Intake              310 ml   Output                0 ml   Net              310 ml         Exam:    tMax 24 hrs: Temp (24hrs), Av.2 °F (36.8 °C), Min:97.8 °F (36.6 °C), Max:98.6 °F (37 °C)    Vitals Ranges:   Temp:  [97.8 °F (36.6 °C)-98.6 °F (37 °C)] 98.2 °F (36.8 °C)  Heart Rate:  [60-69] 65  Resp:  [18-20] 20  BP: (159-185)/(65-89) 185/71    BP (!) 185/71  Pulse 65  Temp 98.2 °F (36.8 °C) (Oral)   Resp 20  Ht 160 cm (63\")  Wt 78.2 kg (172 lb 4.8 oz)  SpO2 94%  BMI 30.52 kg/m2    General: Alert, oriented x 3. Cooperative, no distress, appears stated age. Up in chair  Neck: Supple, symmetrical, trachea midline, no adenopathy;              thyroid:  no enlargement/tenderness/nodules;              no carotid bruit or JVD  Cardiovascular: Normal rate, regular rhythm and intact distal pulses.              Exam reveals no gallop and no friction rub. No murmur heard  Chest wall: No tenderness or deformity  Pulmonary:  decreased breath sounds. Expiratory wheeze bilateral bases, L > R. .  Rhonchi bilaterally, no rales.    Abdominal: Soft, non-tender, bowel sounds active all four quadrants,     no masses, no hepatomegaly, no splenomegaly.   Extremities: Normal, atraumatic, no cyanosis. + edema both LE or edema  Pulses: 2 + symmetric all extremities  Neurological: She is alert and oriented to person, place, and time. No focal motor or sensory deficits                CNII-XII intact, normal strength, sensation intact throughout  Skin: Healing ulcer on the dorsum of her right foot, small area healing on the dorsum of her right heel. 1.5 cm ulcerated area with mild surrounding erythema           Data Review:      Results from last 7 days  Lab Units 18  0605 18  0533 18  0514   WBC 10*3/mm3 8.23 9.59 7.53   HEMOGLOBIN g/dL 12.5 12.8 12.5   HEMATOCRIT % 41.0 42.2 " 40.3   PLATELETS 10*3/mm3 225 234 238         Results from last 7 days  Lab Units 01/06/18  0605 01/05/18  0533 01/04/18 2209 01/04/18  0514  01/01/18  0631   SODIUM mmol/L 137 140  --  141  < > 142   POTASSIUM mmol/L 4.1 5.0 4.2 2.8*  < > 3.6   CHLORIDE mmol/L 96* 98  --  97*  < > 105   CO2 mmol/L 30.1* 31.6*  --  30.3*  < > 23.6   BUN mg/dL 38* 36*  --  40*  < > 28*   CREATININE mg/dL 1.34* 1.43*  --  1.60*  < > 1.17*   CALCIUM mg/dL 9.1 9.1  --  8.9  < > 8.7   BILIRUBIN mg/dL  --   --   --   --   --  0.2   ALK PHOS U/L  --   --   --   --   --  146*   ALT (SGPT) U/L  --   --   --   --   --  25   AST (SGOT) U/L  --   --   --   --   --  15   GLUCOSE mg/dL 322* 224*  --  137*  < > 207*   < > = values in this interval not displayed.                Lab Results  Lab Value Date/Time   TROPONINT 0.069 (H) 12/29/2017 1039   TROPONINT 0.101 (C) 12/28/2017 2054   TROPONINT <0.010 01/16/2017 0248   TROPONINT <0.010 12/28/2016 0039   TROPONINT <0.010 12/27/2016 1752   TROPONINT <0.010 12/27/2016 0617       Microbiology Results (last 10 days)     Procedure Component Value - Date/Time    MRSA Screen Culture - Swab, Nares [[id number]]  (Normal) Collected:  12/30/17 0036    Lab Status:  Final result Specimen:  Swab from Nares Updated:  01/01/18 0912     MRSA SCREEN CX No Methicillin Resistant Staphylococcus aureus isolated    Respiratory Panel, PCR - Swab, Nasopharynx [[id number]]  (Abnormal) Collected:  12/29/17 1923    Lab Status:  Final result Specimen:  Swab from Nasopharynx Updated:  12/29/17 2201     ADENOVIRUS, PCR Not Detected     Coronavirus 229E Not Detected     Coronavirus HKU1 Not Detected     Coronavirus NL63 Not Detected     Coronavirus OC43 Not Detected     Human Metapneumovirus Not Detected     Human Rhinovirus/Enterovirus Not Detected     Influenza B PCR Not Detected     Parainfluenza Virus 1 Not Detected     Parainfluenza Virus 2 Not Detected     Parainfluenza Virus 3 Not Detected     Parainfluenza Virus 4 Not  Detected     Bordetella pertussis pcr Not Detected     Influenza A H1N1 2009 PCR Detected (A)     Chlamydophila pneumoniae PCR Not Detected     Mycoplasma pneumo by PCR Not Detected     Influenza A PCR Not Detected     Influenza A H3 Not Detected     Influenza A H1 Not Detected     RSV, PCR Not Detected    Influenza Antigen, Rapid - Swab, Nasopharynx [[id number]]  (Normal) Collected:  12/28/17 2218    Lab Status:  Final result Specimen:  Swab from Nasopharynx Updated:  12/28/17 2252     Influenza A Ag, EIA Negative     Influenza B Ag, EIA Negative           Imaging Results (last 7 days)     Procedure Component Value Units Date/Time    XR Chest 2 View [[id number]] Collected:  12/28/17 2220     Updated:  12/29/17 2312    Narrative:       CHEST PA AND LATERAL.     HISTORY: Cough and congestion.     COMPARISON: 01/16/2017.     FINDINGS:  Mild cardiomegaly.         There is no consolidation or effusion. Mild pulmonary congestion  superimposed on emphysema.          Impression:       Pulmonary congestion, overall no significant change.         This report was finalized on 12/29/2017 11:09 PM by Dr. Fransisco Yoo MD.           2D echo:  · Left ventricular systolic function is normal. Estimated EF = 55%.  · Left ventricular diastolic dysfunction (grade I) consistent with impaired relaxation.  · Mild mitral valve regurgitation is present  · Mild tricuspid valve regurgitation is present.  · Estimated right ventricular systolic pressure from tricuspid regurgitation is normal (<35 mmHg).  · There is calcification of the aortic root and valve.  · Mild aortic valve stenosis is present.    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         Plan:      Continue current supportive therapy  DC prednisone  Adjust BP medications  Monitor and correct electrolytes  Adjust DM medications  Adjust insulin as needed  Monitor mental status  Continue home medications  PT working with pt  DVT/stress ulcer prophylaxis  Labs in am  DC in am    Roberto Carlos Moya MD  1/7/2018  11:44 AM

## 2018-01-07 NOTE — PLAN OF CARE
Problem: Patient Care Overview (Adult)  Goal: Plan of Care Review  Outcome: Ongoing (interventions implemented as appropriate)   01/06/18 1601 01/06/18 1940   Coping/Psychosocial Response Interventions   Plan Of Care Reviewed With patient --    Patient Care Overview   Progress improving --    Outcome Evaluation   Outcome Summary/Follow up Plan --  VSS, no c/o pain, got up to the chair with assist x2, weaning steroids, on RA, turn every 2 hours, BP still running high, getting PO and PRN Hydralazine     Goal: Adult Individualization and Mutuality  Outcome: Ongoing (interventions implemented as appropriate)      Problem: Fall Risk (Adult)  Goal: Absence of Falls  Outcome: Ongoing (interventions implemented as appropriate)      Problem: Pressure Ulcer Risk (Akshat Scale) (Adult,Obstetrics,Pediatric)  Goal: Skin Integrity  Outcome: Ongoing (interventions implemented as appropriate)      Problem: Respiratory Insufficiency (Adult)  Goal: Acid/Base Balance  Outcome: Ongoing (interventions implemented as appropriate)    Goal: Effective Ventilation  Outcome: Ongoing (interventions implemented as appropriate)      Problem: Renal Failure/Kidney Injury, Acute (Adult)  Goal: Signs and Symptoms of Listed Potential Problems Will be Absent or Manageable (Renal Failure/Kidney Injury, Acute)  Outcome: Ongoing (interventions implemented as appropriate)

## 2018-01-08 NOTE — DISCHARGE SUMMARY
PHYSICIAN DISCHARGE SUMMARY  [facility], [facility]    Patient Identification:    Name: Cynthia Arreola  Age: 89 y.o.  Sex: female  :  1927  MRN: [id number]    Primary Care Physician: Param Martinez MD    Admit date: 2017    Discharge date and time:2018    Discharged Condition: stable    Discharge Diagnoses:  Principal Problem:    H1N1 influenza  Active Problems:    Bronchitis with bronchospasm    KOFI (acute kidney injury)    Hypokalemia    Elevated LFTs    COPD exacerbation    Chronic kidney disease, stage II (mild)    Ischemic cardiomyopathy    Hypertension, essential    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    DM (diabetes mellitus)    Gastroesophageal reflux disease with esophagitis    Patient Active Problem List   Diagnosis Code   • Healthcare-associated viral pneumonia J18.9   • Diabetic foot ulcer associated with type 2 diabetes mellitus E11.621, L97.509   • Chronic kidney disease, stage II (mild) N18.2   • H/O small bowel obstruction Z87.19   • Anemia of chronic disease D63.8   • Recurrent UTI N39.0   • Ischemic cardiomyopathy I25.5   • Peripheral arterial disease I73.9   • Hypertension, essential I10   • Immobility syndrome M62.3   • Acquired hypothyroidism E03.9   • MRSA cellulitis of right foot L03.115, B95.62   • Sacral decubitus ulcer L89.159   • SSS (sick sinus syndrome) I49.5   • S/P placement of cardiac pacemaker Z95.0   • Gastroesophageal reflux disease with esophagitis K21.0   • Hyperlipidemia E78.5   • Ischemic ulcer of right heel with fat layer exposed L97.412   • 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 I73.9   • Acute bronchitis due to Rhinovirus (enterovirus) J20.6   • Bilateral viral bronchopneumonia J18.0   • Respiratory infection due to enterovirus/ Human Rhinovirus J98.8, B97.19   • Common bile duct stone K80.50   • Bronchitis with bronchospasm J20.9   • DM (diabetes mellitus) E11.9   •  KOFI (acute kidney injury) N17.9   • Hypokalemia E87.6   • Elevated LFTs R79.89   • H1N1 influenza J10.1   • COPD exacerbation J44.1          Hospital Course: Cynthia Arreola  is a 89 y/o female with h/o DM, PVD, ICM, CAD, and HTN. She is a resident at a NH. She developed runny nose and nasal congestion during last few days, associated with productive cough, SOB, fever and CP. She did not get better with MN at NH. She was brought to ER, and  found to have dehydration, hypokalemia, elevated troponin, and CXR with  pulmonary congestion. It was felt the patient had pneumonia and sjhe was given  Dose of Vancomycin and Zozyn. As well as a  a dose of IV Lasix. Patient was admitted for further management. She tested positive for H1N1 influenza and tamiflu was started. It was felt that she had acute bronchitis, CHF exacerbation  and influenza. ID was consulted and IV antibiotics were dc'd due to unlikelihood of pneumonia. Tamiflu was completed and she did receive steroids for some time due to ongoing bronchospasm as well as increased diuretics. She has now been successfully weaned from steroids. She continues on duonebs, but she has been frequently refusing these and this will need to be monitored in the NH. She is taking and tolerating a PO diet. She can be discharged back to the NH.          Past Medical History:   Diagnosis Date   • Anemia    • COPD exacerbation 12/30/2017   • Coronary artery disease    • Diabetes mellitus    • Disease of thyroid gland    • Dysphagia    • Hypertension    • Ischemic cardiomyopathy    • Malignant neoplasm of colon    • PVD (peripheral vascular disease)    • Renal disorder      PSHX:   Past Surgical History:   Procedure Laterality Date   • CHOLECYSTECTOMY     • COLON RESECTION WITH COLOSTOMY      25 years ago   • COLON SURGERY             Consults:     Consults     Date and Time Order Name Status Description    1/2/2018 0448 Inpatient Consult to Pulmonology Completed     12/30/2017 1410  "Inpatient Consult to Infectious Diseases Completed     12/29/2017 0212 Family Medicine Consult Completed           Discharge Exam:    /81 (BP Location: Left arm, Patient Position: Lying)  Pulse 65  Temp 98.3 °F (36.8 °C) (Oral)   Resp 18  Ht 160 cm (63\")  Wt 78.2 kg (172 lb 4.8 oz)  SpO2 95%  BMI 30.52 kg/m2    General: Alert, cooperative, no distress, appears stated age  Neck: Supple, symmetrical, trachea midline, no adenopathy;              thyroid:  no enlargement/tenderness/nodules;              no carotid bruit or JVD  Cardiovascular: Normal rate, regular rhythm and intact distal pulses.              Exam reveals no gallop and no friction rub. No murmur heard  Pulmonary: Clear to auscultation bilaterally, respirations unlabored.               No rhonchi, wheezing or rales.   Abdominal: Soft. Soft, non-tender, bowel sounds active all four quadrants,     no masses, no hepatomegaly, no splenomegaly.   Extremities: Normal, atraumatic, no cyanosis or edema  Pulses: 2 + symmetric all extremities  Neurological: She is alert and oriented to person and sometimes, place.                 CNII-XII intact, normal strength, sensation intact throughout  Skin: Skin color, texture, turgor normal, no rashes or lesions    Data Review:          Results from last 7 days  Lab Units 01/08/18  0522 01/06/18  0605 01/05/18  0533   WBC 10*3/mm3 8.82 8.23 9.59   HEMOGLOBIN g/dL 13.2 12.5 12.8   HEMATOCRIT % 43.6 41.0 42.2   PLATELETS 10*3/mm3 226 225 234         Results from last 7 days  Lab Units 01/08/18 0522 01/06/18  0605 01/05/18  0533   SODIUM mmol/L 139 137 140   POTASSIUM mmol/L 3.6 4.1 5.0   CHLORIDE mmol/L 96* 96* 98   CO2 mmol/L 30.0* 30.1* 31.6*   BUN mg/dL 42* 38* 36*   CREATININE mg/dL 1.35* 1.34* 1.43*   CALCIUM mg/dL 9.0 9.1 9.1   GLUCOSE mg/dL 133* 322* 224*           Brief Urine Lab Results     None            Lab Results  Lab Value Date/Time   TROPONINT 0.069 (H) 12/29/2017 1039   TROPONINT 0.101 (C) " 12/28/2017 2054   TROPONINT <0.010 01/16/2017 0248   TROPONINT <0.010 12/28/2016 0039   TROPONINT <0.010 12/27/2016 1752   TROPONINT <0.010 12/27/2016 0617       Microbiology Results (last 10 days)     Procedure Component Value - Date/Time    MRSA Screen Culture - Swab, Nares [[id number]]  (Normal) Collected:  12/30/17 0036    Lab Status:  Final result Specimen:  Swab from Nares Updated:  01/01/18 0912     MRSA SCREEN CX No Methicillin Resistant Staphylococcus aureus isolated    Respiratory Panel, PCR - Swab, Nasopharynx [[id number]]  (Abnormal) Collected:  12/29/17 1923    Lab Status:  Final result Specimen:  Swab from Nasopharynx Updated:  12/29/17 2201     ADENOVIRUS, PCR Not Detected     Coronavirus 229E Not Detected     Coronavirus HKU1 Not Detected     Coronavirus NL63 Not Detected     Coronavirus OC43 Not Detected     Human Metapneumovirus Not Detected     Human Rhinovirus/Enterovirus Not Detected     Influenza B PCR Not Detected     Parainfluenza Virus 1 Not Detected     Parainfluenza Virus 2 Not Detected     Parainfluenza Virus 3 Not Detected     Parainfluenza Virus 4 Not Detected     Bordetella pertussis pcr Not Detected     Influenza A H1N1 2009 PCR Detected (A)     Chlamydophila pneumoniae PCR Not Detected     Mycoplasma pneumo by PCR Not Detected     Influenza A PCR Not Detected     Influenza A H3 Not Detected     Influenza A H1 Not Detected     RSV, PCR Not Detected           Imaging Results (all)     Procedure Component Value Units Date/Time    XR Chest 2 View [[id number]] Collected:  12/28/17 2220     Updated:  12/29/17 2312    Narrative:       CHEST PA AND LATERAL.     HISTORY: Cough and congestion.     COMPARISON: 01/16/2017.     FINDINGS:  Mild cardiomegaly.         There is no consolidation or effusion. Mild pulmonary congestion  superimposed on emphysema.          Impression:       Pulmonary congestion, overall no significant change.         This report was finalized on 12/29/2017 11:09 PM by   Fransisco Yoo MD.       XR Chest PA & Lateral [[id number]] Collected:  12/31/17 1016     Updated:  12/31/17 1020    Narrative:       TWO-VIEW CHEST     HISTORY: Flu symptoms. Cough.     There is mild cardiomegaly with a pacemaker in place and there is  further interstitial prominence and slight alveolar haziness compared to  the study of 3 days ago and most consistent with changes of mild  congestive heart failure.     This report was finalized on 12/31/2017 10:17 AM by Dr. Joe Hart MD.       XR Chest 1 View [[id number]] Collected:  01/02/18 0352     Updated:  01/03/18 0224    Narrative:       X-RAY CHEST 1 VIEW.     HISTORY: Shortness of breath.     COMPARISON: 12/31/2017.     FINDINGS:  Cardiomegaly, worsening pulmonary congestion.         Bilateral tiny effusions are suspected.              Impression:       Severe congestive heart failure, interval worsening.         This report was finalized on 1/3/2018 2:21 AM by Dr. Fransisco Yoo MD.       XR Chest 1 View [[id number]] Collected:  01/07/18 0657     Updated:  01/07/18 0703    Narrative:       XR CHEST 1 VW-     HISTORY: Female who is 89 years-old,  pulmonary infiltrate     TECHNIQUE: Frontal view of the chest     COMPARISON: 01/02/2018     FINDINGS: Heart is enlarged. Aorta is calcified. Left-sided pacemaker  and cardiac leads are seen. Pulmonary vasculature is less congested.  Pulmonary opacities have improved. Minimal atelectasis/infiltrate at the  left base. No pneumothorax or large pleural effusion. Otherwise stable.       Impression:       Interval improvement. Minimal atelectasis/infiltrate at the  left base.     This report was finalized on 1/7/2018 7:00 AM by Dr. Amandeep Arshad MD.               Disposition:    Nursing Home    Patient Instructions: See After Visit summary for Medications   Cynthia Arreola   [facility] Medication Instructions MEGAN:[id number]    Printed on:01/08/18 1705   Medication Information                      acetaminophen  (TYLENOL) 325 MG tablet  Take 650 mg by mouth Every 6 (Six) Hours As Needed for mild pain (1-3).             amLODIPine (NORVASC) 5 MG tablet  Take 5 mg by mouth Daily.             aspirin 81 MG chewable tablet  Chew 81 mg Daily.             atorvastatin (LIPITOR) 10 MG tablet  Take 1 tablet by mouth Every Night.             budesonide-formoterol (SYMBICORT) 160-4.5 MCG/ACT inhaler  Inhale 2 puffs 2 (Two) Times a Day.             calcium carbonate (TUMS) 500 MG chewable tablet  Chew 1 tablet Daily.             clopidogrel (PLAVIX) 75 MG tablet  Take 75 mg by mouth Daily.             Docusate Sodium (DSS) 100 MG capsule  Take 100 mg by mouth 2 (Two) Times a Day.             Fe-Succ Ac-C-Thre Ac-B12-FA (FERREX 150 FORTE PLUS)  MG capsule capsule  Take 1 capsule by mouth Daily With Breakfast.             furosemide (LASIX) 40 MG tablet  Take 1 tablet by mouth Daily.             glimepiride (AMARYL) 4 MG tablet  Take 4 mg by mouth Every Morning Before Breakfast.             glucagon, human recombinant, (GLUCAGEN DIAGNOSTIC) 1 MG injection  Inject 1 mg under the skin 1 (One) Time As Needed (hypoglycemia) for up to 1 dose.             hydrALAZINE (APRESOLINE) 25 MG tablet  Take 3 tablets by mouth Every 8 (Eight) Hours.             HYDROcodone-acetaminophen (NORCO) 5-325 MG per tablet  Take 1 tablet by mouth Every 4 (Four) Hours As Needed for Moderate Pain  or Severe Pain  (only valid faxed to [facility]).             insulin aspart (novoLOG) 100 UNIT/ML injection  Inject 0-7 Units under the skin 4 (Four) Times a Day Before Meals & at Bedtime.             insulin glargine (LANTUS) 100 UNIT/ML injection  Inject 14 Units under the skin Daily.             ipratropium-albuterol (DUO-NEB) 0.5-2.5 mg/mL nebulizer  Take 3 mL by nebulization Every 4 (Four) Hours As Needed for Wheezing.             isosorbide mononitrate (IMDUR) 30 MG 24 hr tablet  Take 30 mg by mouth Daily.             levothyroxine (SYNTHROID,  LEVOTHROID) 100 MCG tablet  Take 100 mcg by mouth Daily.             linagliptin (TRADJENTA) 5 MG tablet tablet  Take 1 tablet by mouth Daily.             metoclopramide (REGLAN) 10 MG tablet  Take 10 mg by mouth 4 (Four) Times a Day Before Meals & at Bedtime.             metoprolol succinate XL (TOPROL-XL) 50 MG 24 hr tablet  Take 50 mg by mouth Daily.             nitroglycerin (NITROSTAT) 0.4 MG SL tablet  Place 1 tablet under the tongue Every 5 (Five) Minutes As Needed for chest pain (Hold systolic BP < 100 mm/Hg.). Max 3 doses / 15 min             pantoprazole (PROTONIX) 40 MG EC tablet  Take 40 mg by mouth Daily.             polyethylene glycol (MIRALAX) packet  Take 17 g by mouth Daily As Needed.             promethazine (PHENERGAN) 12.5 MG tablet  Take 1 tablet by mouth Every 6 (Six) Hours As Needed for nausea or vomiting.             rOPINIRole (REQUIP) 0.25 MG tablet  Take 0.25 mg by mouth Every Night. Take 1 hour before bedtime.             trolamine salicylate (ASPERCREME) 10 % cream  Apply  topically 2 (Two) Times a Day. Apply to bilateral knees topically two times a day                     Discharge Order     None          Follow-up Information     Follow up with Param Martinez MD .    Specialty:  Family Medicine    Contact information:    [facility] [id number] [phone]            Total time spent discharging patient including evaluation,post hospitalization follow up,  medication and post hospitalization instructions and education total time exceeds 30 minutes.    Signed:  Roberto Carlos Moya MD  1/8/2018  1:59 PM

## 2018-01-08 NOTE — PROGRESS NOTES
"  PROGRESS NOTE   LOS: 9 days   Patient Care Team:  Param Martinez MD as PCP - General (Family Medicine)  Meghann Serna, RN as Care Coordinator (Population Health)    Chief Complaint: Admitted with influenza and pneumonia and hypoxemia, doing better    Interval History: Patient finished her course of Tamiflu and antibiotic and she has been afebrile.  She had rhonchus spasm and she is on bronchodilator and steroid taper.  She had some edema and she was resumed on her Lasix oral regimen and she seems to be volume compensated at this point.  She did not have any follow-up chest x-ray after the admission films.  She is overall feeling well she is able to work with physical therapy and seems to be ready for discharge in a.m. she did have chest x-ray today that looks much better and the patient is clinically continuing to improve       REVIEW OF SYSTEMS:   CARDIOVASCULAR: No chest pain, chest pressure or chest discomfort. No palpitations or edema.   RESPIRATORY: Improved shortness of breath still positive cough and some wheezes with non productive secretion.   GASTROINTESTINAL: No anorexia, nausea, vomiting or diarrhea. No abdominal pain or blood.   HEMATOLOGIC: No bleeding or bruising.     Ventilator/Non-Invasive Ventilation Settings     None            Vital Signs  Temp:  [97.5 °F (36.4 °C)-98.4 °F (36.9 °C)] 98.4 °F (36.9 °C)  Heart Rate:  [60-69] 60  Resp:  [20] 20  BP: (152-185)/(58-84) 166/66  SpO2:  [89 %-94 %] 94 %  on    O2 Device: room air    Intake/Output Summary (Last 24 hours) at 01/07/18 2023  Last data filed at 01/07/18 0810   Gross per 24 hour   Intake              300 ml   Output                0 ml   Net              300 ml     Flowsheet Rows         First Filed Value    Admission Height  160 cm (63\") Documented at 12/28/2017 2016    Admission Weight  73.9 kg (163 lb) Documented at 12/28/2017 2016        Body mass index is 30.52 kg/(m^2).  Last 3 weights    12/28/17 2016 12/29/17  0452   Weight: " 73.9 kg (163 lb) 78.2 kg (172 lb 4.8 oz)       Physical Exam:  GEN:  No acute distress, alert, cooperative, well developed , On room air  EYES:   Sclera clear. No icterus. PERRL. Normal EOM  ENT:   External ears/nose normal, no oral lesions, no thrush, mucous membranes moist  NECK:  Supple, midline trachea, no JVD  LUNGS: Normal chest on inspection, positive expiratory rhonchi with minimal wheezes that partially improved with coughing and attempted to clear her secretions. Respirations regular, even and unlabored.   CV:  Regular rhythm and rate. Normal S1/S2. No murmurs, gallops, or rubs noted.  ABD:  Soft, non-tender and non-distended. Normal bowel sounds. No guarding, colostomy bag on the left with green colored stool  EXT:  Moves all extremities well. No cyanosis. No redness. No edema.   Skin: dry, intact, no bleeding    Results Review:        Results from last 7 days  Lab Units 01/06/18  0605 01/05/18  0533 01/04/18  2209 01/04/18  0514 01/03/18  0405 01/02/18  0445 01/01/18  0631   SODIUM mmol/L 137 140  --  141 139 139 142   POTASSIUM mmol/L 4.1 5.0 4.2 2.8* 3.5 3.6 3.6   CHLORIDE mmol/L 96* 98  --  97* 96* 103 105   CO2 mmol/L 30.1* 31.6*  --  30.3* 30.8* 23.6 23.6   BUN mg/dL 38* 36*  --  40* 33* 22 28*   CREATININE mg/dL 1.34* 1.43*  --  1.60* 1.72* 1.11* 1.17*   CALCIUM mg/dL 9.1 9.1  --  8.9 8.6 9.0 8.7   AST (SGOT) U/L  --   --   --   --   --   --  15   ALT (SGPT) U/L  --   --   --   --   --   --  25   ANION GAP mmol/L 10.9 10.4  --  13.7 12.2 12.4 13.4   ALBUMIN g/dL  --   --   --   --   --   --  3.10*               Results from last 7 days  Lab Units 01/03/18  0405 01/02/18  0445   PROBNP pg/mL 3434.0* 3495.0*       Results from last 7 days  Lab Units 01/06/18  0605 01/05/18  0533 01/04/18  0514 01/03/18  0405 01/02/18  0445 01/01/18  0631   WBC 10*3/mm3 8.23 9.59 7.53 8.12 13.24* 14.04*   HEMOGLOBIN g/dL 12.5 12.8 12.5 11.7* 12.4 11.6*   HEMATOCRIT % 41.0 42.2 40.3 38.2 41.1 38.0   PLATELETS 10*3/mm3  225 234 238 189 164 192   MCV fL 94.7 95.7 93.9 94.8 96.3 95.7   NEUTROPHIL % % 71.5 74.3 54.9  --   --   --    LYMPHOCYTE % % 17.6* 14.3* 28.2  --   --   --    MONOCYTES % % 6.7 6.8 10.4  --   --   --    EOSINOPHIL % % 0.0* 0.9 1.6  --   --   --    BASOPHIL % % 0.4 0.3 0.5  --   --   --    IMM GRAN % % 3.8* 3.4* 4.4*  --   --   --            Results from last 7 days  Lab Units 01/05/18  0533   MAGNESIUM mg/dL 2.2           Results from last 7 days  Lab Units 01/02/18  0317   PH, ARTERIAL pH units 7.360   PCO2, ARTERIAL mm Hg 50.1*   PO2 ART mm Hg 85.9   HCO3 ART mmol/L 28.3*         Glucose   Date/Time Value Ref Range Status   01/07/2018 1742 284 (H) 70 - 130 mg/dL Final   01/07/2018 1200 342 (H) 70 - 130 mg/dL Final   01/07/2018 0806 221 (H) 70 - 130 mg/dL Final   01/06/2018 2041 188 (H) 70 - 130 mg/dL Final   01/06/2018 1634 246 (H) 70 - 130 mg/dL Final   01/06/2018 1138 321 (H) 70 - 130 mg/dL Final   01/06/2018 0856 253 (H) 70 - 130 mg/dL Final   01/05/2018 2046 325 (H) 70 - 130 mg/dL Final                           Imaging:   Imaging Results (all)     Procedure Component Value Units Date/Time    XR Chest 2 View [[id number]] Collected:  12/28/17 2220     Updated:  12/29/17 2312    Narrative:       CHEST PA AND LATERAL.     HISTORY: Cough and congestion.     COMPARISON: 01/16/2017.     FINDINGS:  Mild cardiomegaly.         There is no consolidation or effusion. Mild pulmonary congestion  superimposed on emphysema.          Impression:       Pulmonary congestion, overall no significant change.         This report was finalized on 12/29/2017 11:09 PM by Dr. Fransisco Yoo MD.       XR Chest PA & Lateral [[id number]] Collected:  12/31/17 1016     Updated:  12/31/17 1020    Narrative:       TWO-VIEW CHEST     HISTORY: Flu symptoms. Cough.     There is mild cardiomegaly with a pacemaker in place and there is  further interstitial prominence and slight alveolar haziness compared to  the study of 3 days ago and most  consistent with changes of mild  congestive heart failure.     This report was finalized on 12/31/2017 10:17 AM by Dr. Joe Hart MD.       XR Chest 1 View [[id number]] Collected:  01/02/18 0352     Updated:  01/03/18 0224    Narrative:       X-RAY CHEST 1 VIEW.     HISTORY: Shortness of breath.     COMPARISON: 12/31/2017.     FINDINGS:  Cardiomegaly, worsening pulmonary congestion.         Bilateral tiny effusions are suspected.              Impression:       Severe congestive heart failure, interval worsening.         This report was finalized on 1/3/2018 2:21 AM by Dr. Fransisco Yoo MD.             I reviewed the patient's new clinical results.  I personally viewed and interpreted the patient's imaging results:Significant improvement in pulmonary edema, positive cardiomegaly, no acute residual disease        Medication Review:     amLODIPine 10 mg Oral Daily   aspirin 81 mg Oral Daily   atorvastatin 10 mg Oral Nightly   budesonide-formoterol 2 puff Inhalation BID - RT   clopidogrel 75 mg Oral Daily   docusate sodium 200 mg Oral Daily   enoxaparin 30 mg Subcutaneous Q24H   Ferrex 150 forte plus 1 capsule Oral Daily With Breakfast   furosemide 40 mg Oral BID   glipiZIDE 5 mg Oral BID AC   hydrALAZINE 75 mg Oral Q8H   insulin aspart 0-7 Units Subcutaneous 4x Daily With Meals & Nightly   insulin detemir 25 Units Subcutaneous Nightly   ipratropium-albuterol 3 mL Nebulization Q6H - RT   isosorbide mononitrate 30 mg Oral Daily   levothyroxine 100 mcg Oral QAM   linagliptin 5 mg Oral Daily   metoclopramide 5 mg Oral 4x Daily AC & at Bedtime   metoprolol succinate XL 50 mg Oral Daily   pantoprazole 40 mg Oral QAM   rOPINIRole 0.25 mg Oral Nightly            ASSESSMENT:   H1 N1 influenza  Acute bronchitis  COPD with acute exacerbation  Acute hypoxemic respiratory failure  Metabolic encephalopathy  Ischemic cardiomyopathy  CKD II, With acute worsening initially with improvement over the last several days  GERD with  esophagitis  Diabetes mellitus  Weakness/immobility  Hypertension    PLAN:  The chest x-ray is looking much better, patient is compensating on room air back on her home diuretics  Patient is still having some wheezing and will continue with the bronchodilator and the nebulizer ,prednisone was discontinued today by the primary team  Overall improvement especially with the changes on the chest x-ray almost back to normal.  Patient is cleared for discharge from the pulmonary standpoint, plan by the primary team is discharged  in a.m.    Discussed with patient and staff    Disposition: Discharge tomorrow is okay from the pulmonary standpoint especially the x-ray is looking better    Debo Nelson MD  01/07/18  8:23 PM       EMR Dragon/Transcription:   Much of this encounter note is an electronic transcription/translation of spoken language to printed text. The electronic translation of spoken language may permit erroneous, or at times, nonsensical words or phrases to be inadvertently transcribed; Although I have reviewed the note for such errors, some may still exist.

## 2018-01-08 NOTE — PROGRESS NOTES
[facility]    Physicians Statement of Medical Necessity for Ambulance Transportation    It is medically necessary for:    Patient Name: Cynthia Arreola    Insurance Information:  Medicare [id number]    To be transported by ambulance:    From (if nursing facility, specify level of care: skilled, [facility], etc): [facility]    To (specify level of care if nursing facility): [facility]    Date of Service: 1/8/17    For dialysis patients state date dialysis began:     Diagnosis: H1N1 influenza    Past Medical/Surgical History:  Past Medical History:   Diagnosis Date   • Anemia    • COPD exacerbation 12/30/2017   • Coronary artery disease    • Diabetes mellitus    • Disease of thyroid gland    • Dysphagia    • Hypertension    • Ischemic cardiomyopathy    • Malignant neoplasm of colon    • PVD (peripheral vascular disease)    • Renal disorder       Past Surgical History:   Procedure Laterality Date   • CHOLECYSTECTOMY     • COLON RESECTION WITH COLOSTOMY      25 years ago   • COLON SURGERY          Current Objective Medical Evidence(including physical exam finding to support reason for limitations):    Immobilization syndrome  Confused/combative: may require restraints    Other:     Physician Signature:           (RN,NP,PA,CAN, Discharge Planner) Date/Time:     Printed Name:    __________________________________    [facility]   Phone: [phone] Phone: [phone] Phone: [phone]   Fax: [phone] Fax: [phone] Fax: [phone]

## 2018-01-08 NOTE — PLAN OF CARE
Problem: Patient Care Overview (Adult)  Goal: Plan of Care Review  Outcome: Ongoing (interventions implemented as appropriate)   01/08/18 0234   Coping/Psychosocial Response Interventions   Plan Of Care Reviewed With patient   Patient Care Overview   Progress improving   Outcome Evaluation   Outcome Summary/Follow up Plan VSS, b/p meds adjusted, DM meds adjusted, pt continues to refuse duo nebs in spite of continued wheezing, up to chair on days, needs more pulm hygiene, plan to d/c back to NH on Monday. Continue to monitor.      Goal: Adult Individualization and Mutuality  Outcome: Ongoing (interventions implemented as appropriate)    Goal: Discharge Needs Assessment  Outcome: Ongoing (interventions implemented as appropriate)   01/08/18 0234   Discharge Needs Assessment   Discharge Disposition still a patient       Problem: Fall Risk (Adult)  Goal: Absence of Falls  Outcome: Ongoing (interventions implemented as appropriate)   01/08/18 0234   Fall Risk (Adult)   Absence of Falls making progress toward outcome       Problem: Pressure Ulcer Risk (Akshat Scale) (Adult,Obstetrics,Pediatric)  Goal: Skin Integrity  Outcome: Ongoing (interventions implemented as appropriate)   01/08/18 0234   Pressure Ulcer Risk (Akshat Scale) (Adult,Obstetrics,Pediatric)   Skin Integrity making progress toward outcome       Problem: Respiratory Insufficiency (Adult)  Goal: Acid/Base Balance  Outcome: Ongoing (interventions implemented as appropriate)   01/08/18 0234   Respiratory Insufficiency (Adult)   Acid/Base Balance making progress toward outcome     Goal: Effective Ventilation  Outcome: Ongoing (interventions implemented as appropriate)   01/08/18 0234   Respiratory Insufficiency (Adult)   Effective Ventilation making progress toward outcome       Problem: Renal Failure/Kidney Injury, Acute (Adult)  Goal: Signs and Symptoms of Listed Potential Problems Will be Absent or Manageable (Renal Failure/Kidney Injury, Acute)  Outcome:  Ongoing (interventions implemented as appropriate)   01/08/18 0234   Renal Failure/Kidney Injury, Acute   Problems Assessed (Acute Renal Failure/Kidney Injury) all   Problems Present (Acute Renal Failure/Kidney Injury) hypertension;situational response

## 2018-01-08 NOTE — SIGNIFICANT NOTE
01/08/18 1410   Rehab Treatment   Discipline physical therapist   Treatment Not Performed other (see comments)  (Pt has D/C orders put in. She previously was very agitated and is now resting comfortable. Per RN pt does not require PT followup today.)   Recommendation   PT - Next Appointment 01/09/18

## 2018-01-08 NOTE — PROGRESS NOTES
"  Infectious Diseases Progress Note    Vikas Parra MD     [facility]  [place]: 10 days  Patient Identification:  Name: Cynthia Arreola  Age: 89 y.o.  Sex: female  :  1927  MRN: [id number]         Primary Care Physician: Param Martinez MD            Subjective: Doing well and wants to know when can she go home.  Interval History: See consultation note.    Objective:    Scheduled Meds:    amLODIPine 10 mg Oral Daily   aspirin 81 mg Oral Daily   atorvastatin 10 mg Oral Nightly   budesonide-formoterol 2 puff Inhalation BID - RT   clopidogrel 75 mg Oral Daily   docusate sodium 200 mg Oral Daily   enoxaparin 30 mg Subcutaneous Q24H   Ferrex 150 forte plus 1 capsule Oral Daily With Breakfast   furosemide 40 mg Oral BID   glipiZIDE 5 mg Oral BID AC   hydrALAZINE 75 mg Oral Q8H   insulin aspart 0-7 Units Subcutaneous 4x Daily With Meals & Nightly   insulin detemir 25 Units Subcutaneous Nightly   ipratropium-albuterol 3 mL Nebulization Q6H - RT   isosorbide mononitrate 30 mg Oral Daily   levothyroxine 100 mcg Oral QAM   linagliptin 5 mg Oral Daily   metoclopramide 5 mg Oral 4x Daily AC & at Bedtime   metoprolol succinate XL 50 mg Oral Daily   pantoprazole 40 mg Oral QAM   rOPINIRole 0.25 mg Oral Nightly     Continuous Infusions:       Vital signs in last 24 hours:  Temp:  [97.6 °F (36.4 °C)-98.6 °F (37 °C)] 98.3 °F (36.8 °C)  Heart Rate:  [60-65] 65  Resp:  [18-20] 18  BP: (148-185)/(58-81) 148/81    Intake/Output:  No intake or output data in the 24 hours ending 18 1227    Exam:  /81 (BP Location: Left arm, Patient Position: Lying)  Pulse 65  Temp 98.3 °F (36.8 °C) (Oral)   Resp 18  Ht 160 cm (63\")  Wt 78.2 kg (172 lb 4.8 oz)  SpO2 95%  BMI 30.52 kg/m2    General Appearance:    Feeling better denies any specific complaints.  Comfortable and did participate in physical therapy.                          Head:    Normocephalic, without obvious abnormality, atraumatic                       "     Eyes:    PERRL, conjunctiva/corneas clear, EOM's intact, both eyes                         Throat:   Lips, tongue, gums normal; oral mucosa pink and moist                           Neck:   Supple, symmetrical, trachea midline, no JVD                         Lungs:   Improved air entry bilaterally                 Chest Wall:    No tenderness or deformity                          Heart:    Regular rate and rhythm, S1 and S2 normal, no murmur,no  Rub                                      or gallop                  Abdomen:     Soft, non-tender, bowel sounds active, no masses, no                                                        organomegaly                  Extremities:   Extremities normal, atraumatic, no cyanosis, 1+ edema                        Pulses:   Pulses palpable in all extremities                            Skin:   Skin is warm and dry,  no rashes or palpable lesions                    Data Review:    I reviewed the patient's new clinical results.    Results from last 7 days  Lab Units 01/08/18  0522 01/06/18  0605 01/05/18  0533 01/04/18  0514 01/03/18  0405 01/02/18  0445   WBC 10*3/mm3 8.82 8.23 9.59 7.53 8.12 13.24*   HEMOGLOBIN g/dL 13.2 12.5 12.8 12.5 11.7* 12.4   PLATELETS 10*3/mm3 226 225 234 238 189 164       Results from last 7 days  Lab Units 01/08/18  0522 01/06/18  0605 01/05/18  0533 01/04/18 2209 01/04/18  0514 01/03/18  0405 01/02/18  0445   SODIUM mmol/L 139 137 140  --  141 139 139   POTASSIUM mmol/L 3.6 4.1 5.0 4.2 2.8* 3.5 3.6   CHLORIDE mmol/L 96* 96* 98  --  97* 96* 103   CO2 mmol/L 30.0* 30.1* 31.6*  --  30.3* 30.8* 23.6   BUN mg/dL 42* 38* 36*  --  40* 33* 22   CREATININE mg/dL 1.35* 1.34* 1.43*  --  1.60* 1.72* 1.11*   CALCIUM mg/dL 9.0 9.1 9.1  --  8.9 8.6 9.0   GLUCOSE mg/dL 133* 322* 224*  --  137* 277* 132*       Microbiology Results (last 10 days)     Procedure Component Value - Date/Time    MRSA Screen Culture - Swab, Nares [[id number]]  (Normal) Collected:  12/30/17  0036    Lab Status:  Final result Specimen:  Swab from Nares Updated:  01/01/18 0912     MRSA SCREEN CX No Methicillin Resistant Staphylococcus aureus isolated    Respiratory Panel, PCR - Swab, Nasopharynx [[id number]]  (Abnormal) Collected:  12/29/17 1923    Lab Status:  Final result Specimen:  Swab from Nasopharynx Updated:  12/29/17 2201     ADENOVIRUS, PCR Not Detected     Coronavirus 229E Not Detected     Coronavirus HKU1 Not Detected     Coronavirus NL63 Not Detected     Coronavirus OC43 Not Detected     Human Metapneumovirus Not Detected     Human Rhinovirus/Enterovirus Not Detected     Influenza B PCR Not Detected     Parainfluenza Virus 1 Not Detected     Parainfluenza Virus 2 Not Detected     Parainfluenza Virus 3 Not Detected     Parainfluenza Virus 4 Not Detected     Bordetella pertussis pcr Not Detected     Influenza A H1N1 2009 PCR Detected (A)     Chlamydophila pneumoniae PCR Not Detected     Mycoplasma pneumo by PCR Not Detected     Influenza A PCR Not Detected     Influenza A H3 Not Detected     Influenza A H1 Not Detected     RSV, PCR Not Detected          Assessment:  Principal Problem:    H1N1 influenza  Active Problems:    Chronic kidney disease, stage II (mild)    Ischemic cardiomyopathy    Hypertension, essential    Gastroesophageal reflux disease with esophagitis    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  She continues to do well off of antibiotic therapy.  · Evidence of volume overload related issues noted.  Doubt she had aspiration pneumonia.  Strongly suggest discontinuation of antibiotics as his status improved significantly with diuretics and a very short period of time.  · Continue treatment with Tamiflu and inhalers and supportive care.    · Continue to observe off of antibiotic  therapy.    · Her leukocytosis could very well be due to steroid effect.    · Need to monitor her clinical course if she changes her shows evidence of decline and may need workup to rule out postviral pneumonia versus C. difficile infections and so forth.  · Okay to discharge anytime on no antibiotic therapy.    Vikas Parra MD  1/8/2018  12:27 PM    Much of this encounter note is an electronic transcription/translation of spoken language to printed text. The electronic translation of spoken language may permit erroneous, or at times, nonsensical words or phrases to be inadvertently transcribed; Although I have reviewed the note for such errors, some may still exist

## 2018-01-08 NOTE — PROGRESS NOTES
Continued Stay Note  [facility]     Patient Name: Cynthia Arreola  MRN: [id number]  Today's Date: 1/8/2018    Admit Date: 12/28/2017          Discharge Plan       01/08/18 1310    Case Management/Social Work Plan    Plan Return to [facility]    Patient/Family In Agreement With Plan yes    Additional Comments Spoke with pt son who confirms plan to return to [facility] at discharge.  RICHARD Bocanegra RN              Discharge Codes     None        Expected Discharge Date and Time     Expected Discharge Date Expected Discharge Time    Jan 5, 2018             Rita Bocanegra

## 2018-01-09 NOTE — PROGRESS NOTES
"  PROGRESS NOTE   LOS: 10 days   Patient Care Team:  Param Martinez MD as PCP - General (Family Medicine)  Meghann Serna, RN as Care Coordinator (Population Health)    Chief Complaint: Admitted with influenza and pneumonia and hypoxemia, doing better    Interval History: Patient finished her course of Tamiflu and antibiotic and she has been afebrile.  She had rhonchus spasm and she is on bronchodilator and steroid taper.  She had some edema and she was resumed on her Lasix oral regimen and she seems to be volume compensated at this point.  She did not have any follow-up chest x-ray after the admission films.  She is overall feeling well she is able to work with physical therapy and seems to be ready for discharge in a.m. she did have chest x-ray today that looks much better and the patient is clinically continuing to improve.       REVIEW OF SYSTEMS:   CARDIOVASCULAR: No chest pain, chest pressure or chest discomfort. No palpitations or edema.   RESPIRATORY: Improved shortness of breath still positive cough and no secretion.   GASTROINTESTINAL: No anorexia, nausea, vomiting or diarrhea. No abdominal pain or blood.   HEMATOLOGIC: No bleeding or bruising.     Ventilator/Non-Invasive Ventilation Settings     None            Vital Signs  Temp:  [98.3 °F (36.8 °C)-98.6 °F (37 °C)] 98.3 °F (36.8 °C)  Heart Rate:  [63-65] 63  Resp:  [18-19] 18  BP: (148-185)/(61-81) 148/81  SpO2:  [92 %-95 %] 95 %  on    O2 Device: room air  No intake or output data in the 24 hours ending 01/08/18 1905  Flowsheet Rows         First Filed Value    Admission Height  160 cm (63\") Documented at 12/28/2017 2016    Admission Weight  73.9 kg (163 lb) Documented at 12/28/2017 2016        Body mass index is 30.52 kg/(m^2).  Last 3 weights    12/28/17 2016 12/29/17  0452   Weight: 73.9 kg (163 lb) 78.2 kg (172 lb 4.8 oz)       Physical Exam:  GEN:  No acute distress, alert, cooperative, well developed , On room air  EYES:   Sclera clear. No " icterus. PERRL.   ENT:   External ears/nose normal, no oral lesions, no thrush, mucous membranes moist  NECK:  Supple, midline trachea, no JVD  LUNGS: Normal chest on inspection, positive expiratory rhonchi improved with cough. No wheezes. Respirations regular, even and unlabored.   CV:  Regular rhythm and rate. Normal S1/S2. No murmurs, gallops, or rubs noted.  ABD:  Soft, non-tender and non-distended. Normal bowel sounds. No guarding, colostomy bag on the left with green colored stool  EXT:  Moves all extremities well. No cyanosis. No redness. No edema.   Skin: dry, intact, no bleeding    Results Review:        Results from last 7 days  Lab Units 01/08/18  0522 01/06/18  0605 01/05/18  0533 01/04/18 2209 01/04/18  0514 01/03/18  0405 01/02/18  0445   SODIUM mmol/L 139 137 140  --  141 139 139   POTASSIUM mmol/L 3.6 4.1 5.0 4.2 2.8* 3.5 3.6   CHLORIDE mmol/L 96* 96* 98  --  97* 96* 103   CO2 mmol/L 30.0* 30.1* 31.6*  --  30.3* 30.8* 23.6   BUN mg/dL 42* 38* 36*  --  40* 33* 22   CREATININE mg/dL 1.35* 1.34* 1.43*  --  1.60* 1.72* 1.11*   CALCIUM mg/dL 9.0 9.1 9.1  --  8.9 8.6 9.0   ANION GAP mmol/L 13.0 10.9 10.4  --  13.7 12.2 12.4               Results from last 7 days  Lab Units 01/03/18  0405 01/02/18  0445   PROBNP pg/mL 3434.0* 3495.0*       Results from last 7 days  Lab Units 01/08/18  0522 01/06/18  0605 01/05/18  0533 01/04/18  0514 01/03/18  0405 01/02/18  0445   WBC 10*3/mm3 8.82 8.23 9.59 7.53 8.12 13.24*   HEMOGLOBIN g/dL 13.2 12.5 12.8 12.5 11.7* 12.4   HEMATOCRIT % 43.6 41.0 42.2 40.3 38.2 41.1   PLATELETS 10*3/mm3 226 225 234 238 189 164   MCV fL 94.4 94.7 95.7 93.9 94.8 96.3   NEUTROPHIL % %  --  71.5 74.3 54.9  --   --    LYMPHOCYTE % %  --  17.6* 14.3* 28.2  --   --    MONOCYTES % %  --  6.7 6.8 10.4  --   --    EOSINOPHIL % %  --  0.0* 0.9 1.6  --   --    BASOPHIL % %  --  0.4 0.3 0.5  --   --    IMM GRAN % %  --  3.8* 3.4* 4.4*  --   --            Results from last 7 days  Lab Units  01/05/18  0533   MAGNESIUM mg/dL 2.2           Results from last 7 days  Lab Units 01/02/18  0317   PH, ARTERIAL pH units 7.360   PCO2, ARTERIAL mm Hg 50.1*   PO2 ART mm Hg 85.9   HCO3 ART mmol/L 28.3*         Glucose   Date/Time Value Ref Range Status   01/08/2018 1636 166 (H) 70 - 130 mg/dL Final   01/08/2018 1123 184 (H) 70 - 130 mg/dL Final   01/08/2018 0802 91 70 - 130 mg/dL Final   01/07/2018 2040 264 (H) 70 - 130 mg/dL Final   01/07/2018 1742 284 (H) 70 - 130 mg/dL Final   01/07/2018 1200 342 (H) 70 - 130 mg/dL Final   01/07/2018 0806 221 (H) 70 - 130 mg/dL Final   01/06/2018 2041 188 (H) 70 - 130 mg/dL Final                           Imaging:   Imaging Results (all)     Procedure Component Value Units Date/Time    XR Chest 2 View [[id number]] Collected:  12/28/17 2220     Updated:  12/29/17 2312    Narrative:       CHEST PA AND LATERAL.     HISTORY: Cough and congestion.     COMPARISON: 01/16/2017.     FINDINGS:  Mild cardiomegaly.         There is no consolidation or effusion. Mild pulmonary congestion  superimposed on emphysema.          Impression:       Pulmonary congestion, overall no significant change.         This report was finalized on 12/29/2017 11:09 PM by Dr. Fransisco Yoo MD.       XR Chest PA & Lateral [[id number]] Collected:  12/31/17 1016     Updated:  12/31/17 1020    Narrative:       TWO-VIEW CHEST     HISTORY: Flu symptoms. Cough.     There is mild cardiomegaly with a pacemaker in place and there is  further interstitial prominence and slight alveolar haziness compared to  the study of 3 days ago and most consistent with changes of mild  congestive heart failure.     This report was finalized on 12/31/2017 10:17 AM by Dr. Joe Hart MD.       XR Chest 1 View [[id number]] Collected:  01/02/18 0352     Updated:  01/03/18 0224    Narrative:       X-RAY CHEST 1 VIEW.     HISTORY: Shortness of breath.     COMPARISON: 12/31/2017.     FINDINGS:  Cardiomegaly, worsening pulmonary congestion.          Bilateral tiny effusions are suspected.              Impression:       Severe congestive heart failure, interval worsening.         This report was finalized on 1/3/2018 2:21 AM by Dr. Fransisco Yoo MD.             I reviewed the patient's new clinical results.  I personally viewed and interpreted the patient's imaging results:Significant improvement in pulmonary edema, positive cardiomegaly, no acute residual disease        Medication Review:     amLODIPine 10 mg Oral Daily   aspirin 81 mg Oral Daily   atorvastatin 10 mg Oral Nightly   budesonide-formoterol 2 puff Inhalation BID - RT   clopidogrel 75 mg Oral Daily   docusate sodium 200 mg Oral Daily   enoxaparin 30 mg Subcutaneous Q24H   Ferrex 150 forte plus 1 capsule Oral Daily With Breakfast   furosemide 40 mg Oral BID   glipiZIDE 5 mg Oral BID AC   hydrALAZINE 75 mg Oral Q8H   insulin aspart 0-7 Units Subcutaneous 4x Daily With Meals & Nightly   insulin detemir 25 Units Subcutaneous Nightly   ipratropium-albuterol 3 mL Nebulization Q6H - RT   isosorbide mononitrate 30 mg Oral Daily   levothyroxine 100 mcg Oral QAM   linagliptin 5 mg Oral Daily   metoclopramide 5 mg Oral 4x Daily AC & at Bedtime   metoprolol succinate XL 50 mg Oral Daily   pantoprazole 40 mg Oral QAM   rOPINIRole 0.25 mg Oral Nightly            ASSESSMENT:   1. H1 N1 influenza  2. Acute bronchitis  3. COPD with acute exacerbation  4. Acute hypoxemic respiratory failure  5. Metabolic encephalopathy  6. Ischemic cardiomyopathy  7. CKD II, With acute worsening initially with improvement over the last several days  8. GERD with esophagitis  9. Diabetes mellitus  10. Weakness/immobility  11. Hypertension    PLAN:  Overall improvement and ok for discharge, which has already been completed by admitting service.   Plan to go home on symbicort and duonebs at least BID and PRN   No need for antibiotic  Discussed with patient and staff    Disposition: per admitting. Will need follow up with PCP and  consider Pulmonary follow up if no further improvement.    Rox Roth, CHAVEZ  01/08/18  7:05 PM       EMR Dragon/Transcription:   Much of this encounter note is an electronic transcription/translation of spoken language to printed text. The electronic translation of spoken language may permit erroneous, or at times, nonsensical words or phrases to be inadvertently transcribed; Although I have reviewed the note for such errors, some may still exist.

## 2018-01-09 NOTE — PROGRESS NOTES
Case Management Discharge Note    Final Note: Pt discharged to [facility].  Transported by [facility].     Discharge Placement     Facility/Agency Request Status Selected? Address Phone Number Fax Number    [facility] Accepted    Yes [address] [facility] [id number] [phone]        [facility]    Discharge Codes: 04  Discharged/transferred to intermediate care facility (ICF)
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
## 2018-01-26 PROBLEM — J18.9 HAP (HOSPITAL-ACQUIRED PNEUMONIA): Status: ACTIVE | Noted: 2018-01-01

## 2018-01-27 PROBLEM — I50.31 ACUTE DIASTOLIC CHF (CONGESTIVE HEART FAILURE) (HCC): Status: ACTIVE | Noted: 2018-01-01

## 2018-01-29 PROBLEM — J10.1 INFLUENZA A: Status: ACTIVE | Noted: 2018-01-01

## 2018-02-01 NOTE — PLAN OF CARE
Problem: Patient Care Overview (Adult)  Goal: Plan of Care Review  Outcome: Ongoing (interventions implemented as appropriate)      Problem: Pneumonia (Adult)  Goal: Signs and Symptoms of Listed Potential Problems Will be Absent or Manageable (Pneumonia)  Outcome: Ongoing (interventions implemented as appropriate)      Problem: Fall Risk (Adult)  Goal: Absence of Falls  Outcome: Ongoing (interventions implemented as appropriate)

## 2018-02-01 NOTE — PROGRESS NOTES
Continued Stay Note  [facility]     Patient Name: Cynthia Arreola  MRN: [id number]  Today's Date: 2/1/2018    Admit Date: 1/26/2018          Discharge Plan       02/01/18 1143    Case Management/Social Work Plan    Plan [facility]    Additional Comments Pt to [facility] pending available bed              Discharge Codes     None            Ana Copeland RN

## 2018-02-01 NOTE — PLAN OF CARE
Problem: Patient Care Overview (Adult)  Goal: Plan of Care Review  Outcome: Ongoing (interventions implemented as appropriate)   02/01/18 0902   Coping/Psychosocial Response Interventions   Plan Of Care Reviewed With patient   Outcome Evaluation   Outcome Summary/Follow up Plan Pt. able to do some AROM exercises in bed and sit EOB. Pt. performed partial stand x2 but is dependent/maxA x2 to do so. Pt. limited by weakness and fatigue.

## 2018-02-01 NOTE — THERAPY TREATMENT NOTE
Acute Care - Physical Therapy Treatment Note  [facility]     Patient Name: Cynthia Arreola  : 1927  MRN: [id number]  Today's Date: 2018  Onset of Illness/Injury or Date of Surgery Date: 18          Admit Date: 2018    Visit Dx:    ICD-10-CM ICD-9-CM   1. HAP (hospital-acquired pneumonia) J18.9 486   2. Acute on chronic congestive heart failure, unspecified congestive heart failure type I50.9 428.0   3. Elevated alkaline phosphatase level R74.8 790.5   4. Anemia, unspecified type D64.9 285.9   5. Oral phase dysphagia R13.11 787.21     Patient Active Problem List   Diagnosis   • Healthcare-associated viral pneumonia   • Diabetic foot ulcer associated with type 2 diabetes mellitus   • Chronic kidney disease, stage II (mild)   • H/O small bowel obstruction   • Anemia of chronic disease   • Recurrent UTI   • Ischemic cardiomyopathy   • Peripheral arterial disease   • Hypertension, essential   • Immobility syndrome   • Acquired hypothyroidism   • MRSA cellulitis of right foot   • Sacral decubitus ulcer   • SSS (sick sinus syndrome)   • S/P placement of cardiac pacemaker   • Gastroesophageal reflux disease with esophagitis   • Hyperlipidemia   • Ischemic ulcer of right heel with fat layer exposed   • 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   • Acute bronchitis due to Rhinovirus (enterovirus)   • Bilateral viral bronchopneumonia   • Respiratory infection due to enterovirus/ Human Rhinovirus   • Common bile duct stone   • Bronchitis with bronchospasm   • DM (diabetes mellitus)   • KOFI (acute kidney injury)   • Hypokalemia   • Elevated LFTs   • H1N1 influenza   • COPD exacerbation   • HAP (hospital-acquired pneumonia)   • Acute diastolic CHF (congestive heart failure)   • Influenza A               Adult Rehabilitation Note       18 0800          Rehab Assessment/Intervention    Discipline (P)  physical therapy assistant   student  -      Document Type (P)  therapy note  (daily note)  -      Subjective Information (P)  agree to therapy;complains of;fatigue;weakness  -      Patient Effort, Rehab Treatment (P)  adequate  -      Symptoms Noted During/After Treatment (P)  fatigue  -      Precautions/Limitations (P)  fall precautions  -      Recorded by [] Catie Rajput, PT Student      Vital Signs    Pre SpO2 (%) (P)  94  -SH      O2 Delivery Pre Treatment (P)  supplemental O2   2 L  -SH      Post SpO2 (%) (P)  94  -SH      O2 Delivery Post Treatment (P)  supplemental O2   2 L  -SH      Recorded by [] Catie Rajput, PT Student      Pain Assessment    Pain Assessment (P)  No/denies pain  -      Recorded by [] Catie Rajput PT Student      Cognitive Assessment/Intervention    Current Cognitive/Communication Assessment (P)  impaired  -      Orientation Status (P)  oriented x 4  -      Follows Commands/Answers Questions (P)  able to follow single-step instructions;needs cueing  -      Personal Safety (P)  moderate impairment  -      Personal Safety Interventions (P)  fall prevention program maintained;gait belt;nonskid shoes/slippers when out of bed  -      Recorded by [] Catie Rajput PT Student      Bed Mobility, Assessment/Treatment    Bed Mobility, Assistive Device (P)  bed rails;head of bed elevated;draw sheet  -      Bed Mob, Supine to Sit, [place] (P)  moderate assist (50% patient effort);2 person assist required;verbal cues required;nonverbal cues required (demo/gesture)  -      Bed Mob, Sit to Supine, [place] (P)  moderate assist (50% patient effort);minimum assist (75% patient effort);verbal cues required;nonverbal cues required (demo/gesture)  -      Bed Mobility, Safety Issues (P)  decreased use of arms for pushing/pulling;decreased use of legs for bridging/pushing  -      Bed Mobility, Impairments (P)  strength decreased;impaired balance  -      Recorded by [] Catie Rajput PT Student      Transfer Assessment/Treatment     Transfers, Sit-Stand [place] (P)  dependent (less than 25% patient effort);2 person assist required;verbal cues required;nonverbal cues required (demo/gesture)   partial stand x2.   -      Transfers, Stand-Sit [place] (P)  dependent (less than 25% patient effort);maximum assist (25% patient effort);2 person assist required;verbal cues required;nonverbal cues required (demo/gesture)  -      Transfers, Sit-Stand-Sit, Assist Device (P)  rolling walker  -      Transfer, Safety Issues (P)  weight-shifting ability decreased  -      Transfer, Impairments (P)  strength decreased  -      Transfer, Comment (P)  B knees blocked, bed raised for height. Partial stand x2  -SH      Recorded by [] Catie Rajput PT [place]      Gait Assessment/Treatment    Gait, [place] Level (P)  not appropriate to assess  -SH      Recorded by [] Catie Rajput PT [place]      Therapy Exercises    Bilateral Lower Extremities (P)  AROM:;10 reps;supine;ankle pumps/circles;heel slides;hip abduction/adduction;sitting;5 reps;LAQ  -SH      Recorded by [] Catie Rajput PT [place]      Positioning and Restraints    Pre-Treatment Position (P)  in bed  -SH      Post Treatment Position (P)  bed  -SH      In Bed (P)  fowlers;call light within reach;encouraged to call for assist;exit alarm on;notified nsg  -SH      Recorded by [] Catie Rajput PT [place]        User Key  (r) = Recorded By, (t) = Taken By, (c) = Cosigned By    Initials Name Effective Dates     Catie Rajput PT Student 01/08/18 -                 IP PT Goals       01/29/18 1546          Bed Mobility PT LTG    Bed Mobility PT LTG, Date Established 01/29/18  -AR      Bed Mobility PT LTG, Time to Achieve 2 wks  -AR      Bed Mobility PT LTG, Activity Type supine to sit/sit to supine  -AR      Bed Mobility PT LTG, [place] Level moderate assist (50% patient effort)  -AR      Transfer Training PT LTG    Transfer Training PT LTG, Date Established 01/29/18  -AR       Transfer Training PT LTG, Time to Achieve 2 wks  -AR      Transfer Training PT LTG, Activity Type sit to stand/stand to sit  -AR      Transfer Training PT LTG, [place] Level moderate assist (50% patient effort);maximum assist (25% patient effort);2 person assist required  -AR        User Key  (r) = Recorded By, (t) = Taken By, (c) = Cosigned By    Initials Name Provider Type    AR Halina Layton PT Physical Therapist          Physical Therapy Education     Title: PT OT SLP Therapies (Active)     Topic: Physical Therapy (Active)     Point: Mobility training (Done)    Learning Progress Summary    Learner Readiness Method Response Comment Documented by Status   Patient Acceptance E VU,NR   02/01/18 0854 Done    Acceptance E [facility] 01/29/18 1544 Active               Point: Home exercise program (Active)    Learning Progress Summary    Learner Readiness Method Response Comment Documented by Status   Patient Acceptance E NR  AR 01/29/18 1544 Active               Point: Body mechanics (Done)    Learning Progress Summary    Learner Readiness Method Response Comment Documented by Status   Patient Acceptance E VU,NR   02/01/18 0854 Done    Acceptance E NR  AR 01/29/18 1544 Active               Point: Precautions (Done)    Learning Progress Summary    Learner Readiness Method Response Comment Documented by Status   Patient Acceptance E VU,NR   02/01/18 0854 Done    Acceptance E [facility] 01/29/18 1544 Active                      User Key     Initials Effective Dates Name Provider Type Discipline    AR 06/27/16 -  Halina Layton, PT Physical Therapist PT     01/08/18 -  Catie Rajput PT Student PT Student PT                    PT Recommendation and Plan  Anticipated Discharge Disposition: extended care facility, skilled nursing facility  Planned Therapy Interventions: bed mobility training, balance training, gait training, home exercise program, patient/family education, ROM (Range of Motion), strengthening,  transfer training  PT Frequency: 2-3 times/wk  Plan of Care Review  Plan Of Care Reviewed With: (P) patient  Outcome Summary/Follow up Plan: (P) Pt. able to do some AROM exercises in bed and sit EOB. Pt. performed partial stand x2 but is dependent/maxA x2 to do so. Pt. limited by weakness and fatigue.          Outcome Measures       02/01/18 0800 01/29/18 1500       How much help from another person do you currently need...    Turning from your back to your side while in flat bed without using bedrails? (P)  2  -SH 2  -AR     Moving from lying on back to sitting on the side of a flat bed without bedrails? (P)  1  -SH 1  -AR     Moving to and from a bed to a chair (including a wheelchair)? (P)  1  -SH 1  -AR     Standing up from a chair using your arms (e.g., wheelchair, bedside chair)? (P)  1  -SH 1  -AR     Climbing 3-5 steps with a railing? (P)  1  -SH 1  -AR     To walk in hospital room? (P)  1  -SH 1  -AR     AM-PAC 6 Clicks Score (P)  7  -RW (r) SH (t) 7  -AR     Functional Assessment    Outcome Measure Options (P)  AM-PAC 6 Clicks Basic Mobility (PT)  -SH AM-PAC 6 Clicks Basic Mobility (PT)  -AR       User Key  (r) = Recorded By, (t) = Taken By, (c) = Cosigned By    Initials Name Provider Type    AR Halina Layton, PT Physical Therapist    RW Mary Ray, PTA Physical Therapy Assistant     Catie Rajput, PT Student PT Student           Time Calculation:         PT Charges       02/01/18 0852          Time Calculation    Start Time (P)  0832  -      Stop Time (P)  0850  -      Time Calculation (min) (P)  18 min  -      PT Received On (P)  02/01/18  -      PT - Next Appointment (P)  02/05/18  -        User Key  (r) = Recorded By, (t) = Taken By, (c) = Cosigned By    Initials Name Provider Type     Catie Rajput, PT Student PT Student          Therapy Charges for Today     Code Description Service Date Service Provider Modifiers Qty    98174443811  PT THER PROC EA 15 MIN 2/1/2018 Catie Rajput  PT Student GP 1    07016051555 HC PT THER SUPP EA 15 MIN 2/1/2018 Catie Rajput, PT Student GP 1          PT G-Codes  Outcome Measure Options: (P) AM-PAC 6 Clicks Basic Mobility (PT)    Catie Rajput, PT Student  2/1/2018

## 2018-02-01 NOTE — PROGRESS NOTES
DAILY PROGRESS NOTE  [facility], [facility]    2018    Patient Identification:  Name: Cynthia Arreola  Age: 89 y.o.  Sex: female  :  1927  MRN: [id number]           Primary Care Physician: Param Martinez MD    Subjective:    Interval History:    She is awake this morning. Sat up yesterday.   Saturation 95% on 2 Liters N/C  Day 5 zosyn completed, changed to doxycycline  F/U CXR: better      ROS:     Denies CP, N/V/D. Denies SOA this morning.      Objective:    Scheduled Meds:    aspirin 81 mg Oral Daily   atorvastatin 10 mg Oral Nightly   clopidogrel 75 mg Oral Daily   docusate sodium 250 mg Oral Daily   doxycycline 100 mg Oral Q12H   enoxaparin 30 mg Subcutaneous Q24H   FERREX 150 PLUS 1 capsule Oral Daily With Breakfast   furosemide 40 mg Oral Daily   hydrALAZINE 75 mg Oral Q8H   insulin aspart 0-9 Units Subcutaneous 4x Daily With Meals & Nightly   insulin detemir 10 Units Subcutaneous Daily   isosorbide mononitrate 30 mg Oral Daily   levothyroxine 100 mcg Oral Daily   linagliptin 5 mg Oral Daily   metoclopramide 5 mg Oral 4x Daily AC & at Bedtime   rOPINIRole 0.25 mg Oral Nightly       Continuous Infusions:       PRN Meds:  •  acetaminophen  •  dextrose  •  dextrose  •  glucagon (human recombinant)  •  glucagon (human recombinant)  •  HYDROcodone-acetaminophen  •  ipratropium-albuterol  •  nitroglycerin  •  sodium chloride  •  sodium chloride  •  Insert peripheral IV **AND** sodium chloride    Intake/Output:    Intake/Output Summary (Last 24 hours) at 18 0003  Last data filed at 18 1709   Gross per 24 hour   Intake              780 ml   Output                1 ml   Net              779 ml         Exam:    tMax 24 hrs: Temp (24hrs), Av.8 °F (36.6 °C), Min:97.2 °F (36.2 °C), Max:98.5 °F (36.9 °C)    Vitals Ranges:   Temp:  [97.2 °F (36.2 °C)-98.5 °F (36.9 °C)] 97.9 °F (36.6 °C)  Heart Rate:  [67-78] 77  Resp:  [18] 18  BP: (145-181)/(51-99)  "152/69    /69 (BP Location: Right arm, Patient Position: Lying)  Pulse 77  Temp 97.9 °F (36.6 °C) (Oral)   Resp 18  Ht 160 cm (63\")  Wt 79 kg (174 lb 3.2 oz)  SpO2 98%  BMI 30.86 kg/m2    General: Awake, however, confused.  On 2 liters nasal cannula, saturation 95 percent.  Neck: Supple, symmetrical, trachea midline, no adenopathy;              thyroid:  no enlargement/tenderness/nodules;              no carotid bruit or JVD  Cardiovascular: Normal rate, regular rhythm and intact distal pulses.              Exam reveals no gallop and no friction rub. No murmur heard  Chest wall: No tenderness or deformity  Pulmonary: Diminished breath sounds bilaterally.  No wheeze today. .   Abdominal: Soft,  non-tender, bowel sounds active all four quadrants,     no masses, no hepatomegaly, no splenomegaly.   Extremities: Normal, atraumatic, no cyanosis or edema  Pulses: 2 + symmetric all extremities  Neurological:  awake, confused.  Non-new focal sensorimotor deficit.    Skin: Skin color, texture, turgor normal, no rashes or lesions    Data Review:      Results from last 7 days  Lab Units 02/01/18  0329 01/31/18  0415 01/30/18  0446   WBC 10*3/mm3 8.75 8.99 10.49   HEMOGLOBIN g/dL 9.9* 9.9* 10.3*   HEMATOCRIT % 32.9* 32.9* 34.1*   PLATELETS 10*3/mm3 342 314 301         Results from last 7 days  Lab Units 02/01/18  0329 01/31/18  0415 01/30/18  0446  01/26/18  1444   SODIUM mmol/L 140 147* 139  < > 133*   POTASSIUM mmol/L 4.3 5.2 3.2*  < > 3.5   CHLORIDE mmol/L 99 105 97*  < > 94*   CO2 mmol/L 26.5 25.9 27.2  < > 23.8   BUN mg/dL 20 18 18  < > 30*   CREATININE mg/dL 1.45* 1.52* 1.40*  < > 1.75*   CALCIUM mg/dL 9.2 9.2 8.9  < > 9.0   BILIRUBIN mg/dL  --   --   --   --  0.8   ALK PHOS U/L  --   --   --   --  496*   ALT (SGPT) U/L  --   --   --   --  63*   AST (SGOT) U/L  --   --   --   --  18   GLUCOSE mg/dL 188* 154* 146*  < > 197*   < > = values in this interval not displayed.                Lab Results  Lab Value " Date/Time   TROPONINT 0.126 (C) 01/27/2018 0645   TROPONINT 0.155 (C) 01/26/2018 1444   TROPONINT 0.069 (H) 12/29/2017 1039   TROPONINT 0.101 (C) 12/28/2017 2054   TROPONINT <0.010 01/16/2017 0248   TROPONINT <0.010 12/28/2016 0039   TROPONINT <0.010 12/27/2016 1752   TROPONINT <0.010 12/27/2016 0617       Microbiology Results (last 10 days)     Procedure Component Value - Date/Time    Urine Culture - Urine, Urine, Clean Catch [[id number]]  (Normal) Collected:  01/27/18 1838    Lab Status:  Final result Specimen:  Urine from Urine, Catheter Updated:  01/28/18 0708     Urine Culture No growth    Blood Culture - Blood, [[id number]]  (Normal) Collected:  01/26/18 1732    Lab Status:  Final result Specimen:  Blood from Hand, Left Updated:  01/31/18 1746     Blood Culture No growth at 5 days    Blood Culture - Blood, [[id number]]  (Normal) Collected:  01/26/18 1732    Lab Status:  Final result Specimen:  Blood from Arm, Right Updated:  01/31/18 1746     Blood Culture No growth at 5 days           Imaging Results (last 7 days)     Procedure Component Value Units Date/Time    XR Chest 2 View [[id number]] Collected:  01/26/18 1409     Updated:  01/26/18 1413    Narrative:       TWO-VIEW CHEST     HISTORY: Emergency imaging of the chest was performed in this  89-year-old morbidly obese female for evaluation of cough congestion  shortness of breath.     COMPARISON: 01/07/2018     FINDINGS:  1. Interval development of vascular congestion, peripheral interstitial  prominence and small bilateral pleural effusions consistent with  congestive heart failure.  2. Underlying pneumonia would be impossible to exclude.  3. No other change since prior study.  4. Follow-up to resolution suggested.     This report was finalized on 1/26/2018 2:09 PM by Dr. Willard Toro MD.               Assessment:      Principal Problem:    HAP (hospital-acquired pneumonia)  Active Problems:    KOFI (acute kidney injury)    Acute diastolic CHF (congestive  heart failure)    Ischemic cardiomyopathy    Hypertension, essential    DM (diabetes mellitus)    Influenza A          Plan:      Continue  PO antibiotics.   Repeat CXR looks better  Monitor and correct electrolytes  Monitor renal function  Adjust insulin as needed  Monitor mental status  Continue home medications  DVT/stress ulcer prophylaxis  Continue working with PT  Labs in am  Patient is DNR with conditions.    If stable/improving, will dc in am      Roberto Carlos Moya MD  2/2/2018

## 2018-02-02 NOTE — PLAN OF CARE
Problem: Patient Care Overview (Adult)  Goal: Plan of Care Review  Outcome: Ongoing (interventions implemented as appropriate)   02/01/18 2013   Coping/Psychosocial Response Interventions   Plan Of Care Reviewed With patient   Patient Care Overview   Progress no change   Outcome Evaluation   Outcome Summary/Follow up Plan CXR completed this am. Lungs diminished.     Goal: Discharge Needs Assessment  Outcome: Ongoing (interventions implemented as appropriate)      Problem: Pneumonia (Adult)  Goal: Signs and Symptoms of Listed Potential Problems Will be Absent or Manageable (Pneumonia)  Outcome: Ongoing (interventions implemented as appropriate)      Problem: Fall Risk (Adult)  Goal: Absence of Falls  Outcome: Ongoing (interventions implemented as appropriate)

## 2018-02-02 NOTE — PLAN OF CARE
Problem: Patient Care Overview (Adult)  Goal: Plan of Care Review  Outcome: Ongoing (interventions implemented as appropriate)   02/02/18 0215   Coping/Psychosocial Response Interventions   Plan Of Care Reviewed With patient   Patient Care Overview   Progress progress towards functional goals is fair   Outcome Evaluation   Outcome Summary/Follow up Plan Lungs diminished. Congested cough. Up in chair with assist.     Goal: Adult Individualization and Mutuality  Outcome: Ongoing (interventions implemented as appropriate)    Goal: Discharge Needs Assessment  Outcome: Ongoing (interventions implemented as appropriate)      Problem: Pneumonia (Adult)  Goal: Signs and Symptoms of Listed Potential Problems Will be Absent or Manageable (Pneumonia)  Outcome: Ongoing (interventions implemented as appropriate)      Problem: Fall Risk (Adult)  Goal: Absence of Falls  Outcome: Ongoing (interventions implemented as appropriate)

## 2018-02-02 NOTE — NURSING NOTE
02/02/18 1331   Colostomy     Placement Date/Time: (c) (c)   Existing LDA: present on admission to this facility   Stoma Appearance round;moist;pink;protruding above skin level   Peristomal Skin dry;intact   Appliance 2-piece;drainable pouch;changed;per protocol/policy   Accessories/Skin Care cleansed with water;skin barrier ring   Stoma Function stool   Stool Color brown   Tolerance no signs/symptoms of discomfort   Stool output (mL) 100   Ostomy pouch changed.  Low left colostomy intact.  2 piece 2 3/4 [place] pouch placed.  Hypergranulation noted around perimeter of stoma but does not appear to be interfering with pouch adherence.  Additional supplies left at bedside.

## 2018-02-02 NOTE — PROGRESS NOTES
DAILY PROGRESS NOTE  [facility], [facility]    2018    Patient Identification:  Name: Cynthia Arreola  Age: 89 y.o.  Sex: female  :  1927  MRN: [id number]           Primary Care Physician: Param Martinez MD    Subjective:    Interval History:    No feeling well today. Breathing faster this am.  On po abx. CXR OK  BS elevated  WBC elevated today      ROS:     Denies CP, N/V/D. .      Objective:    Scheduled Meds:    aspirin 81 mg Oral Daily   atorvastatin 10 mg Oral Nightly   clopidogrel 75 mg Oral Daily   docusate sodium 250 mg Oral Daily   doxycycline 100 mg Oral Q12H   enoxaparin 30 mg Subcutaneous Q24H   FERREX 150 PLUS 1 capsule Oral Daily With Breakfast   furosemide 40 mg Oral Daily   hydrALAZINE 75 mg Oral Q8H   insulin aspart 0-9 Units Subcutaneous 4x Daily With Meals & Nightly   insulin detemir 10 Units Subcutaneous Daily   isosorbide mononitrate 30 mg Oral Daily   levothyroxine 100 mcg Oral Daily   linagliptin 5 mg Oral Daily   metoclopramide 5 mg Oral 4x Daily AC & at Bedtime   rOPINIRole 0.25 mg Oral Nightly       Continuous Infusions:       PRN Meds:  •  acetaminophen  •  dextrose  •  dextrose  •  glucagon (human recombinant)  •  glucagon (human recombinant)  •  HYDROcodone-acetaminophen  •  ipratropium-albuterol  •  nitroglycerin  •  sodium chloride  •  sodium chloride  •  Insert peripheral IV **AND** sodium chloride    Intake/Output:    Intake/Output Summary (Last 24 hours) at 18 1352  Last data filed at 18 1331   Gross per 24 hour   Intake              430 ml   Output              100 ml   Net              330 ml         Exam:    tMax 24 hrs: Temp (24hrs), Av.2 °F (36.2 °C), Min:96.7 °F (35.9 °C), Max:97.9 °F (36.6 °C)    Vitals Ranges:   Temp:  [96.7 °F (35.9 °C)-97.9 °F (36.6 °C)] 96.7 °F (35.9 °C)  Heart Rate:  [77-82] 80  Resp:  [18] 18  BP: (135-181)/(57-73) 137/62    /62 (BP Location: Left arm, Patient Position: Lying)   "Pulse 80  Temp 96.7 °F (35.9 °C) (Oral)   Resp 18  Ht 160 cm (63\")  Wt 78.1 kg (172 lb 3.2 oz)  SpO2 96%  BMI 30.5 kg/m2    General: Awake, however, confused.  On 2 liters nasal cannula, saturation 95 percent.  Neck: Supple, symmetrical, trachea midline, no adenopathy;              thyroid:  no enlargement/tenderness/nodules;              no carotid bruit or JVD  Cardiovascular: Normal rate, regular rhythm and intact distal pulses.              Exam reveals no gallop and no friction rub. No murmur heard  Chest wall: No tenderness or deformity  Pulmonary: Diminished breath sounds bilaterally.  Few rhonchi at bases. No wheeze today. .   Abdominal: Soft,  non-tender, bowel sounds active all four quadrants,     no masses, no hepatomegaly, no splenomegaly.   Extremities: Normal, atraumatic, no cyanosis or edema  Pulses: 2 + symmetric all extremities  Neurological:  awake, confused.  Non-new focal sensorimotor deficit.    Skin: Skin color, texture, turgor normal, no rashes or lesions    Data Review:      Results from last 7 days  Lab Units 02/02/18  0434 02/01/18  0329 01/31/18  0415   WBC 10*3/mm3 11.22* 8.75 8.99   HEMOGLOBIN g/dL 8.7* 9.9* 9.9*   HEMATOCRIT % 29.3* 32.9* 32.9*   PLATELETS 10*3/mm3 344 342 314         Results from last 7 days  Lab Units 02/02/18  0434 02/01/18  0329 01/31/18  0415  01/26/18  1444   SODIUM mmol/L 133* 140 147*  < > 133*   POTASSIUM mmol/L 4.4 4.3 5.2  < > 3.5   CHLORIDE mmol/L 94* 99 105  < > 94*   CO2 mmol/L 28.3 26.5 25.9  < > 23.8   BUN mg/dL 39* 20 18  < > 30*   CREATININE mg/dL 1.50* 1.45* 1.52*  < > 1.75*   CALCIUM mg/dL 8.9 9.2 9.2  < > 9.0   BILIRUBIN mg/dL  --   --   --   --  0.8   ALK PHOS U/L  --   --   --   --  496*   ALT (SGPT) U/L  --   --   --   --  63*   AST (SGOT) U/L  --   --   --   --  18   GLUCOSE mg/dL 264* 188* 154*  < > 197*   < > = values in this interval not displayed.                Lab Results  Lab Value Date/Time   TROPONINT 0.126 (C) 01/27/2018 0645 "   TROPONINT 0.155 (C) 01/26/2018 1444   TROPONINT 0.069 (H) 12/29/2017 1039   TROPONINT 0.101 (C) 12/28/2017 2054   TROPONINT <0.010 01/16/2017 0248   TROPONINT <0.010 12/28/2016 0039   TROPONINT <0.010 12/27/2016 1752   TROPONINT <0.010 12/27/2016 0617       Microbiology Results (last 10 days)     Procedure Component Value - Date/Time    Urine Culture - Urine, Urine, Clean Catch [[id number]]  (Normal) Collected:  01/27/18 1838    Lab Status:  Final result Specimen:  Urine from Urine, Catheter Updated:  01/28/18 0708     Urine Culture No growth    Blood Culture - Blood, [[id number]]  (Normal) Collected:  01/26/18 1732    Lab Status:  Final result Specimen:  Blood from Hand, Left Updated:  01/31/18 1746     Blood Culture No growth at 5 days    Blood Culture - Blood, [[id number]]  (Normal) Collected:  01/26/18 1732    Lab Status:  Final result Specimen:  Blood from Arm, Right Updated:  01/31/18 1746     Blood Culture No growth at 5 days           Imaging Results (last 7 days)     Procedure Component Value Units Date/Time    XR Chest 2 View [[id number]] Collected:  01/26/18 1409     Updated:  01/26/18 1413    Narrative:       TWO-VIEW CHEST     HISTORY: Emergency imaging of the chest was performed in this  89-year-old morbidly obese female for evaluation of cough congestion  shortness of breath.     COMPARISON: 01/07/2018     FINDINGS:  1. Interval development of vascular congestion, peripheral interstitial  prominence and small bilateral pleural effusions consistent with  congestive heart failure.  2. Underlying pneumonia would be impossible to exclude.  3. No other change since prior study.  4. Follow-up to resolution suggested.     This report was finalized on 1/26/2018 2:09 PM by Dr. Willard Toro MD.               Assessment:      Principal Problem:    HAP (hospital-acquired pneumonia)  Active Problems:    KOFI (acute kidney injury)    Acute diastolic CHF (congestive heart failure)    Ischemic cardiomyopathy     Hypertension, essential    DM (diabetes mellitus)    Influenza A          Plan:      Continue  PO antibiotics.   Adjust insulin  IV fluids gently, Cr is elevated  Monitor and correct electrolytes  Monitor renal function  Monitor mental status  Continue home medications  DVT/stress ulcer prophylaxis  Continue working with PT  Labs in am  Patient is DNR with conditions.        Roberto Carlos Moya MD  2/2/2018

## 2018-02-03 NOTE — SIGNIFICANT NOTE
02/03/18 0852   Rehab Evaluation   Evaluation Not Performed other (see comments)  (New swallow consult received. Patient has already been evaluted and found to be WFL. Clarification from MD requested. Will await further instructions)

## 2018-02-03 NOTE — PROGRESS NOTES
DAILY PROGRESS NOTE  [facility], [facility]    2/3/2018    Patient Identification:  Name: Cynthia Arreola  Age: 89 y.o.  Sex: female  :  1927  MRN: [id number]           Primary Care Physician: Param Martinez MD    Subjective:    Interval History:    Patient's is more congested today.  Repeated hemoglobin is 6.2.    To have a stat chest x-ray this morning.  White blood cell count went up to 14,000.        ROS:     Denies CP, N/V/D. .      Objective:    Scheduled Meds:    aspirin 81 mg Oral Daily   atorvastatin 10 mg Oral Nightly   clopidogrel 75 mg Oral Daily   docusate sodium 250 mg Oral Daily   doxycycline 100 mg Oral Q12H   enoxaparin 30 mg Subcutaneous Q24H   FERREX 150 PLUS 1 capsule Oral Daily With Breakfast   furosemide 40 mg Intravenous Once   furosemide 40 mg Oral Daily   hydrALAZINE 75 mg Oral Q8H   insulin aspart 0-9 Units Subcutaneous 4x Daily With Meals & Nightly   insulin detemir 15 Units Subcutaneous Daily   isosorbide mononitrate 30 mg Oral Daily   levothyroxine 100 mcg Oral Daily   linagliptin 5 mg Oral Daily   metoclopramide 5 mg Oral 4x Daily AC & at Bedtime   rOPINIRole 0.25 mg Oral Nightly       Continuous Infusions:    sodium chloride 100 mL/hr Last Rate: 100 mL/hr (18)       PRN Meds:  •  acetaminophen  •  dextrose  •  dextrose  •  glucagon (human recombinant)  •  glucagon (human recombinant)  •  HYDROcodone-acetaminophen  •  ipratropium-albuterol  •  nitroglycerin  •  sodium chloride  •  sodium chloride  •  Insert peripheral IV **AND** sodium chloride    Intake/Output:    Intake/Output Summary (Last 24 hours) at 18  Last data filed at 18   Gross per 24 hour   Intake             3130 ml   Output              100 ml   Net             3030 ml         Exam:    tMax 24 hrs: Temp (24hrs), Av.7 °F (36.5 °C), Min:96.7 °F (35.9 °C), Max:98.2 °F (36.8 °C)    Vitals Ranges:   Temp:  [96.7 °F (35.9 °C)-98.2 °F (36.8 °C)]  "98.1 °F (36.7 °C)  Heart Rate:  [] 128  Resp:  [18-30] 30  BP: (129-154)/(48-69) 138/50    /50  Pulse (!) 128  Temp 98.1 °F (36.7 °C) (Oral)   Resp (!) 30  Ht 160 cm (63\")  Wt 80.6 kg (177 lb 11.2 oz)  SpO2 95%  BMI 31.48 kg/m2    General: Awake, however, confused.  On 2 liters nasal cannula, saturation 92 percent.  Neck: Supple, symmetrical, trachea midline, no adenopathy;              thyroid:  no enlargement/tenderness/nodules;              no carotid bruit or JVD  Cardiovascular:  tachycardic, appears to be irregular. Exam reveals no gallop and no friction rub. No murmur heard  Chest wall: No tenderness or deformity  Pulmonary: Diminished breath sounds bilaterally.  Rhonchi bilaterally, few wheezes in the mid lungs, diminished mainly at the bases.   Abdominal: Soft,  non-tender, bowel sounds active all four quadrants,     no masses, no hepatomegaly, no splenomegaly.   Extremities: Normal, atraumatic, no cyanosis or edema  Pulses: 2 + symmetric all extremities  Neurological:  awake, confused.  Non-new focal sensorimotor deficit.    Skin: Skin color, texture, turgor normal, no rashes or lesions    Data Review:      Results from last 7 days  Lab Units 02/03/18  0814 02/02/18  0434 02/01/18  0329   WBC 10*3/mm3 14.09* 11.22* 8.75   HEMOGLOBIN g/dL 6.2* 8.7* 9.9*   HEMATOCRIT % 21.0* 29.3* 32.9*   PLATELETS 10*3/mm3 345 344 342         Results from last 7 days  Lab Units 02/03/18  0441 02/02/18  0434 02/01/18  0329   SODIUM mmol/L 136 133* 140   POTASSIUM mmol/L 4.2 4.4 4.3   CHLORIDE mmol/L 96* 94* 99   CO2 mmol/L 27.4 28.3 26.5   BUN mg/dL 60* 39* 20   CREATININE mg/dL 1.31* 1.50* 1.45*   CALCIUM mg/dL 8.7 8.9 9.2   GLUCOSE mg/dL 267* 264* 188*                   Lab Results  Lab Value Date/Time   TROPONINT 0.126 (C) 01/27/2018 0645   TROPONINT 0.155 (C) 01/26/2018 1444   TROPONINT 0.069 (H) 12/29/2017 1039   TROPONINT 0.101 (C) 12/28/2017 2054   TROPONINT <0.010 01/16/2017 0248   TROPONINT <0.010 " 12/28/2016 0039   TROPONINT <0.010 12/27/2016 1752   TROPONINT <0.010 12/27/2016 0617       Microbiology Results (last 10 days)     Procedure Component Value - Date/Time    Urine Culture - Urine, Urine, Clean Catch [[id number]]  (Normal) Collected:  01/27/18 1838    Lab Status:  Final result Specimen:  Urine from Urine, Catheter Updated:  01/28/18 0708     Urine Culture No growth    Blood Culture - Blood, [[id number]]  (Normal) Collected:  01/26/18 1732    Lab Status:  Final result Specimen:  Blood from Hand, Left Updated:  01/31/18 1746     Blood Culture No growth at 5 days    Blood Culture - Blood, [[id number]]  (Normal) Collected:  01/26/18 1732    Lab Status:  Final result Specimen:  Blood from Arm, Right Updated:  01/31/18 1746     Blood Culture No growth at 5 days           Imaging Results (last 7 days)     Procedure Component Value Units Date/Time    XR Chest 2 View [[id number]] Collected:  01/26/18 1409     Updated:  01/26/18 1413    Narrative:       TWO-VIEW CHEST     HISTORY: Emergency imaging of the chest was performed in this  89-year-old morbidly obese female for evaluation of cough congestion  shortness of breath.     COMPARISON: 01/07/2018     FINDINGS:  1. Interval development of vascular congestion, peripheral interstitial  prominence and small bilateral pleural effusions consistent with  congestive heart failure.  2. Underlying pneumonia would be impossible to exclude.  3. No other change since prior study.  4. Follow-up to resolution suggested.     This report was finalized on 1/26/2018 2:09 PM by Dr. Willard Toro MD.               Assessment:      Principal Problem:    HAP (hospital-acquired pneumonia)  Active Problems:    KOFI (acute kidney injury)    Acute diastolic CHF (congestive heart failure)    Ischemic cardiomyopathy    Hypertension, essential    DM (diabetes mellitus)    Influenza A  Severe anemia.  COPD, probably exacerbation.        Plan:      Chest x-ray stat  Mini nebs scheduled and when  necessary.  Occult blood in stool  Cross and Transfuse 2 PRBCs  Hold Plavix and ASA for now, patient apparently bleeding  DC IV fluids, Lasix IV ×1, extra dose.  Look for source of infection  Adjust insulin  Monitor and correct electrolytes  Monitor renal function  Monitor mental status  Continue home medications  DVT/stress ulcer prophylaxis  Continue working with PT  Labs in am  Patient is DNR with conditions.        Roberto Carlos Moya MD  2/3/2018

## 2018-02-03 NOTE — PLAN OF CARE
Problem: Patient Care Overview (Adult)  Goal: Plan of Care Review  Outcome: Ongoing (interventions implemented as appropriate)   02/03/18 0517   Coping/Psychosocial Response Interventions   Plan Of Care Reviewed With patient   Patient Care Overview   Progress progress towards functional goals is fair   Outcome Evaluation   Outcome Summary/Follow up Plan Pt did not rest at all tonight. Ostomy patent. IVF started. Safety maintained, will continue to monitor.        Problem: Pneumonia (Adult)  Goal: Signs and Symptoms of Listed Potential Problems Will be Absent or Manageable (Pneumonia)  Outcome: Ongoing (interventions implemented as appropriate)      Problem: Fall Risk (Adult)  Goal: Absence of Falls  Outcome: Ongoing (interventions implemented as appropriate)

## 2018-02-04 NOTE — PROGRESS NOTES
DAILY PROGRESS NOTE  [facility], [facility]    2018    Patient Identification:  Name: Cynthia Arreola  Age: 89 y.o.  Sex: female  :  1927  MRN: [id number]           Primary Care Physician: Param Martinez MD    Subjective:    Interval History:    VSS this am.  Received 2 prbc, Hgb better  CXR noted  BS in the 500 yesterday, better this am  White blood cell count went up to 16k  Cr raising as well      ROS:     Leslye CP, N/V/D. .      Objective:    Scheduled Meds:    atorvastatin 10 mg Oral Nightly   docusate sodium 250 mg Oral Daily   doxycycline 100 mg Oral Q12H   FERREX 150 PLUS 1 capsule Oral Daily With Breakfast   furosemide 40 mg Oral Daily   hydrALAZINE 75 mg Oral Q8H   insulin aspart 0-9 Units Subcutaneous 4x Daily With Meals & Nightly   insulin detemir 20 Units Subcutaneous Daily   ipratropium-albuterol 3 mL Nebulization 4x Daily - RT   isosorbide mononitrate 30 mg Oral Daily   levothyroxine 100 mcg Oral Daily   linagliptin 5 mg Oral Daily   metoclopramide 5 mg Oral 4x Daily AC & at Bedtime   pantoprazole 40 mg Intravenous Q AM   rOPINIRole 0.25 mg Oral Nightly       Continuous Infusions:    sodium chloride 75 mL/hr Last Rate: 75 mL/hr (18 0632)       PRN Meds:  •  acetaminophen  •  dextrose  •  dextrose  •  glucagon (human recombinant)  •  glucagon (human recombinant)  •  HYDROcodone-acetaminophen  •  ipratropium-albuterol  •  nitroglycerin  •  ondansetron  •  sodium chloride  •  sodium chloride  •  Insert peripheral IV **AND** sodium chloride    Intake/Output:    Intake/Output Summary (Last 24 hours) at 18 1019  Last data filed at 18 0747   Gross per 24 hour   Intake             2220 ml   Output              101 ml   Net             2119 ml         Exam:    tMax 24 hrs: Temp (24hrs), Av.9 °F (36.1 °C), Min:95.5 °F (35.3 °C), Max:98 °F (36.7 °C)    Vitals Ranges:   Temp:  [95.5 °F (35.3 °C)-98 °F (36.7 °C)] 97.4 °F (36.3 °C)  Heart  "Rate:  [] 64  Resp:  [16-24] 24  BP: ()/(56-82) 122/61    /61 (BP Location: Left arm, Patient Position: Lying)  Pulse 64  Temp 97.4 °F (36.3 °C) (Oral)   Resp 24  Ht 160 cm (63\")  Wt 80.3 kg (177 lb 1.6 oz)  SpO2 97%  BMI 31.37 kg/m2    General: Awake, however, confused.  On 2 liters nasal cannula, saturation 97 percent.  Neck: Supple, symmetrical, trachea midline, no adenopathy;              thyroid:  no enlargement/tenderness/nodules;              no carotid bruit or JVD  Cardiovascular:  tachycardic, appears to be irregular. Exam reveals no gallop and no friction rub. No murmur heard  Chest wall: No tenderness or deformity  Pulmonary: Diminished breath sounds bilaterally.  Rhonchi bilaterally, few wheezes in the mid lungs, diminished mainly at the bases.   Abdominal: Soft,  non-tender, bowel sounds active all four quadrants,     no masses, no hepatomegaly, no splenomegaly. Ostomy site OK  Extremities: Normal, atraumatic, no cyanosis or edema  Pulses: 2 + symmetric all extremities  Neurological:  awake, confused.  Non-new focal sensorimotor deficit.    Skin: Skin color, texture, turgor normal, no rashes or lesions    Data Review:      Results from last 7 days  Lab Units 02/04/18  0438 02/03/18  0814 02/02/18  0434   WBC 10*3/mm3 16.81* 14.09* 11.22*   HEMOGLOBIN g/dL 8.7* 6.2* 8.7*   HEMATOCRIT % 26.6* 21.0* 29.3*   PLATELETS 10*3/mm3 258 345 344         Results from last 7 days  Lab Units 02/04/18  0438 02/03/18  0441 02/02/18  0434   SODIUM mmol/L 138 136 133*   POTASSIUM mmol/L 4.0 4.2 4.4   CHLORIDE mmol/L 99 96* 94*   CO2 mmol/L 24.6 27.4 28.3   BUN mg/dL 74* 60* 39*   CREATININE mg/dL 1.50* 1.31* 1.50*   CALCIUM mg/dL 8.6 8.7 8.9   GLUCOSE mg/dL 233* 267* 264*                   Lab Results  Lab Value Date/Time   TROPONINT 0.126 (C) 01/27/2018 0645   TROPONINT 0.155 (C) 01/26/2018 1444   TROPONINT 0.069 (H) 12/29/2017 1039   TROPONINT 0.101 (C) 12/28/2017 2054   TROPONINT <0.010 " 01/16/2017 0248   TROPONINT <0.010 12/28/2016 0039   TROPONINT <0.010 12/27/2016 1752   TROPONINT <0.010 12/27/2016 0617       Microbiology Results (last 10 days)     Procedure Component Value - Date/Time    Urine Culture - Urine, Urine, Clean Catch [[id number]]  (Normal) Collected:  01/27/18 1838    Lab Status:  Final result Specimen:  Urine from Urine, Catheter Updated:  01/28/18 0708     Urine Culture No growth    Blood Culture - Blood, [[id number]]  (Normal) Collected:  01/26/18 1732    Lab Status:  Final result Specimen:  Blood from Hand, Left Updated:  01/31/18 1746     Blood Culture No growth at 5 days    Blood Culture - Blood, [[id number]]  (Normal) Collected:  01/26/18 1732    Lab Status:  Final result Specimen:  Blood from Arm, Right Updated:  01/31/18 1746     Blood Culture No growth at 5 days           Imaging Results (last 7 days)     Procedure Component Value Units Date/Time    XR Chest 2 View [[id number]] Collected:  01/26/18 1409     Updated:  01/26/18 1413    Narrative:       TWO-VIEW CHEST     HISTORY: Emergency imaging of the chest was performed in this  89-year-old morbidly obese female for evaluation of cough congestion  shortness of breath.     COMPARISON: 01/07/2018     FINDINGS:  1. Interval development of vascular congestion, peripheral interstitial  prominence and small bilateral pleural effusions consistent with  congestive heart failure.  2. Underlying pneumonia would be impossible to exclude.  3. No other change since prior study.  4. Follow-up to resolution suggested.     This report was finalized on 1/26/2018 2:09 PM by Dr. Willard Toro MD.               Assessment:      Principal Problem:    HAP (hospital-acquired pneumonia)  Active Problems:    KOFI (acute kidney injury)    Acute diastolic CHF (congestive heart failure)    Ischemic cardiomyopathy    Hypertension, essential    DM (diabetes mellitus)    Influenza A  Severe anemia.  COPD, probably exacerbation.  Gi  bleeding        Plan:    Appreciate  input.I agree with EGD.  Hbg better after 2 prbc  Continuing holding ASA, plavix, lovenox  Cr elevated, will start IV fluids, check BMP later today  Adjust insulin as needed  Monitor and correct electrolytes  Monitor renal function  Monitor mental status  Continue home medications  DVT/stress ulcer prophylaxis  Continue working with PT  Labs in am  Patient is DNR with conditions.        Roberto Carlos Moya MD  2/4/2018

## 2018-02-04 NOTE — PLAN OF CARE
Problem: Patient Care Overview (Adult)  Goal: Plan of Care Review  Outcome: Ongoing (interventions implemented as appropriate)   02/03/18 2017 02/04/18 0542   Coping/Psychosocial Response Interventions   Plan Of Care Reviewed With patient --    Patient Care Overview   Progress --  progress towards functional goals is fair   Outcome Evaluation   Outcome Summary/Follow up Plan --  Pt rested better tonight. Tolerated transfusion of 2 units PRBC w/o incident, Hgb 8.7. LS remain coarse. (+) Occult stool, GI consulted. BS remain elevated, Levemir increased to 20 units. AM . BUN/Cr also elevated. Safety maintained, will continue to monitor.       Problem: Pneumonia (Adult)  Goal: Signs and Symptoms of Listed Potential Problems Will be Absent or Manageable (Pneumonia)  Outcome: Outcome(s) achieved Date Met: 02/04/18

## 2018-02-05 NOTE — PLAN OF CARE
Problem: GI Endoscopy (Adult)  Goal: Signs and Symptoms of Listed Potential Problems Will be Absent or Manageable (GI Endoscopy)  Outcome: Ongoing (interventions implemented as appropriate)   02/05/18 1023   GI Endoscopy   Problems Assessed (GI Endoscopy) pain;bleeding

## 2018-02-05 NOTE — PLAN OF CARE
Problem: Patient Care Overview (Adult)  Goal: Plan of Care Review  Outcome: Ongoing (interventions implemented as appropriate)      Problem: Fall Risk (Adult)  Goal: Absence of Falls  Outcome: Ongoing (interventions implemented as appropriate)

## 2018-02-05 NOTE — ANESTHESIA PREPROCEDURE EVALUATION
Anesthesia Evaluation     Patient summary reviewed and Nursing notes reviewed   NPO Solid Status: > 8 hours  NPO Liquid Status: > 8 hours     Airway   Mallampati: II  TM distance: >3 FB  Neck ROM: full  no difficulty expected  Dental - normal exam     Pulmonary - normal exam   (+) pneumonia , a smoker Former, COPD,   Cardiovascular - normal exam    (+) hypertension, CAD, CHF, PVD, hyperlipidemia      Neuro/Psych  GI/Hepatic/Renal/Endo    (+)  diabetes mellitus type 2 using insulin, hypothyroidism,     Musculoskeletal     Abdominal  - normal exam   Substance History      OB/GYN          Other      history of cancer remission                                            Anesthesia Plan    ASA 3     MAC     Anesthetic plan and risks discussed with patient.

## 2018-02-05 NOTE — PLAN OF CARE
Problem: Patient Care Overview (Adult)  Goal: Plan of Care Review  Outcome: Ongoing (interventions implemented as appropriate)   02/05/18 0850   Coping/Psychosocial Response Interventions   Plan Of Care Reviewed With patient   Outcome Evaluation   Outcome Summary/Follow up Plan Pt. able to sit EOB with CGA for sitting balance. Pt. completed AROM exercises and trunk alternating isometrics, but is limited by fatigue and weakness.

## 2018-02-05 NOTE — ANESTHESIA POSTPROCEDURE EVALUATION
"Patient: Cynthia Arreola    Procedure Summary     Date Anesthesia Start Anesthesia Stop Room / Location    02/05/18 1045 1101  [facility] /  [facility]       Procedure Diagnosis Surgeon Provider    ESOPHAGOGASTRODUODENOSCOPY (N/A Esophagus) KOFI (acute kidney injury)  (KOFI (acute kidney injury) [N17.9]) MD Yousif Ramirez MD          Anesthesia Type: MAC  Last vitals  BP   137/53 (02/05/18 1032)   Temp   36.4 °C (97.6 °F) (02/05/18 1032)   Pulse   70 (02/05/18 1032)   Resp   12 (02/05/18 1032)     SpO2   95 % (02/05/18 1032)     Post Anesthesia Care and Evaluation    Patient location during evaluation: PACU  Patient participation: complete - patient participated  Level of consciousness: awake and alert  Pain management: adequate  Airway patency: patent  Anesthetic complications: No anesthetic complications    Cardiovascular status: acceptable  Respiratory status: acceptable  Hydration status: acceptable    Comments: /53 (BP Location: Left arm, Patient Position: Lying)  Pulse 70  Temp 36.4 °C (97.6 °F) (Oral)   Resp 12  Ht 160 cm (63\")  Wt 81.6 kg (180 lb)  SpO2 95%  BMI 31.89 kg/m2      "

## 2018-02-05 NOTE — PROGRESS NOTES
DAILY PROGRESS NOTE  [facility], [facility]    2018    Patient Identification:  Name: Cynthia Arreola  Age: 89 y.o.  Sex: female  :  1927  MRN: [id number]           Primary Care Physician: Param Martinez MD    Subjective:    Interval History:    Doxycycline, day 5 for pneumonia, after IV Zosyn  Hgb up to 7.9 following 2 units PRBC's, and remaining in hat level  WBC and creatine down a little after start of IVF's  Lovenox, plavix and ASA on hold pending EGD  Sats 98% on 2 Liters N/C    ROS:     Denies CP, SOA, N/V/D.      Objective:    Scheduled Meds:    atorvastatin 10 mg Oral Nightly   docusate sodium 250 mg Oral Daily   FERREX 150 PLUS 1 capsule Oral Daily With Breakfast   insulin aspart 0-9 Units Subcutaneous 4x Daily With Meals & Nightly   insulin detemir 15 Units Subcutaneous Q12H   ipratropium-albuterol 3 mL Nebulization 4x Daily - RT   isosorbide mononitrate 30 mg Oral Daily   levothyroxine 100 mcg Oral Daily   linagliptin 5 mg Oral Daily   metoclopramide 5 mg Oral 4x Daily AC & at Bedtime   pantoprazole 40 mg Intravenous Q AM   rOPINIRole 0.25 mg Oral Nightly       Continuous Infusions:    sodium chloride 75 mL/hr Last Rate: 75 mL/hr (18 0149)       PRN Meds:  •  acetaminophen  •  dextrose  •  dextrose  •  glucagon (human recombinant)  •  glucagon (human recombinant)  •  HYDROcodone-acetaminophen  •  ipratropium-albuterol  •  nitroglycerin  •  ondansetron  •  sodium chloride  •  sodium chloride  •  Insert peripheral IV **AND** sodium chloride    Intake/Output:    Intake/Output Summary (Last 24 hours) at 18  Last data filed at 18 1100   Gross per 24 hour   Intake             1250 ml   Output                0 ml   Net             1250 ml         Exam:    tMax 24 hrs: Temp (24hrs), Av.9 °F (36.6 °C), Min:97.5 °F (36.4 °C), Max:98.7 °F (37.1 °C)    Vitals Ranges:   Temp:  [97.5 °F (36.4 °C)-98.7 °F (37.1 °C)] 97.5 °F (36.4 °C)  Heart  "Rate:  [59-76] 66  Resp:  [12-20] 16  BP: (102-154)/(46-85) 117/68    /68 (BP Location: Left arm, Patient Position: Lying)  Pulse 66  Temp 97.5 °F (36.4 °C) (Oral)   Resp 16  Ht 160 cm (63\")  Wt 81.6 kg (180 lb)  SpO2 90%  BMI 31.89 kg/m2    General: Awake, however, confused.    Neck: Supple, symmetrical, trachea midline, no adenopathy;              thyroid:  no enlargement/tenderness/nodules;              no carotid bruit or JVD  Cardiovascular:  tachycardic, appears to be irregular. Exam reveals no gallop and no friction rub. No murmur heard  Chest wall: No tenderness or deformity  Pulmonary: Diminished breath sounds bilaterally.  Rhonchi bilaterally at bases, no wheezes, rales.    Abdominal: Soft,  non-tender, bowel sounds active all four quadrants, no masses, no hepatomegaly, no splenomegaly. Ostomy site OK  Extremities: Normal, atraumatic, no cyanosis or edema  Pulses: 2 + symmetric all extremities  Neurological:  awake, confused.  Non-new focal sensorimotor deficit.    Skin: Skin color, texture, turgor normal, no rashes or lesions    Data Review:      Results from last 7 days  Lab Units 02/05/18  1602 02/05/18  0811 02/05/18  0543  02/04/18  0438 02/03/18  0814   WBC 10*3/mm3  --   --  11.10*  --  16.81* 14.09*   HEMOGLOBIN g/dL 7.9* 7.9* 7.8*  < > 8.7* 6.2*   HEMATOCRIT % 25.5* 25.3* 25.2*  < > 26.6* 21.0*   PLATELETS 10*3/mm3  --   --  250  --  258 345   < > = values in this interval not displayed.      Results from last 7 days  Lab Units 02/05/18  0543 02/04/18  2146 02/04/18  0438   SODIUM mmol/L 139 140 138   POTASSIUM mmol/L 3.7 4.0 4.0   CHLORIDE mmol/L 101 100 99   CO2 mmol/L 25.8 24.5 24.6   BUN mg/dL 48* 56* 74*   CREATININE mg/dL 1.35* 1.45* 1.50*   CALCIUM mg/dL 8.3 8.5 8.6   GLUCOSE mg/dL 180* 289* 233*       Results from last 7 days  Lab Units 02/05/18  0543   INR  1.10               Lab Results  Lab Value Date/Time   TROPONINT 0.126 (C) 01/27/2018 0645   TROPONINT 0.155 (C) " 01/26/2018 1444   TROPONINT 0.069 (H) 12/29/2017 1039   TROPONINT 0.101 (C) 12/28/2017 2054   TROPONINT <0.010 01/16/2017 0248   TROPONINT <0.010 12/28/2016 0039   TROPONINT <0.010 12/27/2016 1752   TROPONINT <0.010 12/27/2016 0617       Microbiology Results (last 10 days)     Procedure Component Value - Date/Time    Urine Culture - Urine, Urine, Clean Catch [[id number]]  (Normal) Collected:  01/27/18 1838    Lab Status:  Final result Specimen:  Urine from Urine, Catheter Updated:  01/28/18 0708     Urine Culture No growth           Imaging Results (last 7 days)     Procedure Component Value Units Date/Time    XR Chest 2 View [[id number]] Collected:  01/26/18 1409     Updated:  01/26/18 1413    Narrative:       TWO-VIEW CHEST     HISTORY: Emergency imaging of the chest was performed in this  89-year-old morbidly obese female for evaluation of cough congestion  shortness of breath.     COMPARISON: 01/07/2018     FINDINGS:  1. Interval development of vascular congestion, peripheral interstitial  prominence and small bilateral pleural effusions consistent with  congestive heart failure.  2. Underlying pneumonia would be impossible to exclude.  3. No other change since prior study.  4. Follow-up to resolution suggested.     This report was finalized on 1/26/2018 2:09 PM by Dr. Willard Toro MD.               Assessment:      Principal Problem:    HAP (hospital-acquired pneumonia)  Active Problems:    KOFI (acute kidney injury)    Acute diastolic CHF (congestive heart failure)    Ischemic cardiomyopathy    Hypertension, essential    DM (diabetes mellitus)    Influenza A  Severe anemia.  COPD, probably exacerbation.  Gi bleeding        Plan:    EGD reviewed, gastritis, but not source of bleeding  Hgb stable at 7.9  Continuing holding ASA, plavix, lovenox  Cr improving, continue IV fluids for 1 more day  DC abx.  Adjust insulin as needed, increase levemir  Monitor and correct electrolytes  Monitor renal function  Monitor  mental status  Continue home medications  DVT/stress ulcer prophylaxis  Continue working with PT  Labs in am  Patient is DNR with conditions.    Roberto Carlos Moya MD  2/5/2018

## 2018-02-05 NOTE — THERAPY TREATMENT NOTE
Acute Care - Physical Therapy Treatment Note  [facility]     Patient Name: Cynthia Arreola  : 1927  MRN: [id number]  Today's Date: 2018  Onset of Illness/Injury or Date of Surgery Date: 18          Admit Date: 2018    Visit Dx:    ICD-10-CM ICD-9-CM   1. HAP (hospital-acquired pneumonia) J18.9 486   2. Acute on chronic congestive heart failure, unspecified congestive heart failure type I50.9 428.0   3. Elevated alkaline phosphatase level R74.8 790.5   4. Anemia, unspecified type D64.9 285.9   5. Oral phase dysphagia R13.11 787.21   6. Anemia of chronic disease D63.8 285.29   7. KOFI (acute kidney injury) N17.9 584.9     Patient Active Problem List   Diagnosis   • Healthcare-associated viral pneumonia   • Diabetic foot ulcer associated with type 2 diabetes mellitus   • Chronic kidney disease, stage II (mild)   • H/O small bowel obstruction   • Anemia of chronic disease   • Recurrent UTI   • Ischemic cardiomyopathy   • Peripheral arterial disease   • Hypertension, essential   • Immobility syndrome   • Acquired hypothyroidism   • MRSA cellulitis of right foot   • Sacral decubitus ulcer   • SSS (sick sinus syndrome)   • S/P placement of cardiac pacemaker   • Gastroesophageal reflux disease with esophagitis   • Hyperlipidemia   • Ischemic ulcer of right heel with fat layer exposed   • 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   • Acute bronchitis due to Rhinovirus (enterovirus)   • Bilateral viral bronchopneumonia   • Respiratory infection due to enterovirus/ Human Rhinovirus   • Common bile duct stone   • Bronchitis with bronchospasm   • DM (diabetes mellitus)   • KOFI (acute kidney injury)   • Hypokalemia   • Elevated LFTs   • H1N1 influenza   • COPD exacerbation   • HAP (hospital-acquired pneumonia)   • Acute diastolic CHF (congestive heart failure)   • Influenza A               Adult Rehabilitation Note       18 0800          Rehab Assessment/Intervention    Discipline  (P)  physical therapy assistant   student  -      Document Type (P)  therapy note (daily note)  -      Subjective Information (P)  agree to therapy  -      Patient Effort, Rehab Treatment (P)  good  -      Symptoms Noted During/After Treatment (P)  fatigue  -      Precautions/Limitations (P)  fall precautions  -      Precautions/Limitations, Hearing (P)  hearing impairment, bilaterally  -      Recorded by [] Catie Rajput PT Student      Pain Assessment    Pain Assessment (P)  No/denies pain  -      Recorded by [] Catie Rajput PT Student      Cognitive Assessment/Intervention    Current Cognitive/Communication Assessment (P)  impaired  -      Orientation Status (P)  oriented x 4  -      Follows Commands/Answers Questions (P)  able to follow single-step instructions  -      Personal Safety (P)  moderate impairment  -      Personal Safety Interventions (P)  fall prevention program maintained;gait belt;nonskid shoes/slippers when out of bed  -      Recorded by [SH] Catie Rajput PT Student      Bed Mobility, Assessment/Treatment    Bed Mobility, Assistive Device (P)  bed rails;head of bed elevated;draw sheet  -      Bed Mob, Supine to Sit, [place] (P)  maximum assist (25% patient effort);2 person assist required;verbal cues required;nonverbal cues required (demo/gesture)  -      Bed Mob, Sit to Supine, [place] (P)  moderate assist (50% patient effort);2 person assist required;verbal cues required;nonverbal cues required (demo/gesture)  -      Bed Mobility, Safety Issues (P)  decreased use of arms for pushing/pulling;decreased use of legs for bridging/pushing  -      Bed Mobility, Impairments (P)  strength decreased;impaired balance  -      Recorded by [] Catie Rajput PT Student      Transfer Assessment/Treatment    Transfers, Sit-Stand [place] (P)  not tested  -      Transfers, Stand-Sit [place] (P)  not tested  -      Recorded by [SH] Catie Rajput PT  Student      Gait Assessment/Treatment    Gait, [place] Level (P)  not appropriate to assess  -SH      Recorded by [] Catie Rajput PT Student      Balance Skills Training    Sitting-Balance Support (P)  Right upper extremity supported;Left upper extremity supported  -      Sitting # of Minutes (P)  7  -SH      Recorded by [] Catie Rajput PT Student      Therapy Exercises    Bilateral Lower Extremities (P)  AROM:;10 reps;sitting;ankle pumps/circles;hip flexion;LAQ  -SH      Bilateral Upper Extremity (P)  AROM:;10 reps;sitting;shoulder extension/flexion  -SH      Recorded by [] Catie Rajput, PT Student      Neuromuscular Re-education    Neuromuscular Re-Ed Techniques (P)  Facilitation Trunk Control ([place])   alternating isometrics  -SH      Recorded by [] Catie Rajput PT Student      Positioning and Restraints    Pre-Treatment Position (P)  in bed  -SH      Post Treatment Position (P)  bed  -SH      In Bed (P)  side lying left;call light within reach;encouraged to call for assist;exit alarm on;with nsg  -SH      Recorded by [] Catie Rajput PT Student        User Key  (r) = Recorded By, (t) = Taken By, (c) = Cosigned By    Initials Name Effective Dates    ERIC Rajput PT Student 01/08/18 -                 IP PT Goals       01/29/18 1546          Bed Mobility PT LTG    Bed Mobility PT LTG, Date Established 01/29/18  -AR      Bed Mobility PT LTG, Time to Achieve 2 wks  -AR      Bed Mobility PT LTG, Activity Type supine to sit/sit to supine  -AR      Bed Mobility PT LTG, [place] Level moderate assist (50% patient effort)  -AR      Transfer Training PT LTG    Transfer Training PT LTG, Date Established 01/29/18  -AR      Transfer Training PT LTG, Time to Achieve 2 wks  -AR      Transfer Training PT LTG, Activity Type sit to stand/stand to sit  -AR      Transfer Training PT LTG, [place] Level moderate assist (50% patient effort);maximum assist (25% patient effort);2 person assist required   -AR        User Key  (r) = Recorded By, (t) = Taken By, (c) = Cosigned By    Initials Name Provider Type    AR Halina Layton PT Physical Therapist          Physical Therapy Education     Title: PT OT SLP Therapies (Done)     Topic: Physical Therapy (Done)     Point: Mobility training (Done)    Learning Progress Summary    Learner Readiness Method Response Comment Documented by Status   Patient Acceptance E,TB VU,NR   02/05/18 0853 Done    Acceptance E VU,NR   02/01/18 0854 Done    Acceptance E NR  AR 01/29/18 1544 Active               Point: Home exercise program (Done)    Learning Progress Summary    Learner Readiness Method Response Comment Documented by Status   Patient Acceptance E,TB VU,NR   02/05/18 0853 Done    Acceptance E NR  AR 01/29/18 1544 Active               Point: Body mechanics (Done)    Learning Progress Summary    Learner Readiness Method Response Comment Documented by Status   Patient Acceptance E,TB VU,NR   02/05/18 0853 Done    Acceptance E VU,NR   02/01/18 0854 Done    Acceptance E NR  AR 01/29/18 1544 Active               Point: Precautions (Done)    Learning Progress Summary    Learner Readiness Method Response Comment Documented by Status   Patient Acceptance E,TB VU,NR   02/05/18 0853 Done    Acceptance E VU,NR   02/01/18 0854 Done    Acceptance E NR  AR 01/29/18 1544 Active                      User Key     Initials Effective Dates Name Provider Type Discipline    AR 06/27/16 -  Halina Layton, PT Physical Therapist PT     01/08/18 -  Catie Rajput, PT Student PT Student PT                    PT Recommendation and Plan  Anticipated Discharge Disposition: extended care facility, skilled nursing facility  Planned Therapy Interventions: bed mobility training, balance training, gait training, home exercise program, patient/family education, ROM (Range of Motion), strengthening, transfer training  PT Frequency: 2-3 times/wk  Plan of Care Review  Plan Of Care Reviewed With:  (P) patient  Outcome Summary/Follow up Plan: (P) Pt. able to sit EOB with CGA for sitting balance. Pt. completed AROM exercises and trunk alternating isometrics, but is limited by fatigue and  weakness.          Outcome Measures       02/05/18 0800          How much help from another person do you currently need...    Turning from your back to your side while in flat bed without using bedrails? (P)  2  -SH      Moving from lying on back to sitting on the side of a flat bed without bedrails? (P)  1  -SH      Moving to and from a bed to a chair (including a wheelchair)? (P)  1  -SH      Standing up from a chair using your arms (e.g., wheelchair, bedside chair)? (P)  1  -SH      Climbing 3-5 steps with a railing? (P)  1  -SH      To walk in hospital room? (P)  1  -SH      AM-PAC 6 Clicks Score (P)  7  -RW (r) SH (t)      Functional Assessment    Outcome Measure Options (P)  AM-PAC 6 Clicks Basic Mobility (PT)  -        User Key  (r) = Recorded By, (t) = Taken By, (c) = Cosigned By    Initials Name Provider Type     Mary Ray, PTA Physical Therapy Assistant     Catie Rajput, PT Student PT Student           Time Calculation:         PT Charges       02/05/18 0855          Time Calculation    Start Time (P)  0828  -      Stop Time (P)  0841  -      Time Calculation (min) (P)  13 min  -      PT Received On (P)  02/05/18  -      PT - Next Appointment (P)  02/07/18  -        User Key  (r) = Recorded By, (t) = Taken By, (c) = Cosigned By    Initials Name Provider Type     Catie Rajput PT Student PT Student          Therapy Charges for Today     Code Description Service Date Service Provider Modifiers Qty    36264610291 HC PT THER SUPP EA 15 MIN 2/5/2018 Catie Rajput PT Student GP 1    82268016477 HC PT THER PROC EA 15 MIN 2/5/2018 Catie Rajput PT Student GP 1          PT G-Codes  Outcome Measure Options: (P) AM-PAC 6 Clicks Basic Mobility (PT)    Catie Rajput PT Student  2/5/2018

## 2018-02-05 NOTE — SIGNIFICANT NOTE
02/05/18 1036   Rehab Treatment   Discipline speech language pathologist   Rehab Evaluation   Evaluation Not Performed patient unavailable for evaluation;other (see comments)  (Pt currently off the floor for EGD. ST completed bedside swallow 1/27/18 , swallow WFL and oral dysphagia secondary to limited dentition. ST to follow up s/p EGD for re-eval if warranted. MD please provide clarafication. )

## 2018-02-06 NOTE — PROGRESS NOTES
DAILY PROGRESS NOTE  [facility], [facility]    2018    Patient Identification:  Name: Cynthia Arreola  Age: 89 y.o.  Sex: female  :  1927  MRN: [id number]           Primary Care Physician: Param Martinez MD    Subjective:    Interval History:      Hgb remaining stable with no further evidence of bleeding  Creatinine trending down after start of IVF's  Now with trouble breathing  Lovenox, plavix and ASA on hold  Antibiotics completed.   Sats 98% on 2 Liters N/C    ROS:     Denies CP, SOA, N/V/D.      Objective:    Scheduled Meds:    atorvastatin 10 mg Oral Nightly   budesonide 1 mg Nebulization BID - RT   docusate sodium 250 mg Oral Daily   FERREX 150 PLUS 1 capsule Oral Daily With Breakfast   insulin aspart 0-9 Units Subcutaneous 4x Daily With Meals & Nightly   insulin detemir 18 Units Subcutaneous Q12H   ipratropium-albuterol 3 mL Nebulization 4x Daily - RT   isosorbide mononitrate 30 mg Oral Daily   levothyroxine 100 mcg Oral Daily   linagliptin 5 mg Oral Daily   metoclopramide 5 mg Oral 4x Daily AC & at Bedtime   [START ON 2018] pantoprazole 40 mg Oral Q AM   rOPINIRole 0.25 mg Oral Nightly       Continuous Infusions:    sodium chloride 75 mL/hr Last Rate: 75 mL/hr (18 0931)       PRN Meds:  •  acetaminophen  •  dextrose  •  dextrose  •  glucagon (human recombinant)  •  glucagon (human recombinant)  •  HYDROcodone-acetaminophen  •  ipratropium-albuterol  •  nitroglycerin  •  ondansetron  •  sodium chloride  •  sodium chloride  •  Insert peripheral IV **AND** sodium chloride    Intake/Output:  No intake or output data in the 24 hours ending 18 7816      Exam:    tMax 24 hrs: Temp (24hrs), Av.5 °F (36.4 °C), Min:97.3 °F (36.3 °C), Max:97.8 °F (36.6 °C)    Vitals Ranges:   Temp:  [97.3 °F (36.3 °C)-97.8 °F (36.6 °C)] 97.3 °F (36.3 °C)  Heart Rate:  [62-84] 62  Resp:  [16-22] 20  BP: (117-187)/(59-71) 172/69    /69 (BP Location: Left arm,  "Patient Position: Sitting)  Pulse 62  Temp 97.3 °F (36.3 °C) (Oral)   Resp 20  Ht 160 cm (63\")  Wt 79.8 kg (176 lb)  SpO2 91%  BMI 31.18 kg/m2    General: Awake, however, confused.    Neck: Supple, symmetrical, trachea midline, no adenopathy;              thyroid:  no enlargement/tenderness/nodules;              no carotid bruit or JVD  Cardiovascular:  tachycardic, appears to be irregular. Exam reveals no gallop and no friction rub. No murmur heard  Chest wall: No tenderness or deformity  Pulmonary: Diminished breath sounds bilaterally.  Rhonchi bilaterally at bases, no wheezes, rales.    Abdominal: Soft,  non-tender, bowel sounds active all four quadrants, no masses, no hepatomegaly, no splenomegaly. Ostomy site OK  Extremities: Normal, atraumatic, no cyanosis or edema  Pulses: 2 + symmetric all extremities  Neurological:  awake, confused.  Non-new focal sensorimotor deficit.    Skin: Skin color, texture, turgor normal, no rashes or lesions    Data Review:      Results from last 7 days  Lab Units 02/06/18  0818 02/06/18  0007 02/05/18  1602  02/05/18  0543  02/04/18  0438 02/03/18  0814   WBC 10*3/mm3  --   --   --   --  11.10*  --  16.81* 14.09*   HEMOGLOBIN g/dL 8.4* 7.8* 7.9*  < > 7.8*  < > 8.7* 6.2*   HEMATOCRIT % 27.4* 24.9* 25.5*  < > 25.2*  < > 26.6* 21.0*   PLATELETS 10*3/mm3  --   --   --   --  250  --  258 345   < > = values in this interval not displayed.      Results from last 7 days  Lab Units 02/06/18  0352 02/05/18  0543 02/04/18  2146   SODIUM mmol/L 145 139 140   POTASSIUM mmol/L 3.8 3.7 4.0   CHLORIDE mmol/L 107 101 100   CO2 mmol/L 26.6 25.8 24.5   BUN mg/dL 34* 48* 56*   CREATININE mg/dL 1.18* 1.35* 1.45*   CALCIUM mg/dL 8.3 8.3 8.5   GLUCOSE mg/dL 189* 180* 289*       Results from last 7 days  Lab Units 02/05/18  0543   INR  1.10               Lab Results  Lab Value Date/Time   TROPONINT 0.126 (C) 01/27/2018 0645   TROPONINT 0.155 (C) 01/26/2018 1444   TROPONINT 0.069 (H) 12/29/2017 " 1039   TROPONINT 0.101 (C) 12/28/2017 2054   TROPONINT <0.010 01/16/2017 0248   TROPONINT <0.010 12/28/2016 0039   TROPONINT <0.010 12/27/2016 1752   TROPONINT <0.010 12/27/2016 0617       Microbiology Results (last 10 days)     Procedure Component Value - Date/Time    Urine Culture - Urine, Urine, Clean Catch [[id number]]  (Normal) Collected:  01/27/18 1838    Lab Status:  Final result Specimen:  Urine from Urine, Catheter Updated:  01/28/18 0708     Urine Culture No growth           Imaging Results (last 7 days)     Procedure Component Value Units Date/Time    XR Chest 2 View [[id number]] Collected:  01/26/18 1409     Updated:  01/26/18 1413    Narrative:       TWO-VIEW CHEST     HISTORY: Emergency imaging of the chest was performed in this  89-year-old morbidly obese female for evaluation of cough congestion  shortness of breath.     COMPARISON: 01/07/2018     FINDINGS:  1. Interval development of vascular congestion, peripheral interstitial  prominence and small bilateral pleural effusions consistent with  congestive heart failure.  2. Underlying pneumonia would be impossible to exclude.  3. No other change since prior study.  4. Follow-up to resolution suggested.     This report was finalized on 1/26/2018 2:09 PM by Dr. Willard Toro MD.               Assessment:      Principal Problem:    HAP (hospital-acquired pneumonia)  Active Problems:    KOFI (acute kidney injury)    Acute diastolic CHF (congestive heart failure)    Ischemic cardiomyopathy    Hypertension, essential    DM (diabetes mellitus)    Influenza A  Severe anemia.  COPD, probably exacerbation.  Gi bleeding        Plan:    Hgb stable  Continuing holding ASA, plavix, lovenox  Cr improving. DC IV fluids. May need Lasix IV x 1  Adjust insulin as needed; Bs's better after increase in levemir  Monitor and correct electrolytes  Very anxious this pm, Lorazepam x1  Monitor renal function  Monitor mental status  Continue home medications  DVT/stress ulcer  prophylaxis  Continue working with PT  Labs in am  Patient is DNR with conditions.    Roberto Carlos Moya MD  2/6/2018

## 2018-02-06 NOTE — PROGRESS NOTES
"Adult Nutrition  Assessment/PES    Patient Name:  Cynthia Arreola  YOB: 1927  MRN: [id number]  Admit Date:  1/26/2018    Assessment Date:  2/6/2018    Patient screened for length of stay > 10 days. Spoke with RN, stated diet advanced today to soft texture ground food and eating fair. Tried to speak with patient but unable to due to confusion. Will order supplement BID.          Reason for Assessment       02/06/18 1424    Reason for Assessment    Reason For Assessment/Visit length of stay    Diagnosis   HAP                Anthropometrics       02/06/18 1425    Anthropometrics (Special Considerations)    Height Used for Calculations 1.6 m (5' 3\")    Weight Used for Calculations 79.8 kg (176 lb)    RD Calculated     RD Calculated %     RD Calculated BMI (kg/m2) 31.1    Body Mass Index (BMI)    BMI Grade 30 - 34.9- obesity - grade I            Labs/Tests/Procedures/Meds       02/06/18 1427    Labs/Tests/Procedures/Meds    Diagnostic Test/Procedure Review reviewed    Labs/Tests Review Reviewed;Glucose;Creat;BUN    Medication Review Reviewed, pertinent   colace, insulin, reglan, PPI, NaCl    Significant Vitals reviewed            Physical Findings       02/06/18 1428    Physical Findings/Assessment    Additional Documentation --   B=16            Estimated/Assessed Needs       02/06/18 1428    Calculation Measurements    Weight Used For Calculations 79.8 kg (176 lb)    Height Used for Calculations 1.6 m (5' 3\")    Estimated/Assessed Energy Needs    Energy Need Method Kcal/kg    kcal/kg 25    25 Kcal/Kg (kcal) 1995.82    Estimated/Assessed Protein Needs    Weight Used for Protein Calculation 79.8 kg (176 lb)    Protein (gm/kg) 1.0    1.0 Gm Protein (gm) 79.83    Estimated/Assessed Fluid Needs    Fluid Need Method RDA method    RDA Method (mL)  2000            Nutrition Prescription Ordered       02/06/18 1428    Nutrition Prescription PO    Current PO Diet Soft Texture    Texture Ground    " Common Modifiers Cardiac;Consistent Carbohydrate            Evaluation of Received Nutrient/Fluid Intake       02/06/18 1428    PO Evaluation    Number of Meals 1    % PO Intake 25-minimal documentation for po intake            Problem/Interventions:        Problem 1       02/06/18 1429    Nutrition Diagnoses Problem 1    Problem 1 Inadequate Nutrient Intake    Etiology (related to) MNT for Treatment/Condition    Signs/Symptoms (evidenced by) Report of Mnimal PO Intake                    Intervention Goal       02/06/18 1429    Intervention Goal    General Maintain nutrition;Meet nutritional needs for age/condition    PO Tolerate PO;Increase intake    Weight Maintain weight            Nutrition Intervention       02/06/18 1429    Nutrition Intervention    RD/Tech Action Interview for preference;Follow Tx progress;Care plan reviewd;Encourage intake;Supplement provided            Nutrition Prescription       02/06/18 1429    Nutrition Prescription PO    PO Prescription Begin/change supplement    Supplement Glucerna Shake    Supplement Frequency 2 times a day    New PO Prescription Ordered? Yes            Education/Evaluation       02/06/18 1429    Education    Education Will Instruct as appropriate    Monitor/Evaluation    Monitor Per protocol    Education Follow-up Reinforce PRN        Electronically signed by:  Esther Harper RD  02/06/18 2:30 PM

## 2018-02-06 NOTE — PLAN OF CARE
Problem: Patient Care Overview (Adult)  Goal: Plan of Care Review  Outcome: Ongoing (interventions implemented as appropriate)   02/05/18 2020   Coping/Psychosocial Response Interventions   Plan Of Care Reviewed With patient;son   Patient Care Overview   Progress progress towards functional goals is fair   Outcome Evaluation   Outcome Summary/Follow up Plan No further evidence of bleeding noted. EGD showed no signs of bleeding in esophagus or stomach. Monitor shows atrial paced / sinus rhythm. VSS. Lungs diminished.     Goal: Adult Individualization and Mutuality  Outcome: Ongoing (interventions implemented as appropriate)    Goal: Discharge Needs Assessment  Outcome: Ongoing (interventions implemented as appropriate)      Problem: Fall Risk (Adult)  Goal: Absence of Falls  Outcome: Ongoing (interventions implemented as appropriate)

## 2018-02-06 NOTE — SIGNIFICANT NOTE
02/06/18 1130   Rehab Treatment   Discipline speech language pathologist   Rehab Evaluation   Evaluation Not Performed other (see comments)  (Per Nsg, Rebecca, homer eval not warranted; ST will s/o please reconsult as warranted.)

## 2018-02-06 NOTE — PLAN OF CARE
Problem: Patient Care Overview (Adult)  Goal: Plan of Care Review  Outcome: Ongoing (interventions implemented as appropriate)   02/06/18 0413   Coping/Psychosocial Response Interventions   Plan Of Care Reviewed With patient   Patient Care Overview   Progress improving   Outcome Evaluation   Outcome Summary/Follow up Plan No S/S of Bleeding, Safety maintained, pt restless at times, resting better after pain med. No noted.      Goal: Adult Individualization and Mutuality  Outcome: Ongoing (interventions implemented as appropriate)    Goal: Discharge Needs Assessment  Outcome: Ongoing (interventions implemented as appropriate)      Problem: Fall Risk (Adult)  Goal: Absence of Falls  Outcome: Ongoing (interventions implemented as appropriate)

## 2018-02-06 NOTE — PROGRESS NOTES
BGA/GI Progress Note   Chief Complaint:  GI bleed    Subjective     Interval History:   EGD 2/5 without source of bleed/ anemia. Non-obstructing Schatzki ring and gastritis.   No further melenic stool from stoma. No overt GI bleeding.     History taken from: chart    Review of Systems:    Review of systems could not be obtained due to  patient confusion.    Objective     Vital Signs  Temp:  [97.3 °F (36.3 °C)-97.8 °F (36.6 °C)] 97.3 °F (36.3 °C)  Heart Rate:  [59-84] 84  Resp:  [14-22] 22  BP: (117-187)/(59-85) 187/71  Body mass index is 31.18 kg/(m^2).    Intake/Output Summary (Last 24 hours) at 02/06/18 1041  Last data filed at 02/05/18 1100   Gross per 24 hour   Intake              250 ml   Output                0 ml   Net              250 ml          Physical Exam:   General: patient awake, alert and cooperative   Eyes: Normal lids and lashes, no scleral icterus, no conjunctival pallor   Neck: supple, normal ROM, no tracheal deviation, no thyromegaly   Skin: warm and dry, not jaundiced   Cardiovascular: regular rhythm and rate, no murmurs auscultated   Pulm: clear to auscultation bilaterally, regular and unlabored   Abdomen: soft, nontender, nondistended; normal bowel sounds   Rectal: deferred   Extremities: no rash or edema   Neurologic: Normal mood and behavior    All Medications Have Been Reviewed     Results Review:       Results from last 7 days  Lab Units 02/06/18  0818 02/06/18  0007 02/05/18  1602  02/05/18  0543  02/04/18  0438 02/03/18  0814   WBC 10*3/mm3  --   --   --   --  11.10*  --  16.81* 14.09*   HEMOGLOBIN g/dL 8.4* 7.8* 7.9*  < > 7.8*  < > 8.7* 6.2*   HEMATOCRIT % 27.4* 24.9* 25.5*  < > 25.2*  < > 26.6* 21.0*   PLATELETS 10*3/mm3  --   --   --   --  250  --  258 345   < > = values in this interval not displayed.      Results from last 7 days  Lab Units 02/06/18  0352 02/05/18  0543 02/04/18  2146   SODIUM mmol/L 145 139 140   POTASSIUM mmol/L 3.8 3.7 4.0   CHLORIDE mmol/L 107 101 100    CO2 mmol/L 26.6 25.8 24.5   BUN mg/dL 34* 48* 56*   CREATININE mg/dL 1.18* 1.35* 1.45*   CALCIUM mg/dL 8.3 8.3 8.5   GLUCOSE mg/dL 189* 180* 289*         Results from last 7 days  Lab Units 02/05/18  0543   INR  1.10       RADIOLOGY:    Imaging Results (last 72 hours)     Procedure Component Value Units Date/Time    CT Abdomen Pelvis Without Contrast [[id number]] Collected:  02/03/18 2209     Updated:  02/04/18 0522    Narrative:       NONCONTRAST CT SCANS ABDOMEN AND PELVIS     HISTORY: GI bleeding; J18.9-Pneumonia, unspecified organism; I50.9-Heart  failure, unspecified; R74.8-Abnormal levels of other serum enzymes;  D64.9-Anemia, unspecified; R13.11-Dysphagia, oral phase     COMPARISON: 01/16/2017.     TECHNIQUE:Radiation dose reduction techniques were utilized, including  automated exposure control and exposure modulation based on body size.   Axial images were obtained from the lung bases to the symphysis pubis  without oral or IV contrast per request.      FINDINGS ABDOMEN CT:  There are irregular lower lobe opacities which may  be related to atelectasis or pneumonia. There are small pleural  effusions, left greater than right. Heart is enlarged. Gallbladder  surgically absent. Bilateral renal calculi appear to be largely  vascular. There are low-density renal lesions bilaterally which are  favored to reflect cysts. There is no hydronephrosis. Remaining solid  organs are otherwise unremarkable without benefit of IV contrast.     FINDINGS PELVIS CT:  Aorta heavily calcified but nonaneurysmal. Normal  appendix. The GI tract not opacified for assessment but non obstructive  in appearance. Abundant fecal material suggests constipation. No free  fluid. There is urinary bladder distention.                Impression:       1.  Lower lobe opacities and small pleural effusions.  2. Stable low-density renal lesions, favor cysts. They are incompletely  characterized without contrast.  3. Constipation without  obstruction.               This study was performed with techniques to keep radiation doses as low  as reasonably achievable (ALARA). Individualized dose reduction  techniques using automated exposure control or adjustment of mA and/or  kV according to the patient size were employed.      This report was finalized on 2/4/2018 5:19 AM by Maykel Chapin MD.             Assessment/Plan     Patient Active Problem List   Diagnosis Code   • Healthcare-associated viral pneumonia J18.9   • Diabetic foot ulcer associated with type 2 diabetes mellitus E11.621, L97.509   • Chronic kidney disease, stage II (mild) N18.2   • H/O small bowel obstruction Z87.19   • Anemia of chronic disease D63.8   • Recurrent UTI N39.0   • Ischemic cardiomyopathy I25.5   • Peripheral arterial disease I73.9   • Hypertension, essential I10   • Immobility syndrome M62.3   • Acquired hypothyroidism E03.9   • MRSA cellulitis of right foot L03.115, B95.62   • Sacral decubitus ulcer L89.159   • SSS (sick sinus syndrome) I49.5   • S/P placement of cardiac pacemaker Z95.0   • Gastroesophageal reflux disease with esophagitis K21.0   • Hyperlipidemia E78.5   • Ischemic ulcer of right heel with fat layer exposed L97.412   • 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 I73.9   • Acute bronchitis due to Rhinovirus (enterovirus) J20.6   • Bilateral viral bronchopneumonia J18.0   • Respiratory infection due to enterovirus/ Human Rhinovirus J98.8, B97.19   • Common bile duct stone K80.50   • Bronchitis with bronchospasm J20.9   • DM (diabetes mellitus) E11.9   • KOFI (acute kidney injury) N17.9   • Hypokalemia E87.6   • Elevated LFTs R79.89   • H1N1 influenza J10.1   • COPD exacerbation J44.1   • HAP (hospital-acquired pneumonia) J18.9   • Acute diastolic CHF (congestive heart failure) I50.31   • Influenza A J10.1         Jade Alfonso, APRN  02/06/18  10:41 AM    I have seen and examined the patient.  I have verified the interval history and  review of systems as documented above by Mrs Alfonso. I have personally reviewed all the patient's pertinent medical history, medications, and most recent diagnostic data from the chart.      Assessment:  1. Melena- no bleeding source on EGD. No evidence of bleeding.   2. Anemia- H&H stable  3. H/o colon resection- unknown reason    Recommendations:  EGD yesterday unrevealing as to source of melena.  Discussed lower endoscopy via stoma with family yesterday, and their wish is to monitor for any signs of active bleeding before pursuing further testing.  Right now her HB is stable and no melena per ostomy.          Zeb Gama M.D.  [facility]  [address] [place] [address]  Office: [phone]

## 2018-02-07 NOTE — SIGNIFICANT NOTE
02/07/18 0911   Rehab Treatment   Discipline physical therapy assistant   Treatment Not Performed patient/family declined treatment  (Pt. refused PT this morning despite encouragement to participate. Notified SHIRA Bose)   Recommendation   PT - Next Appointment 02/08/18

## 2018-02-07 NOTE — PLAN OF CARE
Problem: Patient Care Overview (Adult)  Goal: Plan of Care Review  Outcome: Ongoing (interventions implemented as appropriate)   02/07/18 0559   Patient Care Overview   Progress improving   Outcome Evaluation   Outcome Summary/Follow up Plan Pt rested well throughout entire shift, incontinent of a large amount of urine, no episodes of restlessness . Safety maintained.      Goal: Adult Individualization and Mutuality  Outcome: Ongoing (interventions implemented as appropriate)    Goal: Discharge Needs Assessment  Outcome: Ongoing (interventions implemented as appropriate)      Problem: Pneumonia (Adult)  Goal: Signs and Symptoms of Listed Potential Problems Will be Absent or Manageable (Pneumonia)  Outcome: Ongoing (interventions implemented as appropriate)      Problem: Pressure Ulcer (Adult)  Goal: Signs and Symptoms of Listed Potential Problems Will be Absent or Manageable (Pressure Ulcer)  Outcome: Ongoing (interventions implemented as appropriate)      Problem: Fall Risk (Adult)  Goal: Absence of Falls  Outcome: Ongoing (interventions implemented as appropriate)

## 2018-02-07 NOTE — PLAN OF CARE
Problem: Patient Care Overview (Adult)  Goal: Plan of Care Review  Outcome: Ongoing (interventions implemented as appropriate)   02/06/18 1937   Coping/Psychosocial Response Interventions   Plan Of Care Reviewed With patient   Patient Care Overview   Progress no change   Outcome Evaluation   Outcome Summary/Follow up Plan Pt remains anxious throughout shift; oxygen stable; IVF DC and Lasix given; up to chair majority of day with lift; son spoke with MD per request at bedside; son concerned about discharge/readmission; consult pallative?       Problem: Pneumonia (Adult)  Goal: Signs and Symptoms of Listed Potential Problems Will be Absent or Manageable (Pneumonia)  Outcome: Ongoing (interventions implemented as appropriate)      Problem: Pressure Ulcer (Adult)  Goal: Signs and Symptoms of Listed Potential Problems Will be Absent or Manageable (Pressure Ulcer)  Outcome: Ongoing (interventions implemented as appropriate)      Problem: Fall Risk (Adult)  Goal: Absence of Falls  Outcome: Ongoing (interventions implemented as appropriate)

## 2018-02-07 NOTE — PROGRESS NOTES
DAILY PROGRESS NOTE  [facility], [facility]    2018    Patient Identification:  Name: Cynthia Arreola  Age: 89 y.o.  Sex: female  :  1927  MRN: [id number]           Primary Care Physician: Param Martinez MD    Subjective:    Interval History:      Hgb remaining stable. Family declined colonoscopy unless bleeding reoccurs.   Creatinine trending down. IVF's dc'd and lasix given yesterday with reported good diuresis.   Lovenox, plavix and ASA on hold  Increased anxiety at times.   Antibiotics completed.   Sats 98% on 3 Liters N/C  WBC still elevated.    ROS:     Denies CP, SOA, N/V/D.      Objective:    Scheduled Meds:    atorvastatin 10 mg Oral Nightly   budesonide 1 mg Nebulization BID - RT   docusate sodium 250 mg Oral Daily   FERREX 150 PLUS 1 capsule Oral Daily With Breakfast   insulin aspart 0-9 Units Subcutaneous 4x Daily With Meals & Nightly   insulin detemir 18 Units Subcutaneous Q12H   ipratropium-albuterol 3 mL Nebulization 4x Daily - RT   isosorbide mononitrate 30 mg Oral Daily   levothyroxine 100 mcg Oral Daily   linagliptin 5 mg Oral Daily   metoclopramide 5 mg Oral 4x Daily AC & at Bedtime   pantoprazole 40 mg Oral Q AM   rOPINIRole 0.25 mg Oral Nightly       Continuous Infusions:       PRN Meds:  •  acetaminophen  •  dextrose  •  dextrose  •  glucagon (human recombinant)  •  glucagon (human recombinant)  •  HYDROcodone-acetaminophen  •  ipratropium-albuterol  •  nitroglycerin  •  ondansetron  •  sodium chloride  •  sodium chloride  •  Insert peripheral IV **AND** sodium chloride    Intake/Output:    Intake/Output Summary (Last 24 hours) at 18 2308  Last data filed at 18 1100   Gross per 24 hour   Intake               25 ml   Output               50 ml   Net              -25 ml         Exam:    tMax 24 hrs: Temp (24hrs), Av.3 °F (36.8 °C), Min:98.1 °F (36.7 °C), Max:98.9 °F (37.2 °C)    Vitals Ranges:   Temp:  [98.1 °F (36.7 °C)-98.9 °F  "(37.2 °C)] 98.1 °F (36.7 °C)  Heart Rate:  [60-83] 62  Resp:  [20] 20  BP: (136-181)/(55-78) 181/69    BP (!) 181/69 (BP Location: Right arm, Patient Position: Lying)  Pulse 62  Temp 98.1 °F (36.7 °C) (Oral)   Resp 20  Ht 160 cm (63\")  Wt 80 kg (176 lb 6.4 oz)  SpO2 98%  BMI 31.25 kg/m2    General: Awake, however, confused.    Neck: Supple, symmetrical, trachea midline, no adenopathy;              thyroid:  no enlargement/tenderness/nodules;              no carotid bruit or JVD  Cardiovascular:  tachycardic, appears to be irregular. Exam reveals no gallop and no friction rub. No murmur heard  Chest wall: No tenderness or deformity  Pulmonary: Diminished breath sounds bilaterally.  Rhonchi bilaterally at bases, no wheezes, rales.    Abdominal: Soft,  non-tender, bowel sounds active all four quadrants, no masses, no hepatomegaly, no splenomegaly. Ostomy site without induration.   Extremities: Normal, atraumatic, no cyanosis or edema  Pulses: 2 + symmetric all extremities  Neurological:  awake, confused.  Non-new focal sensorimotor deficit.    Skin: Skin color, texture, turgor normal, no rashes or lesions    Data Review:      Results from last 7 days  Lab Units 02/07/18 2011 02/07/18  0752 02/07/18  0401 02/05/18  0543  02/04/18  0438   WBC 10*3/mm3  --   --  14.11*  --  11.10*  --  16.81*   HEMOGLOBIN g/dL 7.9* 8.2* 8.2*  < > 7.8*  < > 8.7*   HEMATOCRIT % 26.0* 26.8* 27.3*  < > 25.2*  < > 26.6*   PLATELETS 10*3/mm3  --   --  298  --  250  --  258   < > = values in this interval not displayed.      Results from last 7 days  Lab Units 02/07/18  0401 02/06/18  0352 02/05/18  0543   SODIUM mmol/L 146* 145 139   POTASSIUM mmol/L 3.8 3.8 3.7   CHLORIDE mmol/L 107 107 101   CO2 mmol/L 27.3 26.6 25.8   BUN mg/dL 21 34* 48*   CREATININE mg/dL 1.16* 1.18* 1.35*   CALCIUM mg/dL 8.7 8.3 8.3   GLUCOSE mg/dL 81 189* 180*       Results from last 7 days  Lab Units 02/05/18  0543   INR  1.10               Lab Results  Lab " Value Date/Time   TROPONINT 0.126 (C) 01/27/2018 0645   TROPONINT 0.155 (C) 01/26/2018 1444   TROPONINT 0.069 (H) 12/29/2017 1039   TROPONINT 0.101 (C) 12/28/2017 2054   TROPONINT <0.010 01/16/2017 0248   TROPONINT <0.010 12/28/2016 0039   TROPONINT <0.010 12/27/2016 1752   TROPONINT <0.010 12/27/2016 0617       Microbiology Results (last 10 days)     ** No results found for the last 240 hours. **           Imaging Results (last 7 days)     Procedure Component Value Units Date/Time    XR Chest 2 View [[id number]] Collected:  01/26/18 1409     Updated:  01/26/18 1413    Narrative:       TWO-VIEW CHEST     HISTORY: Emergency imaging of the chest was performed in this  89-year-old morbidly obese female for evaluation of cough congestion  shortness of breath.     COMPARISON: 01/07/2018     FINDINGS:  1. Interval development of vascular congestion, peripheral interstitial  prominence and small bilateral pleural effusions consistent with  congestive heart failure.  2. Underlying pneumonia would be impossible to exclude.  3. No other change since prior study.  4. Follow-up to resolution suggested.     This report was finalized on 1/26/2018 2:09 PM by Dr. Willard Toro MD.               Assessment:      Principal Problem:    HAP (hospital-acquired pneumonia)  Active Problems:    KOFI (acute kidney injury)    Acute diastolic CHF (congestive heart failure)    Ischemic cardiomyopathy    Hypertension, essential    DM (diabetes mellitus)    Influenza A  Severe anemia.  COPD, probably exacerbation.  Gi bleeding        Plan:    Hgb stable. Will dc H/H  Continuing holding ASA, plavix, lovenox  Adjust insulin as needed  Monitor and correct electrolytes  Monitor renal function  Monitor mental status  Continue home medications  DVT/stress ulcer prophylaxis  Continue working with PT  Labs in am  Patient is DNR with conditions.    Roberto Carlos Moya MD  2/7/2018

## 2018-02-07 NOTE — PROGRESS NOTES
Continued Stay Note  [facility]     Patient Name: Cynthia Arreola  MRN: [id number]  Today's Date: 2/7/2018    Admit Date: 1/26/2018          Discharge Plan       02/07/18 0812    Case Management/Social Work Plan    Plan [facility]      Additional Comments Spoke to son  No change in DC plan              Discharge Codes     None            Ana Copeland RN

## 2018-02-07 NOTE — PROGRESS NOTES
BGA/GI Progress Note   Chief Complaint:  GI bleed    Subjective     Interval History:   EGD 2/5 without source of bleed/ anemia.  No further melenic stool from stoma. No overt GI bleeding.     History taken from: chart    Review of Systems:    Review of systems could not be obtained due to  patient confusion.    Objective     Vital Signs  Temp:  [97.3 °F (36.3 °C)-98.9 °F (37.2 °C)] 98.1 °F (36.7 °C)  Heart Rate:  [60-83] 60  Resp:  [20] 20  BP: (136-175)/(55-94) 147/55  Body mass index is 31.25 kg/(m^2).    Intake/Output Summary (Last 24 hours) at 02/07/18 1048  Last data filed at 02/07/18 0900   Gross per 24 hour   Intake              450 ml   Output               50 ml   Net              400 ml     I/O this shift:  In: -   Out: 50 [Stool:50]    Physical Exam:   General: patient awake, alert and cooperative   Eyes: Normal lids and lashes, no scleral icterus, no conjunctival pallor   Neck: supple, normal ROM, no tracheal deviation, no thyromegaly   Skin: warm and dry, not jaundiced   Cardiovascular: regular rhythm and rate, no murmurs auscultated   Pulm: clear to auscultation bilaterally, regular and unlabored   Abdomen: soft, nontender, nondistended; normal bowel sounds   Rectal: deferred   Extremities: no rash or edema   Neurologic: Normal mood and behavior    All Medications Have Been Reviewed     Results Review:       Results from last 7 days  Lab Units 02/07/18  0752 02/07/18  0401 02/06/18 2006 02/05/18  0543  02/04/18  0438   WBC 10*3/mm3  --  14.11*  --   --  11.10*  --  16.81*   HEMOGLOBIN g/dL 8.2* 8.2* 8.8*  < > 7.8*  < > 8.7*   HEMATOCRIT % 26.8* 27.3* 29.1*  < > 25.2*  < > 26.6*   PLATELETS 10*3/mm3  --  298  --   --  250  --  258   < > = values in this interval not displayed.      Results from last 7 days  Lab Units 02/07/18  0401 02/06/18  0352 02/05/18  0543   SODIUM mmol/L 146* 145 139   POTASSIUM mmol/L 3.8 3.8 3.7   CHLORIDE mmol/L 107 107 101   CO2 mmol/L 27.3 26.6 25.8   BUN mg/dL 21  34* 48*   CREATININE mg/dL 1.16* 1.18* 1.35*   CALCIUM mg/dL 8.7 8.3 8.3   GLUCOSE mg/dL 81 189* 180*         Results from last 7 days  Lab Units 02/05/18  0543   INR  1.10       RADIOLOGY:    Imaging Results (last 72 hours)     Procedure Component Value Units Date/Time    CT Abdomen Pelvis Without Contrast [[id number]] Collected:  02/03/18 2209     Updated:  02/04/18 0522    Narrative:       NONCONTRAST CT SCANS ABDOMEN AND PELVIS     HISTORY: GI bleeding; J18.9-Pneumonia, unspecified organism; I50.9-Heart  failure, unspecified; R74.8-Abnormal levels of other serum enzymes;  D64.9-Anemia, unspecified; R13.11-Dysphagia, oral phase     COMPARISON: 01/16/2017.     TECHNIQUE:Radiation dose reduction techniques were utilized, including  automated exposure control and exposure modulation based on body size.   Axial images were obtained from the lung bases to the symphysis pubis  without oral or IV contrast per request.      FINDINGS ABDOMEN CT:  There are irregular lower lobe opacities which may  be related to atelectasis or pneumonia. There are small pleural  effusions, left greater than right. Heart is enlarged. Gallbladder  surgically absent. Bilateral renal calculi appear to be largely  vascular. There are low-density renal lesions bilaterally which are  favored to reflect cysts. There is no hydronephrosis. Remaining solid  organs are otherwise unremarkable without benefit of IV contrast.     FINDINGS PELVIS CT:  Aorta heavily calcified but nonaneurysmal. Normal  appendix. The GI tract not opacified for assessment but non obstructive  in appearance. Abundant fecal material suggests constipation. No free  fluid. There is urinary bladder distention.                Impression:       1.  Lower lobe opacities and small pleural effusions.  2. Stable low-density renal lesions, favor cysts. They are incompletely  characterized without contrast.  3. Constipation without obstruction.               This study was performed with  techniques to keep radiation doses as low  as reasonably achievable (ALARA). Individualized dose reduction  techniques using automated exposure control or adjustment of mA and/or  kV according to the patient size were employed.      This report was finalized on 2/4/2018 5:19 AM by Maykel Chapin MD.             Assessment/Plan     Patient Active Problem List   Diagnosis Code   • Healthcare-associated viral pneumonia J18.9   • Diabetic foot ulcer associated with type 2 diabetes mellitus E11.621, L97.509   • Chronic kidney disease, stage II (mild) N18.2   • H/O small bowel obstruction Z87.19   • Anemia of chronic disease D63.8   • Recurrent UTI N39.0   • Ischemic cardiomyopathy I25.5   • Peripheral arterial disease I73.9   • Hypertension, essential I10   • Immobility syndrome M62.3   • Acquired hypothyroidism E03.9   • MRSA cellulitis of right foot L03.115, B95.62   • Sacral decubitus ulcer L89.159   • SSS (sick sinus syndrome) I49.5   • S/P placement of cardiac pacemaker Z95.0   • Gastroesophageal reflux disease with esophagitis K21.0   • Hyperlipidemia E78.5   • Ischemic ulcer of right heel with fat layer exposed L97.412   • 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 I73.9   • Acute bronchitis due to Rhinovirus (enterovirus) J20.6   • Bilateral viral bronchopneumonia J18.0   • Respiratory infection due to enterovirus/ Human Rhinovirus J98.8, B97.19   • Common bile duct stone K80.50   • Bronchitis with bronchospasm J20.9   • DM (diabetes mellitus) E11.9   • KOFI (acute kidney injury) N17.9   • Hypokalemia E87.6   • Elevated LFTs R79.89   • H1N1 influenza J10.1   • COPD exacerbation J44.1   • HAP (hospital-acquired pneumonia) J18.9   • Acute diastolic CHF (congestive heart failure) I50.31   • Influenza A J10.1         Jade Alfonso, APRN  02/07/18  10:48 AM    I have seen and examined the patient.  I have verified the interval history and review of systems as documented above by Mrs Alfonso. I  have personally reviewed all the patient's pertinent medical history, medications, and most recent diagnostic data from the chart.      Assessment:  1. Melena- no bleeding source on EGD. No evidence of bleeding.   2. Anemia- H&H remains stable  3. H/o colon resection- unknown reason    Recommendations:   Family declined lower endoscopy via stoma  Monitor for any signs of active bleeding before pursuing further testing.    HB is stable and no melena per ostomy  GI will sign off but are available as needed          Zeb Gama M.D.  [facility], [place], operated by [facility]  [address] [place] [address]  Office: [phone]

## 2018-02-08 NOTE — PROGRESS NOTES
DAILY PROGRESS NOTE  [facility], [facility]    2018    Patient Identification:  Name: Cynthia Arreola  Age: 89 y.o.  Sex: female  :  1927  MRN: [id number]           Primary Care Physician: Param Martinez MD    Subjective:    Interval History:      Hgb remaining stable. No melena  Slight bump up in creatinine  Lovenox, plavix and ASA on hold  Up in chair, eating lunch, visiting with son.   Sats 95% on 2 Liters N/C  WBC down    ROS:     Denies CP, SOA, N/V/D.      Objective:    Scheduled Meds:    amLODIPine 5 mg Oral Q24H   atorvastatin 10 mg Oral Nightly   budesonide 1 mg Nebulization BID - RT   docusate sodium 200 mg Oral Daily   And      docusate sodium 50 mg Oral Daily   FERREX 150 PLUS 1 capsule Oral Daily With Breakfast   insulin aspart 0-9 Units Subcutaneous 4x Daily With Meals & Nightly   insulin detemir 18 Units Subcutaneous Q12H   ipratropium-albuterol 3 mL Nebulization 4x Daily - RT   isosorbide mononitrate 30 mg Oral Daily   levothyroxine 100 mcg Oral Daily   linagliptin 5 mg Oral Daily   metoclopramide 5 mg Oral 4x Daily AC & at Bedtime   pantoprazole 40 mg Oral Q AM   rOPINIRole 0.25 mg Oral Nightly       Continuous Infusions:       PRN Meds:  •  acetaminophen  •  dextrose  •  dextrose  •  glucagon (human recombinant)  •  glucagon (human recombinant)  •  hydrALAZINE  •  HYDROcodone-acetaminophen  •  ipratropium-albuterol  •  nitroglycerin  •  ondansetron  •  sodium chloride  •  sodium chloride  •  Insert peripheral IV **AND** sodium chloride    Intake/Output:    Intake/Output Summary (Last 24 hours) at 18 0042  Last data filed at 18 1725   Gross per 24 hour   Intake              840 ml   Output                0 ml   Net              840 ml         Exam:    tMax 24 hrs: Temp (24hrs), Av.6 °F (36.4 °C), Min:97.2 °F (36.2 °C), Max:98 °F (36.7 °C)    Vitals Ranges:   Temp:  [97.2 °F (36.2 °C)-98 °F (36.7 °C)] 98 °F (36.7 °C)  Heart Rate:   "[59-72] 72  Resp:  [20-24] 20  BP: (159-197)/(59-73) 159/59    /59 (BP Location: Left arm, Patient Position: Lying)  Pulse 72  Temp 98 °F (36.7 °C) (Oral)   Resp 20  Ht 160 cm (63\")  Wt 77.1 kg (169 lb 14.4 oz)  SpO2 97%  BMI 30.1 kg/m2    General: Awake, alert. No distress.   Neck: Supple, symmetrical, trachea midline, no adenopathy;              thyroid:  no enlargement/tenderness/nodules;              no carotid bruit or JVD  Cardiovascular:  RRR. . Exam reveals no gallop and no friction rub. No murmur heard  Chest wall: No tenderness or deformity  Pulmonary: Diminished breath sounds bilaterally.  No rhonchi, rales or wheezes.     Abdominal: Soft,  non-tender, bowel sounds active all four quadrants, no masses, no hepatomegaly, no splenomegaly. Ostomy site without induration.   Extremities: Normal, atraumatic, no cyanosis or edema  Pulses: 2 + symmetric all extremities  Neurological:  awake, confused.  No new focal sensorimotor deficit.    Skin: Skin color, texture, turgor normal, no rashes or lesions    Data Review:      Results from last 7 days  Lab Units 02/08/18  2016 02/08/18  0813 02/08/18  0402 02/07/18  0401 02/05/18  0543   WBC 10*3/mm3  --   --  7.92  --  14.11*  --  11.10*   HEMOGLOBIN g/dL 7.9* 8.1* 7.8*  < > 8.2*  < > 7.8*   HEMATOCRIT % 26.2* 26.5* 26.4*  < > 27.3*  < > 25.2*   PLATELETS 10*3/mm3  --   --  286  --  298  --  250   < > = values in this interval not displayed.      Results from last 7 days  Lab Units 02/08/18  0402 02/07/18  0401 02/06/18  0352   SODIUM mmol/L 144 146* 145   POTASSIUM mmol/L 3.8 3.8 3.8   CHLORIDE mmol/L 103 107 107   CO2 mmol/L 28.4 27.3 26.6   BUN mg/dL 21 21 34*   CREATININE mg/dL 1.40* 1.16* 1.18*   CALCIUM mg/dL 8.5 8.7 8.3   GLUCOSE mg/dL 104* 81 189*       Results from last 7 days  Lab Units 02/05/18  0543   INR  1.10               Lab Results  Lab Value Date/Time   TROPONINT 0.126 (C) 01/27/2018 0645   TROPONINT 0.155 (C) 01/26/2018 1444 "   TROPONINT 0.069 (H) 12/29/2017 1039   TROPONINT 0.101 (C) 12/28/2017 2054   TROPONINT <0.010 01/16/2017 0248   TROPONINT <0.010 12/28/2016 0039   TROPONINT <0.010 12/27/2016 1752   TROPONINT <0.010 12/27/2016 0617       Microbiology Results (last 10 days)     ** No results found for the last 240 hours. **           Imaging Results (last 7 days)     Procedure Component Value Units Date/Time    XR Chest 2 View [[id number]] Collected:  01/26/18 1409     Updated:  01/26/18 1413    Narrative:       TWO-VIEW CHEST     HISTORY: Emergency imaging of the chest was performed in this  89-year-old morbidly obese female for evaluation of cough congestion  shortness of breath.     COMPARISON: 01/07/2018     FINDINGS:  1. Interval development of vascular congestion, peripheral interstitial  prominence and small bilateral pleural effusions consistent with  congestive heart failure.  2. Underlying pneumonia would be impossible to exclude.  3. No other change since prior study.  4. Follow-up to resolution suggested.     This report was finalized on 1/26/2018 2:09 PM by Dr. Willard Toro MD.               Assessment:      Principal Problem:    HAP (hospital-acquired pneumonia)  Active Problems:    KOFI (acute kidney injury)    Acute diastolic CHF (congestive heart failure)    Ischemic cardiomyopathy    Hypertension, essential    DM (diabetes mellitus)    Influenza A  Severe anemia.  COPD, probably exacerbation.  Gi bleeding        Plan:      Will check CXR in am  Continuing holding ASA, plavix, lovenox. Patient with GI bleeding  Adjust insulin as needed  Monitor and correct electrolytes  Monitor renal function  Monitor mental status  Continue home medications  DVT/stress ulcer prophylaxis  Continue working with PT  Labs in am  Patient is DNR with conditions.  If stable, will dc to NH in am    Roberto Carlos Moya MD

## 2018-02-08 NOTE — PLAN OF CARE
Problem: Patient Care Overview (Adult)  Goal: Plan of Care Review  Outcome: Ongoing (interventions implemented as appropriate)   02/08/18 0248   Coping/Psychosocial Response Interventions   Plan Of Care Reviewed With patient   Patient Care Overview   Progress improving   Outcome Evaluation   Outcome Summary/Follow up Plan VSS, mallory po well, brownish/black stool from ostomy, up in chair during evening hours, Lortab x1 for generalized pain, safety maintained

## 2018-02-08 NOTE — PLAN OF CARE
Problem: Patient Care Overview (Adult)  Goal: Plan of Care Review  Outcome: Ongoing (interventions implemented as appropriate)   02/07/18 2009   Coping/Psychosocial Response Interventions   Plan Of Care Reviewed With patient;garcia   Patient Care Overview   Progress improving   Outcome Evaluation   Outcome Summary/Follow up Plan Lungs with exp wheezes in upper airway. Pt slept most of the day today. Bld sugars better controlled. Home soon.     Goal: Adult Individualization and Mutuality  Outcome: Ongoing (interventions implemented as appropriate)    Goal: Discharge Needs Assessment  Outcome: Ongoing (interventions implemented as appropriate)      Problem: Pneumonia (Adult)  Goal: Signs and Symptoms of Listed Potential Problems Will be Absent or Manageable (Pneumonia)  Outcome: Ongoing (interventions implemented as appropriate)      Problem: Pressure Ulcer (Adult)  Goal: Signs and Symptoms of Listed Potential Problems Will be Absent or Manageable (Pressure Ulcer)  Outcome: Ongoing (interventions implemented as appropriate)      Problem: Fall Risk (Adult)  Goal: Absence of Falls  Outcome: Ongoing (interventions implemented as appropriate)      Problem: Diabetes, Type 1 (Adult)  Goal: Signs and Symptoms of Listed Potential Problems Will be Absent or Manageable (Diabetes, Type 1)  Outcome: Ongoing (interventions implemented as appropriate)

## 2018-02-08 NOTE — THERAPY TREATMENT NOTE
Acute Care - Physical Therapy Treatment Note  [facility]     Patient Name: Cynthia Arreola  : 1927  MRN: [id number]  Today's Date: 2018  Onset of Illness/Injury or Date of Surgery Date: 18          Admit Date: 2018    Visit Dx:    ICD-10-CM ICD-9-CM   1. HAP (hospital-acquired pneumonia) J18.9 486   2. Acute on chronic congestive heart failure, unspecified congestive heart failure type I50.9 428.0   3. Elevated alkaline phosphatase level R74.8 790.5   4. Anemia, unspecified type D64.9 285.9   5. Oral phase dysphagia R13.11 787.21   6. Anemia of chronic disease D63.8 285.29   7. KOFI (acute kidney injury) N17.9 584.9     Patient Active Problem List   Diagnosis   • Healthcare-associated viral pneumonia   • Diabetic foot ulcer associated with type 2 diabetes mellitus   • Chronic kidney disease, stage II (mild)   • H/O small bowel obstruction   • Anemia of chronic disease   • Recurrent UTI   • Ischemic cardiomyopathy   • Peripheral arterial disease   • Hypertension, essential   • Immobility syndrome   • Acquired hypothyroidism   • MRSA cellulitis of right foot   • Sacral decubitus ulcer   • SSS (sick sinus syndrome)   • S/P placement of cardiac pacemaker   • Gastroesophageal reflux disease with esophagitis   • Hyperlipidemia   • Ischemic ulcer of right heel with fat layer exposed   • 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   • Acute bronchitis due to Rhinovirus (enterovirus)   • Bilateral viral bronchopneumonia   • Respiratory infection due to enterovirus/ Human Rhinovirus   • Common bile duct stone   • Bronchitis with bronchospasm   • DM (diabetes mellitus)   • KOFI (acute kidney injury)   • Hypokalemia   • Elevated LFTs   • H1N1 influenza   • COPD exacerbation   • HAP (hospital-acquired pneumonia)   • Acute diastolic CHF (congestive heart failure)   • Influenza A               Adult Rehabilitation Note       18 0900          Rehab Assessment/Intervention    Discipline  (P)  physical therapy assistant   student  -      Document Type (P)  therapy note (daily note)  -      Subjective Information (P)  agree to therapy;complains of;fatigue  -      Patient Effort, Rehab Treatment (P)  fair  -      Precautions/Limitations, Hearing (P)  hearing impairment, bilaterally  -      Recorded by [SH] Catie Rajput, PT Student      Pain Assessment    Pain Assessment (P)  No/denies pain  -      Recorded by [] Catie Rajput PT Student      Cognitive Assessment/Intervention    Current Cognitive/Communication Assessment (P)  impaired  -      Orientation Status (P)  oriented x 4  -      Follows Commands/Answers Questions (P)  100% of the time;75% of the time  -      Personal Safety (P)  WNL/WFL  -      Personal Safety Interventions (P)  fall prevention program maintained;gait belt;nonskid shoes/slippers when out of bed  -      Recorded by [SH] Catie Rajput, PT Student      Bed Mobility, Assessment/Treatment    Bed Mobility, Assistive Device (P)  bed rails;head of bed elevated;draw sheet  -      Bed Mobility, Roll Left, [place] (P)  minimum assist (75% patient effort);moderate assist (50% patient effort);verbal cues required;nonverbal cues required (demo/gesture)  -      Bed Mobility, Roll Right, [place] (P)  minimum assist (75% patient effort);moderate assist (50% patient effort);verbal cues required;nonverbal cues required (demo/gesture)  -      Bed Mob, Supine to Sit, [place] (P)  maximum assist (25% patient effort);moderate assist (50% patient effort);2 person assist required;verbal cues required;nonverbal cues required (demo/gesture)  -      Bed Mob, Sit to Supine, [place] (P)  maximum assist (25% patient effort);2 person assist required;verbal cues required;nonverbal cues required (demo/gesture)  -      Bed Mobility, Safety Issues (P)  decreased use of arms for pushing/pulling;decreased use of legs for bridging/pushing  -      Bed Mobility,  Impairments (P)  strength decreased;impaired balance  -      Recorded by [] NAREN Allison      Transfer Assessment/Treatment    Transfers, Sit-Stand [place] (P)  maximum assist (25% patient effort);dependent (less than 25% patient effort);2 person assist required;verbal cues required;nonverbal cues required (demo/gesture)   Partial stand x1  -      Transfers, Stand-Sit [place] (P)  dependent (less than 25% patient effort);verbal cues required;nonverbal cues required (demo/gesture)  -      Transfer, Impairments (P)  strength decreased  -      Transfer, Comment (P)  Attempted stand x3 with pt. Pt. able to partial stand x1 with B knees and feet blocked w/ max VCs to lean forward  -      Recorded by [] NAREN Allison      Gait Assessment/Treatment    Gait, [place] Level (P)  not appropriate to assess  -SH      Recorded by [] NAREN Allison      Balance Skills Training    Sitting-Balance Support (P)  Right upper extremity supported;Left upper extremity supported;Feet supported  -      Sitting # of Minutes (P)  3  -SH      Recorded by [] NAREN Allison      Therapy Exercises    Bilateral Lower Extremities (P)  AROM:;10 reps;sitting;LAQ;hip flexion  -SH      Recorded by [] NAREN Allison      Positioning and Restraints    Pre-Treatment Position (P)  in bed  -SH      Post Treatment Position (P)  bed  -SH      In Bed (P)  fowlers;call light within reach;encouraged to call for assist;exit alarm on;with nsg  -SH      Recorded by [] NAREN Allison        User Key  (r) = Recorded By, (t) = Taken By, (c) = Cosigned By    Initials Name Effective Dates    ERIC Rajput PT Student 01/08/18 -                 IP PT Goals       01/29/18 1546          Bed Mobility PT LTG    Bed Mobility PT LTG, Date Established 01/29/18  -AR      Bed Mobility PT LTG, Time to Achieve 2 wks  -AR      Bed Mobility PT LTG, Activity Type supine to sit/sit  to supine  -AR      Bed Mobility PT LTG, [place] Level moderate assist (50% patient effort)  -AR      Transfer Training PT LTG    Transfer Training PT LTG, Date Established 01/29/18  -AR      Transfer Training PT LTG, Time to Achieve 2 wks  -AR      Transfer Training PT LTG, Activity Type sit to stand/stand to sit  -AR      Transfer Training PT LTG, [place] Level moderate assist (50% patient effort);maximum assist (25% patient effort);2 person assist required  -AR        User Key  (r) = Recorded By, (t) = Taken By, (c) = Cosigned By    Initials Name Provider Type    AR Halina Layton PT Physical Therapist          Physical Therapy Education     Title: PT OT SLP Therapies (Done)     Topic: Physical Therapy (Done)     Point: Mobility training (Done)    Learning Progress Summary    Learner Readiness Method Response Comment Documented by Status   Patient Acceptance E,TB VU,NR   02/08/18 0914 Done    Acceptance E,TB VU,NR   02/05/18 0853 Done    Acceptance E VU,[facility] 02/01/18 0854 Done    Acceptance E NR  AR 01/29/18 1544 Active               Point: Home exercise program (Done)    Learning Progress Summary    Learner Readiness Method Response Comment Documented by Status   Patient Acceptance E,TB VU,NR   02/05/18 0853 Done    Acceptance E NR  AR 01/29/18 1544 Active               Point: Body mechanics (Done)    Learning Progress Summary    Learner Readiness Method Response Comment Documented by Status   Patient Acceptance E,TB VU,NR   02/08/18 0914 Done    Acceptance E,TB VU,NR   02/05/18 0853 Done    Acceptance E VU,NR   02/01/18 0854 Done    Acceptance E NR  AR 01/29/18 1544 Active               Point: Precautions (Done)    Learning Progress Summary    Learner Readiness Method Response Comment Documented by Status   Patient Acceptance E,TB VU,NR   02/08/18 0914 Done    Acceptance E,TB VU,NR   02/05/18 0853 Done    Acceptance E VU,[facility] 02/01/18 0854 Done    Acceptance E [facility] 01/29/18  1544 Active                      User Key     Initials Effective Dates Name Provider Type Discipline    AR 06/27/16 -  Halina Layton, PT Physical Therapist PT     01/08/18 -  Catie Rajput, PT Student PT Student PT                    PT Recommendation and Plan  Anticipated Discharge Disposition: extended care facility, skilled nursing facility  Planned Therapy Interventions: bed mobility training, balance training, gait training, home exercise program, patient/family education, ROM (Range of Motion), strengthening, transfer training  PT Frequency: 2-3 times/wk  Plan of Care Review  Plan Of Care Reviewed With: (P) patient  Outcome Summary/Follow up Plan: (P) Pt. sat EOB and attempted sit-stand x3; pt. able to partial stand 1 time with B kneesblocked and max VCs to lean forward/push forward. Pt. maintains sitting balance with CGA/supervision and is limited by fatigue/motivation.          Outcome Measures       02/08/18 0900          How much help from another person do you currently need...    Turning from your back to your side while in flat bed without using bedrails? (P)  2  -SH      Moving from lying on back to sitting on the side of a flat bed without bedrails? (P)  2  -SH      Moving to and from a bed to a chair (including a wheelchair)? (P)  1  -SH      Standing up from a chair using your arms (e.g., wheelchair, bedside chair)? (P)  1  -SH      Climbing 3-5 steps with a railing? (P)  1  -SH      To walk in hospital room? (P)  1  -SH      AM-PAC 6 Clicks Score (P)  8  -RW (r) SH (t)      Functional Assessment    Outcome Measure Options (P)  AM-PAC 6 Clicks Basic Mobility (PT)  -SH        User Key  (r) = Recorded By, (t) = Taken By, (c) = Cosigned By    Initials Name Provider Type    RW Mary Ray, PTA Physical Therapy Assistant     Catie Rajput, PT Student PT Student           Time Calculation:         PT Charges       02/08/18 0913          Time Calculation    Start Time (P)  0858  -SH      Stop Time  (P)  2928  -      Time Calculation (min) (P)  14 min  -      PT Received On (P)  02/08/18  -      PT - Next Appointment (P)  02/09/18  -        User Key  (r) = Recorded By, (t) = Taken By, (c) = Cosigned By    Initials Name Provider Type    ERIC Rajput PT Student PT Student          Therapy Charges for Today     Code Description Service Date Service Provider Modifiers Qty    09368370324 HC PT THER SUPP EA 15 MIN 2/8/2018 Catie Rajput PT Student GP 1    70615395566 HC PT THER PROC EA 15 MIN 2/8/2018 Catie Rajput PT Student GP 1          PT G-Codes  Outcome Measure Options: (P) AM-PAC 6 Clicks Basic Mobility (PT)    Catie Rajput PT Student  2/8/2018

## 2018-02-08 NOTE — PLAN OF CARE
Problem: Patient Care Overview (Adult)  Goal: Plan of Care Review  Outcome: Ongoing (interventions implemented as appropriate)   02/08/18 0919   Coping/Psychosocial Response Interventions   Plan Of Care Reviewed With patient   Outcome Evaluation   Outcome Summary/Follow up Plan Pt. sat EOB and attempted sit-stand x3 with maxA; pt. able to partial stand 1 time with B knees blocked and max VCs to lean forward/push forward. Pt. maintains sitting balance with CGA/supervision and is limited by fatigue and motivation.

## 2018-02-09 NOTE — PLAN OF CARE
Problem: Patient Care Overview (Adult)  Goal: Plan of Care Review  Outcome: Ongoing (interventions implemented as appropriate)   02/08/18 2013   Coping/Psychosocial Response Interventions   Plan Of Care Reviewed With patient   Patient Care Overview   Progress progress towards functional goals is fair   Outcome Evaluation   Outcome Summary/Follow up Plan Lungs diminished with congested cough. Pt needs encouragement to cough. Bld sugars sl elevated today requiring small doses of SSI. Lifted up to recliner with Liko lift this pm. Sat up on chair for about 3 hours.Ritesh. being up well. Plan for transfer to NH soon.       02/08/18 2013   Coping/Psychosocial Response Interventions   Plan Of Care Reviewed With patient   Patient Care Overview   Progress progress towards functional goals is fair   Outcome Evaluation   Outcome Summary/Follow up Plan Lungs diminished with congested cough. Pt needs encouragement to cough. Bld sugars sl elevated today requiring small doses of SSI. Lifted up to recliner with Liko lift this pm. Sat up on chair for about 3 hours.Ritesh. being up well   02/08/18 2013   Coping/Psychosocial Response Interventions   Plan Of Care Reviewed With patient   Patient Care Overview   Progress progress towards functional goals is fair   Outcome Evaluation   Outcome Summary/Follow up Plan Lungs diminished with congested cough. Pt needs encouragement to cough. Bld sugars sl elevated today requiring small doses of SSI. Lifted up to recliner with Liko lift this pm. Sat up on chair for about 3 hours.Ritesh. being up well. Plan for transfer to NH soon.    . Plan for transfer to NH soon.      Goal: Adult Individualization and Mutuality  Outcome: Ongoing (interventions implemented as appropriate)    Goal: Discharge Needs Assessment  Outcome: Ongoing (interventions implemented as appropriate)      Problem: Pneumonia (Adult)  Goal: Signs and Symptoms of Listed Potential Problems Will be Absent or Manageable (Pneumonia)  Outcome:  Ongoing (interventions implemented as appropriate)      Problem: Pressure Ulcer (Adult)  Goal: Signs and Symptoms of Listed Potential Problems Will be Absent or Manageable (Pressure Ulcer)  Outcome: Ongoing (interventions implemented as appropriate)      Problem: Fall Risk (Adult)  Goal: Absence of Falls  Outcome: Ongoing (interventions implemented as appropriate)      Problem: Diabetes, Type 1 (Adult)  Goal: Signs and Symptoms of Listed Potential Problems Will be Absent or Manageable (Diabetes, Type 1)  Outcome: Ongoing (interventions implemented as appropriate)

## 2018-02-09 NOTE — PROGRESS NOTES
[facility]    Physicians Statement of Medical Necessity for Ambulance Transportation    It is medically necessary for:    Patient Name: Cynthia Arreola    Insurance Information:      To be transported by ambulance:    From (if nursing facility, specify level of care: skilled, assisted, etc): [facility]    To (specify level of care if nursing facility): [facility]    Date of Service:     For dialysis patients state date dialysis began:     Diagnosis: Will check CXR in am  Continuing holding ASA, plavix, lovenox. Patient with GI bleeding  Adjust insulin as needed  Monitor and correct electrolytes  Monitor renal function  Monitor mental status  Continue home medications  DVT/stress ulcer prophylaxis  Continue working with PT  Labs in am  Patient is DNR with conditions.  If stable, will dc to NH in am    Past Medical/Surgical History:  Past Medical History:   Diagnosis Date   • Acute diastolic CHF (congestive heart failure) 1/27/2018   • Anemia    • COPD exacerbation 12/30/2017   • Coronary artery disease    • Diabetes mellitus    • Disease of thyroid gland    • Dysphagia    • Hypertension    • Ischemic cardiomyopathy    • Malignant neoplasm of colon    • PVD (peripheral vascular disease)    • Renal disorder       Past Surgical History:   Procedure Laterality Date   • CHOLECYSTECTOMY     • COLON RESECTION WITH COLOSTOMY      25 years ago   • COLON SURGERY     • ENDOSCOPY N/A 2/5/2018    Procedure: ESOPHAGOGASTRODUODENOSCOPY;  Surgeon: Zeb Gama MD;  Location: [facility] ENDOSCOPY;  Service:         Current Objective Medical Evidence(including physical exam finding to support reason for limitations):    Bedridden  Requires oxygen    Other: fall Risk    Physician Signature:           (RN,NP,PA,CAN, Discharge Planner) Date/Time:      Printed Name:    _Ana Copeland RN  _________________________________    [facility]   Phone: [phone]  Phone: [phone] Phone: [phone]   Fax: [phone] Fax: [phone] Fax: [phone]

## 2018-02-09 NOTE — PROGRESS NOTES
Continued Stay Note  [facility]     Patient Name: Cynthia Arreola  MRN: [id number]  Today's Date: 2/9/2018    Admit Date: 1/26/2018          Discharge Plan       02/09/18 1209    Case Management/Social Work Plan    Plan [facility]      Additional Comments Verified pt does have a bed at [facility].  Pt son at bedside.  Aware bed is still available  Waiting Dc orders              Discharge Codes     None            Ana Copeland RN

## 2018-02-09 NOTE — PROGRESS NOTES
Continued Stay Note  [facility]     Patient Name: Cynthia Arreola  MRN: [id number]  Today's Date: 2/9/2018    Admit Date: 1/26/2018          Discharge Plan       02/09/18 1435    Case Management/Social Work Plan    [facility]      Additional Comments Pt DC to [facility] scheduled for 7:30 pm.  Son Evert notified of transfer and time.  No other needs       02/09/18 1209    Case Management/Social Work Plan    [facility]      Additional Comments Verified pt does have a bed at [facility].  Pt son at bedside.  Aware bed is still available  Waiting Dc orders              Discharge Codes     None        Expected Discharge Date and Time     Expected Discharge Date Expected Discharge Time    Feb 9, 2018             Ana Copeland RN

## 2018-02-09 NOTE — DISCHARGE SUMMARY
PHYSICIAN DISCHARGE SUMMARY  [facility], [facility]    Patient Identification:    Name: Cynthia Arreola  Age: 89 y.o.  Sex: female  :  1927  MRN: [id number]    Primary Care Physician: Param Martinez MD    Admit date: 2018    Discharge date and time:2018    Discharged Condition: fair    Discharge Diagnoses:  Principal Problem:    HAP (hospital-acquired pneumonia)  Active Problems:    KOFI (acute kidney injury)    Acute diastolic CHF (congestive heart failure)    Ischemic cardiomyopathy    Hypertension, essential    DM (diabetes mellitus)    Influenza A    Patient Active Problem List   Diagnosis Code   • Healthcare-associated viral pneumonia J18.9   • Diabetic foot ulcer associated with type 2 diabetes mellitus E11.621, L97.509   • Chronic kidney disease, stage II (mild) N18.2   • H/O small bowel obstruction Z87.19   • Anemia of chronic disease D63.8   • Recurrent UTI N39.0   • Ischemic cardiomyopathy I25.5   • Peripheral arterial disease I73.9   • Hypertension, essential I10   • Immobility syndrome M62.3   • Acquired hypothyroidism E03.9   • MRSA cellulitis of right foot L03.115, B95.62   • Sacral decubitus ulcer L89.159   • SSS (sick sinus syndrome) I49.5   • S/P placement of cardiac pacemaker Z95.0   • Gastroesophageal reflux disease with esophagitis K21.0   • Hyperlipidemia E78.5   • Ischemic ulcer of right heel with fat layer exposed L97.412   • 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 I73.9   • Acute bronchitis due to Rhinovirus (enterovirus) J20.6   • Bilateral viral bronchopneumonia J18.0   • Respiratory infection due to enterovirus/ Human Rhinovirus J98.8, B97.19   • Common bile duct stone K80.50   • Bronchitis with bronchospasm J20.9   • DM (diabetes mellitus) E11.9   • KOFI (acute kidney injury) N17.9   • Hypokalemia E87.6   • Elevated LFTs R79.89   • H1N1 influenza J10.1   • COPD exacerbation J44.1   • HAP (hospital-acquired  pneumonia) J18.9   • Acute diastolic CHF (congestive heart failure) I50.31   • Influenza A J10.1          Hospital Course: Cynthia Arreola  is a 89-year-old female resident of the nursing home.  She has history of diabetes mellitus, peripheral vascular disease, ischemic cardiomyopathy, coronary artery disease, hypertension.  She was recently hospitalized about 3 weeks ago due to influenza a H1N1.  She was discharged home improved.  Since the last few days patient started complaining of generalized myalgias, chest congestion and cough.  Per patient's son she also had chills and tremors.  Since patient was now getting better, patient was sent to ER.  In the ER patient's creatinine was 1.75, baseline 1.4, BNP was 5028, troponin 0.155,  White blood cell 17.39, procalcitonin elevated at 2.33, her chest x-ray shows vascular congestion peripheral interstitial prominence, underlying pneumonia impossible to exclude.  Patient was admitted for further management.    Upon admission patient was placed on IV antibiotics, IV diuretics.  Patient was placed on DVT prophylaxis as well as Accu-Cheks and sliding scale insulin.Speech Therapy was working with her all the time.  Patient was found to have hospital-acquired pneumonia, KOFI, acute diastolic congestive heart failure.  These were management accordingly.  Patient did have a drop in her hemoglobin from 10 to 6, she underwent upper endoscopy however this only showed gastritis, no active source of bleeding.  It was felt the patient should have a colonoscopy however patient's POA declined that; so at this time we do not have a clear source of her bleeding.  She  was positive for Hemoccult.  Over the last 4 days patient's hemoglobin has been stable, she has been eating well.  Respiratory status was almost back to her baseline.  She only needs 1 L oxygen per nasal cannula, but she is improving.  I believe at this time patient is okay to be discharged back to nursing home.    PMHX:   Past  "Medical History:   Diagnosis Date   • Acute diastolic CHF (congestive heart failure) 1/27/2018   • Anemia    • COPD exacerbation 12/30/2017   • Coronary artery disease    • Diabetes mellitus    • Disease of thyroid gland    • Dysphagia    • Hypertension    • Ischemic cardiomyopathy    • Malignant neoplasm of colon    • PVD (peripheral vascular disease)    • Renal disorder      PSHX:   Past Surgical History:   Procedure Laterality Date   • CHOLECYSTECTOMY     • COLON RESECTION WITH COLOSTOMY      25 years ago   • COLON SURGERY     • ENDOSCOPY N/A 2/5/2018    Procedure: ESOPHAGOGASTRODUODENOSCOPY;  Surgeon: Zeb Gama MD;  Location: [facility] ENDOSCOPY;  Service:            Consults:     Consults     Date and Time Order Name Status Description    2/3/2018 2057 Inpatient Consult to Gastroenterology Completed     1/26/2018 1639 Family Medicine Consult Completed     1/2/2018 0448 Inpatient Consult to Pulmonology Completed     12/30/2017 1235 Inpatient Consult to Infectious Diseases Completed     12/29/2017 0212 Family Medicine Consult Completed           Discharge Exam:    /75 (BP Location: Left arm, Patient Position: Lying)  Pulse 70  Temp 97.7 °F (36.5 °C) (Oral)   Resp 18  Ht 160 cm (63\")  Wt 76.6 kg (168 lb 14 oz)  SpO2 90%  BMI 29.91 kg/m2    General: Awake, alert. No distress.   Neck: Supple, symmetrical, trachea midline, no adenopathy;              thyroid:  no enlargement/tenderness/nodules;              no carotid bruit or JVD  Cardiovascular:  RRR. . Exam reveals no gallop and no friction rub. No murmur heard  Chest wall: No tenderness or deformity  Pulmonary: Diminished breath sounds bilaterally.  No rhonchi, rales or wheezes.     Abdominal: Soft,  non-tender, bowel sounds active all four quadrants, no masses, no hepatomegaly, no splenomegaly. Ostomy site without induration.   Extremities: Normal, atraumatic, no cyanosis or edema  Pulses: 2 + symmetric all extremities  Neurological:  " awake, confused.  No new focal sensorimotor deficit.    Skin: Skin color, texture, turgor normal, no rashes or lesions       Data Review:          Results from last 7 days  Lab Units 02/09/18  0755 02/09/18  0354 02/08/18 2016 02/08/18  0402  02/07/18  0401   WBC 10*3/mm3  --  9.30  --   --  7.92  --  14.11*   HEMOGLOBIN g/dL 8.3* 8.1* 7.9*  < > 7.8*  < > 8.2*   HEMATOCRIT % 28.2* 27.7* 26.2*  < > 26.4*  < > 27.3*   PLATELETS 10*3/mm3  --  312  --   --  286  --  298   < > = values in this interval not displayed.      Results from last 7 days  Lab Units 02/09/18  0353 02/08/18  0402 02/07/18  0401   SODIUM mmol/L 138 144 146*   POTASSIUM mmol/L 3.6 3.8 3.8   CHLORIDE mmol/L 100 103 107   CO2 mmol/L 27.6 28.4 27.3   BUN mg/dL 18 21 21   CREATININE mg/dL 1.21* 1.40* 1.16*   CALCIUM mg/dL 8.6 8.5 8.7   GLUCOSE mg/dL 131* 104* 81       Results from last 7 days  Lab Units 02/05/18  0543   INR  1.10         Lab Results  Lab Value Date/Time   TROPONINT 0.126 (C) 01/27/2018 0645   TROPONINT 0.155 (C) 01/26/2018 1444   TROPONINT 0.069 (H) 12/29/2017 1039   TROPONINT 0.101 (C) 12/28/2017 2054   TROPONINT <0.010 01/16/2017 0248   TROPONINT <0.010 12/28/2016 0039   TROPONINT <0.010 12/27/2016 1752   TROPONINT <0.010 12/27/2016 0617       Microbiology Results (last 10 days)     ** No results found for the last 240 hours. **           Imaging Results (all)     Procedure Component Value Units Date/Time    XR Chest 2 View [[id number]] Collected:  01/26/18 1409     Updated:  01/26/18 1413    Narrative:       TWO-VIEW CHEST     HISTORY: Emergency imaging of the chest was performed in this  89-year-old morbidly obese female for evaluation of cough congestion  shortness of breath.     COMPARISON: 01/07/2018     FINDINGS:  1. Interval development of vascular congestion, peripheral interstitial  prominence and small bilateral pleural effusions consistent with  congestive heart failure.  2. Underlying pneumonia would be impossible to  exclude.  3. No other change since prior study.  4. Follow-up to resolution suggested.     This report was finalized on 1/26/2018 2:09 PM by Dr. Willard Toro MD.       XR Chest PA & Lateral [[id number]] Collected:  02/01/18 1528     Updated:  02/01/18 1538    Narrative:       PA AND LATERAL CHEST     CLINICAL HISTORY: MRSA cellulitis and foot. Follow-up vascular  congestion.     Compared to the previous chest x-ray dated 1/26/2018.     The lungs are slightly better inflated than on the previous chest x-ray.  Mild vascular congestion shown on the previous chest x-ray has  essentially resolved. However, there is ill-defined increased density in  the medial aspect of the left lung base that appears more prominent  suspicious for developing area of infiltrate. Correlation with clinical  findings is recommended. No pleural effusions are identified. The heart  is moderately enlarged and unchanged. A dual-chamber pacemaker is in  place in satisfactory position in the left subclavian vein.     IMPRESSIONS: Moderate cardiomegaly. Interval resolution of vascular  congestion since the chest x-ray dated 1/26/2018. Possible developing  pneumonia in the medial aspect of the right lung base.     This report was finalized on 2/1/2018 3:34 PM by Dr. Terry Jim MD.       XR Chest 1 View [[id number]] Collected:  02/03/18 1012     Updated:  02/03/18 1016    Narrative:       XR CHEST 1 VW-     HISTORY: Female who is 89 years-old,  short of breath     TECHNIQUE: Frontal view of the chest     COMPARISON: 02/01/2018     FINDINGS: Heart size is borderline. Left-sided pacemaker and cardiac  leads are seen. Slight prominence of vascular and interstitial markings.  Aorta is calcified. Persistent atelectasis or infiltrate at the right  base. No pleural effusion, or pneumothorax. No acute osseous process.       Impression:       No significant change.     This report was finalized on 2/3/2018 10:13 AM by Dr. Amandeep Arshad MD.       CT  Abdomen Pelvis Without Contrast [[id number]] Collected:  02/03/18 2209     Updated:  02/04/18 0522    Narrative:       NONCONTRAST CT SCANS ABDOMEN AND PELVIS     HISTORY: GI bleeding; J18.9-Pneumonia, unspecified organism; I50.9-Heart  failure, unspecified; R74.8-Abnormal levels of other serum enzymes;  D64.9-Anemia, unspecified; R13.11-Dysphagia, oral phase     COMPARISON: 01/16/2017.     TECHNIQUE:Radiation dose reduction techniques were utilized, including  automated exposure control and exposure modulation based on body size.   Axial images were obtained from the lung bases to the symphysis pubis  without oral or IV contrast per request.      FINDINGS ABDOMEN CT:  There are irregular lower lobe opacities which may  be related to atelectasis or pneumonia. There are small pleural  effusions, left greater than right. Heart is enlarged. Gallbladder  surgically absent. Bilateral renal calculi appear to be largely  vascular. There are low-density renal lesions bilaterally which are  favored to reflect cysts. There is no hydronephrosis. Remaining solid  organs are otherwise unremarkable without benefit of IV contrast.     FINDINGS PELVIS CT:  Aorta heavily calcified but nonaneurysmal. Normal  appendix. The GI tract not opacified for assessment but non obstructive  in appearance. Abundant fecal material suggests constipation. No free  fluid. There is urinary bladder distention.                Impression:       1.  Lower lobe opacities and small pleural effusions.  2. Stable low-density renal lesions, favor cysts. They are incompletely  characterized without contrast.  3. Constipation without obstruction.               This study was performed with techniques to keep radiation doses as low  as reasonably achievable (ALARA). Individualized dose reduction  techniques using automated exposure control or adjustment of mA and/or  kV according to the patient size were employed.      This report was finalized on 2/4/2018 5:19  AM by Maykel Chapin MD.       XR Chest PA & Lateral [[id number]] Collected:  02/09/18 0946     Updated:  02/09/18 0951    Narrative:       2-VIEW CHEST     HISTORY: MRSA infection.     FINDINGS: The lungs are moderately expanded with further scattered  infiltrates in both lungs, particularly at the bases when compared to  the study of 6 days ago. The heart remains mildly enlarged with a  pacemaker in place.     This report was finalized on 2/9/2018 9:48 AM by Dr. Joe Hart MD.               Disposition:    Skilled nursing facility    Patient Instructions:      Cynthia Arreola   Home Medication Instructions MEGAN:[id number]    Printed on:02/09/18 1418   Medication Information                      acetaminophen (TYLENOL) 500 MG tablet  Take 500 mg by mouth Every 6 (Six) Hours As Needed for Mild Pain .             amLODIPine (NORVASC) 5 MG tablet  Take 5 mg by mouth Daily. Hold for SBP<110 or HR <60             atorvastatin (LIPITOR) 10 MG tablet  Take 1 tablet by mouth Every Night.             bacitracin 500 UNIT/GM ointment  Apply 1 application topically 2 (Two) Times a Day. Left anterior calf             budesonide (PULMICORT) 0.5 MG/2ML nebulizer solution  Take 4 mL by nebulization 2 (Two) Times a Day.             calcium carbonate (TUMS) 500 MG chewable tablet  Chew 1 tablet Daily.             docusate sodium (COLACE) 250 MG capsule  Take 250 mg by mouth Daily.             Fe-Succ Ac-C-Thre Ac-B12-FA (FERREX 150 FORTE PLUS)  MG capsule capsule  Take 1 capsule by mouth Daily With Breakfast.             furosemide (LASIX) 40 MG tablet  Take 1 tablet by mouth Daily.             glimepiride (AMARYL) 4 MG tablet  Take 4 mg by mouth Every Morning Before Breakfast.             glucagon, human recombinant, (GLUCAGEN DIAGNOSTIC) 1 MG injection  Inject 1 mg under the skin 1 (One) Time As Needed (hypoglycemia) for up to 1 dose.             hydrALAZINE (APRESOLINE) 25 MG tablet  Take 3 tablets by mouth Every 8 (Eight)  Hours.             HYDROcodone-acetaminophen (NORCO) 5-325 MG per tablet  Take 1 tablet by mouth Every 6 (Six) Hours As Needed for Severe Pain .             insulin aspart (novoLOG) 100 UNIT/ML injection  Inject 0-9 Units under the skin 4 (Four) Times a Day With Meals & at Bedtime.             insulin glargine (LANTUS) 100 UNIT/ML injection  Inject 14 Units under the skin Daily.             ipratropium-albuterol (DUO-NEB) 0.5-2.5 mg/mL nebulizer  Take 3 mL by nebulization Every 4 (Four) Hours As Needed for Wheezing.             ipratropium-albuterol (DUO-NEB) 0.5-2.5 mg/mL nebulizer  Take 3 mL by nebulization 4 (Four) Times a Day.             isosorbide mononitrate (IMDUR) 30 MG 24 hr tablet  Take 30 mg by mouth Daily.             levothyroxine (SYNTHROID, LEVOTHROID) 100 MCG tablet  Take 100 mcg by mouth Daily.             linagliptin (TRADJENTA) 5 MG tablet tablet  Take 1 tablet by mouth Daily.             metoclopramide (REGLAN) 10 MG tablet  Take 10 mg by mouth 4 (Four) Times a Day Before Meals & at Bedtime.             metoprolol succinate XL (TOPROL-XL) 50 MG 24 hr tablet  Take 50 mg by mouth Daily. Hold for SBP <110 or HR <60             nitroglycerin (NITROSTAT) 0.4 MG SL tablet  Place 1 tablet under the tongue Every 5 (Five) Minutes As Needed for chest pain (Hold systolic BP < 100 mm/Hg.). Max 3 doses / 15 min             ondansetron (ZOFRAN) 4 MG tablet  Take 4 mg by mouth Every 6 (Six) Hours As Needed for Nausea or Vomiting.             pantoprazole (PROTONIX) 40 MG EC tablet  Take 40 mg by mouth Every Night.             polyethylene glycol (MIRALAX) packet  Take 17 g by mouth Daily As Needed.             rOPINIRole (REQUIP) 0.25 MG tablet  Take 0.25 mg by mouth Every Night. Take 1 hour before bedtime.             trolamine salicylate (ASPERCREME) 10 % cream  Apply 1 application topically 2 (Two) Times a Day. Apply to bilateral knees topically two times a day                  Discharge Order     Start      Ordered    02/09/18 1415  Discharge patient  Once     Expected Discharge Date:  02/09/18    Discharge Disposition:  Skilled Nursing Facility (DC - External)        02/09/18 1414          Follow-up Information     Follow up with Param Martinez MD .    Specialty:  Family Medicine    Contact information:    [address] [place] [address]  [phone]            Total time spent discharging patient including evaluation,post hospitalization follow up,  medication and post hospitalization instructions and education total time exceeds 30 minutes.    Signed:  Roberto Carlos Moya MD  2/9/2018  2:15 PM

## 2018-02-12 NOTE — PROGRESS NOTES
Case Management Discharge Note    Final Note: DC to [facility] via ambulance  [facility]    Discharge Placement     Facility/Agency Request Status Selected? Address Phone Number Fax Number    [facility] Accepted    Yes [address] [facility] [address] [phone]        Ana Copeland, SHIRA 1/31/2018 13:55    Spoke to Bella  Aware son evert spoke to Gifty at the facility               [facility] Pending - Request Sent     [address] [facility] [address] [phone]        Claire Tejeda, SHIRA 1/29/2018 16:26    Per son Evert, pt is from LTC at [facility] but he is trying to get pt into [facility] for LTC. Son states pt would return to [facility] if [facility] unable to accept and he will continue to work on this transfer    1/29/2018 4:25 PM   for Benny to follow up with referral  Claire Tejeda RN                 [facility] Pending - Request Sent     [address] [facility] [address] [phone]        Digna Suarez RN 1/27/2018 11:46    Sent email to [facility] to follow up for SNF transitioning to LTC                   Ambulance: [facility]    Discharge Codes: 03  Discharged/transferred to skilled nursing facility (SNF) with Medicare certification in anticipation of skilled care

## 2018-02-12 NOTE — OUTREACH NOTE
Skilled Nursing Facility Discharge Flowsheet:     Skilled Nursing Facility Discharge Assessment 2/12/2018   Acute Facility Discharged From [facility]   Acute Discharge Date 2/9/2018   Name of the Skilled Nursing Facility? [facility]   Tier Level of the Skilled Nursing Facility 2   Purpose of SNF Admission PT;OT;SN   Estimated length of stay for the patient? TBD   Who is the insurance provider or payor of patient stay? Medicare

## 2018-02-14 NOTE — OUTREACH NOTE
Skilled Nursing Facility Discharge Flowsheet:     Skilled Nursing Facility Discharge Assessment 2/14/2018   Acute Facility Discharged From [facility]   Acute Discharge Date 2/9/2018   Name of the Skilled Nursing Facility? [facility]   Tier Level of the Skilled Nursing Facility 2   Purpose of SNF Admission PT;OT;SN   Estimated length of stay for the patient? TBD   Who is the insurance provider or payor of patient stay? Medicare   Progression of Patient? Continues with therapies.

## 2018-02-20 PROBLEM — K83.1 OBSTRUCTIVE JAUNDICE: Status: ACTIVE | Noted: 2018-01-01

## 2018-02-21 PROBLEM — K80.50 COMMON BILE DUCT STONE: Status: ACTIVE | Noted: 2018-01-01

## 2018-02-21 NOTE — ANESTHESIA POSTPROCEDURE EVALUATION
Patient: Cynthia Arreola    Procedure Summary     Date Anesthesia Start Anesthesia Stop Room / Location    02/21/18 1420 [id number]  [facility]       Procedure Diagnosis Surgeon Provider    ENDOSCOPIC RETROGRADE CHOLANGIOPANCREATOGRAPHY WITH SPHINCTEROTOMY, BALLOON SWEEP, AND BILIARY STENT PLACEMENT (N/A ) Obstructive jaundice; Cholangitis due to bile duct calculus with obstruction; Bile duct stricture  (Obstructive jaundice [K83.8]) MD Tameka Joiner MD          Anesthesia Type: MAC  Last vitals  BP   166/64 (02/21/18 1357)   Temp   36.8 °C (98.2 °F) (02/21/18 1357)   Pulse   70 (02/21/18 1357)   Resp   16 (02/21/18 1357)     SpO2   98 % (02/21/18 1357)     Post Anesthesia Care and Evaluation    Patient location during evaluation: bedside  Patient participation: complete - patient participated  Level of consciousness: awake  Pain management: adequate  Airway patency: patent  Anesthetic complications: No anesthetic complications    Cardiovascular status: acceptable  Respiratory status: acceptable  Hydration status: acceptable

## 2018-02-21 NOTE — ANESTHESIA PREPROCEDURE EVALUATION
Anesthesia Evaluation                  Airway   Mallampati: II  TM distance: >3 FB  Neck ROM: full  no difficulty expected  Dental    (+) edentulous    Pulmonary - normal exam   (+) pneumonia , COPD,   Cardiovascular - normal exam    (+) pacemaker, hypertension, CAD, CHF, PVD, hyperlipidemia      Neuro/Psych  GI/Hepatic/Renal/Endo    (+)  GERD, renal disease, diabetes mellitus, hypothyroidism,     Musculoskeletal     Abdominal    Substance History      OB/GYN          Other      history of cancer                    Anesthesia Plan    ASA 4     MAC     intravenous induction   Anesthetic plan and risks discussed with patient.

## 2018-02-22 NOTE — OUTREACH NOTE
Skilled Nursing Facility Discharge Flowsheet:     Skilled Nursing Facility Discharge Assessment 2/22/2018   Acute Facility Discharged From [facility]   Acute Discharge Date 2/9/2018   Name of the Skilled Nursing Facility? [facility]   Tier Level of the Skilled Nursing Facility 2   Purpose of SNF Admission PT;OT;SN   Estimated length of stay for the patient? Readmission to [facility] 2/20/18   Who is the insurance provider or payor of patient stay? Medicare   Progression of Patient? -   Skilled Nursing Discharge Date? 2/20/2018   Where was the patient discharged to? Other (Comment)

## 2018-03-01 PROBLEM — Z51.5 ADMISSION FOR HOSPICE CARE: Status: ACTIVE | Noted: 2018-01-01

## 2018-03-01 NOTE — PROGRESS NOTES
Case Management Discharge Note    Final Note: The patient was admitted to a [facility] scattered bed on 2/28/18. PHILLIP Valera RN, CCP.     Discharge Placement     Facility/Agency Request Status Selected? Address Phone Number Fax Number    [facility] Accepted    Yes [address], [facility] [id number] [phone]    [facility] Accepted     [address] [facility] [address] [phone]    [facility] Pending - Request Sent     [address] [facility] [id number] [phone]    [facility] REHAB Declined   Can not offer bed, due to her sons behavior whild pt there     [address] [facility] [address] [phone]    [facility]   No [facility] term care bed available.     [address] [facility] [address] [phone]    [facility] Declined   No long term care bed available.     [address] , [facility] [address] [phone]    [facility] Declined   accepting private pay only     [address] [facility] [address] [phone]    [facility] [address] [facility]     [address] [facility] [address] [phone]    [facility] Declined   Medicaid lapsed, does not accept medicaid pending     [address] [facility] [address] [phone]    [facility] Declined   [facility], 1st choice had a bed     [address] [facility] [id number] [phone]             Discharge Codes: 51  Hospice - medical facility

## 2018-03-01 NOTE — PLAN OF CARE
"Problem: Patient Care Overview (Adult)  Goal: Plan of Care Review  Outcome: Ongoing (interventions implemented as appropriate)   03/01/18 0514   Coping/Psychosocial Response Interventions   Plan Of Care Reviewed With son   Patient Care Overview   Progress declining   Outcome Evaluation   Outcome Summary/Follow up Plan Continued discussion with son re: medicating pt. Son wants medication for congestion routinely but still hesitant re: Ativan and Morphine. Discussed with him indications that pt is uncomfortable and would benefit from either medication but he feels pt \"doesn't need them\" at this time, but is agreeable to them \"if she needs it\". Pt appears to be declining, responsive to pain only, starting to mottle in her feet. Breathing heavily at ttimes and very congested. Will continue comfort care.        Problem: Dying Patient, Actively (Adult)  Goal: Comfort/Pain Control  Outcome: Ongoing (interventions implemented as appropriate)    Goal: Dying Process, Peace and Dignity  Outcome: Ongoing (interventions implemented as appropriate)      Problem: Fall Risk (Adult)  Goal: Absence of Falls  Outcome: Ongoing (interventions implemented as appropriate)      Problem: Pressure Ulcer Risk (Akshat Scale) (Adult,Obstetrics,Pediatric)  Goal: Skin Integrity  Outcome: Ongoing (interventions implemented as appropriate)        "

## 2018-03-01 NOTE — PLAN OF CARE
Problem: Patient Care Overview (Adult)  Goal: Plan of Care Review  Outcome: Ongoing (interventions implemented as appropriate)   02/28/18 2010   Coping/Psychosocial Response Interventions   Plan Of Care Reviewed With son   Patient Care Overview   Progress declining   Outcome Evaluation   Outcome Summary/Follow up Plan ptn is less responsive, will open eyes to turning and pain, Melani spoke with son and we have given ativan prior to turning, If the patient continues to be in pain after the turn, the son says it is ok to medicate, robinol and scopolomine patch for secretions. continue to monitor per POC      Goal: Adult Individualization and Mutuality  Outcome: Ongoing (interventions implemented as appropriate)    Goal: Discharge Needs Assessment  Outcome: Ongoing (interventions implemented as appropriate)      Problem: Dying Patient, Actively (Adult)  Goal: Comfort/Pain Control  Outcome: Ongoing (interventions implemented as appropriate)    Goal: Dying Process, Peace and Dignity  Outcome: Ongoing (interventions implemented as appropriate)      Problem: Fall Risk (Adult)  Goal: Absence of Falls  Outcome: Ongoing (interventions implemented as appropriate)

## 2022-11-02 NOTE — PROGRESS NOTES
"DAILY PROGRESS NOTE  [facility], [facility]    2018    Patient Identification:  Name: Cynthia Arreola  Age: 89 y.o.  Sex: female  :  1927  MRN: [id number]           Primary Care Physician: Param Martinez MD    Subjective:    Interval History:    Completed tamiflu and antibiotics. Steroids restarted yesterday due to increased wheezing. Continues with diuresis. Much better this pm. Oriented. \"Feeling better\"      ROS: Denies CP, N/V/D. Continues CUEVAS.     No nausea, vomiting, diarrhea, constipation.Denies CP. Continues with CUEVAS     Objective:    Scheduled Meds:    amLODIPine 10 mg Oral Daily   aspirin 81 mg Oral Daily   atorvastatin 10 mg Oral Nightly   clopidogrel 75 mg Oral Daily   docusate sodium 250 mg Oral Daily   enoxaparin 30 mg Subcutaneous Q24H   Ferrex 150 forte plus 1 capsule Oral Daily With Breakfast   furosemide 40 mg Oral BID   hydrALAZINE 50 mg Oral Q8H   insulin aspart 0-7 Units Subcutaneous 4x Daily With Meals & Nightly   insulin detemir 25 Units Subcutaneous Nightly   ipratropium-albuterol 3 mL Nebulization Q4H - RT   isosorbide mononitrate 30 mg Oral Daily   levothyroxine 100 mcg Oral QAM   linagliptin 5 mg Oral Daily   metoclopramide 5 mg Oral 4x Daily AC & at Bedtime   metoprolol succinate XL 50 mg Oral Daily   pantoprazole 40 mg Oral QAM   potassium chloride 40 mEq Oral BID   predniSONE 20 mg Oral BID With Meals   rOPINIRole 0.25 mg Oral Nightly       Continuous Infusions:       PRN Meds:  •  acetaminophen  •  dextrose  •  dextrose  •  glucagon (human recombinant)  •  hydrALAZINE  •  HYDROcodone-acetaminophen  •  influenza vaccine  •  ipratropium-albuterol  •  nitroglycerin  •  pneumococcal polysaccharide 23-valent  •  polyethylene glycol  •  potassium chloride **OR** potassium chloride **OR** potassium chloride  •  promethazine  •  sodium chloride    Intake/Output:    Intake/Output Summary (Last 24 hours) at 18 1112  Last data filed at " "18 1913   Gross per 24 hour   Intake              530 ml   Output                0 ml   Net              530 ml         Exam:    tMax 24 hrs: Temp (24hrs), Av °F (36.7 °C), Min:97 °F (36.1 °C), Max:98.9 °F (37.2 °C)    Vitals Ranges:   Temp:  [97 °F (36.1 °C)-98.9 °F (37.2 °C)] 98.9 °F (37.2 °C)  Heart Rate:  [60-72] 61  Resp:  [16-20] 18  BP: (150-187)/() 187/83    BP (!) 187/83 (BP Location: Right arm, Patient Position: Lying)  Pulse 61  Temp 98.9 °F (37.2 °C) (Oral)   Resp 18  Ht 160 cm (63\")  Wt 78.2 kg (172 lb 4.8 oz)  SpO2 97%  BMI 30.52 kg/m2    General: Alert, oriented x 3. Cooperative, no distress, appears stated age  HEENT:    Head: Normocephalic, without obvious abnormality, atraumatic  Eyes: EOM are normal. Pupils are equal, round, and reactive to light.   Oropharynx: Mucosa and tongue dry  Neck: Supple, symmetrical, trachea midline, no adenopathy;              thyroid:  no enlargement/tenderness/nodules;              no carotid bruit or JVD  Cardiovascular: Normal rate, regular rhythm and intact distal pulses.              Exam reveals no gallop and no friction rub. No murmur heard  Chest wall: No tenderness or deformity  Pulmonary:  decreased breath sounds. Expiratory wheeze bilateral bases, L > R. .  Rhonchi bilaterally, no rales.    Abdominal: Soft, non-tender, bowel sounds active all four quadrants,     no masses, no hepatomegaly, no splenomegaly.   Extremities: Normal, atraumatic, no cyanosis. + edema both LE or edema  Pulses: 2 + symmetric all extremities  Neurological: She is alert and oriented to person, place, and time.                 CNII-XII intact, normal strength, sensation intact throughout  Skin: Healing ulcer on the dorsum of her right foot, small area healing on the dorsum of her right heel. 1.5 cm ulcerated area with mild surrounding erythema           Data Review:      Results from last 7 days  Lab Units 18  0533 18  0514 18  0405   WBC " 10*3/mm3 9.59 7.53 8.12   HEMOGLOBIN g/dL 12.8 12.5 11.7*   HEMATOCRIT % 42.2 40.3 38.2   PLATELETS 10*3/mm3 234 238 189         Results from last 7 days  Lab Units 01/05/18  0533 01/04/18  2209 01/04/18  0514 01/03/18  0405  01/01/18  0631   SODIUM mmol/L 140  --  141 139  < > 142   POTASSIUM mmol/L 5.0 4.2 2.8* 3.5  < > 3.6   CHLORIDE mmol/L 98  --  97* 96*  < > 105   CO2 mmol/L 31.6*  --  30.3* 30.8*  < > 23.6   BUN mg/dL 36*  --  40* 33*  < > 28*   CREATININE mg/dL 1.43*  --  1.60* 1.72*  < > 1.17*   CALCIUM mg/dL 9.1  --  8.9 8.6  < > 8.7   BILIRUBIN mg/dL  --   --   --   --   --  0.2   ALK PHOS U/L  --   --   --   --   --  146*   ALT (SGPT) U/L  --   --   --   --   --  25   AST (SGOT) U/L  --   --   --   --   --  15   GLUCOSE mg/dL 224*  --  137* 277*  < > 207*   < > = values in this interval not displayed.                Lab Results  Lab Value Date/Time   TROPONINT 0.069 (H) 12/29/2017 1039   TROPONINT 0.101 (C) 12/28/2017 2054   TROPONINT <0.010 01/16/2017 0248   TROPONINT <0.010 12/28/2016 0039   TROPONINT <0.010 12/27/2016 1752   TROPONINT <0.010 12/27/2016 0617       Microbiology Results (last 10 days)     Procedure Component Value - Date/Time    MRSA Screen Culture - Swab, Nares [[id number]]  (Normal) Collected:  12/30/17 0036    Lab Status:  Final result Specimen:  Swab from Nares Updated:  01/01/18 0912     MRSA SCREEN CX No Methicillin Resistant Staphylococcus aureus isolated    Respiratory Panel, PCR - Swab, Nasopharynx [[id number]]  (Abnormal) Collected:  12/29/17 1923    Lab Status:  Final result Specimen:  Swab from Nasopharynx Updated:  12/29/17 2201     ADENOVIRUS, PCR Not Detected     Coronavirus 229E Not Detected     Coronavirus HKU1 Not Detected     Coronavirus NL63 Not Detected     Coronavirus OC43 Not Detected     Human Metapneumovirus Not Detected     Human Rhinovirus/Enterovirus Not Detected     Influenza B PCR Not Detected     Parainfluenza Virus 1 Not Detected     Parainfluenza Virus 2  Not Detected     Parainfluenza Virus 3 Not Detected     Parainfluenza Virus 4 Not Detected     Bordetella pertussis pcr Not Detected     Influenza A H1N1 2009 PCR Detected (A)     Chlamydophila pneumoniae PCR Not Detected     Mycoplasma pneumo by PCR Not Detected     Influenza A PCR Not Detected     Influenza A H3 Not Detected     Influenza A H1 Not Detected     RSV, PCR Not Detected    Influenza Antigen, Rapid - Swab, Nasopharynx [[id number]]  (Normal) Collected:  12/28/17 2218    Lab Status:  Final result Specimen:  Swab from Nasopharynx Updated:  12/28/17 2252     Influenza A Ag, EIA Negative     Influenza B Ag, EIA Negative           Imaging Results (last 7 days)     Procedure Component Value Units Date/Time    XR Chest 2 View [[id number]] Collected:  12/28/17 2220     Updated:  12/29/17 2312    Narrative:       CHEST PA AND LATERAL.     HISTORY: Cough and congestion.     COMPARISON: 01/16/2017.     FINDINGS:  Mild cardiomegaly.         There is no consolidation or effusion. Mild pulmonary congestion  superimposed on emphysema.          Impression:       Pulmonary congestion, overall no significant change.         This report was finalized on 12/29/2017 11:09 PM by Dr. Fransisco Yoo MD.           2D echo:  · Left ventricular systolic function is normal. Estimated EF = 55%.  · Left ventricular diastolic dysfunction (grade I) consistent with impaired relaxation.  · Mild mitral valve regurgitation is present  · Mild tricuspid valve regurgitation is present.  · Estimated right ventricular systolic pressure from tricuspid regurgitation is normal (<35 mmHg).  · There is calcification of the aortic root and valve.  · Mild aortic valve stenosis is present.    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         Plan:      Continue current supportive therapy: duonebs, steroids, PO lasix  Monitor and correct electrolytes  Adjust insulin as needed  Monitor mental status  Continue home medications  PT to see pt  DVT/stress ulcer prophylaxis  Labs in am  DC tomas Moya MD  1/5/2018  11:12 AM         none

## 2023-04-20 NOTE — PROGRESS NOTES
Case Management Discharge Note    Final Note: The patient  on 3/1/18 @ 06:43. B. Soto RN, CCP.     Discharge Placement     No information found             Discharge Codes: Hospice discharge status code 41  in a medical facility, such as hospital, SNF, ICF or free-standing hospice  
0 = understands/communicates without difficulty

## (undated) DEVICE — TUBING, SUCTION, 1/4" X 10', STRAIGHT: Brand: MEDLINE

## (undated) DEVICE — WIREGUIDED RETRIEVAL BASKET: Brand: TRAPEZOID RX

## (undated) DEVICE — BITEBLOCK OMNI BLOC

## (undated) DEVICE — SPHINCTEROTOME: Brand: HYDRATOME RX 44

## (undated) DEVICE — RETRIEVAL BALLOON CATHETER: Brand: EXTRACTOR™ PRO XL

## (undated) DEVICE — DEV LK WIREGUIDE FUSN OLYMP SCP

## (undated) DEVICE — CANN NASL CO2 TRULINK W/O2 A/

## (undated) DEVICE — TRIPLE LUMEN NEEDLE KNIFE: Brand: RX NEEDLE KNIFE XL

## (undated) DEVICE — ERBE NESSY®PLATE 170 SPLIT; 168CM²; CABLE 3M: Brand: ERBE

## (undated) DEVICE — Device: Brand: DEFENDO AIR/WATER/SUCTION AND BIOPSY VALVE

## (undated) DEVICE — FRCP BX RADJAW4 NDL 2.8 240CM LG OG BX40

## (undated) DEVICE — TBG 02 CRUSH RESIST LF CLR 7FT